# Patient Record
Sex: FEMALE | Race: WHITE | NOT HISPANIC OR LATINO | Employment: OTHER | URBAN - METROPOLITAN AREA
[De-identification: names, ages, dates, MRNs, and addresses within clinical notes are randomized per-mention and may not be internally consistent; named-entity substitution may affect disease eponyms.]

---

## 2017-01-06 ENCOUNTER — TELEPHONE (OUTPATIENT)
Dept: RHEUMATOLOGY | Facility: CLINIC | Age: 52
End: 2017-01-06

## 2017-01-06 NOTE — TELEPHONE ENCOUNTER
Social Work:  D/I: Pt contacted me to discuss her insurance issues. She lost MA over a year ago. She didn't know it ended until she started getting medical bills for the 20% co-pay. She is calling wondering what she can do. She can't afford a medicare supplement policy. I recall discussing [facility] [employer] with her in the past and recommend that program again. discussed that's it's not insurance but a discount program. She doesn't use any DME at this time as it's not covered under the program. Provied the phone # to obtain an application. Also encouraged her to apply for [facility] and [facility] [employer] programs for her old bills.  P: She will contact me again as needed.

## 2017-01-11 ENCOUNTER — TELEPHONE (OUTPATIENT)
Dept: RHEUMATOLOGY | Facility: CLINIC | Age: 52
End: 2017-01-11

## 2017-01-11 DIAGNOSIS — M34.9 SYSTEMIC SCLEROSIS (H): ICD-10-CM

## 2017-01-11 DIAGNOSIS — D50.0 IRON DEFICIENCY ANEMIA DUE TO CHRONIC BLOOD LOSS: ICD-10-CM

## 2017-01-11 DIAGNOSIS — Z79.899 ENCOUNTER FOR LONG-TERM CURRENT USE OF MEDICATION: ICD-10-CM

## 2017-01-11 DIAGNOSIS — M35.00 SICCA SYNDROME (H): ICD-10-CM

## 2017-01-11 DIAGNOSIS — I73.01 RAYNAUD'S DISEASE WITH GANGRENE (H): ICD-10-CM

## 2017-01-11 DIAGNOSIS — M77.40 METATARSALGIA, UNSPECIFIED LATERALITY: ICD-10-CM

## 2017-01-11 DIAGNOSIS — M05.79 RHEUMATOID ARTHRITIS INVOLVING MULTIPLE SITES WITH POSITIVE RHEUMATOID FACTOR (H): Primary | ICD-10-CM

## 2017-01-11 NOTE — TELEPHONE ENCOUNTER
Pt is requesting that an order for her PFT and also her labs be faxed to Dr. Foley with [facility] at [phone] due to insurance coverage. Dr. Ng's phone number is [phone].

## 2017-01-16 NOTE — TELEPHONE ENCOUNTER
Pt left VM re: message below. Orders not received. Requesting be sent again, she be called at [phone]

## 2017-01-19 ENCOUNTER — HOSPITAL ENCOUNTER (OUTPATIENT)
Dept: MAMMOGRAPHY | Facility: CLINIC | Age: 52
Discharge: HOME | End: 2017-01-19
Attending: FAMILY MEDICINE | Admitting: FAMILY MEDICINE
Payer: MEDICARE

## 2017-01-19 DIAGNOSIS — Z12.31 VISIT FOR SCREENING MAMMOGRAM: ICD-10-CM

## 2017-01-19 PROCEDURE — G0202 SCR MAMMO BI INCL CAD: HCPCS

## 2017-01-19 NOTE — TELEPHONE ENCOUNTER
Patient called again regarding orders. Informed that they need to be physically signed by the provider and that sometimes providers are only in office once or twice per week. Patient verbalized understanding of this and asked that I send another reminder message to rheumatology pool.

## 2017-01-24 RX ORDER — OXYCODONE HYDROCHLORIDE 5 MG/1
5 TABLET ORAL EVERY 4 HOURS PRN
Qty: 200 TABLET | Refills: 0 | Status: SHIPPED | OUTPATIENT
Start: 2017-01-24 | End: 2017-02-27

## 2017-01-24 RX ORDER — OXYCODONE AND ACETAMINOPHEN 5; 325 MG/1; MG/1
TABLET ORAL
Qty: 90 TABLET | Refills: 0 | Status: SHIPPED | OUTPATIENT
Start: 2017-01-24 | End: 2017-02-27

## 2017-01-24 NOTE — TELEPHONE ENCOUNTER
Pt called back. Please fax to [phone] [facility]. Pt wants to be called when done. Informed will be done as soon as possible. Please advise.

## 2017-01-24 NOTE — TELEPHONE ENCOUNTER
Last OFV: 9/1/16  Pending OFV: 2/16/17  Dx: Scleroderma  Last filled: Percocet 12/31/16   Oxycodone 1/3/17  Qty: Percocet 90   Oxycodone 200  Pharmacy: [facility] in [place]    No other narcotics filled

## 2017-01-24 NOTE — TELEPHONE ENCOUNTER
Pt is calling again to request that these orders be faxed as soon as possible.       Pt is also requesting refills of the below medications. Pt needs to pick these up this week due to leaving for [place] for over a week starting next Monday. Please contact pt at [phone] when Rx's are ready for .    oxyCODONE-acetaminophen (PERCOCET) 5-325 MG per tablet 90 tablet 0         Sig: Take 1 tablet three times daily as needed For breakthrough pain.     oxyCODONE (ROXICODONE) 5 MG IR tablet 200 tablet 0         Sig: Take 1 tablet (5 mg) by mouth every 4 hours as needed for moderate to severe pain or pain Maximum 8 tabs per day.

## 2017-01-24 NOTE — TELEPHONE ENCOUNTER
Pt called and informed that all orders were faxed to the [phone] number.  Patient stated understanding and all questions were answered.

## 2017-02-01 ENCOUNTER — TRANSFERRED RECORDS (OUTPATIENT)
Dept: HEALTH INFORMATION MANAGEMENT | Facility: CLINIC | Age: 52
End: 2017-02-01

## 2017-02-02 LAB
ALBUMIN SERPL-MCNC: 4.7 G/DL (ref 3.5–5.2)
ALT SERPL-CCNC: 9 U/L (ref 8–45)
AST SERPL-CCNC: 16 U/L (ref 2–40)
CREAT SERPL-MCNC: 0.78 MG/DL (ref 0.57–1.11)
CRP SERPL-MCNC: 2.28 MG/L
GFR SERPL CREATININE-BSD FRML MDRD: >60 ML/MIN/1.73M2
IRON BINDING CAP: 342 (ref 245–400)
IRON: 51 UG/DL (ref 25–156)
UIBC: 291 UG/DL

## 2017-02-03 ENCOUNTER — TRANSFERRED RECORDS (OUTPATIENT)
Dept: HEALTH INFORMATION MANAGEMENT | Facility: CLINIC | Age: 52
End: 2017-02-03

## 2017-02-03 LAB
ABSOLUTE BASOPHILS (EXTERNAL): 0
BACTERIA URINE: NORMAL
BASOPHILS NFR BLD AUTO: 0.2 %
BILIRUBIN UR: NORMAL MG/DL
BLOOD UR: NORMAL U/L
CLARITY, URINE: CLEAR
COLOR UR: YELLOW
EOSINOPHIL # BLD AUTO: 0.1 10*3/UL
EOSINOPHIL NFR BLD AUTO: 1 %
EPITHELIAL CELLS UR: NORMAL
ERYTHROCYTE [DISTWIDTH] IN BLOOD BY AUTOMATED COUNT: 13.1 % (ref 11.5–15.5)
ERYTHROCYTE [SEDIMENTATION RATE] IN BLOOD: 29 MM/HR
GLUCOSE URINE: NORMAL MG/DL
HCT VFR BLD AUTO: 37.1 % (ref 33–51)
HEMOGLOBIN: 11.8 G/DL (ref 12–16)
KETONES UR QL: NORMAL MG/DL
LEUKOCYTE ESTERASE URINE: NORMAL
LYMPHOCYTES # BLD AUTO: 1.8 10*3/UL (ref 0.9–2.9)
LYMPHOCYTES NFR BLD AUTO: 34 % (ref 20–?)
MCH RBC QN AUTO: 31.2 PG (ref 26–34)
MCHC RBC AUTO-ENTMCNC: 31.8 G/DL (ref 32–36)
MCV RBC AUTO: 98 FL (ref 80–100)
MONOCYTES # BLD AUTO: 0.5 10*3/UL
MONOCYTES NFR BLD AUTO: 9.1 %
NEUTROPHILS # BLD AUTO: 2.9 10*3/UL (ref 1.7–7)
NEUTROPHILS NFR BLD AUTO: 55.7 % (ref 42–?)
NITRITE UR QL STRIP: NORMAL
PH UR STRIP: 5 PH (ref 5–7)
PLATELET COUNT - QUEST: 223 10^9/L (ref 140–440)
PMV BLD: 10.2 FL (ref 6.5–11)
PROTEIN UR: NORMAL MG/DL
RBC # BLD AUTO: 3.78 10^12/L (ref 4–5.2)
RBC URINE: NORMAL
SPECIFIC GRAVITY URINE (EXTERNAL): <=1.005
UROBILINOGEN (EXTERNAL): NORMAL
WBC # BLD AUTO: 5.2 10^9/L (ref 4.5–11)
WBC URINE: NORMAL

## 2017-02-16 ENCOUNTER — OFFICE VISIT (OUTPATIENT)
Dept: RHEUMATOLOGY | Facility: CLINIC | Age: 52
End: 2017-02-16
Attending: INTERNAL MEDICINE
Payer: MEDICARE

## 2017-02-16 VITALS
SYSTOLIC BLOOD PRESSURE: 114 MMHG | HEART RATE: 61 BPM | OXYGEN SATURATION: 91 % | DIASTOLIC BLOOD PRESSURE: 65 MMHG | HEIGHT: 64 IN | WEIGHT: 122 LBS | BODY MASS INDEX: 20.83 KG/M2

## 2017-02-16 DIAGNOSIS — B37.81 ESOPHAGEAL CANDIDIASIS (H): ICD-10-CM

## 2017-02-16 DIAGNOSIS — K31.819 GAVE (GASTRIC ANTRAL VASCULAR ECTASIA): ICD-10-CM

## 2017-02-16 DIAGNOSIS — M34.9 SYSTEMIC SCLEROSIS (H): ICD-10-CM

## 2017-02-16 DIAGNOSIS — Z79.899 ENCOUNTER FOR LONG-TERM CURRENT USE OF MEDICATION: ICD-10-CM

## 2017-02-16 DIAGNOSIS — M35.00 SICCA SYNDROME (H): ICD-10-CM

## 2017-02-16 DIAGNOSIS — D50.8 OTHER IRON DEFICIENCY ANEMIA: Primary | ICD-10-CM

## 2017-02-16 PROCEDURE — 99212 OFFICE O/P EST SF 10 MIN: CPT | Mod: ZF

## 2017-02-16 RX ORDER — ASPIRIN AND DIPYRIDAMOLE 25; 200 MG/1; MG/1
1 CAPSULE, EXTENDED RELEASE ORAL DAILY
COMMUNITY
Start: 2016-06-02

## 2017-02-16 NOTE — MR AVS SNAPSHOT
"              After Visit Summary   2/16/2017    Willard Grayson    MRN: [id number]           Patient Information     Date Of Birth          1965        Visit Information        Provider Department      2/16/2017 4:00 PM Brennen Child MD [facility] Rheumatology        Today's Diagnoses     Other iron deficiency anemia    -  1    Systemic sclerosis (H)        Encounter for long-term current use of medication        Sicca syndrome (H)        GAVE (gastric antral vascular ectasia)        Esophageal candidiasis (H)           Follow-ups after your visit        Your next 10 appointments already scheduled     May 18, 2017  2:30 PM CDT   (Arrive by 2:15 PM)   Return Visit with Brennen Child MD   [facility] Rheumatology ([facility])    [address] [address] [phone]              Who to contact     If you have questions or need follow up information about today's clinic visit or your schedule please contact [facility] RHEUMATOLOGY directly at [phone].  Normal or non-critical lab and imaging results will be communicated to you by [employer], letter or phone within 4 business days after the clinic has received the results. If you do not hear from us within 7 days, please contact the clinic through [employer] or phone. If you have a critical or abnormal lab result, we will notify you by phone as soon as possible.  Submit refill requests through [employer] or call your pharmacy and they will forward the refill request to us. Please allow 3 business days for your refill to be completed.          Additional Information About Your Visit        [employer] Information     [employer] lets you send messages to your doctor, view your test results, renew your prescriptions, schedule appointments and more. To sign up, go to [email or web address] . Click on \"Log in\" on the left side of the screen, which will take you to the Welcome page. Then click on \"Sign up Now\" on the " "right side of the page.     You will be asked to enter the access code listed below, as well as some personal information. Please follow the directions to create your username and password.     Your access code is: [email or web address]  Expires: 5/3/2017  6:30 AM     Your access code will  in 90 days. If you need help or a new code, please call your [facility] or [phone].        Care EveryWhere ID     This is your Care EveryWhere ID. This could be used by other organizations to access your [facility] medical records  [id number]        Your Vitals Were     Pulse Height Pulse Oximetry BMI (Body Mass Index)          61 1.626 m (5' 4\") 91% 20.94 kg/m2         Blood Pressure from Last 3 Encounters:   No data found for BP    Weight from Last 3 Encounters:   No data found for Wt              Today, you had the following     No orders found for display       Primary Care Provider Office Phone # Fax #    Lindy Neeta Plasencia [phone]       [place] [address] [facility] [address]        Thank you!     Thank you for choosing [facility] RHEUMATOLOGY  for your care. Our goal is always to provide you with excellent care. Hearing back from our patients is one way we can continue to improve our services. Please take a few minutes to complete the written survey that you may receive in the mail after your visit with us. Thank you!             Your Updated Medication List - Protect others around you: Learn how to safely use, store and throw away your medicines at www.disposemymeds.org.          This list is accurate as of: 17 11:59 PM.  Always use your most recent med list.                   Brand Name Dispense Instructions for use    aspirin-dipyridamole  MG per 12 hr capsule    AGGRENOX     Take 1 capsule by mouth       Blood Pressure Monitoring Kit     1 kit    Check BP every 7 days.       cevimeline 30 MG capsule    EVOXAC    270 capsule    Take 1 capsule (30 mg) by mouth 3 " times daily as needed       cyanocobalamin 1000 MCG/ML injection    VITAMIN B12     Inject 1 mL into the muscle every 30 days       esomeprazole 40 MG CR capsule    nexIUM    180 capsule    Take 1 capsule (40 mg) by mouth 2 times daily Take 30-60 minutes before eating.       fluconazole 100 MG tablet    DIFLUCAN    15 tablet    Take 2 tablets (200 mg) for 1 day followed by 1 tablet (100 mg) daily for 13 days.       folic acid 1 MG tablet    FOLVITE    450 tablet    Take 5 tablets (5 mg) by mouth daily       LORazepam 0.5 MG tablet    ATIVAN    60 tablet    Take 1 - 2 tablets by mouth three times daily as needed.       mycophenolate 500 MG tablet    CELLCEPT - GENERIC EQUIVALENT    360 tablet    Take 2 tablets (1,000 mg) by mouth 2 times daily *LABS DUE EVERY 8-12 WKS       ondansetron 4 MG tablet    ZOFRAN    20 tablet    Take 1 tablet (4 mg) by mouth every 8 hours as needed for nausea       oxyCODONE 5 MG IR tablet    ROXICODONE    200 tablet    Take 1 tablet (5 mg) by mouth every 4 hours as needed for moderate to severe pain or pain Maximum 8 tabs per day.       oxyCODONE-acetaminophen 5-325 MG per tablet    PERCOCET    90 tablet    Take 1 tablet three times daily as needed For breakthrough pain.       pravastatin 10 MG tablet    PRAVACHOL    30 tablet    Take 1 tablet (10 mg) by mouth daily       pregabalin 75 MG capsule    LYRICA    180 capsule    Take 1 capsule (75 mg) by mouth 2 times daily       valACYclovir 500 MG tablet    VALTREX    180 tablet    Take 1 tablet (500 mg) by mouth 2 times daily       VITAMIN D (CHOLECALCIFEROL) PO      Take by mouth daily

## 2017-02-16 NOTE — LETTER
2/16/2017      RE: Willard Grayson  [address]    [facility] [address]       Rheumatology Clinic Follow-up  [facility], Rheumatology        History of rheumatologic diagnosis:    Problem List:  1. 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   2. Associated marked neuropathic skin pain markedly improved with Lyrica with prior medication therapy with gabapentin.   3. Diffuse musculoskeletal pain requiring methotrexate plus CellCept, and narcotic to control, but also improved.   4. Initial clinical overlap with anti-CCP seropositive rheumatoid arthritis with continued anti-CCP seropositivity as of 09/2010.   5. Raynaud's phenomena with new active digital ulcers with easily cracked skin.   6. GI involvement consistent with bacterial small bowel overgrowth with marked improvement after a course of neomycin followed by Dr. Glez; positive hydrogen breath test in 3/2012 - treated wtih rifaximin through Dr. Jacob  7. No convincing evidence of lung involvement on serial pulmonary function tests or clinically.   8. History of heart palpitations prior to the onset of SSc with stable symptoms since.   9. History of medication failures with Remicade, methotrexate, methotrexate plus CellCept and with Orencia and with improvement since initiation of IV IG but with complicating headaches and overall sense of malaise with IV IG infusions.   10. History of silicone breast implants.   11. Incidental finding of calcified splenic artery aneurysm.  12. EGD in 3/2012 with hiatal hernia and gastral antral vascular ectasia        13. Recurrent right  third finger contracture with  extensor surface ulceration, healed        INTERVAL HISTORY, Since our last visit and her November  hospitalization, she has had no more digital ulcers and decreased pain in her fingers.  She did quit smoking at the time of that hospitalization and she has not resumed.  She still has pain in her fingers, but not nearly so severe.  Her biggest problem with her fingers remains the marked contractures.  She has difficulty using the paraffin bath because the paraffin is too hot, so we discussed simply unplugging it for a few minutes and letting it cool down a little and then before it gels using it to warm her hands and allow the stretches.       Since we have last seen her, she has seen her primary care.  I had requested that the lorazepam prescriptions be moved back over to primary care since I had simply assumed them when she had no primary care physician for her existing diagnosis of anxiety.  That has now been accomplished.      She also continues on oxycodone and we again today discussed attempting to taper that down.  She has decreased pain and hopefully can accomplish that, although she still does have multiple sources of musculoskeletal pain.  She has been off of methotrexate since the fall as she was having some gastrointestinal issues with it, but we did discuss that if tapering the narcotics becomes too problematic because of musculoskeletal pain with morning stiffness, resuming low-dose methotrexate might be possible and desirable.        She was started on a statin during the hospitalization and I also asked that that be taken over the primary care.  In fact, she brings her lipid results with her today and they are very good with her total cholesterol only 202, just barely above target, LDL only 133, just barely above target, HDL at 45 which is target, and triglycerides under target, so I am not sure that particularly now that she has the cigarette smoking removed as a risk factor and she has minimal family risk that she necessarily will need statins long-term.  On the other hand, they may have a  vascular stabilizing effect, so I am certainly not opposed to her remaining on it and she does not have side effect with it.       Her biggest issues recently have been gastrointestinal.  She has felt bloated, nauseated, and at times is having significant constipation.  She does not get diarrhea suggesting bacterial small bowel overgrowth.       She is having a lot of issues with her insurance and whether she can pay for her medical bills.  She therefore would like to see our .      She does get some dysphagia, but that is not changing and is not severe.       As noted, she has a number of sources of musculoskeletal pain including pain in her knees and hips and greater trochanteric area.  She did have what sounds like a Baker's cyst a few weeks ago, but that resolved quickly and she did not have any known antecedent injury that would explain that.     FEBRUARY 16, 2017, INTERVAL HISTORY:  Since I last saw the patient, she went down to Gastroenterology at the [facility] and saw Dr. Tatum there.  She was found to have recurrent versus ongoing esophageal candidiasis on upper endoscopy but had a normal colonoscopy.  A rectal balloon test, however, was abnormal, consistent with sphincter dysfunction potentially contributing to her chronic constipation.       She was also told she needed iron infusions.  This is somewhat curious in that her iron studies done at [facility] on 01/27 were only minimally abnormal and her hemoglobin and hematocrit were only minimally abnormal as well.  Apparently, her ferritin was very low, however.       We do not have any of those records to look at.  She says that she asked that they be sent here, but she was told they would be sent only to her primary care doctor.  I have looked in her [facility] notes on Care Everywhere, but I cannot see that they have been scanned in at this point.       She has some financial issues at this point.  Her labs and other tests are best paid for if  they are done at [facility].  Fortunately, her primary care doctor at [facility] was adept enough with the system that she was able to get the labs done at [facility] and have them come directly to my box.  I have reviewed all of them with her today and, as noted, she  has only had some very modest anemia.  She has no other evidence of any toxicity from the CellCept or really any other abnormal labs.       She has been doing well with her Raynaud's.  She has had no episodes of severe distal digital ischemia.  She is very careful about stopping attacks as soon as they get started with rewarming.  She has hand warmers with her at all times and a hand muff that she uses.       Unfortunately, she has restarted smoking again.  She is much more careful with it, she says, however, and is not smoking outdoors when in the cold the way she was previously when she developed a severe ulcer that would not heal.       Although she hated the Botox treatment that Dr. Beauchamp gave her, she has not had any further episodes since that time.       She does believe her lungs are stable.  She is not doing a lot of significant aerobic exercise, but she has not noticed any drop-off in her exercise tolerance with walking, etc.  She has not had PFTs done and I urged her to have them done at [facility] since it is so expensive for her to do them here.       She feels okay otherwise in general.  She continues to have some musculoskeletal pain but does not think it is really a lot worse.           ROS  Gen: Denies weight loss or fevers or night sweats  HEENT:  No ocular inflammation..  Denies swollen glands  CV:  Denies chest pains  Resp:  denies chronic cough or dyspnea.  No pleurisy  Ext:  No swelling in extremities.    Skin:  No new rashes  Remainder of 10-point ROS as per Interval history or negative,     Past Medical History  1.  Diffuse systemic sclerosis  2.  CCP+ inflammatory arthritis  3.  Small bowel overgrowth syndrome - positive hydrogen test   4.  " GERD  5.  Plantar fasciitis - seen by Dr. Jorgensen  6.  Chronic neuropathic pain - on lyrica and chronic percocet    Allergies   Allergies   Allergen Reactions     Penicillin [Esters] Other (See Comments)     Unable to move     Bactroban [Mupirocin Calcium]      Inability to move     Levaquin Other (See Comments)     Muscle weakness       Rifampin Other (See Comments)     Makes pain medication less effective     Medications  Current Outpatient Prescriptions   Medication     LORazepam (ATIVAN) 0.5 MG tablet     valACYclovir (VALTREX) 500 MG tablet     oxyCODONE (ROXICODONE) 5 MG immediate release tablet     oxyCODONE-acetaminophen (PERCOCET) 5-325 MG per tablet     pregabalin (LYRICA) 75 MG capsule     rifaximin (XIFAXAN) 550 MG TABS     mycophenolate (CELLCEPT-BRAND NAME) 500 MG tablet     omeprazole (PRILOSEC) 40 MG capsule     ondansetron (ZOFRAN) 4 MG tablet     Needle, Disp, 30G X 1/2\" MISC     diazepam (VALIUM) 5 MG tablet     fish oil-omega-3 fatty acids (FISH OIL) 1000 MG capsule     METHOTREXate 25 MG/ML injection     leucovorin (WELLCOVORIN) 5 MG tablet     Syringe/Needle, Disp, 27G X 1/2\" 1 ML MISC     Physical Exam  /65  Pulse 61  Ht 1.626 m (5' 4\")  Wt 55.3 kg (122 lb)  SpO2 91%  BMI 20.94 kg/m2  General: pleasant, but dysphoric  HEENT: EOMI, PERRL, no conjunctivitis or scleritis. Chronic facial skin thickenig without facial rash. Slight pursing of her lips. Slight restriction in opening of the jaw. Oropharynx exam reveals 4-5mm shallow ulceration on right lower lip. Moderate saliva pooling. No cervical adenopathy  CV: regular rhythm; no murmurs.    Resp: End-expiratory wheezing heard diffusely throughout  GI:  Soft, minimal  discomfort in the lower abdomen to deeper palpation; no masses or hepatomegaly  Ext:  No peripheral edema.   Skin:  Chronic sclerodactyly, most prominent in R hand (contracture at 50% finger flexion) vs. Left (missing final 20' extension).  Chronic skin thickening with " evidence for improvement along forearms, upper arms, chest, abdomen, lower legs and feet.  Stable Calcinosis-like deposit noted on her right anterior thigh just above the knee joint and on right fith finger. . Salt/pepper appearance to areas of skin (christiano arms and upper chest).  No other rashes.   Fully healed  DU  over  the  3rd  PIP joint: no otherDU.   Mild skin thinning/erythema along other PIPs. 2+ over fingers ; 1+ maximum over hands, and feet and shins, and face, pregressing to atrophic status.    MSK:  Full joint exam without evidence for synovitis. Chronic joint contractures of fingers (christiano right); minimally also along wrists and elbows.       RESULTS:  Component      Latest Ref Rng 1/17/2013           2:15 PM   WBC      4.0 - 11.0 10e9/L 4.6   RBC Count      3.8 - 5.2 10e12/L 3.71 (L)   Hemoglobin      11.7 - 15.7 g/dL 11.8   Hematocrit      35.0 - 47.0 % 37.4   MCV      78 - 100 fl 101 (H)   MCH      26.5 - 33.0 pg 31.8   MCHC      31.5 - 36.5 g/dL 31.6   RDW      10.0 - 15.0 % 13.9   Platelet Count      150 - 450 10e9/L 200     Component      Latest Ref Rng 1/17/2013   ALT      0 - 50 U/L 23   AST      0 - 45 U/L 30   Albumin      3.9 - 5.1 g/dL 4.2   CRP Inflammation      0.0 - 8.0 mg/L <5.0   Sed Rate      0 - 20 mm/h 18         ASSESSMENT: Per the Problem LIst    PLAN AND RECOMMENDATIONS:     I think her overall disease process is relatively stable and has been for a while.      I am discouraged that she has again started smoking, but encouraged that she has not developed digital ulcers since she did restart.      It sounds like she had a pretty good GI workup.  She has been treated now with high-dose fluconazole for recurrent esophageal candidiasis and she had a little bit of GI distress with that but nothing serious and her liver function tests were normal on the recent labs.      I am not quite sure what to make of the somewhat divergent reports of iron studies, so will wait to see what was done at  [facility].  If she cannot get that sent here, she will get a copy for herself and then simply bring in a copy for us.       Hopefully, she can continue to get her labs patched directly through to us the way they were done this last time by her primary care physician and we can also get pulmonary function tests done that way.  Based on her description of her exercise tolerance and her lack of crackles on the basilar lung exam, I am not terribly concerned, but we still should have those done at least annually.        She otherwise looks reasonably well.  I will see her back in 3 months, sooner p.r.n.       This visit was 40 minutes in duration, greater than 50% in counseling.       Brennen Child MD

## 2017-02-16 NOTE — NURSING NOTE
"No chief complaint on file.      Initial /65  Pulse 61  Ht 1.626 m (5' 4\")  Wt 55.3 kg (122 lb)  SpO2 91%  BMI 20.94 kg/m2 Estimated body mass index is 20.94 kg/(m^2) as calculated from the following:    Height as of this encounter: 1.626 m (5' 4\").    Weight as of this encounter: 55.3 kg (122 lb).  Medication Reconciliation: complete   Rosa Gonzalez CMA    "

## 2017-02-24 DIAGNOSIS — M05.79 RHEUMATOID ARTHRITIS INVOLVING MULTIPLE SITES WITH POSITIVE RHEUMATOID FACTOR (H): ICD-10-CM

## 2017-02-24 DIAGNOSIS — I73.01 RAYNAUD'S DISEASE WITH GANGRENE (H): ICD-10-CM

## 2017-02-24 DIAGNOSIS — M77.40 METATARSALGIA, UNSPECIFIED LATERALITY: ICD-10-CM

## 2017-02-24 DIAGNOSIS — M34.9 SYSTEMIC SCLEROSIS (H): ICD-10-CM

## 2017-02-24 NOTE — TELEPHONE ENCOUNTER
Pt is calling to request refills of the below medications. Pt would like to pick these up at the [facility]. Pt would like to pick these up on 2/28/17. Please contact her at [phone].      oxyCODONE (ROXICODONE) 5 MG IR tablet 200 tablet 0      Sig: Take 1 tablet (5 mg) by mouth every 4 hours as needed for moderate to severe pain or pain Maximum 8 tabs per day.     xyCODONE-acetaminophen (PERCOCET) 5-325 MG per tablet 90 tablet 0      Sig: Take 1 tablet three times daily as needed For breakthrough pain.

## 2017-02-27 DIAGNOSIS — M34.9 SYSTEMIC SCLEROSIS (H): ICD-10-CM

## 2017-02-27 NOTE — TELEPHONE ENCOUNTER
pregabalin (LYRICA) 75 MG capsule      Last Written Prescription Date:  7/27/2016  Last Fill Quantity: 180,   # refills: 1  Last Office Visit : 2/16/2017  Future Office visit:  5/18/2017    Routing refill request to provider for review/approval because:  Drug controlled substance

## 2017-02-27 NOTE — TELEPHONE ENCOUNTER
Roxicodone 5 mg      Last Written Prescription Date:  1-24-17  Last Fill Quantity: 200,   # refills: 0      Percocet 5-325 mg      Last Written Prescription Date:  1-24-17  Last Fill Quantity: 90,   # refills: 0  Last Office Visit : 2-16-17  Future Office visit:  5-18-17    Routing refill request to provider for review/approval because:  Drug not on the [facility], [facility] or [facility] refill protocol or controlled substance.    Kathleen M Doege RN

## 2017-02-28 RX ORDER — PREGABALIN 75 MG/1
75 CAPSULE ORAL 2 TIMES DAILY
Qty: 180 CAPSULE | Refills: 1
Start: 2017-02-28 | End: 2018-01-16

## 2017-02-28 RX ORDER — OXYCODONE AND ACETAMINOPHEN 5; 325 MG/1; MG/1
TABLET ORAL
Qty: 90 TABLET | Refills: 0 | Status: SHIPPED | OUTPATIENT
Start: 2017-02-28 | End: 2017-03-28

## 2017-02-28 RX ORDER — OXYCODONE HYDROCHLORIDE 5 MG/1
5 TABLET ORAL EVERY 4 HOURS PRN
Qty: 200 TABLET | Refills: 0 | Status: SHIPPED | OUTPATIENT
Start: 2017-02-28 | End: 2017-03-28

## 2017-03-10 ENCOUNTER — TRANSFERRED RECORDS (OUTPATIENT)
Dept: HEALTH INFORMATION MANAGEMENT | Facility: CLINIC | Age: 52
End: 2017-03-10

## 2017-03-22 DIAGNOSIS — M34.9 SYSTEMIC SCLEROSIS (H): ICD-10-CM

## 2017-03-22 DIAGNOSIS — M05.79 RHEUMATOID ARTHRITIS INVOLVING MULTIPLE SITES WITH POSITIVE RHEUMATOID FACTOR (H): ICD-10-CM

## 2017-03-22 DIAGNOSIS — I73.01 RAYNAUD'S DISEASE WITH GANGRENE (H): ICD-10-CM

## 2017-03-22 DIAGNOSIS — M77.40 METATARSALGIA, UNSPECIFIED LATERALITY: ICD-10-CM

## 2017-03-23 DIAGNOSIS — M77.40 METATARSALGIA, UNSPECIFIED LATERALITY: ICD-10-CM

## 2017-03-23 DIAGNOSIS — M34.9 SYSTEMIC SCLEROSIS (H): ICD-10-CM

## 2017-03-23 DIAGNOSIS — M35.00 SICCA SYNDROME (H): ICD-10-CM

## 2017-03-23 RX ORDER — CEVIMELINE HYDROCHLORIDE 30 MG/1
30 CAPSULE ORAL 3 TIMES DAILY PRN
Qty: 270 CAPSULE | Refills: 3 | Status: SHIPPED | OUTPATIENT
Start: 2017-03-23 | End: 2018-06-11

## 2017-03-23 NOTE — TELEPHONE ENCOUNTER
cevimeline (EVOXAC) 30 MG      Last Written Prescription Date:  11/4/15  Last Fill Quantity: 270,   # refills: 3  Last Office Visit : 2/16/17  Future Office visit:  5/18/17

## 2017-03-23 NOTE — TELEPHONE ENCOUNTER
mycophenolate (CELLCEPT - GENERIC EQUIVALENT) 500 MG tablet      Last Written Prescription Date:  12-11-16  Last Fill Quantity: 360,   # refills: 0  Last Office Visit: 2-16-17  Future Office visit:  5-18-17    CBC RESULTS:   Recent Labs   Lab Test  01/25/17   1445   WBC  5.2   RBC  3.78*   HGB  11.8*   HCT  37.1   MCV  98   MCH  31.2   MCHC  31.8*   RDW  13.1   PLT  223       Creatinine   Date Value Ref Range Status   01/25/2017 0.78 0.57 - 1.11 mg/dL Final   ]    Liver Function Studies -   Recent Labs   Lab Test  01/25/17   1445   11/24/15   1848   PROTTOTAL   --    --   7.8   ALBUMIN  4.7   < >  4.1   BILITOTAL   --    --   0.4   ALKPHOS   --    --   49   AST  16   < >  12   ALT  9   < >  15    < > = values in this interval not displayed.       Kathleen M Doege RN

## 2017-03-24 NOTE — TELEPHONE ENCOUNTER
Prescription(s):  Pending Prescriptions:                       Disp   Refills    mycophenolate (CELLCEPT - GENERIC EQUIVAL*360 ta*0            Sig: Take 2 tablets (1,000 mg) by mouth 2 times daily           Labs every 8-12 weeks    oxyCODONE (ROXICODONE) 5 MG IR tablet     200 ta*0            Sig: Take 1 tablet (5 mg) by mouth every 4 hours as           needed for moderate to severe pain or pain           Maximum 8 tabs per day.    oxyCODONE-acetaminophen (PERCOCET) 5-325 *90 tab*0            Sig: Take 1 tablet three times daily as needed For           breakthrough pain.        Last Date Narcotics Ordered: 2/24/17 for start date of 2/28/17  Last Date Filled at Pharmacy: roxicodone: 3/6/17 (34 day supply) - percocet: 2/28/17 (30 day supply)     checked: 03/24/17 - no concerns, no other providers for these medications.    Last Office Visit: 2/16/2017  Next Office Visit: Visit date not found    Aline Mireles CMA ([facility])  [facility]

## 2017-03-26 NOTE — TELEPHONE ENCOUNTER
mycophenolate (CELLCEPT      Last Written Prescription Date:  12/11/16  Last Fill Quantity: 360,   # refills: 0  Last Office Visit: 2/24/17  Future Office visit:  5/18/17    CBC RESULTS:   Recent Labs   Lab Test  01/25/17   1445   WBC  5.2   RBC  3.78*   HGB  11.8*   HCT  37.1   MCV  98   MCH  31.2   MCHC  31.8*   RDW  13.1   PLT  223       Creatinine   Date Value Ref Range Status   01/25/2017 0.78 0.57 - 1.11 mg/dL Final   ]    Liver Function Studies -   Recent Labs   Lab Test  01/25/17   1445   11/24/15   1848   PROTTOTAL   --    --   7.8   ALBUMIN  4.7   < >  4.1   BILITOTAL   --    --   0.4   ALKPHOS   --    --   49   AST  16   < >  12   ALT  9   < >  15    < > = values in this interval not displayed.   Routing refill request to provider for review/approval because:LABS DUE       oxyCODONE 5 MG   Last Prescription Date:  2/28/17  Last Fill Quantity: 200,   # refills: 0  Routing refill request to provider for review/approval because:  Drug not on [place] [facility] refill protocol or controlled substance     PERCOCET 5-325 MG  Last Written Prescription Date:   2/28/17  Last Fill Quantity: 90,   # refills: 0  Routing refill request to provider for review/approval because:  Drug not on the [facility] refill protocol or controlled substance

## 2017-03-27 NOTE — TELEPHONE ENCOUNTER
Scleroderma pt of Dr Talbert.    Last seen 2/24/17, with future appt 5/18/17. Med monitoring labs are ordered to be done every 8-12 weeks.    Rxs set up and routed to Dr Gonzalez, consulting rheumatologist.    WBC   Date Value Ref Range Status   01/25/2017 5.2 4.5 - 11.0 10^9/L Final     RBC Count   Date Value Ref Range Status   01/25/2017 3.78 (A) 4.00 - 5.20 10^12/L Final     Hemoglobin   Date Value Ref Range Status   01/25/2017 11.8 (A) 12.0 - 16.0 g/dL Final     Hematocrit   Date Value Ref Range Status   01/25/2017 37.1 33.0 - 51.0 % Final     MCV   Date Value Ref Range Status   01/25/2017 98 80 - 100 fl Final     MCH   Date Value Ref Range Status   01/25/2017 31.2 26.0 - 34.0 pg Final     Platelet Count   Date Value Ref Range Status   01/25/2017 223 140 - 440 10^9/L Final   10/26/2016 189 150 - 450 10e9/L Final     % Lymphocytes   Date Value Ref Range Status   01/25/2017 34.0 20.0 % Final     AST   Date Value Ref Range Status   01/25/2017 16 2 - 40 U/L Final     ALT   Date Value Ref Range Status   01/25/2017 9 8 - 45 U/L Final     Albumin   Date Value Ref Range Status   01/25/2017 4.7 3.5 - 5.2 g/dL Final     Alkaline Phosphatase   Date Value Ref Range Status   11/24/2015 49 40 - 150 U/L Final     Creatinine   Date Value Ref Range Status   01/25/2017 0.78 0.57 - 1.11 mg/dL Final     GFR Estimate   Date Value Ref Range Status   01/25/2017 >60 ml/min/1.73m2 Final     GFR Estimate If Black   Date Value Ref Range Status   01/25/2017 >60 ml/min/1.73m2 Final     Creatinine Urine   Date Value Ref Range Status   05/13/2013 7 mg/dL Final     Sed Rate   Date Value Ref Range Status   01/25/2017 29 <31 mm/hr Final     CRP Cardiac Risk   Date Value Ref Range Status   08/24/2010 1.0 mg/L Final     Comment:     Reference Values:   Low Risk:           <1.0 mg/L   Average Risk:       1.0-3.0 mg/L   High Risk:          >3.0 mg/L   Acute Inflammation: >8.0 mg/L     CRP Inflammation   Date Value Ref Range Status   01/25/2017 2.28  <0.50 Final

## 2017-03-28 RX ORDER — OXYCODONE HYDROCHLORIDE 5 MG/1
TABLET ORAL
Qty: 200 TABLET | Refills: 0 | OUTPATIENT
Start: 2017-03-28

## 2017-03-28 RX ORDER — OXYCODONE HYDROCHLORIDE 5 MG/1
5 TABLET ORAL EVERY 4 HOURS PRN
Qty: 200 TABLET | Refills: 0 | Status: SHIPPED | OUTPATIENT
Start: 2017-03-28 | End: 2017-03-31

## 2017-03-28 RX ORDER — OXYCODONE AND ACETAMINOPHEN 5; 325 MG/1; MG/1
TABLET ORAL
Qty: 90 TABLET | Refills: 0 | Status: SHIPPED | OUTPATIENT
Start: 2017-03-28 | End: 2017-03-31

## 2017-03-28 RX ORDER — OXYCODONE AND ACETAMINOPHEN 5; 325 MG/1; MG/1
TABLET ORAL
Qty: 90 TABLET | Refills: 0 | OUTPATIENT
Start: 2017-03-28

## 2017-03-28 RX ORDER — MYCOPHENOLATE MOFETIL 500 MG/1
1000 TABLET ORAL 2 TIMES DAILY
Qty: 120 TABLET | Refills: 3 | OUTPATIENT
Start: 2017-03-28

## 2017-03-28 RX ORDER — MYCOPHENOLATE MOFETIL 500 MG/1
1000 TABLET ORAL 2 TIMES DAILY
Qty: 360 TABLET | Refills: 0 | Status: SHIPPED | OUTPATIENT
Start: 2017-03-28 | End: 2017-06-14

## 2017-03-29 ENCOUNTER — TELEPHONE (OUTPATIENT)
Dept: RHEUMATOLOGY | Facility: CLINIC | Age: 52
End: 2017-03-29

## 2017-03-29 DIAGNOSIS — M06.9 RHEUMATOID ARTHRITIS (H): Primary | ICD-10-CM

## 2017-03-29 DIAGNOSIS — D50.9 ANEMIA, IRON DEFICIENCY: ICD-10-CM

## 2017-03-29 DIAGNOSIS — Z79.899 HIGH RISK MEDICATIONS (NOT ANTICOAGULANTS) LONG-TERM USE: ICD-10-CM

## 2017-03-29 DIAGNOSIS — M34.9 SYSTEMIC SCLEROSIS (H): ICD-10-CM

## 2017-03-29 DIAGNOSIS — K31.819 GAVE (GASTRIC ANTRAL VASCULAR ECTASIA): ICD-10-CM

## 2017-03-29 NOTE — TELEPHONE ENCOUNTER
Dr. Minerva Elizabeth from [facility] is calling to discuss prescribing vaginal estrogen for this pt. Please contact Dr. Elizabeth at [phone] (detailed VM OK).

## 2017-03-29 NOTE — TELEPHONE ENCOUNTER
Pt stopped into clinic to allow staff to copy her [place] records. Copies made and originals given back to pt.    Pt is asking that lab orders be send to Dr Ng's office again as it is cheaper to get her labs done at that office. Fax number is [phone],    Lab orders printed, ready to Dr Child's signature, which will then be faxed.

## 2017-03-30 NOTE — TELEPHONE ENCOUNTER
Left message on Dr Elizabeth's voice mail informing her that Dr Child is out of the clinic this week, returning next week. Left my phone number so that she could return call to provide more information if she wished, otherwise a message will be forwarded to Dr Child.

## 2017-03-31 DIAGNOSIS — M34.9 SYSTEMIC SCLEROSIS (H): ICD-10-CM

## 2017-03-31 DIAGNOSIS — M77.40 METATARSALGIA, UNSPECIFIED LATERALITY: ICD-10-CM

## 2017-03-31 DIAGNOSIS — I73.01 RAYNAUD'S DISEASE WITH GANGRENE (H): ICD-10-CM

## 2017-03-31 DIAGNOSIS — M05.79 RHEUMATOID ARTHRITIS INVOLVING MULTIPLE SITES WITH POSITIVE RHEUMATOID FACTOR (H): ICD-10-CM

## 2017-03-31 RX ORDER — OXYCODONE AND ACETAMINOPHEN 5; 325 MG/1; MG/1
TABLET ORAL
Qty: 90 TABLET | Refills: 0 | Status: SHIPPED | OUTPATIENT
Start: 2017-03-31 | End: 2017-04-24

## 2017-03-31 RX ORDER — OXYCODONE HYDROCHLORIDE 5 MG/1
5 TABLET ORAL EVERY 4 HOURS PRN
Qty: 200 TABLET | Refills: 0 | Status: SHIPPED | OUTPATIENT
Start: 2017-03-31 | End: 2017-04-24

## 2017-03-31 NOTE — TELEPHONE ENCOUNTER
Returned call to pt. She reports that after she picked up her narcotic prescriptions here, she brought them to her [facility] pharmacy. States the pharmacist who works with her there took them. The plan was that she was going to pick them up today. When she called to check on the status she spoke with a different pharmacist and was told they did not have the scripts. Furthermore was told that it appeared they had lost them. The pharmacist she gave them to was not at work, has been ill off and on this past week. Pt was told to contact this clinic for new ones.    Call placed to [facility] pharmacy and spoke with the pharmacist. Was told that he had been told that the other pharmacist verified he had received the scripts from this pt but can't remember what he had done with them. Pharmacy staff has searched their pharmacy but hasn't seen any sign of them. Pharmacist apologized.    Pt will be out of these meds tonight and needs to get them filled.    Rxs set up and will be routed to consult provider.

## 2017-03-31 NOTE — TELEPHONE ENCOUNTER
Pt left a voice message stating the pharmacy lost her narcotic Rx's and needs to pick these up today. Please contact pt at [phone].

## 2017-03-31 NOTE — TELEPHONE ENCOUNTER
Pt called called from [facility] re: pain meds. [facility] informed her ibuprofen Rx could not be fill b/c too early, requesting other med for pain. Please advise ASAP at [phone]. Sent to refill pool pool.

## 2017-04-03 NOTE — TELEPHONE ENCOUNTER
You can let them know that I think this is okay. If she has more specific questions ns I'm happy to talk to her.

## 2017-04-03 NOTE — TELEPHONE ENCOUNTER
Call placed to Dr Elizabeth. Pt has so much post menopausal vaginal atrophy that they are not able to do a colposcopy due to pt not tolerating it. Dr Elizabeth just wanted to give her vaginal estrogen so that they can complete the colposcopy. Relayed Dr Child's comment regarding it was to prescribe the vaginal estrogen. Dr Elizabeth appreciated the call back. She did not have any other questions.    TIARRA FrankN RN  Rheumatology RN Coordinator   [facility]

## 2017-04-06 ENCOUNTER — TELEPHONE (OUTPATIENT)
Dept: RHEUMATOLOGY | Facility: CLINIC | Age: 52
End: 2017-04-06

## 2017-04-06 DIAGNOSIS — M06.9 RA (RHEUMATOID ARTHRITIS) (H): Primary | ICD-10-CM

## 2017-04-06 DIAGNOSIS — M34.9 SYSTEMIC SCLEROSIS (H): ICD-10-CM

## 2017-04-06 NOTE — TELEPHONE ENCOUNTER
Pt saying pulmonary clinic cannot schedule PFT d/t orders being old. Also asking whether Dr. Child wants 30 or 60 minutes test. Please advise. Sent to [facility].

## 2017-04-07 NOTE — TELEPHONE ENCOUNTER
PFT orders updated. Pt notified this has been done.    TIARRA FrankN RN  Rheumatology RN Coordinator  [facility]

## 2017-04-10 DIAGNOSIS — M34.9 SYSTEMIC SCLEROSIS (H): Primary | ICD-10-CM

## 2017-04-14 LAB
ABSOLUTE BASOPHILS (EXTERNAL): 0
ALBUMIN SERPL-MCNC: 4.7 G/DL (ref 3.5–5.2)
ALT SERPL-CCNC: 7 U/L (ref 8–45)
AST SERPL-CCNC: 13 U/L (ref 2–40)
BACTERIA, UR: ABNORMAL
BASOPHILS NFR BLD AUTO: 0.2 %
BILIRUB UR QL: ABNORMAL
CLARITY, URINE: CLEAR
COLOR UR: YELLOW
CREAT SERPL-MCNC: 0.82 MG/DL (ref 0.57–1.11)
CRP SERPL-MCNC: 0.07 MG/L
EOSINOPHIL # BLD AUTO: 0.1 10*3/UL
EOSINOPHIL NFR BLD AUTO: 1.9 %
ERYTHROCYTE [DISTWIDTH] IN BLOOD BY AUTOMATED COUNT: 13.5 % (ref 11.5–15.5)
ERYTHROCYTE [SEDIMENTATION RATE] IN BLOOD: 10 MM/HR
GFR SERPL CREATININE-BSD FRML MDRD: >60 ML/MIN/1.73M2
GLUCOSE URINE: ABNORMAL MG/DL
HCT VFR BLD AUTO: 37 % (ref 33–51)
HEMOGLOBIN: 11.8 G/DL (ref 12–16)
HGB UR QL: ABNORMAL
IRON BINDING CAPACITY: 327 UG/DL (ref 245–400)
IRON SATURATION %: 28 % (ref 20–55)
IRON: 93 UG/DL (ref 25–156)
KETONES UR QL: ABNORMAL MG/DL
LEUKOCYTE ESTERASE URINE: ABNORMAL
LYMPHOCYTES # BLD AUTO: 1.6 10*3/UL (ref 0.9–2.9)
LYMPHOCYTES NFR BLD AUTO: 33.8 % (ref 20–44)
MCH RBC QN AUTO: 31.6 PG (ref 26–34)
MCHC RBC AUTO-ENTMCNC: 31.9 G/DL (ref 32–36)
MCV RBC AUTO: 99 FL (ref 80–100)
MONOCYTES # BLD AUTO: 0.5 10*3/UL
MONOCYTES NFR BLD AUTO: 9.5 %
NEUTROPHILS # BLD AUTO: 2.7 10*3/UL (ref 1.7–7)
NEUTROPHILS NFR BLD AUTO: 54.6 % (ref 42–72)
NITRITE UR QL STRIP: ABNORMAL
PH BLDA: 5 [PH] (ref 6–8.5)
PLATELET COUNT - QUEST: 219 10^9/L (ref 140–440)
PMV BLD: 10.4 FL (ref 6.5–11)
PROT UR QL: ABNORMAL MG/DL
RBC # BLD AUTO: 3.74 10^12/L (ref 4–5.2)
RBC, UR MICRO: ABNORMAL (ref ?–2)
SP GR UR STRIP: 1 (ref 1–1.03)
SQUAMOUS EPI: ABNORMAL
UIBC: 234 UG/DL
UROBILINOGEN UR QL STRIP: ABNORMAL EU/DL (ref 0.2–1)
WBC # BLD AUTO: 4.9 10^9/L (ref 4.5–11)
WBC, UR MICRO: ABNORMAL (ref ?–2)

## 2017-04-24 DIAGNOSIS — M34.9 SYSTEMIC SCLEROSIS (H): ICD-10-CM

## 2017-04-24 DIAGNOSIS — M05.79 RHEUMATOID ARTHRITIS INVOLVING MULTIPLE SITES WITH POSITIVE RHEUMATOID FACTOR (H): ICD-10-CM

## 2017-04-24 DIAGNOSIS — I73.01 RAYNAUD'S DISEASE WITH GANGRENE (H): ICD-10-CM

## 2017-04-24 DIAGNOSIS — M77.40 METATARSALGIA, UNSPECIFIED LATERALITY: ICD-10-CM

## 2017-04-24 NOTE — TELEPHONE ENCOUNTER
Last seen: 2/16/2017    Pending appt: 5/18/2017  Medication: percocet 5-325 mg   Last filled: 3/31/2017  Qty: 90 tabs 0 refills      Last seen: 02/16/2017  Pending appt: 05/18/2017  Medication: oxycodone 5 mg  IR  Last filled: 3/31/2017  Qty: 200 tabs 0 refills    Shanta Shetty LPN

## 2017-04-24 NOTE — TELEPHONE ENCOUNTER
Pt requesting Percocet, [place] [facility] [place] [facility] pool.

## 2017-04-25 DIAGNOSIS — I73.01 RAYNAUD'S DISEASE WITH GANGRENE (H): ICD-10-CM

## 2017-04-25 DIAGNOSIS — M77.40 METATARSALGIA, UNSPECIFIED LATERALITY: ICD-10-CM

## 2017-04-25 DIAGNOSIS — M34.9 SYSTEMIC SCLEROSIS (H): ICD-10-CM

## 2017-04-25 DIAGNOSIS — M05.79 RHEUMATOID ARTHRITIS INVOLVING MULTIPLE SITES WITH POSITIVE RHEUMATOID FACTOR (H): ICD-10-CM

## 2017-04-25 LAB
DLCOUNC-%PRED-PRE: 84 %
DLCOUNC-PRE: 19.02 ML/MIN/MMHG
DLCOUNC-PRED: 22.51 ML/MIN/MMHG
ERV-%PRED-PRE: 148 %
ERV-PRE: 1.63 L
ERV-PRED: 1.1 L
EXPTIME-PRE: 7.96 SEC
FEF2575-%PRED-PRE: 72 %
FEF2575-PRE: 1.76 L/SEC
FEF2575-PRED: 2.44 L/SEC
FEFMAX-%PRED-PRE: 103 %
FEFMAX-PRE: 6.9 L/SEC
FEFMAX-PRED: 6.66 L/SEC
FEV1-%PRED-PRE: 97 %
FEV1-PRE: 2.44 L
FEV1FEV6-PRE: 74 %
FEV1FEV6-PRED: 82 %
FEV1FVC-PRE: 74 %
FEV1FVC-PRED: 81 %
FEV1SVC-PRE: 76 %
FEV1SVC-PRED: 73 %
FIFMAX-PRE: 6.59 L/SEC
FRCPLETH-%PRED-PRE: 125 %
FRCPLETH-PRE: 3.42 L
FRCPLETH-PRED: 2.72 L
FVC-%PRED-PRE: 105 %
FVC-PRE: 3.31 L
FVC-PRED: 3.14 L
IC-%PRED-PRE: 66 %
IC-PRE: 1.57 L
IC-PRED: 2.36 L
RVPLETH-%PRED-PRE: 97 %
RVPLETH-PRE: 1.78 L
RVPLETH-PRED: 1.83 L
TLCPLETH-%PRED-PRE: 99 %
TLCPLETH-PRE: 4.99 L
TLCPLETH-PRED: 5.02 L
VA-%PRED-PRE: 90 %
VA-PRE: 4.65 L
VC-%PRED-PRE: 92 %
VC-PRE: 3.2 L
VC-PRED: 3.46 L

## 2017-04-25 RX ORDER — OXYCODONE HYDROCHLORIDE 5 MG/1
5 TABLET ORAL EVERY 4 HOURS PRN
Qty: 200 TABLET | Refills: 0 | Status: SHIPPED | OUTPATIENT
Start: 2017-04-25 | End: 2017-04-25

## 2017-04-25 RX ORDER — OXYCODONE AND ACETAMINOPHEN 5; 325 MG/1; MG/1
TABLET ORAL
Qty: 90 TABLET | Refills: 0 | Status: SHIPPED | OUTPATIENT
Start: 2017-04-25 | End: 2017-05-18

## 2017-04-25 RX ORDER — OXYCODONE HYDROCHLORIDE 5 MG/1
5 TABLET ORAL EVERY 4 HOURS PRN
Qty: 200 TABLET | Refills: 0 | Status: SHIPPED | OUTPATIENT
Start: 2017-04-25 | End: 2017-05-18

## 2017-04-25 RX ORDER — OXYCODONE AND ACETAMINOPHEN 5; 325 MG/1; MG/1
TABLET ORAL
Qty: 90 TABLET | Refills: 0 | Status: SHIPPED | OUTPATIENT
Start: 2017-04-25 | End: 2017-04-25

## 2017-04-25 NOTE — TELEPHONE ENCOUNTER
Writer called patient informing her that rx is available for  and will be located on the 1st floor with . Patient informed that she will need proof of ID. Patient stated understanding.  Shanta Shetty LPN

## 2017-05-18 ENCOUNTER — OFFICE VISIT (OUTPATIENT)
Dept: RHEUMATOLOGY | Facility: CLINIC | Age: 52
End: 2017-05-18
Attending: INTERNAL MEDICINE
Payer: MEDICARE

## 2017-05-18 VITALS
SYSTOLIC BLOOD PRESSURE: 99 MMHG | HEIGHT: 65 IN | BODY MASS INDEX: 20.06 KG/M2 | DIASTOLIC BLOOD PRESSURE: 60 MMHG | HEART RATE: 69 BPM | TEMPERATURE: 97.6 F | WEIGHT: 120.4 LBS

## 2017-05-18 DIAGNOSIS — M05.79 RHEUMATOID ARTHRITIS INVOLVING MULTIPLE SITES WITH POSITIVE RHEUMATOID FACTOR (H): ICD-10-CM

## 2017-05-18 DIAGNOSIS — M77.41 METATARSALGIA OF BOTH FEET: Primary | ICD-10-CM

## 2017-05-18 DIAGNOSIS — M77.42 METATARSALGIA OF BOTH FEET: Primary | ICD-10-CM

## 2017-05-18 DIAGNOSIS — M77.40 METATARSALGIA, UNSPECIFIED LATERALITY: ICD-10-CM

## 2017-05-18 DIAGNOSIS — M34.9 SYSTEMIC SCLEROSIS (H): ICD-10-CM

## 2017-05-18 DIAGNOSIS — I73.01 RAYNAUD'S DISEASE WITH GANGRENE (H): ICD-10-CM

## 2017-05-18 PROCEDURE — 99212 OFFICE O/P EST SF 10 MIN: CPT | Mod: ZF

## 2017-05-18 RX ORDER — OXYCODONE HYDROCHLORIDE 5 MG/1
5 TABLET ORAL EVERY 4 HOURS PRN
Qty: 204 TABLET | Refills: 0 | Status: SHIPPED | OUTPATIENT
Start: 2017-05-18 | End: 2017-06-21

## 2017-05-18 RX ORDER — OXYCODONE AND ACETAMINOPHEN 5; 325 MG/1; MG/1
TABLET ORAL
Qty: 90 TABLET | Refills: 0 | Status: SHIPPED | OUTPATIENT
Start: 2017-05-18 | End: 2017-06-21

## 2017-05-18 RX ORDER — OXYCODONE HYDROCHLORIDE 5 MG/1
5 TABLET ORAL EVERY 4 HOURS PRN
Qty: 200 TABLET | Refills: 0 | Status: SHIPPED | OUTPATIENT
Start: 2017-05-18 | End: 2017-05-18

## 2017-05-18 ASSESSMENT — PAIN SCALES - GENERAL: PAINLEVEL: MODERATE PAIN (5)

## 2017-05-18 NOTE — MR AVS SNAPSHOT
"              After Visit Summary   5/18/2017    Willard Grayson    MRN: [id number]           Patient Information     Date Of Birth          1965        Visit Information        Provider Department      5/18/2017 2:30 PM Brennen Child MD [facility]        Today's Diagnoses     Metatarsalgia of both feet    -  1    Systemic sclerosis (H)        Rheumatoid arthritis involving multiple sites with positive rheumatoid factor (H)        Raynaud's disease with gangrene (H)        Systemic sclerosis        Metatarsalgia, unspecified laterality           Follow-ups after your visit        Who to contact     If you have questions or need follow up information about today's clinic visit or your schedule please contact [facility] directly at [phone].  Normal or non-critical lab and imaging results will be communicated to you by kompanyhart, letter or phone within 4 business days after the clinic has received the results. If you do not hear from us within 7 days, please contact the clinic through kompanyhart or phone. If you have a critical or abnormal lab result, we will notify you by phone as soon as possible.  Submit refill requests through S B E or call your pharmacy and they will forward the refill request to us. Please allow 3 business days for your refill to be completed.          Additional Information About Your Visit        kompanyhart Information     S B E lets you send messages to your doctor, view your test results, renew your prescriptions, schedule appointments and more. To sign up, go to [email or web address] . Click on \"Log in\" on the left side of the screen, which will take you to the Welcome page. Then click on \"Sign up Now\" on the right side of the page.     You will be asked to enter the access code listed below, as well as some personal information. Please follow the directions to create your username and password.     Your access code is: [email or web address]  Expires: 8/2/2017  6:30 " "AM     Your access code will  in 90 days. If you need help or a new code, please call your [facility] or [phone].        Care EveryWhere ID     This is your Care EveryWhere ID. This could be used by other organizations to access your [place] medical records  [id number]        Your Vitals Were     Pulse Temperature Height BMI (Body Mass Index)          69 97.6  F (36.4  C) (Oral) 1.638 m (5' 4.5\") 20.35 kg/m2         Blood Pressure from Last 3 Encounters:   17 99/60   17 114/65   16 111/76    Weight from Last 3 Encounters:   17 54.6 kg (120 lb 6.4 oz)   17 55.3 kg (122 lb)   16 58.2 kg (128 lb 6.4 oz)              Today, you had the following     No orders found for display         Today's Medication Changes          These changes are accurate as of: 17 11:59 PM.  If you have any questions, ask your nurse or doctor.               Start taking these medicines.        Dose/Directions    oxyCODONE 5 MG IR tablet   Commonly known as:  ROXICODONE   Used for:  Systemic sclerosis (H), Rheumatoid arthritis involving multiple sites with positive rheumatoid factor (H), Raynaud's disease with gangrene (H), Systemic sclerosis (H), Metatarsalgia, unspecified laterality, Metatarsalgia of both feet        Dose:  5 mg   Take 1 tablet (5 mg) by mouth every 4 hours as needed for moderate to severe pain or pain Maximum 6 tabs per day.   Quantity:  204 tablet   Refills:  0            Where to get your medicines      Some of these will need a paper prescription and others can be bought over the counter.  Ask your nurse if you have questions.     Bring a paper prescription for each of these medications     oxyCODONE 5 MG IR tablet    oxyCODONE-acetaminophen 5-325 MG per tablet                Primary Care Provider Office Phone # Fax #    Lindy Plasencia [phone]       [facility] [address] [address]        Thank you!     Thank you " for choosing [facility]  for your care. Our goal is always to provide you with excellent care. Hearing back from our patients is one way we can continue to improve our services. Please take a few minutes to complete the written survey that you may receive in the mail after your visit with us. Thank you!             Your Updated Medication List - Protect others around you: Learn how to safely use, store and throw away your medicines at www.disposemymeds.org.          This list is accurate as of: 5/18/17 11:59 PM.  Always use your most recent med list.                   Brand Name Dispense Instructions for use    aspirin-dipyridamole  MG per 12 hr capsule    AGGRENOX     Take 1 capsule by mouth       Blood Pressure Monitoring Kit     1 kit    Check BP every 7 days.       cevimeline 30 MG capsule    EVOXAC    270 capsule    Take 1 capsule (30 mg) by mouth 3 times daily as needed       cyanocobalamin 1000 MCG/ML injection    VITAMIN B12     Inject 1 mL into the muscle every 30 days       esomeprazole 40 MG CR capsule    nexIUM    180 capsule    Take 1 capsule (40 mg) by mouth 2 times daily Take 30-60 minutes before eating.       fluconazole 100 MG tablet    DIFLUCAN    15 tablet    Take 2 tablets (200 mg) for 1 day followed by 1 tablet (100 mg) daily for 13 days.       folic acid 1 MG tablet    FOLVITE    450 tablet    Take 5 tablets (5 mg) by mouth daily       LORazepam 0.5 MG tablet    ATIVAN    60 tablet    Take 1 - 2 tablets by mouth three times daily as needed.       mycophenolate 500 MG tablet    CELLCEPT - GENERIC EQUIVALENT    360 tablet    Take 2 tablets (1,000 mg) by mouth 2 times daily *LABS DUE EVERY 8-12 WKS       ondansetron 4 MG tablet    ZOFRAN    20 tablet    Take 1 tablet (4 mg) by mouth every 8 hours as needed for nausea       oxyCODONE 5 MG IR tablet    ROXICODONE    204 tablet    Take 1 tablet (5 mg) by mouth every 4 hours as needed for moderate to severe pain or pain Maximum 6 tabs  per day.       oxyCODONE-acetaminophen 5-325 MG per tablet    PERCOCET    90 tablet    Take 1 tablet three times daily as needed For breakthrough pain.       pravastatin 10 MG tablet    PRAVACHOL    30 tablet    Take 1 tablet (10 mg) by mouth daily       pregabalin 75 MG capsule    LYRICA    180 capsule    Take 1 capsule (75 mg) by mouth 2 times daily       valACYclovir 500 MG tablet    VALTREX    180 tablet    Take 1 tablet (500 mg) by mouth 2 times daily       VITAMIN D (CHOLECALCIFEROL) PO      Take by mouth daily

## 2017-05-18 NOTE — LETTER
5/18/2017       RE: Willard Grayson  [address]    [facility] [address]     Dear Colleague,    Thank you for referring your patient, Willard Grayson, to the [facility] RHEUMATOLOGY at [facility] Please see a copy of my visit note below.    Rheumatology Clinic Follow-up  [facility], Rheumatology        History of rheumatologic diagnosis:    Problem List:  1. 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   2. Associated marked neuropathic skin pain markedly improved with Lyrica with prior medication therapy with gabapentin.   3. Diffuse musculoskeletal pain requiring methotrexate plus CellCept, and narcotic to control, but also improved.   4. Initial clinical overlap with anti-CCP seropositive rheumatoid arthritis with continued anti-CCP seropositivity as of 09/2010.   5. Raynaud's phenomena with new active digital ulcers with easily cracked skin.   6. GI involvement consistent with bacterial small bowel overgrowth with marked improvement after a course of neomycin followed by Dr. Glez; positive hydrogen breath test in 3/2012 - treated wtih rifaximin through Dr. Jacob  7. No convincing evidence of lung involvement on serial pulmonary function tests or clinically.   8. History of heart palpitations prior to the onset of SSc with stable symptoms since.   9. History of medication failures with Remicade, methotrexate, methotrexate plus CellCept and with Orencia and with improvement since initiation of IV IG but with complicating headaches and overall sense of malaise with IV IG infusions.   10. History of silicone breast implants.   11. Incidental finding of calcified splenic artery aneurysm.  12. EGD in 3/2012 with hiatal  hernia and gastral antral vascular ectasia        13. Recurrent right  third finger contracture with  extensor surface ulceration, healed        INTERVAL HISTORY, Since our last visit and her November hospitalization, she has had no more digital ulcers and decreased pain in her fingers.  She did quit smoking at the time of that hospitalization and she has not resumed.  She still has pain in her fingers, but not nearly so severe.  Her biggest problem with her fingers remains the marked contractures.  She has difficulty using the paraffin bath because the paraffin is too hot, so we discussed simply unplugging it for a few minutes and letting it cool down a little and then before it gels using it to warm her hands and allow the stretches.       Since we have last seen her, she has seen her primary care.  I had requested that the lorazepam prescriptions be moved back over to primary care since I had simply assumed them when she had no primary care physician for her existing diagnosis of anxiety.  That has now been accomplished.      She also continues on oxycodone and we again today discussed attempting to taper that down.  She has decreased pain and hopefully can accomplish that, although she still does have multiple sources of musculoskeletal pain.  She has been off of methotrexate since the fall as she was having some gastrointestinal issues with it, but we did discuss that if tapering the narcotics becomes too problematic because of musculoskeletal pain with morning stiffness, resuming low-dose methotrexate might be possible and desirable.        She was started on a statin during the hospitalization and I also asked that that be taken over the primary care.  In fact, she brings her lipid results with her today and they are very good with her total cholesterol only 202, just barely above target, LDL only 133, just barely above target, HDL at 45 which is target, and triglycerides under target, so I am not sure that  particularly now that she has the cigarette smoking removed as a risk factor and she has minimal family risk that she necessarily will need statins long-term.  On the other hand, they may have a vascular stabilizing effect, so I am certainly not opposed to her remaining on it and she does not have side effect with it.       Her biggest issues recently have been gastrointestinal.  She has felt bloated, nauseated, and at times is having significant constipation.  She does not get diarrhea suggesting bacterial small bowel overgrowth.       She is having a lot of issues with her insurance and whether she can pay for her medical bills.  She therefore would like to see our .      She does get some dysphagia, but that is not changing and is not severe.       As noted, she has a number of sources of musculoskeletal pain including pain in her knees and hips and greater trochanteric area.  She did have what sounds like a Baker's cyst a few weeks ago, but that resolved quickly and she did not have any known antecedent injury that would explain that.     FEBRUARY 16, 2017, INTERVAL HISTORY:  Since I last saw the patient, she went down to Gastroenterology at the [facility] and saw Dr. Tatum there.  She was found to have recurrent versus ongoing esophageal candidiasis on upper endoscopy but had a normal colonoscopy.  A rectal balloon test, however, was abnormal, consistent with sphincter dysfunction potentially contributing to her chronic constipation.       She was also told she needed iron infusions.  This is somewhat curious in that her iron studies done at [facility] on 01/27 were only minimally abnormal and her hemoglobin and hematocrit were only minimally abnormal as well.  Apparently, her ferritin was very low, however.       We do not have any of those records to look at.  She says that she asked that they be sent here, but she was told they would be sent only to her primary care doctor.  I have looked in her  [facility] notes on Care Everywhere, but I cannot see that they have been scanned in at this point.       She has some financial issues at this point.  Her labs and other tests are best paid for if they are done at [facility].  Fortunately, her primary care doctor at [facility] was adept enough with the system that she was able to get the labs done at [facility] and have them come directly to my box.  I have reviewed all of them with her today and, as noted, she  has only had some very modest anemia.  She has no other evidence of any toxicity from the CellCept or really any other abnormal labs.       She has been doing well with her Raynaud's.  She has had no episodes of severe distal digital ischemia.  She is very careful about stopping attacks as soon as they get started with rewarming.  She has hand warmers with her at all times and a hand muff that she uses.       Unfortunately, she has restarted smoking again.  She is much more careful with it, she says, however, and is not smoking outdoors when in the cold the way she was previously when she developed a severe ulcer that would not heal.       Although she hated the Botox treatment that Dr. Beauchamp gave her, she has not had any further episodes since that time.       She does believe her lungs are stable.  She is not doing a lot of significant aerobic exercise, but she has not noticed any drop-off in her exercise tolerance with walking, etc.  She has not had PFTs done and I urged her to have them done at [facility] since it is so expensive for her to do them here.       She feels okay otherwise in general.  She continues to have some musculoskeletal pain but does not think it is really a lot worse.     INTERVAL HISTORY (05/18/2017):  Since we have last seen the patient, she developed a pretty bad sore throat.  This really sounds viral but she got a Z-GRIFFIN for it.  The soreness in her throat was posterior oropharyngeal extending only a little ways down her throat, but she was  concerned that it could represent recurrent esophageal candidiasis since that was diagnosed on an endoscopy at [place].  She was then told by one of her physicians that she should have had a repeat EGD, despite the fact that the original report said repeat EGD needed to be done only if she had persistent or ongoing symptoms.      The trouble with that is that her only symptoms initially were not pain but were dysphagia.  She has had some longstanding low-grade dysphagia.  She does not believe that has progressed in any way.  It certainly is not worse since she has been on the Z-GRIFFIN which she has now completed.      We have therefore discussed with her that certainly after a Z-GRIFFIN with 2 weeks of therapy with it that were this esophageal candidiasis it should not be better and it might well be worse.  I think given the overall situation that is very unlikely.      She had a lot of constipation and we gave her a bowel regimen for that and with that she has done quite well.      One of the things that is bothering her more is that by the end of the day, both the bottom of her feet and her fingers really burner.  This is not as bad first thing in the day.      What is better is that she has had only 1 digital ulcer, very low-grade in the fifth PIP currently, with no distal pulp ulcers since Dr. Beauchamp gave her the Botox injection.      She also has no evidence of any progressive cardiopulmonary disease and indeed her last pulmonary function tests were completely stable.  She does not do a lot of her aerobic exercise, but she can climb stairs without difficulties.      She has had a history of blood in the urine.  Because of her smoking history, she saw an urologist who did a cystoscopy that was negative.  She was then told she should see a renal doctor.      However, since January here she has had 2 years that has ever completely free of any evidence of blood.  Blood has not observed microscopic stable as far as I can  tell and was positive only on the dip stick.  This did still occur in 2016.  Prior to that, she had been on methotrexate, which is known to potentially cause this.      The other thing is she is noticing and is quite concerned about is worsening oral aperture and more and more problems with her teeth including retraction.  She continues to smoke.  She has significant keratoconjunctivitis sicca, although it is definitely improved with twice daily Evoxac.       She feels okay otherwise in general.  She continues to have some musculoskeletal pain but does not think it is really a lot worse.         ROS  Gen: Denies weight loss or fevers or night sweats  HEENT:  No ocular inflammation..  Denies swollen glands  CV:  Denies chest pains  Resp:  denies chronic cough or dyspnea.  No pleurisy  Ext:  No swelling in extremities.    Skin:  No new rashes  Remainder of 10-point ROS as per Interval history or negative,     Past Medical History  1.  Diffuse systemic sclerosis  2.  CCP+ inflammatory arthritis  3.  Small bowel overgrowth syndrome - positive hydrogen test   4.  GERD  5.  Plantar fasciitis - seen by Dr. Jorgensen  6.  Chronic neuropathic pain - on lyrica and chronic percocet    Allergies   Allergies   Allergen Reactions     Penicillin [Esters] Other (See Comments)     Unable to move     Bactroban [Mupirocin Calcium]      Inability to move     Levaquin Other (See Comments)     Muscle weakness       Rifampin Other (See Comments)     Makes pain medication less effective     Medications  Current Outpatient Prescriptions   Medication     LORazepam (ATIVAN) 0.5 MG tablet     valACYclovir (VALTREX) 500 MG tablet     oxyCODONE (ROXICODONE) 5 MG immediate release tablet     oxyCODONE-acetaminophen (PERCOCET) 5-325 MG per tablet     pregabalin (LYRICA) 75 MG capsule     rifaximin (XIFAXAN) 550 MG TABS     mycophenolate (CELLCEPT-BRAND NAME) 500 MG tablet     omeprazole (PRILOSEC) 40 MG capsule     ondansetron (ZOFRAN) 4 MG tablet  "    Needle, Disp, 30G X 1/2\" MISC     diazepam (VALIUM) 5 MG tablet     fish oil-omega-3 fatty acids (FISH OIL) 1000 MG capsule     METHOTREXate 25 MG/ML injection     leucovorin (WELLCOVORIN) 5 MG tablet     Syringe/Needle, Disp, 27G X 1/2\" 1 ML MISC     Physical Exam  BP 99/60  Pulse 69  Temp 97.6  F (36.4  C) (Oral)  Ht 1.638 m (5' 4.5\")  Wt 54.6 kg (120 lb 6.4 oz)  BMI 20.35 kg/m2  General: pleasant, but dysphoric  HEENT: EOMI, PERRL, no conjunctivitis or scleritis. Chronic facial skin thickenig without facial rash. Slight pursing of her lips. Slight restriction in opening of the jaw. Oropharynx exam reveals 4-5mm shallow ulceration on right lower lip. Moderate saliva pooling. No cervical adenopathy  CV: regular rhythm; no murmurs.    Resp: End-expiratory wheezing heard diffusely throughout  GI:  Soft, minimal  discomfort in the lower abdomen to deeper palpation; no masses or hepatomegaly  Ext:  No peripheral edema.   Skin:  Chronic sclerodactyly, most prominent in R hand (contracture at 50% finger flexion) vs. Left (missing final 20' extension).  Chronic skin thickening with evidence for improvement along forearms, upper arms, chest, abdomen, lower legs and feet.  Stable Calcinosis-like deposit noted on her right anterior thigh just above the knee joint and on right fith finger. . Salt/pepper appearance to areas of skin (christiano arms and upper chest).  No other rashes.   Fully healed  DU  over  the  3rd  PIP joint: no otherDU.   Mild skin thinning/erythema along other PIPs. 2+ over fingers ; 1+ maximum over hands, and feet and shins, and face, pregressing to atrophic status.    MSK:  Full joint exam without evidence for synovitis. Chronic joint contractures of fingers (christiano right); minimally also along wrists and elbows.       RESULTS:  Component      Latest Ref Rng 1/17/2013           2:15 PM   WBC      4.0 - 11.0 10e9/L 4.6   RBC Count      3.8 - 5.2 10e12/L 3.71 (L)   Hemoglobin      11.7 - 15.7 g/dL 11.8 "   Hematocrit      35.0 - 47.0 % 37.4   MCV      78 - 100 fl 101 (H)   MCH      26.5 - 33.0 pg 31.8   MCHC      31.5 - 36.5 g/dL 31.6   RDW      10.0 - 15.0 % 13.9   Platelet Count      150 - 450 10e9/L 200     Component      Latest Ref Rng 1/17/2013   ALT      0 - 50 U/L 23   AST      0 - 45 U/L 30   Albumin      3.9 - 5.1 g/dL 4.2   CRP Inflammation      0.0 - 8.0 mg/L <5.0   Sed Rate      0 - 20 mm/h 18         ASSESSMENT: Per the Problem LIst    PLAN AND RECOMMENDATIONS:     I think her overall disease process is relatively stable and has been for a while.      She has several significant problems and several of them may be related to smoking.  I have therefore counseled her extensively again on smoking cessation.      Perhaps the most important of these is her problems with her teeth.  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      The other things that may be related to smoking are her history of digital ulcers, though that has fortunately been improved since Botox.  The blood in the urine is possibly due to her history of methotrexate use.  She never had cyclophosphamide.  Certainly in a smoker cystoscopy was appropriate to rule out bladder cancer and that was not seen, but given the overall situation I am going to strongly encourage her to stop smoking.      Given the burning in her feet I examined her feet closely.  She actually has tenderness right now along the entire plantar fascia.  I suggested to her, therefore, that she may try getting an arch support and seeing if some of this improves.  She is already on Lyrica so that the extent that this is neurologic that should continue to be helping her.      I have refilled her oxycodone for the next month starting on the thirty-first.  I will plan on seeing her back in approximately 3-4 months,  sooner p.r.n.       She otherwise looks reasonably well.  I will see her back in 3-4 months, sooner p.r.n.       This visit was 40 minutes in duration, greater than 50% in counseling.     Again, thank you for allowing me to participate in the care of your patient.      Sincerely,    Brennen Child MD

## 2017-05-18 NOTE — NURSING NOTE
"Chief Complaint   Patient presents with     RECHECK     Scleroderma       Initial BP 99/60  Pulse 69  Temp 97.6  F (36.4  C) (Oral)  Ht 1.638 m (5' 4.5\")  Wt 54.6 kg (120 lb 6.4 oz)  BMI 20.35 kg/m2 Estimated body mass index is 20.35 kg/(m^2) as calculated from the following:    Height as of this encounter: 1.638 m (5' 4.5\").    Weight as of this encounter: 54.6 kg (120 lb 6.4 oz).  Medication Reconciliation: complete  "

## 2017-05-18 NOTE — PROGRESS NOTES
Rheumatology Clinic Follow-up  [facility], Rheumatology        History of rheumatologic diagnosis:    Problem List:  1. 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   2. Associated marked neuropathic skin pain markedly improved with Lyrica with prior medication therapy with gabapentin.   3. Diffuse musculoskeletal pain requiring methotrexate plus CellCept, and narcotic to control, but also improved.   4. Initial clinical overlap with anti-CCP seropositive rheumatoid arthritis with continued anti-CCP seropositivity as of 09/2010.   5. Raynaud's phenomena with new active digital ulcers with easily cracked skin.   6. GI involvement consistent with bacterial small bowel overgrowth with marked improvement after a course of neomycin followed by Dr. Glez; positive hydrogen breath test in 3/2012 - treated wtih rifaximin through Dr. Jacob  7. No convincing evidence of lung involvement on serial pulmonary function tests or clinically.   8. History of heart palpitations prior to the onset of SSc with stable symptoms since.   9. History of medication failures with Remicade, methotrexate, methotrexate plus CellCept and with Orencia and with improvement since initiation of IV IG but with complicating headaches and overall sense of malaise with IV IG infusions.   10. History of silicone breast implants.   11. Incidental finding of calcified splenic artery aneurysm.  12. EGD in 3/2012 with hiatal hernia and gastral antral vascular ectasia        13. Recurrent right  third finger contracture with  extensor surface ulceration, healed        INTERVAL HISTORY, Since our last visit and her November hospitalization, she has had no more digital ulcers and decreased pain in her fingers.  She did  quit smoking at the time of that hospitalization and she has not resumed.  She still has pain in her fingers, but not nearly so severe.  Her biggest problem with her fingers remains the marked contractures.  She has difficulty using the paraffin bath because the paraffin is too hot, so we discussed simply unplugging it for a few minutes and letting it cool down a little and then before it gels using it to warm her hands and allow the stretches.       Since we have last seen her, she has seen her primary care.  I had requested that the lorazepam prescriptions be moved back over to primary care since I had simply assumed them when she had no primary care physician for her existing diagnosis of anxiety.  That has now been accomplished.      She also continues on oxycodone and we again today discussed attempting to taper that down.  She has decreased pain and hopefully can accomplish that, although she still does have multiple sources of musculoskeletal pain.  She has been off of methotrexate since the fall as she was having some gastrointestinal issues with it, but we did discuss that if tapering the narcotics becomes too problematic because of musculoskeletal pain with morning stiffness, resuming low-dose methotrexate might be possible and desirable.        She was started on a statin during the hospitalization and I also asked that that be taken over the primary care.  In fact, she brings her lipid results with her today and they are very good with her total cholesterol only 202, just barely above target, LDL only 133, just barely above target, HDL at 45 which is target, and triglycerides under target, so I am not sure that particularly now that she has the cigarette smoking removed as a risk factor and she has minimal family risk that she necessarily will need statins long-term.  On the other hand, they may have a vascular stabilizing effect, so I am certainly not opposed to her remaining on it and she does not have  side effect with it.       Her biggest issues recently have been gastrointestinal.  She has felt bloated, nauseated, and at times is having significant constipation.  She does not get diarrhea suggesting bacterial small bowel overgrowth.       She is having a lot of issues with her insurance and whether she can pay for her medical bills.  She therefore would like to see our .      She does get some dysphagia, but that is not changing and is not severe.       As noted, she has a number of sources of musculoskeletal pain including pain in her knees and hips and greater trochanteric area.  She did have what sounds like a Baker's cyst a few weeks ago, but that resolved quickly and she did not have any known antecedent injury that would explain that.     FEBRUARY 16, 2017, INTERVAL HISTORY:  Since I last saw the patient, she went down to Gastroenterology at the [facility] and saw Dr. Tatum there.  She was found to have recurrent versus ongoing esophageal candidiasis on upper endoscopy but had a normal colonoscopy.  A rectal balloon test, however, was abnormal, consistent with sphincter dysfunction potentially contributing to her chronic constipation.       She was also told she needed iron infusions.  This is somewhat curious in that her iron studies done at [facility] on 01/27 were only minimally abnormal and her hemoglobin and hematocrit were only minimally abnormal as well.  Apparently, her ferritin was very low, however.       We do not have any of those records to look at.  She says that she asked that they be sent here, but she was told they would be sent only to her primary care doctor.  I have looked in her [facility] notes on Care Everywhere, but I cannot see that they have been scanned in at this point.       She has some financial issues at this point.  Her labs and other tests are best paid for if they are done at [facility].  Fortunately, her primary care doctor at [facility] was adept enough with the system  that she was able to get the labs done at [facility] and have them come directly to my box.  I have reviewed all of them with her today and, as noted, she  has only had some very modest anemia.  She has no other evidence of any toxicity from the CellCept or really any other abnormal labs.       She has been doing well with her Raynaud's.  She has had no episodes of severe distal digital ischemia.  She is very careful about stopping attacks as soon as they get started with rewarming.  She has hand warmers with her at all times and a hand muff that she uses.       Unfortunately, she has restarted smoking again.  She is much more careful with it, she says, however, and is not smoking outdoors when in the cold the way she was previously when she developed a severe ulcer that would not heal.       Although she hated the Botox treatment that Dr. Beauchamp gave her, she has not had any further episodes since that time.       She does believe her lungs are stable.  She is not doing a lot of significant aerobic exercise, but she has not noticed any drop-off in her exercise tolerance with walking, etc.  She has not had PFTs done and I urged her to have them done at [facility] since it is so expensive for her to do them here.       She feels okay otherwise in general.  She continues to have some musculoskeletal pain but does not think it is really a lot worse.     INTERVAL HISTORY (05/18/2017):  Since we have last seen the patient, she developed a pretty bad sore throat.  This really sounds viral but she got a Z-GRIFFIN for it.  The soreness in her throat was posterior oropharyngeal extending only a little ways down her throat, but she was concerned that it could represent recurrent esophageal candidiasis since that was diagnosed on an endoscopy at [facility].  She was then told by one of her physicians that she should have had a repeat EGD, despite the fact that the original report said repeat EGD needed to be done only if she had persistent or  ongoing symptoms.      The trouble with that is that her only symptoms initially were not pain but were dysphagia.  She has had some longstanding low-grade dysphagia.  She does not believe that has progressed in any way.  It certainly is not worse since she has been on the Z-GRIFFIN which she has now completed.      We have therefore discussed with her that certainly after a Z-GRIFFIN with 2 weeks of therapy with it that were this esophageal candidiasis it should not be better and it might well be worse.  I think given the overall situation that is very unlikely.      She had a lot of constipation and we gave her a bowel regimen for that and with that she has done quite well.      One of the things that is bothering her more is that by the end of the day, both the bottom of her feet and her fingers really burner.  This is not as bad first thing in the day.      What is better is that she has had only 1 digital ulcer, very low-grade in the fifth PIP currently, with no distal pulp ulcers since Dr. Beauchamp gave her the Botox injection.      She also has no evidence of any progressive cardiopulmonary disease and indeed her last pulmonary function tests were completely stable.  She does not do a lot of her aerobic exercise, but she can climb stairs without difficulties.      She has had a history of blood in the urine.  Because of her smoking history, she saw an urologist who did a cystoscopy that was negative.  She was then told she should see a renal doctor.      However, since January here she has had 2 years that has ever completely free of any evidence of blood.  Blood has not observed microscopic stable as far as I can tell and was positive only on the dip stick.  This did still occur in 2016.  Prior to that, she had been on methotrexate, which is known to potentially cause this.      The other thing is she is noticing and is quite concerned about is worsening oral aperture and more and more problems with her teeth  "including retraction.  She continues to smoke.  She has significant keratoconjunctivitis sicca, although it is definitely improved with twice daily Evoxac.       She feels okay otherwise in general.  She continues to have some musculoskeletal pain but does not think it is really a lot worse.         ROS  Gen: Denies weight loss or fevers or night sweats  HEENT:  No ocular inflammation..  Denies swollen glands  CV:  Denies chest pains  Resp:  denies chronic cough or dyspnea.  No pleurisy  Ext:  No swelling in extremities.    Skin:  No new rashes  Remainder of 10-point ROS as per Interval history or negative,     Past Medical History  1.  Diffuse systemic sclerosis  2.  CCP+ inflammatory arthritis  3.  Small bowel overgrowth syndrome - positive hydrogen test   4.  GERD  5.  Plantar fasciitis - seen by Dr. Jorgensen  6.  Chronic neuropathic pain - on lyrica and chronic percocet    Allergies   Allergies   Allergen Reactions     Penicillin [Esters] Other (See Comments)     Unable to move     Bactroban [Mupirocin Calcium]      Inability to move     Levaquin Other (See Comments)     Muscle weakness       Rifampin Other (See Comments)     Makes pain medication less effective     Medications  Current Outpatient Prescriptions   Medication     LORazepam (ATIVAN) 0.5 MG tablet     valACYclovir (VALTREX) 500 MG tablet     oxyCODONE (ROXICODONE) 5 MG immediate release tablet     oxyCODONE-acetaminophen (PERCOCET) 5-325 MG per tablet     pregabalin (LYRICA) 75 MG capsule     rifaximin (XIFAXAN) 550 MG TABS     mycophenolate (CELLCEPT-BRAND NAME) 500 MG tablet     omeprazole (PRILOSEC) 40 MG capsule     ondansetron (ZOFRAN) 4 MG tablet     Needle, Disp, 30G X 1/2\" MISC     diazepam (VALIUM) 5 MG tablet     fish oil-omega-3 fatty acids (FISH OIL) 1000 MG capsule     METHOTREXate 25 MG/ML injection     leucovorin (WELLCOVORIN) 5 MG tablet     Syringe/Needle, Disp, 27G X 1/2\" 1 ML MISC     Physical Exam  BP 99/60  Pulse 69  Temp 97.6 " " F (36.4  C) (Oral)  Ht 1.638 m (5' 4.5\")  Wt 54.6 kg (120 lb 6.4 oz)  BMI 20.35 kg/m2  General: pleasant, but dysphoric  HEENT: EOMI, PERRL, no conjunctivitis or scleritis. Chronic facial skin thickenig without facial rash. Slight pursing of her lips. Slight restriction in opening of the jaw. Oropharynx exam reveals 4-5mm shallow ulceration on right lower lip. Moderate saliva pooling. No cervical adenopathy  CV: regular rhythm; no murmurs.    Resp: End-expiratory wheezing heard diffusely throughout  GI:  Soft, minimal  discomfort in the lower abdomen to deeper palpation; no masses or hepatomegaly  Ext:  No peripheral edema.   Skin:  Chronic sclerodactyly, most prominent in R hand (contracture at 50% finger flexion) vs. Left (missing final 20' extension).  Chronic skin thickening with evidence for improvement along forearms, upper arms, chest, abdomen, lower legs and feet.  Stable Calcinosis-like deposit noted on her right anterior thigh just above the knee joint and on right fith finger. . Salt/pepper appearance to areas of skin (christiano arms and upper chest).  No other rashes.   Fully healed  DU  over  the  3rd  PIP joint: no otherDU.   Mild skin thinning/erythema along other PIPs. 2+ over fingers ; 1+ maximum over hands, and feet and shins, and face, pregressing to atrophic status.    MSK:  Full joint exam without evidence for synovitis. Chronic joint contractures of fingers (christiano right); minimally also along wrists and elbows.       RESULTS:  Component      Latest Ref Rng 1/17/2013           2:15 PM   WBC      4.0 - 11.0 10e9/L 4.6   RBC Count      3.8 - 5.2 10e12/L 3.71 (L)   Hemoglobin      11.7 - 15.7 g/dL 11.8   Hematocrit      35.0 - 47.0 % 37.4   MCV      78 - 100 fl 101 (H)   MCH      26.5 - 33.0 pg 31.8   MCHC      31.5 - 36.5 g/dL 31.6   RDW      10.0 - 15.0 % 13.9   Platelet Count      150 - 450 10e9/L 200     Component      Latest Ref Rn 1/17/2013   ALT      0 - 50 U/L 23   AST      0 - 45 U/L 30 "   Albumin      3.9 - 5.1 g/dL 4.2   CRP Inflammation      0.0 - 8.0 mg/L <5.0   Sed Rate      0 - 20 mm/h 18         ASSESSMENT: Per the Problem LIst    PLAN AND RECOMMENDATIONS:     I think her overall disease process is relatively stable and has been for a while.      She has several significant problems and several of them may be related to smoking.  I have therefore counseled her extensively again on smoking cessation.      Perhaps the most important of these is her problems with her teeth.  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      The other things that may be related to smoking are her history of digital ulcers, though that has fortunately been improved since Botox.  The blood in the urine is possibly due to her history of methotrexate use.  She never had cyclophosphamide.  Certainly in a smoker cystoscopy was appropriate to rule out bladder cancer and that was not seen, but given the overall situation I am going to strongly encourage her to stop smoking.      Given the burning in her feet I examined her feet closely.  She actually has tenderness right now along the entire plantar fascia.  I suggested to her, therefore, that she may try getting an arch support and seeing if some of this improves.  She is already on Lyrica so that the extent that this is neurologic that should continue to be helping her.      I have refilled her oxycodone for the next month starting on the thirty-first.  I will plan on seeing her back in approximately 3-4 months, sooner p.r.n.       She otherwise looks reasonably well.  I will see her back in 3-4 months, sooner p.r.n.       This visit was 40 minutes in duration, greater than 50% in counseling.

## 2017-05-21 DIAGNOSIS — M34.9 SYSTEMIC SCLEROSIS (H): ICD-10-CM

## 2017-05-23 RX ORDER — MYCOPHENOLATE MOFETIL 500 MG/1
TABLET ORAL
Qty: 360 TABLET | Refills: 0 | OUTPATIENT
Start: 2017-05-23

## 2017-06-13 DIAGNOSIS — Z79.899 HIGH RISK MEDICATIONS (NOT ANTICOAGULANTS) LONG-TERM USE: ICD-10-CM

## 2017-06-13 DIAGNOSIS — D50.9 ANEMIA, IRON DEFICIENCY: ICD-10-CM

## 2017-06-13 DIAGNOSIS — M34.9 SYSTEMIC SCLEROSIS (H): ICD-10-CM

## 2017-06-13 DIAGNOSIS — K31.819 GAVE (GASTRIC ANTRAL VASCULAR ECTASIA): ICD-10-CM

## 2017-06-13 DIAGNOSIS — M06.9 RHEUMATOID ARTHRITIS (H): ICD-10-CM

## 2017-06-13 LAB
ALBUMIN SERPL-MCNC: 4.2 G/DL (ref 3.4–5)
ALBUMIN UR-MCNC: NEGATIVE MG/DL
ALT SERPL W P-5'-P-CCNC: 15 U/L (ref 0–50)
APPEARANCE UR: CLEAR
AST SERPL W P-5'-P-CCNC: 14 U/L (ref 0–45)
BASOPHILS # BLD AUTO: 0 10E9/L (ref 0–0.2)
BASOPHILS NFR BLD AUTO: 0.2 %
BILIRUB UR QL STRIP: NEGATIVE
COLOR UR AUTO: ABNORMAL
CREAT SERPL-MCNC: 0.71 MG/DL (ref 0.52–1.04)
CRP SERPL-MCNC: <2.9 MG/L (ref 0–8)
DIFFERENTIAL METHOD BLD: ABNORMAL
EOSINOPHIL # BLD AUTO: 0.1 10E9/L (ref 0–0.7)
EOSINOPHIL NFR BLD AUTO: 1 %
ERYTHROCYTE [DISTWIDTH] IN BLOOD BY AUTOMATED COUNT: 12.7 % (ref 10–15)
ERYTHROCYTE [SEDIMENTATION RATE] IN BLOOD BY WESTERGREN METHOD: 13 MM/H (ref 0–30)
GFR SERPL CREATININE-BSD FRML MDRD: 85 ML/MIN/1.7M2
GLUCOSE UR STRIP-MCNC: NEGATIVE MG/DL
HCT VFR BLD AUTO: 37.1 % (ref 35–47)
HGB BLD-MCNC: 11.5 G/DL (ref 11.7–15.7)
HGB UR QL STRIP: ABNORMAL
IMM GRANULOCYTES # BLD: 0 10E9/L (ref 0–0.4)
IMM GRANULOCYTES NFR BLD: 0.2 %
IRON SATN MFR SERPL: 18 % (ref 15–46)
IRON SERPL-MCNC: 72 UG/DL (ref 35–180)
KETONES UR STRIP-MCNC: NEGATIVE MG/DL
LEUKOCYTE ESTERASE UR QL STRIP: NEGATIVE
LYMPHOCYTES # BLD AUTO: 1.8 10E9/L (ref 0.8–5.3)
LYMPHOCYTES NFR BLD AUTO: 37.5 %
MCH RBC QN AUTO: 31.2 PG (ref 26.5–33)
MCHC RBC AUTO-ENTMCNC: 31 G/DL (ref 31.5–36.5)
MCV RBC AUTO: 101 FL (ref 78–100)
MONOCYTES # BLD AUTO: 0.5 10E9/L (ref 0–1.3)
MONOCYTES NFR BLD AUTO: 9.8 %
MUCOUS THREADS #/AREA URNS LPF: PRESENT /LPF
NEUTROPHILS # BLD AUTO: 2.5 10E9/L (ref 1.6–8.3)
NEUTROPHILS NFR BLD AUTO: 51.3 %
NITRATE UR QL: NEGATIVE
NRBC # BLD AUTO: 0 10*3/UL
NRBC BLD AUTO-RTO: 0 /100
PH UR STRIP: 6 PH (ref 5–7)
PLATELET # BLD AUTO: 218 10E9/L (ref 150–450)
RBC # BLD AUTO: 3.69 10E12/L (ref 3.8–5.2)
RBC #/AREA URNS AUTO: 4 /HPF (ref 0–2)
SP GR UR STRIP: 1 (ref 1–1.03)
SQUAMOUS #/AREA URNS AUTO: 1 /HPF (ref 0–1)
TIBC SERPL-MCNC: 394 UG/DL (ref 240–430)
URN SPEC COLLECT METH UR: ABNORMAL
UROBILINOGEN UR STRIP-MCNC: 0 MG/DL (ref 0–2)
WBC # BLD AUTO: 4.8 10E9/L (ref 4–11)
WBC #/AREA URNS AUTO: <1 /HPF (ref 0–2)

## 2017-06-14 DIAGNOSIS — M34.9 SYSTEMIC SCLEROSIS (H): ICD-10-CM

## 2017-06-16 RX ORDER — MYCOPHENOLATE MOFETIL 500 MG/1
1000 TABLET ORAL 2 TIMES DAILY
Qty: 360 TABLET | Refills: 0 | Status: SHIPPED | OUTPATIENT
Start: 2017-06-16 | End: 2017-09-21

## 2017-06-21 ENCOUNTER — TELEPHONE (OUTPATIENT)
Dept: RHEUMATOLOGY | Facility: CLINIC | Age: 52
End: 2017-06-21

## 2017-06-21 DIAGNOSIS — M77.41 METATARSALGIA OF BOTH FEET: ICD-10-CM

## 2017-06-21 DIAGNOSIS — I73.01 RAYNAUD'S DISEASE WITH GANGRENE (H): ICD-10-CM

## 2017-06-21 DIAGNOSIS — M05.79 RHEUMATOID ARTHRITIS INVOLVING MULTIPLE SITES WITH POSITIVE RHEUMATOID FACTOR (H): ICD-10-CM

## 2017-06-21 DIAGNOSIS — M34.9 SYSTEMIC SCLEROSIS (H): ICD-10-CM

## 2017-06-21 DIAGNOSIS — M77.40 METATARSALGIA, UNSPECIFIED LATERALITY: ICD-10-CM

## 2017-06-21 DIAGNOSIS — M77.42 METATARSALGIA OF BOTH FEET: ICD-10-CM

## 2017-06-21 RX ORDER — OXYCODONE HYDROCHLORIDE 5 MG/1
5 TABLET ORAL EVERY 4 HOURS PRN
Qty: 204 TABLET | Refills: 0 | Status: SHIPPED | OUTPATIENT
Start: 2017-07-05 | End: 2017-08-11

## 2017-06-21 RX ORDER — OXYCODONE AND ACETAMINOPHEN 5; 325 MG/1; MG/1
TABLET ORAL
Qty: 90 TABLET | Refills: 0 | Status: SHIPPED | OUTPATIENT
Start: 2017-06-21 | End: 2017-07-24

## 2017-06-21 NOTE — TELEPHONE ENCOUNTER
Willard called to request a refill of her pain meds. Her percocet is due for a refill next week and the oxycodone not until 7/12/17 according to pt. She is not sure how the scripts got  on the fill dates. Pt would like to pick both up next week, with the oxycodone post dated.     Last seen: 5/18/17  Pending appt: 8/3/17  Medication:  Percocet last filled  5/28/17 qty 90 verified with [facility]  Oxycodone last filled 6/9/17 qty 204 verified with [facility]    Prescription set up, printed and will be given  to Dr Child tomorrow signature.    TIARRA FrankN RN  Rheumatology RN Coordinator   Health

## 2017-06-22 NOTE — TELEPHONE ENCOUNTER
Writer walked Rxs to [facility] and patient notified via voice mail.   Shanta Shetty LPN

## 2017-07-21 DIAGNOSIS — M05.79 RHEUMATOID ARTHRITIS INVOLVING MULTIPLE SITES WITH POSITIVE RHEUMATOID FACTOR (H): ICD-10-CM

## 2017-07-21 DIAGNOSIS — M34.9 SYSTEMIC SCLEROSIS (H): ICD-10-CM

## 2017-07-21 DIAGNOSIS — M77.42 METATARSALGIA OF BOTH FEET: ICD-10-CM

## 2017-07-21 DIAGNOSIS — M77.41 METATARSALGIA OF BOTH FEET: ICD-10-CM

## 2017-07-21 DIAGNOSIS — M77.40 METATARSALGIA, UNSPECIFIED LATERALITY: ICD-10-CM

## 2017-07-21 DIAGNOSIS — I73.01 RAYNAUD'S DISEASE WITH GANGRENE (H): ICD-10-CM

## 2017-07-21 NOTE — TELEPHONE ENCOUNTER
Pt requesting Percocet, oxycodone refill. Please send to [facility]. Says refill for Percocet due 7/27/17, oxycodone 8/15/17 so she is not sure how Dr. Child would like to proceed with it. Says she can pick meds up at 2 separate times. Please advise. Sent to [facility].

## 2017-07-24 NOTE — TELEPHONE ENCOUNTER
Last seen: 5/18/17  Pending appt: 8/3/17  Medication: percocet    Last filled: 6/27/17  Qty: 90 tabs 0 refills      checked and no other provider's prescribing medication.  Shanta Shetty LPN

## 2017-07-25 RX ORDER — OXYCODONE AND ACETAMINOPHEN 5; 325 MG/1; MG/1
TABLET ORAL
Qty: 90 TABLET | Refills: 0 | Status: SHIPPED | OUTPATIENT
Start: 2017-07-25 | End: 2017-08-09

## 2017-07-26 NOTE — TELEPHONE ENCOUNTER
Writer spoke with patient and informed her that her Rx will be available for  on the first floor. Patient stated understanding.   Shanta Shetty LPN

## 2017-08-03 ENCOUNTER — TELEPHONE (OUTPATIENT)
Dept: RHEUMATOLOGY | Facility: CLINIC | Age: 52
End: 2017-08-03

## 2017-08-03 NOTE — TELEPHONE ENCOUNTER
"Patient called. States she is unable to make her appointment with Dr. Child today, as she's currently in the ER with her daughter (needs to have surgery due to a rough delivery). Requesting work in to see Dr. Child before first available in 2 months (\"I really need to see him\") and refill of oxycodone.    Melvina Miles RN  "

## 2017-08-09 DIAGNOSIS — I73.01 RAYNAUD'S DISEASE WITH GANGRENE (H): ICD-10-CM

## 2017-08-09 DIAGNOSIS — M05.79 RHEUMATOID ARTHRITIS INVOLVING MULTIPLE SITES WITH POSITIVE RHEUMATOID FACTOR (H): ICD-10-CM

## 2017-08-09 DIAGNOSIS — M77.42 METATARSALGIA OF BOTH FEET: ICD-10-CM

## 2017-08-09 DIAGNOSIS — M77.41 METATARSALGIA OF BOTH FEET: ICD-10-CM

## 2017-08-09 DIAGNOSIS — M34.9 SYSTEMIC SCLEROSIS (H): ICD-10-CM

## 2017-08-09 DIAGNOSIS — M77.40 METATARSALGIA, UNSPECIFIED LATERALITY: ICD-10-CM

## 2017-08-09 NOTE — TELEPHONE ENCOUNTER
"Pt LVM asking for status on oxycodone refill. Said she requested refill previous wk \"or whenever.\" No documentation in Epic. Thinks med is due 8/13, 8/14, or 8/15. Pt says will not be able to wait until 8/17/17 appt to . Please advise at [phone]. Sent to [facility].    "

## 2017-08-10 RX ORDER — OXYCODONE AND ACETAMINOPHEN 5; 325 MG/1; MG/1
TABLET ORAL
Qty: 90 TABLET | Refills: 0 | Status: SHIPPED | OUTPATIENT
Start: 2017-08-10 | End: 2017-08-17

## 2017-08-10 NOTE — TELEPHONE ENCOUNTER
Last seen: 5/18/17  Pending appt: 8/17/17  Medication: oxycodone   Last filled: 7/12/17  Qty: 90 tab 0 refill      checked and no outside providers noted prescribing medication.   Shanta Shetty LPN

## 2017-08-11 DIAGNOSIS — M34.9 SYSTEMIC SCLEROSIS (H): ICD-10-CM

## 2017-08-11 DIAGNOSIS — M77.42 METATARSALGIA OF BOTH FEET: ICD-10-CM

## 2017-08-11 DIAGNOSIS — I73.01 RAYNAUD'S DISEASE WITH GANGRENE (H): ICD-10-CM

## 2017-08-11 DIAGNOSIS — M77.40 METATARSALGIA, UNSPECIFIED LATERALITY: ICD-10-CM

## 2017-08-11 DIAGNOSIS — M05.79 RHEUMATOID ARTHRITIS INVOLVING MULTIPLE SITES WITH POSITIVE RHEUMATOID FACTOR (H): ICD-10-CM

## 2017-08-11 DIAGNOSIS — M77.41 METATARSALGIA OF BOTH FEET: ICD-10-CM

## 2017-08-11 RX ORDER — OXYCODONE HYDROCHLORIDE 5 MG/1
5 TABLET ORAL EVERY 4 HOURS PRN
Qty: 204 TABLET | Refills: 0 | Status: CANCELLED | OUTPATIENT
Start: 2017-08-11

## 2017-08-11 NOTE — TELEPHONE ENCOUNTER
Last seen: 5/18/17  Pending appt: 8/17/17  Medication: oxycodone    Last filled: 7/12/17  Qty: 90/0     checked and no outside provider's noted prescribing medication.   Shanta Shetty LPN

## 2017-08-11 NOTE — TELEPHONE ENCOUNTER
Male answered phone and stated that writer has the wrong number. Called a second time and was hung up on. Rx is ready for .   Shanta Shetty LPN

## 2017-08-14 NOTE — TELEPHONE ENCOUNTER
Writer spoke with patient updating her of medications status. Patient stated understanding and would like to  medication as soon as possible.   Shanta Shetty LPN

## 2017-08-15 ENCOUNTER — TELEPHONE (OUTPATIENT)
Dept: NEPHROLOGY | Facility: CLINIC | Age: 52
End: 2017-08-15

## 2017-08-15 RX ORDER — OXYCODONE HYDROCHLORIDE 5 MG/1
5 TABLET ORAL EVERY 4 HOURS PRN
Qty: 204 TABLET | Refills: 0 | Status: SHIPPED | OUTPATIENT
Start: 2017-08-15 | End: 2017-09-12

## 2017-08-15 NOTE — TELEPHONE ENCOUNTER
Patient left voice message regarding prescriptions? Frustrated that this has taken 2 weeks and is requesting a call back today as she is wanting to pick this medication up today. Message sent to [facility].  Krissy Park LPN  Nephrology  [facility]  [phone]

## 2017-08-15 NOTE — TELEPHONE ENCOUNTER
Writer informed patient that Rx has been approved and will be with the [facility] staff on the first floor.   Shanta Shetty LPN

## 2017-08-17 ENCOUNTER — OFFICE VISIT (OUTPATIENT)
Dept: RHEUMATOLOGY | Facility: CLINIC | Age: 52
End: 2017-08-17
Attending: INTERNAL MEDICINE
Payer: MEDICARE

## 2017-08-17 VITALS
TEMPERATURE: 98.1 F | OXYGEN SATURATION: 99 % | DIASTOLIC BLOOD PRESSURE: 69 MMHG | SYSTOLIC BLOOD PRESSURE: 120 MMHG | HEART RATE: 50 BPM | BODY MASS INDEX: 19.56 KG/M2 | HEIGHT: 65 IN | WEIGHT: 117.4 LBS

## 2017-08-17 DIAGNOSIS — K31.819 GAVE (GASTRIC ANTRAL VASCULAR ECTASIA): ICD-10-CM

## 2017-08-17 DIAGNOSIS — D50.9 ANEMIA, IRON DEFICIENCY: ICD-10-CM

## 2017-08-17 DIAGNOSIS — Z79.899 ENCOUNTER FOR LONG-TERM CURRENT USE OF MEDICATION: ICD-10-CM

## 2017-08-17 DIAGNOSIS — M35.00 SICCA SYNDROME (H): ICD-10-CM

## 2017-08-17 DIAGNOSIS — M05.742 RHEUMATOID ARTHRITIS INVOLVING BOTH HANDS WITH POSITIVE RHEUMATOID FACTOR (H): Primary | ICD-10-CM

## 2017-08-17 DIAGNOSIS — I73.01 RAYNAUD'S DISEASE WITH GANGRENE (H): ICD-10-CM

## 2017-08-17 DIAGNOSIS — M06.9 RHEUMATOID ARTHRITIS (H): ICD-10-CM

## 2017-08-17 DIAGNOSIS — M77.42 METATARSALGIA OF BOTH FEET: ICD-10-CM

## 2017-08-17 DIAGNOSIS — M77.40 METATARSALGIA, UNSPECIFIED LATERALITY: ICD-10-CM

## 2017-08-17 DIAGNOSIS — M34.9 SYSTEMIC SCLEROSIS (H): ICD-10-CM

## 2017-08-17 DIAGNOSIS — M86.9 OSTEOMYELITIS OF FINGER OF RIGHT HAND (H): ICD-10-CM

## 2017-08-17 DIAGNOSIS — M77.41 METATARSALGIA OF BOTH FEET: ICD-10-CM

## 2017-08-17 DIAGNOSIS — F41.9 ANXIETY: ICD-10-CM

## 2017-08-17 DIAGNOSIS — M05.79 RHEUMATOID ARTHRITIS INVOLVING MULTIPLE SITES WITH POSITIVE RHEUMATOID FACTOR (H): ICD-10-CM

## 2017-08-17 DIAGNOSIS — Z79.899 HIGH RISK MEDICATIONS (NOT ANTICOAGULANTS) LONG-TERM USE: ICD-10-CM

## 2017-08-17 DIAGNOSIS — D50.0 IRON DEFICIENCY ANEMIA DUE TO CHRONIC BLOOD LOSS: ICD-10-CM

## 2017-08-17 DIAGNOSIS — M05.741 RHEUMATOID ARTHRITIS INVOLVING BOTH HANDS WITH POSITIVE RHEUMATOID FACTOR (H): Primary | ICD-10-CM

## 2017-08-17 LAB
ALBUMIN SERPL-MCNC: 4.2 G/DL (ref 3.4–5)
ALT SERPL W P-5'-P-CCNC: 17 U/L (ref 0–50)
AST SERPL W P-5'-P-CCNC: 12 U/L (ref 0–45)
BASOPHILS # BLD AUTO: 0 10E9/L (ref 0–0.2)
BASOPHILS NFR BLD AUTO: 0.2 %
CREAT SERPL-MCNC: 0.76 MG/DL (ref 0.52–1.04)
CRP SERPL-MCNC: <2.9 MG/L (ref 0–8)
DIFFERENTIAL METHOD BLD: ABNORMAL
EOSINOPHIL # BLD AUTO: 0.1 10E9/L (ref 0–0.7)
EOSINOPHIL NFR BLD AUTO: 1.2 %
ERYTHROCYTE [DISTWIDTH] IN BLOOD BY AUTOMATED COUNT: 12.8 % (ref 10–15)
ERYTHROCYTE [SEDIMENTATION RATE] IN BLOOD BY WESTERGREN METHOD: 11 MM/H (ref 0–30)
GFR SERPL CREATININE-BSD FRML MDRD: 79 ML/MIN/1.7M2
HCT VFR BLD AUTO: 37.7 % (ref 35–47)
HGB BLD-MCNC: 11.9 G/DL (ref 11.7–15.7)
IMM GRANULOCYTES # BLD: 0 10E9/L (ref 0–0.4)
IMM GRANULOCYTES NFR BLD: 0.2 %
IRON SATN MFR SERPL: 34 % (ref 15–46)
IRON SERPL-MCNC: 126 UG/DL (ref 35–180)
LYMPHOCYTES # BLD AUTO: 1.9 10E9/L (ref 0.8–5.3)
LYMPHOCYTES NFR BLD AUTO: 38.5 %
MCH RBC QN AUTO: 31.7 PG (ref 26.5–33)
MCHC RBC AUTO-ENTMCNC: 31.6 G/DL (ref 31.5–36.5)
MCV RBC AUTO: 101 FL (ref 78–100)
MONOCYTES # BLD AUTO: 0.4 10E9/L (ref 0–1.3)
MONOCYTES NFR BLD AUTO: 7.8 %
NEUTROPHILS # BLD AUTO: 2.6 10E9/L (ref 1.6–8.3)
NEUTROPHILS NFR BLD AUTO: 52.1 %
NRBC # BLD AUTO: 0 10*3/UL
NRBC BLD AUTO-RTO: 0 /100
PLATELET # BLD AUTO: 189 10E9/L (ref 150–450)
RBC # BLD AUTO: 3.75 10E12/L (ref 3.8–5.2)
TIBC SERPL-MCNC: 370 UG/DL (ref 240–430)
WBC # BLD AUTO: 5 10E9/L (ref 4–11)

## 2017-08-17 PROCEDURE — 85652 RBC SED RATE AUTOMATED: CPT

## 2017-08-17 PROCEDURE — 99212 OFFICE O/P EST SF 10 MIN: CPT | Mod: ZF

## 2017-08-17 PROCEDURE — 84460 ALANINE AMINO (ALT) (SGPT): CPT

## 2017-08-17 PROCEDURE — 81001 URINALYSIS AUTO W/SCOPE: CPT

## 2017-08-17 PROCEDURE — 84450 TRANSFERASE (AST) (SGOT): CPT

## 2017-08-17 PROCEDURE — 82565 ASSAY OF CREATININE: CPT

## 2017-08-17 PROCEDURE — 82040 ASSAY OF SERUM ALBUMIN: CPT

## 2017-08-17 PROCEDURE — 86140 C-REACTIVE PROTEIN: CPT

## 2017-08-17 PROCEDURE — 83540 ASSAY OF IRON: CPT

## 2017-08-17 PROCEDURE — 83550 IRON BINDING TEST: CPT

## 2017-08-17 PROCEDURE — 85025 COMPLETE CBC W/AUTO DIFF WBC: CPT

## 2017-08-17 RX ORDER — OXYCODONE AND ACETAMINOPHEN 5; 325 MG/1; MG/1
TABLET ORAL
Qty: 90 TABLET | Refills: 0 | Status: SHIPPED | OUTPATIENT
Start: 2017-08-17 | End: 2017-09-12

## 2017-08-17 RX ORDER — FOLIC ACID 1 MG/1
1 TABLET ORAL DAILY
Qty: 90 TABLET | Refills: 3 | Status: SHIPPED | OUTPATIENT
Start: 2017-08-17 | End: 2023-02-24

## 2017-08-17 ASSESSMENT — PAIN SCALES - GENERAL: PAINLEVEL: MODERATE PAIN (5)

## 2017-08-17 NOTE — NURSING NOTE
Chief Complaint   Patient presents with     RECHECK     Scleroderma   Pt roomed, vitals, meds, and allergies reviewed with pt. Pt ready for provider.  Jabari Miller, CMA

## 2017-08-17 NOTE — PROGRESS NOTES
Rheumatology Clinic Follow-up  [facility], Rheumatology        History of rheumatologic diagnosis:    Problem List:  1. 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   2. Associated marked neuropathic skin pain markedly improved with Lyrica with prior medication therapy with gabapentin.   3. Diffuse musculoskeletal pain requiring methotrexate plus CellCept, and narcotic to control, but also improved.   4. Initial clinical overlap with anti-CCP seropositive rheumatoid arthritis with continued anti-CCP seropositivity as of 09/2010.   5. Raynaud's phenomena with new active digital ulcers with easily cracked skin.   6. GI involvement consistent with bacterial small bowel overgrowth with marked improvement after a course of neomycin followed by Dr. Glez; positive hydrogen breath test in 3/2012 - treated wtih rifaximin through Dr. Jacob  7. No convincing evidence of lung involvement on serial pulmonary function tests or clinically.   8. History of heart palpitations prior to the onset of SSc with stable symptoms since.   9. History of medication failures with Remicade, methotrexate, methotrexate plus CellCept and with Orencia and with improvement since initiation of IV IG but with complicating headaches and overall sense of malaise with IV IG infusions.   10. History of silicone breast implants.   11. Incidental finding of calcified splenic artery aneurysm.  12. EGD in 3/2012 with hiatal hernia and gastral antral vascular ectasia        13. Recurrent right  third finger contracture with  extensor surface ulceration, healed        INTERVAL HISTORY, Since our last visit and her November hospitalization, she has had no more digital ulcers and decreased pain in her fingers.  She did  quit smoking at the time of that hospitalization and she has not resumed.  She still has pain in her fingers, but not nearly so severe.  Her biggest problem with her fingers remains the marked contractures.  She has difficulty using the paraffin bath because the paraffin is too hot, so we discussed simply unplugging it for a few minutes and letting it cool down a little and then before it gels using it to warm her hands and allow the stretches.       Since we have last seen her, she has seen her primary care.  I had requested that the lorazepam prescriptions be moved back over to primary care since I had simply assumed them when she had no primary care physician for her existing diagnosis of anxiety.  That has now been accomplished.      She also continues on oxycodone and we again today discussed attempting to taper that down.  She has decreased pain and hopefully can accomplish that, although she still does have multiple sources of musculoskeletal pain.  She has been off of methotrexate since the fall as she was having some gastrointestinal issues with it, but we did discuss that if tapering the narcotics becomes too problematic because of musculoskeletal pain with morning stiffness, resuming low-dose methotrexate might be possible and desirable.        She was started on a statin during the hospitalization and I also asked that that be taken over the primary care.  In fact, she brings her lipid results with her today and they are very good with her total cholesterol only 202, just barely above target, LDL only 133, just barely above target, HDL at 45 which is target, and triglycerides under target, so I am not sure that particularly now that she has the cigarette smoking removed as a risk factor and she has minimal family risk that she necessarily will need statins long-term.  On the other hand, they may have a vascular stabilizing effect, so I am certainly not opposed to her remaining on it and she does not have  side effect with it.       Her biggest issues recently have been gastrointestinal.  She has felt bloated, nauseated, and at times is having significant constipation.  She does not get diarrhea suggesting bacterial small bowel overgrowth.       She is having a lot of issues with her insurance and whether she can pay for her medical bills.  She therefore would like to see our .      She does get some dysphagia, but that is not changing and is not severe.       As noted, she has a number of sources of musculoskeletal pain including pain in her knees and hips and greater trochanteric area.  She did have what sounds like a Baker's cyst a few weeks ago, but that resolved quickly and she did not have any known antecedent injury that would explain that.     FEBRUARY 16, 2017, INTERVAL HISTORY:  Since I last saw the patient, she went down to Gastroenterology at the [facility] and saw Dr. Tatum there.  She was found to have recurrent versus ongoing esophageal candidiasis on upper endoscopy but had a normal colonoscopy.  A rectal balloon test, however, was abnormal, consistent with sphincter dysfunction potentially contributing to her chronic constipation.       She was also told she needed iron infusions.  This is somewhat curious in that her iron studies done at [facility] on 01/27 were only minimally abnormal and her hemoglobin and hematocrit were only minimally abnormal as well.  Apparently, her ferritin was very low, however.       We do not have any of those records to look at.  She says that she asked that they be sent here, but she was told they would be sent only to her primary care doctor.  I have looked in her [facility] notes on Care Everywhere, but I cannot see that they have been scanned in at this point.       She has some financial issues at this point.  Her labs and other tests are best paid for if they are done at [facility].  Fortunately, her primary care doctor at [facility] was adept enough with the system  that she was able to get the labs done at [facility] and have them come directly to my box.  I have reviewed all of them with her today and, as noted, she  has only had some very modest anemia.  She has no other evidence of any toxicity from the CellCept or really any other abnormal labs.       She has been doing well with her Raynaud's.  She has had no episodes of severe distal digital ischemia.  She is very careful about stopping attacks as soon as they get started with rewarming.  She has hand warmers with her at all times and a hand muff that she uses.       Unfortunately, she has restarted smoking again.  She is much more careful with it, she says, however, and is not smoking outdoors when in the cold the way she was previously when she developed a severe ulcer that would not heal.       Although she hated the Botox treatment that Dr. Beauchamp gave her, she has not had any further episodes since that time.       She does believe her lungs are stable.  She is not doing a lot of significant aerobic exercise, but she has not noticed any drop-off in her exercise tolerance with walking, etc.  She has not had PFTs done and I urged her to have them done at [facility] since it is so expensive for her to do them here.       She feels okay otherwise in general.  She continues to have some musculoskeletal pain but does not think it is really a lot worse.     INTERVAL HISTORY (05/18/2017):  Since we have last seen the patient, she developed a pretty bad sore throat.  This really sounds viral but she got a Z-GRIFFIN for it.  The soreness in her throat was posterior oropharyngeal extending only a little ways down her throat, but she was concerned that it could represent recurrent esophageal candidiasis since that was diagnosed on an endoscopy at [facility].  She was then told by one of her physicians that she should have had a repeat EGD, despite the fact that the original report said repeat EGD needed to be done only if she had persistent or  ongoing symptoms.      The trouble with that is that her only symptoms initially were not pain but were dysphagia.  She has had some longstanding low-grade dysphagia.  She does not believe that has progressed in any way.  It certainly is not worse since she has been on the Z-GRIFFIN which she has now completed.      We have therefore discussed with her that certainly after a Z-GRIFFIN with 2 weeks of therapy with it that were this esophageal candidiasis it should not be better and it might well be worse.  I think given the overall situation that is very unlikely.      She had a lot of constipation and we gave her a bowel regimen for that and with that she has done quite well.      One of the things that is bothering her more is that by the end of the day, both the bottom of her feet and her fingers really burner.  This is not as bad first thing in the day.      What is better is that she has had only 1 digital ulcer, very low-grade in the fifth PIP currently, with no distal pulp ulcers since Dr. Beauchamp gave her the Botox injection.      She also has no evidence of any progressive cardiopulmonary disease and indeed her last pulmonary function tests were completely stable.  She does not do a lot of her aerobic exercise, but she can climb stairs without difficulties.      She has had a history of blood in the urine.  Because of her smoking history, she saw an urologist who did a cystoscopy that was negative.  She was then told she should see a renal doctor.      However, since January here she has had 2 years that has ever completely free of any evidence of blood.  Blood has not observed microscopic stable as far as I can tell and was positive only on the dip stick.  This did still occur in 2016.  Prior to that, she had been on methotrexate, which is known to potentially cause this.      The other thing is she is noticing and is quite concerned about is worsening oral aperture and more and more problems with her teeth  including retraction.  She continues to smoke.  She has significant keratoconjunctivitis sicca, although it is definitely improved with twice daily Evoxac.       She feels okay otherwise in general.  She continues to have some musculoskeletal pain but does not think it is really a lot worse.     AUGUST 17, 2017, INTERVAL HISTORY:  Since we have last seen the patient, she has been having a lot of stressors.  Specifically, her daughter had a baby in late July, then had a complication that kept her in the hospital for some time.  Her sister has also been in the hospital with what she reports has a brain hemorrhage.  She is 42 years old and this all apparently developed after an infected back injection for pain.  She has now been told that she has positive autoimmune antibodies and that she might have systemic lupus, apparently.       With all of this stress, she has again resumed smoking after having had cessation.  She has some intermittent ulcers over her right third and right fifth PIP joints.  She is, if anything, more contractured there.  She is not doing stretching because of the pain.  They are not open currently, however.  If she hits them, of course, they hurt more and open up.       She is also getting a lot of hip pain, particularly over the right hip at night.  Much of the pain she describes as consistent with trochanteric bursitis, but some may be consistent with true hip pain deeper in.  She feels a tendon rub at times over this area as well.       She has been off of methotrexate for a long time, as her skin had improved enough that we felt we could get her off of it and she had some gastrointestinal symptoms that were arguably worsened from it.  She continues to have a lot of GI symptoms currently with intermittent bloating and nausea.  She has persistently had some low-grade dysphagia as well.  None of that is all that severe right now, and pain is the bigger problem.       She has apparently had some  recent mix-up with the narcotic prescriptions as well.  I had signed a Percocet prescription last week, but that was apparently not the one that was due; it was rather the oxycodone, which I did sign just 2 days ago.  She has apparently not picked up the Percocet prescription and is wanting to make sure she gets it refilled now for 2 weeks from now rather 10 days from now when it is due.        She is having some skin irritation over her upper back with a lot of itching and burning pain.  She has excoriated that area.  She wants to make sure that it does not look like a skin cancer.       Other features of her disease remained relatively stable.  She is getting Raynaud's attacks despite the fact that it is not particularly cool right now.  This may be being worsened by her cigarette smoking, and we discussed that.             ROS  Gen: Denies weight loss or fevers or night sweats  HEENT:  No ocular inflammation..  Denies swollen glands  CV:  Denies chest pains  Resp:  denies chronic cough or dyspnea.  No pleurisy  Ext:  No swelling in extremities.    Skin:  No new rashes  Remainder of 10-point ROS as per Interval history or negative,     Past Medical History  1.  Diffuse systemic sclerosis  2.  CCP+ inflammatory arthritis  3.  Small bowel overgrowth syndrome - positive hydrogen test   4.  GERD  5.  Plantar fasciitis - seen by Dr. Jorgensen  6.  Chronic neuropathic pain - on lyrica and chronic percocet    Allergies   Allergies   Allergen Reactions     Penicillin [Esters] Other (See Comments)     Unable to move     Bactroban [Mupirocin Calcium]      Inability to move     Levaquin Other (See Comments)     Muscle weakness       Rifampin Other (See Comments)     Makes pain medication less effective     Medications  Current Outpatient Prescriptions   Medication     LORazepam (ATIVAN) 0.5 MG tablet     valACYclovir (VALTREX) 500 MG tablet     oxyCODONE (ROXICODONE) 5 MG immediate release tablet     oxyCODONE-acetaminophen  "(PERCOCET) 5-325 MG per tablet     pregabalin (LYRICA) 75 MG capsule     rifaximin (XIFAXAN) 550 MG TABS     mycophenolate (CELLCEPT-BRAND NAME) 500 MG tablet     omeprazole (PRILOSEC) 40 MG capsule     ondansetron (ZOFRAN) 4 MG tablet     Needle, Disp, 30G X 1/2\" MISC     diazepam (VALIUM) 5 MG tablet     fish oil-omega-3 fatty acids (FISH OIL) 1000 MG capsule     METHOTREXate 25 MG/ML injection     leucovorin (WELLCOVORIN) 5 MG tablet     Syringe/Needle, Disp, 27G X 1/2\" 1 ML MISC     Physical Exam  /69  Pulse 50  Temp 98.1  F (36.7  C) (Oral)  Ht 1.651 m (5' 5\")  Wt 53.3 kg (117 lb 6.4 oz)  SpO2 99%  BMI 19.54 kg/m2  General: pleasant, but dysphoric  HEENT: EOMI, PERRL, no conjunctivitis or scleritis. Chronic facial skin thickenig without facial rash. Slight pursing of her lips. Slight restriction in opening of the jaw. Oropharynx exam reveals 4-5mm shallow ulceration on right lower lip. Moderate saliva pooling. No cervical adenopathy  CV: regular rhythm; no murmurs.    Resp: End-expiratory wheezing heard diffusely throughout  GI:  Soft, minimal  discomfort in the lower abdomen to deeper palpation; no masses or hepatomegaly  Ext:  No peripheral edema.   Skin:  Chronic sclerodactyly, most prominent in R hand (contracture at 50% finger flexion) vs. Left (missing final 20' extension).  Chronic skin thickening with evidence for improvement along forearms, upper arms, chest, abdomen, lower legs and feet.  Stable Calcinosis-like deposit noted on her right anterior thigh just above the knee joint and on right fith finger. . Salt/pepper appearance to areas of skin (christiano arms and upper chest).  No other rashes.   Closed  DU  over  the right 5th and  3rd  PIP joint: no otherDU.   Mild skin thinning/erythema along other PIPs. 2+ over fingers ; 1+ maximum over hands, and feet and shins, and face, pregressing to atrophic status.    MSK:  Full joint exam without evidence for synovitis, but right hip pain in joint " and over trochate. Chronic joint contractures of fingers (christiano right); minimally also along wrists and elbows.       RESULTS:  Component      Latest Ref Rng 1/17/2013           2:15 PM   WBC      4.0 - 11.0 10e9/L 4.6   RBC Count      3.8 - 5.2 10e12/L 3.71 (L)   Hemoglobin      11.7 - 15.7 g/dL 11.8   Hematocrit      35.0 - 47.0 % 37.4   MCV      78 - 100 fl 101 (H)   MCH      26.5 - 33.0 pg 31.8   MCHC      31.5 - 36.5 g/dL 31.6   RDW      10.0 - 15.0 % 13.9   Platelet Count      150 - 450 10e9/L 200     Component      Latest Ref Rng 1/17/2013   ALT      0 - 50 U/L 23   AST      0 - 45 U/L 30   Albumin      3.9 - 5.1 g/dL 4.2   CRP Inflammation      0.0 - 8.0 mg/L <5.0   Sed Rate      0 - 20 mm/h 18         ASSESSMENT: Per the Problem LIst    PLAN AND RECOMMENDATIONS:     It is not entirely clear to me how much of her increase in joint symptoms is driven by her current stressors, but I do think she probably has some inflammation in the tendons, and she continues to have quite significant contractures, particularly in her hands, that are not improving.        Given this overall picture, I think it is worth trying to restart methotrexate.       Given that she has had some difficulty with GI tolerance, I am going to start her on a very low dose, just 7.5 mg oral weekly.  If even this dosing bothers her gut, then we will try switching her to injectable, which she did use previously and tolerated.       I have also asked her to be stretching these joints.  I have given her instruction on how to do so.       We have worked through the issues with what probably happened with her narcotics.  Longer term, I want to get her narcotic prescriptions synchronized so that they are both occurring at the same time for her oxycodone and her Percocet breakthrough.  We will work on that and try to get the amounts set up so that we can get them synchronized with her next visit and give her 1-month supplies and post-date prescriptions  out for 90 days so that we quit having these problems with the synchronization of them.       I am going to have her do labs today, come back and see me in 5-6 weeks, and reassess with her joint and tendon pain at that time.  I again urged her to quit smoking.         This visit was 40 minutes in duration, greater than 50% in counseling.

## 2017-08-17 NOTE — MR AVS SNAPSHOT
After Visit Summary   8/17/2017    Willard Grayson    MRN: [id number]           Patient Information     Date Of Birth          1965        Visit Information        Provider Department      8/17/2017 5:30 PM Brennen Child MD [facility]        Today's Diagnoses     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 factor (H)    -  1    Osteomyelitis of finger of right hand (H)        Encounter for long-term current use of medication        Sicca syndrome (H)        GAVE (gastric antral vascular ectasia)        Raynaud's disease with gangrene (H)        Systemic sclerosis (H)        Rheumatoid arthritis involving multiple sites with positive rheumatoid factor (H)        Systemic sclerosis        Metatarsalgia, unspecified laterality        Metatarsalgia of both feet           Follow-ups after your visit        Your next 10 appointments already scheduled     Sep 21, 2017  2:30 PM CDT   (Arrive by 2:15 PM)   RETURN SCLERODERMA with Brennen Child MD   [facility] ([facility])    [address] [place] [address] [phone]              Who to contact     If you have questions or need follow up information about today's clinic visit or your schedule please contact [facility] directly at [phone].  Normal or non-critical lab and imaging results will be communicated to you by MyChart, letter or phone within 4 business days after the clinic has received the results. If you do not hear from us within 7 days, please contact the clinic through MyChart or phone. If you have a critical or abnormal lab result, we will notify you by phone as soon as possible.  Submit refill requests through [employer] or call your pharmacy and they will forward the refill request to us. Please allow 3 business days for your refill to be completed.          Additional Information About Your Visit        MyChart Information  "    [employer] lets you send messages to your doctor, view your test results, renew your prescriptions, schedule appointments and more. To sign up, go to [email or web address] . Click on \"Log in\" on the left side of the screen, which will take you to the Welcome page. Then click on \"Sign up Now\" on the right side of the page.     You will be asked to enter the access code listed below, as well as some personal information. Please follow the directions to create your username and password.     Your access code is: [email or web address]  Expires: 2017  6:31 AM     Your access code will  in 90 days. If you need help or a new code, please call your [place] clinic or [phone].        Care EveryWhere ID     This is your Care EveryWhere ID. This could be used by other organizations to access your [place] medical records  [id number]        Your Vitals Were     Pulse Temperature Height Pulse Oximetry BMI (Body Mass Index)       50 98.1  F (36.7  C) (Oral) 1.651 m (5' 5\") 99% 19.54 kg/m2        Blood Pressure from Last 3 Encounters:   17 120/69   17 99/60   17 114/65    Weight from Last 3 Encounters:   17 53.3 kg (117 lb 6.4 oz)   17 54.6 kg (120 lb 6.4 oz)   17 55.3 kg (122 lb)              Today, you had the following     No orders found for display         Today's Medication Changes          These changes are accurate as of: 17  6:05 PM.  If you have any questions, ask your nurse or doctor.               Start taking these medicines.        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   Quantity:  12 tablet   Refills:  3         These medicines have changed or have updated prescriptions.        Dose/Directions    * folic acid 1 MG tablet   Commonly known as:  FOLVITE   This may have changed:  Another medication with the same name was added. Make sure you understand how and when to take each.   Used for:  Rheumatoid arthritis(714.0), Systemic sclerosis (H), High risk medications (not anticoagulants) long-term use   Changed by:  Brennen Child MD        Dose:  5 mg   Take 5 tablets (5 mg) by mouth daily   Quantity:  450 tablet   Refills:  3       * folic acid 1 MG tablet   Commonly known as:  FOLVITE   This may have changed:  You were already taking a medication with the same name, and this prescription was added. Make sure you understand how and when to take each.   Used for:  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 factor (H), Osteomyelitis of finger of right hand (H), Encounter for long-term current use of medication, Sicca syndrome (H), GAVE (gastric antral vascular ectasia), Raynaud's disease with gangrene (H)   Changed by:  Brennen Child MD        Dose:  1 mg   Take 1 tablet (1 mg) by mouth daily   Quantity:  90 tablet   Refills:  3       pregabalin 75 MG capsule   Commonly known as:  LYRICA   This may have changed:  when to take this   Used for:  Systemic sclerosis (H)        Dose:  75 mg   Take 1 capsule (75 mg) by mouth 2 times daily   Quantity:  180 capsule   Refills:  1       valACYclovir 500 MG tablet   Commonly known as:  VALTREX   This may have changed:  when to take this   Used for:  Herpes, Systemic sclerosis (H), Anxiety        Dose:  500 mg   Take 1 tablet (500 mg) by mouth 2 times daily   Quantity:  180 tablet   Refills:  1       * Notice:  This list has 2 medication(s) that are the same as other medications prescribed for you. Read the directions carefully, and ask your doctor or other care provider to review  them with you.         Where to get your medicines      These medications were sent to [facility] - [place], [place] - [address]  [address], [facility] [address]     Phone:  [phone]     folic acid 1 MG tablet    methotrexate 2.5 MG tablet CHEMO         Some of these will need a paper prescription and others can be bought over the counter.  Ask your nurse if you have questions.     Bring a paper prescription for each of these medications     oxyCODONE-acetaminophen 5-325 MG per tablet                Primary Care Provider Office Phone # Fax #    Lindy Santacruz Leatha Plasencia [phone]       [facility] [address] [facility] [address]        Equal Access to Services     SOL CHAU : Shar lopezo Oc, waaxda luqadaha, qaybta kaalmada adewisamyaadrian, quinten romeo . So [facility] [phone].    ATENCIÓN: Si habla español, tiene a paige disposición servicios gratuitos de asistencia lingüística. [facility] [phone].    We comply with applicable federal civil rights laws and [place] laws. We do not discriminate on the basis of race, color, national origin, age, disability sex, sexual orientation or gender identity.            Thank you!     Thank you for choosing [facility]  for your care. Our goal is always to provide you with excellent care. Hearing back from our patients is one way we can continue to improve our services. Please take a few minutes to complete the written survey that you may receive in the mail after your visit with us. Thank you!             Your Updated Medication List - Protect others around you: Learn how to safely use, store and throw away your medicines at www.disposemymeds.org.          This list is accurate as of: 8/17/17  6:05 PM.  Always use your most recent med list.                   Brand Name Dispense Instructions for use Diagnosis    aspirin-dipyridamole  MG per 12 hr capsule     AGGRENOX     Take 1 capsule by mouth        Blood Pressure Monitoring Kit     1 kit    Check BP every 7 days.    Rheumatoid arthritis(714.0), Sicca syndrome (H), Systemic sclerosis (H), Encounter for long-term (current) use of other medications, Abnormal hemoglobin (Hgb) (H), Ulcer of finger, with fat layer exposed (H), Chronic osteomyelitis, site unspecified, Anxiety, Herpes, Rheumatoid arthritis(714.0)       cevimeline 30 MG capsule    EVOXAC    270 capsule    Take 1 capsule (30 mg) by mouth 3 times daily as needed    Metatarsalgia, unspecified laterality, Systemic sclerosis (H), Sicca syndrome (H)       cyanocobalamin 1000 MCG/ML injection    VITAMIN B12     Inject 1 mL into the muscle every 30 days        esomeprazole 40 MG CR capsule    nexIUM    180 capsule    Take 1 capsule (40 mg) by mouth 2 times daily Take 30-60 minutes before eating.    GAVE (gastric antral vascular ectasia)       fluconazole 100 MG tablet    DIFLUCAN    15 tablet    Take 2 tablets (200 mg) for 1 day followed by 1 tablet (100 mg) daily for 13 days.    Candida infection, esophageal (H)       * folic acid 1 MG tablet    FOLVITE    450 tablet    Take 5 tablets (5 mg) by mouth daily    Rheumatoid arthritis(714.0), Systemic sclerosis (H), High risk medications (not anticoagulants) long-term use       * folic acid 1 MG tablet    FOLVITE    90 tablet    Take 1 tablet (1 mg) by mouth daily    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 factor (H), Osteomyelitis of finger of right hand (H), Encounter for long-term current use of medication, Sicca syndrome (H), GAVE (gastric antral vascular ectasia), Raynaud's disease with gangrene (H)       LORazepam 0.5 MG tablet    ATIVAN    60 tablet    Take 1 - 2 tablets by mouth three times daily as needed.    Systemic sclerosis (H), Anxiety, Ulcer of finger, with fat layer exposed (H), Raynaud's disease with gangrene (H), Osteomyelitis of finger of right hand (H), Rheumatoid arthritis  involving multiple sites with positive rheumatoid factor (H), Systemic sclerosis (H)       methotrexate 2.5 MG tablet CHEMO     12 tablet    Take 3 tablets (7.5 mg) by mouth once a week Take 3 tablets (7.5mg) once weekly.    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 factor (H), Osteomyelitis of finger of right hand (H), Encounter for long-term current use of medication, Sicca syndrome (H), GAVE (gastric antral vascular ectasia), Raynaud's disease with gangrene (H)       mycophenolate 500 MG tablet    GENERIC EQUIVALENT    360 tablet    Take 2 tablets (1,000 mg) by mouth 2 times daily *LABS DUE EVERY 8-12 WKS    Systemic sclerosis (H)       ondansetron 4 MG tablet    ZOFRAN    20 tablet    Take 1 tablet (4 mg) by mouth every 8 hours as needed for nausea    Nausea, Rheumatoid arthritis(714.0), Systemic sclerosis (H), Encounter for long-term (current) use of other medications, Iron deficiency anemia, Personal history of other diseases of digestive system, GAVE (gastric antral vascular ectasia), Raynaud's syndrome, Abnormal hemoglobin (Hgb) (H), Ulcer of finger, with fat layer exposed (H), Chronic osteomyelitis, site unspecified, Sicca syndrome (H), Anxiety, Herpes, Rheumatoid arthritis(714.0), High risk medications (not anticoagulants) long-term use, Osteomyelitis of finger of right hand (H), Ulcer of finger (H)       oxyCODONE 5 MG IR tablet    ROXICODONE    204 tablet    Take 1 tablet (5 mg) by mouth every 4 hours as needed for moderate to severe pain or pain Maximum 6 tabs per day.    Systemic sclerosis (H), Rheumatoid arthritis involving multiple sites with positive rheumatoid factor (H), Raynaud's disease with gangrene (H), Systemic sclerosis (H), Metatarsalgia, unspecified laterality, Metatarsalgia of both feet       oxyCODONE-acetaminophen 5-325 MG per tablet    PERCOCET    90 tablet    Take 1 tablet three times daily as needed For breakthrough pain.    Systemic sclerosis (H), Rheumatoid  arthritis involving multiple sites with positive rheumatoid factor (H), Raynaud's disease with gangrene (H), Systemic sclerosis (H), Metatarsalgia, unspecified laterality, Metatarsalgia of both feet       pravastatin 10 MG tablet    PRAVACHOL    30 tablet    Take 1 tablet (10 mg) by mouth daily    Rheumatoid arthritis involving multiple sites with positive rheumatoid factor (H)       pregabalin 75 MG capsule    LYRICA    180 capsule    Take 1 capsule (75 mg) by mouth 2 times daily    Systemic sclerosis (H)       valACYclovir 500 MG tablet    VALTREX    180 tablet    Take 1 tablet (500 mg) by mouth 2 times daily    Herpes, Systemic sclerosis (H), Anxiety       VITAMIN D (CHOLECALCIFEROL) PO      Take 2,000 Units by mouth daily        * Notice:  This list has 2 medication(s) that are the same as other medications prescribed for you. Read the directions carefully, and ask your doctor or other care provider to review them with you.

## 2017-08-17 NOTE — LETTER
8/17/2017      RE: Willard Grayson  [address]    [facility] [id number]       Rheumatology Clinic Follow-up  [facility], Rheumatology        History of rheumatologic diagnosis:    Problem List:  1. 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   2. Associated marked neuropathic skin pain markedly improved with Lyrica with prior medication therapy with gabapentin.   3. Diffuse musculoskeletal pain requiring methotrexate plus CellCept, and narcotic to control, but also improved.   4. Initial clinical overlap with anti-CCP seropositive rheumatoid arthritis with continued anti-CCP seropositivity as of 09/2010.   5. Raynaud's phenomena with new active digital ulcers with easily cracked skin.   6. GI involvement consistent with bacterial small bowel overgrowth with marked improvement after a course of neomycin followed by Dr. Glez; positive hydrogen breath test in 3/2012 - treated wtih rifaximin through Dr. Jacob  7. No convincing evidence of lung involvement on serial pulmonary function tests or clinically.   8. History of heart palpitations prior to the onset of SSc with stable symptoms since.   9. History of medication failures with Remicade, methotrexate, methotrexate plus CellCept and with Orencia and with improvement since initiation of IV IG but with complicating headaches and overall sense of malaise with IV IG infusions.   10. History of silicone breast implants.   11. Incidental finding of calcified splenic artery aneurysm.  12. EGD in 3/2012 with hiatal hernia and gastral antral vascular ectasia        13. Recurrent right  third finger contracture with  extensor surface ulceration, healed        INTERVAL HISTORY, Since our last visit and her November  hospitalization, she has had no more digital ulcers and decreased pain in her fingers.  She did quit smoking at the time of that hospitalization and she has not resumed.  She still has pain in her fingers, but not nearly so severe.  Her biggest problem with her fingers remains the marked contractures.  She has difficulty using the paraffin bath because the paraffin is too hot, so we discussed simply unplugging it for a few minutes and letting it cool down a little and then before it gels using it to warm her hands and allow the stretches.       Since we have last seen her, she has seen her primary care.  I had requested that the lorazepam prescriptions be moved back over to primary care since I had simply assumed them when she had no primary care physician for her existing diagnosis of anxiety.  That has now been accomplished.      She also continues on oxycodone and we again today discussed attempting to taper that down.  She has decreased pain and hopefully can accomplish that, although she still does have multiple sources of musculoskeletal pain.  She has been off of methotrexate since the fall as she was having some gastrointestinal issues with it, but we did discuss that if tapering the narcotics becomes too problematic because of musculoskeletal pain with morning stiffness, resuming low-dose methotrexate might be possible and desirable.        She was started on a statin during the hospitalization and I also asked that that be taken over the primary care.  In fact, she brings her lipid results with her today and they are very good with her total cholesterol only 202, just barely above target, LDL only 133, just barely above target, HDL at 45 which is target, and triglycerides under target, so I am not sure that particularly now that she has the cigarette smoking removed as a risk factor and she has minimal family risk that she necessarily will need statins long-term.  On the other hand, they may have a  vascular stabilizing effect, so I am certainly not opposed to her remaining on it and she does not have side effect with it.       Her biggest issues recently have been gastrointestinal.  She has felt bloated, nauseated, and at times is having significant constipation.  She does not get diarrhea suggesting bacterial small bowel overgrowth.       She is having a lot of issues with her insurance and whether she can pay for her medical bills.  She therefore would like to see our .      She does get some dysphagia, but that is not changing and is not severe.       As noted, she has a number of sources of musculoskeletal pain including pain in her knees and hips and greater trochanteric area.  She did have what sounds like a Baker's cyst a few weeks ago, but that resolved quickly and she did not have any known antecedent injury that would explain that.     FEBRUARY 16, 2017, INTERVAL HISTORY:  Since I last saw the patient, she went down to Gastroenterology at the [facility] and saw Dr. Tatum there.  She was found to have recurrent versus ongoing esophageal candidiasis on upper endoscopy but had a normal colonoscopy.  A rectal balloon test, however, was abnormal, consistent with sphincter dysfunction potentially contributing to her chronic constipation.       She was also told she needed iron infusions.  This is somewhat curious in that her iron studies done at [facility] on 01/27 were only minimally abnormal and her hemoglobin and hematocrit were only minimally abnormal as well.  Apparently, her ferritin was very low, however.       We do not have any of those records to look at.  She says that she asked that they be sent here, but she was told they would be sent only to her primary care doctor.  I have looked in her [facility] notes on Care Everywhere, but I cannot see that they have been scanned in at this point.       She has some financial issues at this point.  Her labs and other tests are best paid for if  they are done at [facility].  Fortunately, her primary care doctor at [facility] was adept enough with the system that she was able to get the labs done at [facility] and have them come directly to my box.  I have reviewed all of them with her today and, as noted, she  has only had some very modest anemia.  She has no other evidence of any toxicity from the CellCept or really any other abnormal labs.       She has been doing well with her Raynaud's.  She has had no episodes of severe distal digital ischemia.  She is very careful about stopping attacks as soon as they get started with rewarming.  She has hand warmers with her at all times and a hand muff that she uses.       Unfortunately, she has restarted smoking again.  She is much more careful with it, she says, however, and is not smoking outdoors when in the cold the way she was previously when she developed a severe ulcer that would not heal.       Although she hated the Botox treatment that Dr. Beauchamp gave her, she has not had any further episodes since that time.       She does believe her lungs are stable.  She is not doing a lot of significant aerobic exercise, but she has not noticed any drop-off in her exercise tolerance with walking, etc.  She has not had PFTs done and I urged her to have them done at [facility] since it is so expensive for her to do them here.       She feels okay otherwise in general.  She continues to have some musculoskeletal pain but does not think it is really a lot worse.     INTERVAL HISTORY (05/18/2017):  Since we have last seen the patient, she developed a pretty bad sore throat.  This really sounds viral but she got a Z-GRIFFIN for it.  The soreness in her throat was posterior oropharyngeal extending only a little ways down her throat, but she was concerned that it could represent recurrent esophageal candidiasis since that was diagnosed on an endoscopy at [place].  She was then told by one of her physicians that she should have had a repeat EGD,  despite the fact that the original report said repeat EGD needed to be done only if she had persistent or ongoing symptoms.      The trouble with that is that her only symptoms initially were not pain but were dysphagia.  She has had some longstanding low-grade dysphagia.  She does not believe that has progressed in any way.  It certainly is not worse since she has been on the Z-GRIFFIN which she has now completed.      We have therefore discussed with her that certainly after a Z-GRIFFIN with 2 weeks of therapy with it that were this esophageal candidiasis it should not be better and it might well be worse.  I think given the overall situation that is very unlikely.      She had a lot of constipation and we gave her a bowel regimen for that and with that she has done quite well.      One of the things that is bothering her more is that by the end of the day, both the bottom of her feet and her fingers really burner.  This is not as bad first thing in the day.      What is better is that she has had only 1 digital ulcer, very low-grade in the fifth PIP currently, with no distal pulp ulcers since Dr. Beauchamp gave her the Botox injection.      She also has no evidence of any progressive cardiopulmonary disease and indeed her last pulmonary function tests were completely stable.  She does not do a lot of her aerobic exercise, but she can climb stairs without difficulties.      She has had a history of blood in the urine.  Because of her smoking history, she saw an urologist who did a cystoscopy that was negative.  She was then told she should see a renal doctor.      However, since January here she has had 2 years that has ever completely free of any evidence of blood.  Blood has not observed microscopic stable as far as I can tell and was positive only on the dip stick.  This did still occur in 2016.  Prior to that, she had been on methotrexate, which is known to potentially cause this.      The other thing is she is  noticing and is quite concerned about is worsening oral aperture and more and more problems with her teeth including retraction.  She continues to smoke.  She has significant keratoconjunctivitis sicca, although it is definitely improved with twice daily Evoxac.       She feels okay otherwise in general.  She continues to have some musculoskeletal pain but does not think it is really a lot worse.     AUGUST 17, 2017, INTERVAL HISTORY:  Since we have last seen the patient, she has been having a lot of stressors.  Specifically, her daughter had a baby in late July, then had a complication that kept her in the hospital for some time.  Her sister has also been in the hospital with what she reports has a brain hemorrhage.  She is 42 years old and this all apparently developed after an infected back injection for pain.  She has now been told that she has positive autoimmune antibodies and that she might have systemic lupus, apparently.       With all of this stress, she has again resumed smoking after having had cessation.  She has some intermittent ulcers over her right third and right fifth PIP joints.  She is, if anything, more contractured there.  She is not doing stretching because of the pain.  They are not open currently, however.  If she hits them, of course, they hurt more and open up.       She is also getting a lot of hip pain, particularly over the right hip at night.  Much of the pain she describes as consistent with trochanteric bursitis, but some may be consistent with true hip pain deeper in.  She feels a tendon rub at times over this area as well.       She has been off of methotrexate for a long time, as her skin had improved enough that we felt we could get her off of it and she had some gastrointestinal symptoms that were arguably worsened from it.  She continues to have a lot of GI symptoms currently with intermittent bloating and nausea.  She has persistently had some low-grade dysphagia as well.   None of that is all that severe right now, and pain is the bigger problem.       She has apparently had some recent mix-up with the narcotic prescriptions as well.  I had signed a Percocet prescription last week, but that was apparently not the one that was due; it was rather the oxycodone, which I did sign just 2 days ago.  She has apparently not picked up the Percocet prescription and is wanting to make sure she gets it refilled now for 2 weeks from now rather 10 days from now when it is due.        She is having some skin irritation over her upper back with a lot of itching and burning pain.  She has excoriated that area.  She wants to make sure that it does not look like a skin cancer.       Other features of her disease remained relatively stable.  She is getting Raynaud's attacks despite the fact that it is not particularly cool right now.  This may be being worsened by her cigarette smoking, and we discussed that.             ROS  Gen: Denies weight loss or fevers or night sweats  HEENT:  No ocular inflammation..  Denies swollen glands  CV:  Denies chest pains  Resp:  denies chronic cough or dyspnea.  No pleurisy  Ext:  No swelling in extremities.    Skin:  No new rashes  Remainder of 10-point ROS as per Interval history or negative,     Past Medical History  1.  Diffuse systemic sclerosis  2.  CCP+ inflammatory arthritis  3.  Small bowel overgrowth syndrome - positive hydrogen test   4.  GERD  5.  Plantar fasciitis - seen by Dr. Jorgensen  6.  Chronic neuropathic pain - on lyrica and chronic percocet    Allergies   Allergies   Allergen Reactions     Penicillin [Esters] Other (See Comments)     Unable to move     Bactroban [Mupirocin Calcium]      Inability to move     Levaquin Other (See Comments)     Muscle weakness       Rifampin Other (See Comments)     Makes pain medication less effective     Medications  Current Outpatient Prescriptions   Medication     LORazepam (ATIVAN) 0.5 MG tablet     valACYclovir  "(VALTREX) 500 MG tablet     oxyCODONE (ROXICODONE) 5 MG immediate release tablet     oxyCODONE-acetaminophen (PERCOCET) 5-325 MG per tablet     pregabalin (LYRICA) 75 MG capsule     rifaximin (XIFAXAN) 550 MG TABS     mycophenolate (CELLCEPT-BRAND NAME) 500 MG tablet     omeprazole (PRILOSEC) 40 MG capsule     ondansetron (ZOFRAN) 4 MG tablet     Needle, Disp, 30G X 1/2\" MISC     diazepam (VALIUM) 5 MG tablet     fish oil-omega-3 fatty acids (FISH OIL) 1000 MG capsule     METHOTREXate 25 MG/ML injection     leucovorin (WELLCOVORIN) 5 MG tablet     Syringe/Needle, Disp, 27G X 1/2\" 1 ML MISC     Physical Exam  /69  Pulse 50  Temp 98.1  F (36.7  C) (Oral)  Ht 1.651 m (5' 5\")  Wt 53.3 kg (117 lb 6.4 oz)  SpO2 99%  BMI 19.54 kg/m2  General: pleasant, but dysphoric  HEENT: EOMI, PERRL, no conjunctivitis or scleritis. Chronic facial skin thickenig without facial rash. Slight pursing of her lips. Slight restriction in opening of the jaw. Oropharynx exam reveals 4-5mm shallow ulceration on right lower lip. Moderate saliva pooling. No cervical adenopathy  CV: regular rhythm; no murmurs.    Resp: End-expiratory wheezing heard diffusely throughout  GI:  Soft, minimal  discomfort in the lower abdomen to deeper palpation; no masses or hepatomegaly  Ext:  No peripheral edema.   Skin:  Chronic sclerodactyly, most prominent in R hand (contracture at 50% finger flexion) vs. Left (missing final 20' extension).  Chronic skin thickening with evidence for improvement along forearms, upper arms, chest, abdomen, lower legs and feet.  Stable Calcinosis-like deposit noted on her right anterior thigh just above the knee joint and on right fith finger. . Salt/pepper appearance to areas of skin (christiano arms and upper chest).  No other rashes.   Closed  DU  over  the right 5th and  3rd  PIP joint: no otherDU.   Mild skin thinning/erythema along other PIPs. 2+ over fingers ; 1+ maximum over hands, and feet and shins, and face, " pregressing to atrophic status.    MSK:  Full joint exam without evidence for synovitis, but right hip pain in joint and over trochate. Chronic joint contractures of fingers (christiano right); minimally also along wrists and elbows.       RESULTS:  Component      Latest Ref Rng 1/17/2013           2:15 PM   WBC      4.0 - 11.0 10e9/L 4.6   RBC Count      3.8 - 5.2 10e12/L 3.71 (L)   Hemoglobin      11.7 - 15.7 g/dL 11.8   Hematocrit      35.0 - 47.0 % 37.4   MCV      78 - 100 fl 101 (H)   MCH      26.5 - 33.0 pg 31.8   MCHC      31.5 - 36.5 g/dL 31.6   RDW      10.0 - 15.0 % 13.9   Platelet Count      150 - 450 10e9/L 200     Component      Latest Ref Rng 1/17/2013   ALT      0 - 50 U/L 23   AST      0 - 45 U/L 30   Albumin      3.9 - 5.1 g/dL 4.2   CRP Inflammation      0.0 - 8.0 mg/L <5.0   Sed Rate      0 - 20 mm/h 18         ASSESSMENT: Per the Problem LIst    PLAN AND RECOMMENDATIONS:     It is not entirely clear to me how much of her increase in joint symptoms is driven by her current stressors, but I do think she probably has some inflammation in the tendons, and she continues to have quite significant contractures, particularly in her hands, that are not improving.        Given this overall picture, I think it is worth trying to restart methotrexate.       Given that she has had some difficulty with GI tolerance, I am going to start her on a very low dose, just 7.5 mg oral weekly.  If even this dosing bothers her gut, then we will try switching her to injectable, which she did use previously and tolerated.       I have also asked her to be stretching these joints.  I have given her instruction on how to do so.       We have worked through the issues with what probably happened with her narcotics.  Longer term, I want to get her narcotic prescriptions synchronized so that they are both occurring at the same time for her oxycodone and her Percocet breakthrough.  We will work on that and try to get the amounts set up  so that we can get them synchronized with her next visit and give her 1-month supplies and post-date prescriptions out for 90 days so that we quit having these problems with the synchronization of them.       I am going to have her do labs today, come back and see me in 5-6 weeks, and reassess with her joint and tendon pain at that time.  I again urged her to quit smoking.         This visit was 40 minutes in duration, greater than 50% in counseling.         Brennen Child MD

## 2017-08-21 RX ORDER — LEUCOVORIN CALCIUM 5 MG/1
TABLET ORAL
Qty: 0 TABLET | Refills: 1 | OUTPATIENT
Start: 2017-08-21

## 2017-08-24 ENCOUNTER — TELEPHONE (OUTPATIENT)
Dept: RHEUMATOLOGY | Facility: CLINIC | Age: 52
End: 2017-08-24

## 2017-08-24 DIAGNOSIS — M05.89 OTHER RHEUMATOID ARTHRITIS WITH RHEUMATOID FACTOR OF MULTIPLE SITES (H): Primary | ICD-10-CM

## 2017-08-24 NOTE — TELEPHONE ENCOUNTER
Pt asking if she can get MTX injections instead of PO d/t stomach issues. Also if she can get Rx for leucovorin calcium. Please advise at [phone]. Sent to [facility].

## 2017-08-25 PROBLEM — M05.89 OTHER RHEUMATOID ARTHRITIS WITH RHEUMATOID FACTOR OF MULTIPLE SITES (H): Status: ACTIVE | Noted: 2017-08-25

## 2017-08-25 RX ORDER — LEUCOVORIN CALCIUM 25 MG/1
25 TABLET ORAL DAILY
Qty: 4 TABLET | Refills: 3 | Status: SHIPPED | OUTPATIENT
Start: 2017-08-25 | End: 2018-02-27

## 2017-08-25 RX ORDER — METHOTREXATE 25 MG/ML
10 INJECTION, SOLUTION INTRA-ARTERIAL; INTRAMUSCULAR; INTRAVENOUS
Qty: 4 ML | Refills: 3 | Status: SHIPPED | OUTPATIENT
Start: 2017-08-25 | End: 2018-02-27

## 2017-08-25 NOTE — TELEPHONE ENCOUNTER
Patient notified that Methotrexate and leucovorin has been approved and electronically sent to [facility]. Patient stated understanding.   Shanta Shetty LPN

## 2017-08-25 NOTE — TELEPHONE ENCOUNTER
Methotrexate SQ and Leucovorin tabs scripts set up and routed to Dr Child.    TIARRA FrankN RN  Rheumatology RN Coordinator   [facility]

## 2017-09-12 DIAGNOSIS — I73.01 RAYNAUD'S DISEASE WITH GANGRENE (H): ICD-10-CM

## 2017-09-12 DIAGNOSIS — M34.9 SYSTEMIC SCLEROSIS (H): ICD-10-CM

## 2017-09-12 DIAGNOSIS — M77.41 METATARSALGIA OF BOTH FEET: ICD-10-CM

## 2017-09-12 DIAGNOSIS — M05.79 RHEUMATOID ARTHRITIS INVOLVING MULTIPLE SITES WITH POSITIVE RHEUMATOID FACTOR (H): ICD-10-CM

## 2017-09-12 DIAGNOSIS — M77.40 METATARSALGIA, UNSPECIFIED LATERALITY: ICD-10-CM

## 2017-09-12 DIAGNOSIS — M77.42 METATARSALGIA OF BOTH FEET: ICD-10-CM

## 2017-09-12 NOTE — TELEPHONE ENCOUNTER
Pt LVM asking to speak w/ Andrew. Told to specifically ask to speak w/ her when calling in for narcotics. Please advise at [phone]. Sent to [facility].

## 2017-09-12 NOTE — TELEPHONE ENCOUNTER
Pt is calling to request refills of her percocet and oxycodone. Pt is asking for refills for 3 months.  Last

## 2017-09-14 RX ORDER — OXYCODONE AND ACETAMINOPHEN 5; 325 MG/1; MG/1
1 TABLET ORAL EVERY 4 HOURS PRN
Qty: 90 TABLET | Refills: 0 | Status: SHIPPED | OUTPATIENT
Start: 2017-10-20 | End: 2017-11-21

## 2017-09-14 RX ORDER — OXYCODONE HYDROCHLORIDE 5 MG/1
5 TABLET ORAL EVERY 4 HOURS PRN
Qty: 204 TABLET | Refills: 0 | Status: SHIPPED | OUTPATIENT
Start: 2017-09-14 | End: 2017-11-21

## 2017-09-14 RX ORDER — OXYCODONE HYDROCHLORIDE 5 MG/1
5 TABLET ORAL EVERY 4 HOURS PRN
Qty: 204 TABLET | Refills: 0 | Status: SHIPPED | OUTPATIENT
Start: 2017-10-10 | End: 2017-11-21

## 2017-09-14 RX ORDER — OXYCODONE AND ACETAMINOPHEN 5; 325 MG/1; MG/1
1 TABLET ORAL EVERY 4 HOURS PRN
Qty: 90 TABLET | Refills: 0 | Status: SHIPPED | OUTPATIENT
Start: 2017-11-17 | End: 2017-11-21

## 2017-09-14 RX ORDER — OXYCODONE HYDROCHLORIDE 5 MG/1
5 TABLET ORAL EVERY 4 HOURS PRN
Qty: 204 TABLET | Refills: 0 | Status: SHIPPED | OUTPATIENT
Start: 2017-11-07 | End: 2017-11-21

## 2017-09-14 RX ORDER — OXYCODONE AND ACETAMINOPHEN 5; 325 MG/1; MG/1
1 TABLET ORAL EVERY 4 HOURS PRN
Qty: 90 TABLET | Refills: 0 | Status: SHIPPED | OUTPATIENT
Start: 2017-09-22 | End: 2017-11-21

## 2017-09-15 DIAGNOSIS — M77.40 METATARSALGIA: ICD-10-CM

## 2017-09-15 DIAGNOSIS — M05.89 OTHER RHEUMATOID ARTHRITIS WITH RHEUMATOID FACTOR OF MULTIPLE SITES (H): ICD-10-CM

## 2017-09-15 DIAGNOSIS — M34.9 SYSTEMIC SCLEROSIS (H): Primary | ICD-10-CM

## 2017-09-15 RX ORDER — OXYCODONE AND ACETAMINOPHEN 5; 325 MG/1; MG/1
1 TABLET ORAL EVERY 4 HOURS PRN
Qty: 12 TABLET | Refills: 0 | Status: SHIPPED | OUTPATIENT
Start: 2017-09-15 | End: 2017-11-21

## 2017-09-15 NOTE — TELEPHONE ENCOUNTER
Pt's pharmacy had changed the  for the percocet last month. Pt is reporting they do not work and make her feel ill. She had spoke with the pharmacy to verify they do have a supply that she used in the past. They recommended she get a short script to replace the tabs she has left and the pt will need to bring the tabs into the clinic to be discarded. Contacted pharmacy and verified the above information. They have received complaints from others regarding the same issue. Was told they have the previous  pills again and would be able to replace them but need a script. Had discussed with Dr Child previously and was told that as long as the pt brought in the tabs, he would be willing. Dr Child is out of clinic today and spoke with Dr Layton, who is agreeable to sign this script.    Routed to Dr Layton to sign.    Andrew Chiu, BSN RN  Rheumatology RN Coordinator  [facility]

## 2017-09-15 NOTE — TELEPHONE ENCOUNTER
Patient informed that 4 percocet prescriptions and 3 oxycodone prescriptions have been signed and will be walked to [facility]. Patient stated that she is already at the clinic waiting.   Shanta Shetty LPN

## 2017-09-21 ENCOUNTER — OFFICE VISIT (OUTPATIENT)
Dept: RHEUMATOLOGY | Facility: CLINIC | Age: 52
End: 2017-09-21
Attending: INTERNAL MEDICINE
Payer: MEDICARE

## 2017-09-21 VITALS — DIASTOLIC BLOOD PRESSURE: 64 MMHG | OXYGEN SATURATION: 99 % | SYSTOLIC BLOOD PRESSURE: 117 MMHG | HEART RATE: 89 BPM

## 2017-09-21 DIAGNOSIS — Z79.899 ENCOUNTER FOR LONG-TERM CURRENT USE OF MEDICATION: ICD-10-CM

## 2017-09-21 DIAGNOSIS — K21.9 GASTROESOPHAGEAL REFLUX DISEASE, ESOPHAGITIS PRESENCE NOT SPECIFIED: ICD-10-CM

## 2017-09-21 DIAGNOSIS — M34.9 SYSTEMIC SCLEROSIS (H): ICD-10-CM

## 2017-09-21 DIAGNOSIS — E53.8 VITAMIN B12 DEFICIENCY (NON ANEMIC): ICD-10-CM

## 2017-09-21 DIAGNOSIS — I73.01 RAYNAUD'S DISEASE WITH GANGRENE (H): ICD-10-CM

## 2017-09-21 DIAGNOSIS — R07.82 INTERCOSTAL PAIN: ICD-10-CM

## 2017-09-21 DIAGNOSIS — M05.79 RHEUMATOID ARTHRITIS INVOLVING MULTIPLE SITES WITH POSITIVE RHEUMATOID FACTOR (H): ICD-10-CM

## 2017-09-21 DIAGNOSIS — Z79.899 ENCOUNTER FOR LONG-TERM CURRENT USE OF MEDICATION: Primary | ICD-10-CM

## 2017-09-21 LAB
ALBUMIN UR-MCNC: NEGATIVE MG/DL
APPEARANCE UR: CLEAR
BILIRUB UR QL STRIP: NEGATIVE
COLOR UR AUTO: ABNORMAL
GLUCOSE UR STRIP-MCNC: NEGATIVE MG/DL
HGB UR QL STRIP: ABNORMAL
KETONES UR STRIP-MCNC: NEGATIVE MG/DL
LEUKOCYTE ESTERASE UR QL STRIP: NEGATIVE
MUCOUS THREADS #/AREA URNS LPF: PRESENT /LPF
NITRATE UR QL: NEGATIVE
PH UR STRIP: 6 PH (ref 5–7)
RBC #/AREA URNS AUTO: 2 /HPF (ref 0–2)
SOURCE: ABNORMAL
SP GR UR STRIP: 1.01 (ref 1–1.03)
SQUAMOUS #/AREA URNS AUTO: 1 /HPF (ref 0–1)
UROBILINOGEN UR STRIP-MCNC: 0 MG/DL (ref 0–2)
WBC #/AREA URNS AUTO: 1 /HPF (ref 0–2)

## 2017-09-21 PROCEDURE — 99212 OFFICE O/P EST SF 10 MIN: CPT | Mod: ZF

## 2017-09-21 ASSESSMENT — PAIN SCALES - GENERAL: PAINLEVEL: MODERATE PAIN (4)

## 2017-09-21 NOTE — MR AVS SNAPSHOT
"              After Visit Summary   2017    Willard Grayson    MRN: [id number]           Patient Information     Date Of Birth          1965        Visit Information        Provider Department      2017 2:30 PM Brennen Child MD [facility]        Today's Diagnoses     Encounter for long-term current use of medication    -  1    Vitamin B12 deficiency (non anemic)        Systemic sclerosis (H)        Intercostal pain        Gastroesophageal reflux disease, esophagitis presence not specified           Follow-ups after your visit        Who to contact     If you have questions or need follow up information about today's clinic visit or your schedule please contact [facility] directly at [phone].  Normal or non-critical lab and imaging results will be communicated to you by [employer]hart, letter or phone within 4 business days after the clinic has received the results. If you do not hear from us within 7 days, please contact the clinic through [employer]hart or phone. If you have a critical or abnormal lab result, we will notify you by phone as soon as possible.  Submit refill requests through [employer] or call your pharmacy and they will forward the refill request to us. Please allow 3 business days for your refill to be completed.          Additional Information About Your Visit        MyChart Information     [employer] lets you send messages to your doctor, view your test results, renew your prescriptions, schedule appointments and more. To sign up, go to [email or web address] . Click on \"Log in\" on the left side of the screen, which will take you to the Welcome page. Then click on \"Sign up Now\" on the right side of the page.     You will be asked to enter the access code listed below, as well as some personal information. Please follow the directions to create your username and password.     Your access code is: [email or web address]  Expires: 2017  6:31 AM     Your access code will  " in 90 days. If you need help or a new code, please call your [facility] or [phone].        Care EveryWhere ID     This is your Care EveryWhere ID. This could be used by other organizations to access your [place] medical records  [id number]        Your Vitals Were     Pulse Pulse Oximetry                89 99%           Blood Pressure from Last 3 Encounters:   09/21/17 117/64   08/17/17 120/69   05/18/17 99/60    Weight from Last 3 Encounters:   08/17/17 53.3 kg (117 lb 6.4 oz)   05/18/17 54.6 kg (120 lb 6.4 oz)   02/16/17 55.3 kg (122 lb)                 Today's Medication Changes          These changes are accurate as of: 9/21/17 11:59 PM.  If you have any questions, ask your nurse or doctor.               These medicines have changed or have updated prescriptions.        Dose/Directions    pregabalin 75 MG capsule   Commonly known as:  LYRICA   This may have changed:  when to take this   Used for:  Systemic sclerosis (H)        Dose:  75 mg   Take 1 capsule (75 mg) by mouth 2 times daily   Quantity:  180 capsule   Refills:  1       valACYclovir 500 MG tablet   Commonly known as:  VALTREX   This may have changed:  when to take this   Used for:  Herpes, Systemic sclerosis (H), Anxiety        Dose:  500 mg   Take 1 tablet (500 mg) by mouth 2 times daily   Quantity:  180 tablet   Refills:  1            Where to get your medicines      These medications were sent to [facility] [address] [place] [address] [address]   [address] [facility] [address]     Phone:  [phone]     mycophenolate 500 MG tablet                Primary Care Provider Office Phone # Fax #    Lindy Plasencia [phone]       [facility] [address] [facility] [address]        Equal Access to Services     [facility] ISAC : Shar Renae, warachel resendiz, qaybta kaalmaadrian lee, quinten caruso. [facility] [phone].    ATENCIÓN: Everton woods  español, tiene a paige disposición servicios gratuitos de asistencia lingüística. Collin siddiqui [phone].    We comply with applicable federal civil rights laws and [place] laws. We do not discriminate on the basis of race, color, national origin, age, disability, sex, sexual orientation, or gender identity.            Thank you!     Thank you for choosing [facility]  for your care. Our goal is always to provide you with excellent care. Hearing back from our patients is one way we can continue to improve our services. Please take a few minutes to complete the written survey that you may receive in the mail after your visit with us. Thank you!             Your Updated Medication List - Protect others around you: Learn how to safely use, store and throw away your medicines at www.disposemymeds.org.          This list is accurate as of: 9/21/17 11:59 PM.  Always use your most recent med list.                   Brand Name Dispense Instructions for use Diagnosis    aspirin-dipyridamole  MG per 12 hr capsule    AGGRENOX     Take 1 capsule by mouth        Blood Pressure Monitoring Kit     1 kit    Check BP every 7 days.    Rheumatoid arthritis(714.0), Sicca syndrome (H), Systemic sclerosis (H), Encounter for long-term (current) use of other medications, Abnormal hemoglobin (Hgb) (H), Ulcer of finger, with fat layer exposed (H), Chronic osteomyelitis, site unspecified, Anxiety, Herpes, Rheumatoid arthritis(714.0)       cevimeline 30 MG capsule    EVOXAC    270 capsule    Take 1 capsule (30 mg) by mouth 3 times daily as needed    Metatarsalgia, unspecified laterality, Systemic sclerosis (H), Sicca syndrome (H)       cyanocobalamin 1000 MCG/ML injection    VITAMIN B12     Inject 1 mL into the muscle every 30 days        esomeprazole 40 MG CR capsule    nexIUM    180 capsule    Take 1 capsule (40 mg) by mouth 2 times daily Take 30-60 minutes before eating.    GAVE (gastric antral vascular ectasia)        fluconazole 100 MG tablet    DIFLUCAN    15 tablet    Take 2 tablets (200 mg) for 1 day followed by 1 tablet (100 mg) daily for 13 days.    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    Systemic sclerosis (H), Anxiety, Ulcer of finger, with fat layer exposed (H), Raynaud's disease with gangrene (H), Osteomyelitis of finger of right hand (H), Rheumatoid arthritis involving multiple sites with positive rheumatoid factor (H), Systemic sclerosis (H)       * methotrexate 2.5 MG tablet CHEMO     12 tablet    Take 3 tablets (7.5 mg) by mouth once a week Take 3 tablets (7.5mg) once weekly.    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    Other rheumatoid arthritis with  rheumatoid factor of multiple sites       mycophenolate 500 MG tablet    GENERIC EQUIVALENT    360 tablet    Take 2 tablets (1,000 mg) by mouth 2 times daily *LABS DUE EVERY 8-12 WKS    Systemic sclerosis (H)       ondansetron 4 MG tablet    ZOFRAN    20 tablet    Take 1 tablet (4 mg) by mouth every 8 hours as needed for nausea    Nausea, Rheumatoid arthritis(714.0), Systemic sclerosis (H), Encounter for long-term (current) use of other medications, Iron deficiency anemia, Personal history of other diseases of digestive system, GAVE (gastric antral vascular ectasia), Raynaud's syndrome, Abnormal hemoglobin (Hgb) (H), Ulcer of finger, with fat layer exposed (H), Chronic osteomyelitis, site unspecified, Sicca syndrome (H), Anxiety, Herpes, Rheumatoid arthritis(714.0), High risk medications (not anticoagulants) long-term use, Osteomyelitis of finger of right hand (H), Ulcer of finger (H)       * oxyCODONE 5 MG IR tablet    ROXICODONE    204 tablet    Take 1 tablet (5 mg) by mouth every 4 hours as needed for moderate to severe pain or pain maximum 6 tablet(s) per day    Systemic sclerosis (H), Rheumatoid arthritis involving multiple sites with positive rheumatoid factor (H), Raynaud's disease with gangrene (H), Systemic sclerosis (H), Metatarsalgia, unspecified laterality, Metatarsalgia of both feet       * oxyCODONE 5 MG IR tablet    ROXICODONE    204 tablet    Take 1 tablet (5 mg) by mouth every 4 hours as needed for moderate to severe pain or pain Maximum 6 tabs per day.    Systemic sclerosis (H), Rheumatoid arthritis involving multiple sites with positive rheumatoid factor (H), Raynaud's disease with gangrene (H), Systemic sclerosis (H), Metatarsalgia, unspecified laterality, Metatarsalgia of both feet       * oxyCODONE 5 MG IR tablet   Start taking on:  11/7/2017    ROXICODONE    204 tablet    Take 1 tablet (5 mg) by mouth every 4 hours as needed for moderate to severe pain or pain maximum 6 tablet(s) per day     Systemic sclerosis (H), Rheumatoid arthritis involving multiple sites with positive rheumatoid factor (H), Raynaud's disease with gangrene (H), Systemic sclerosis (H), Metatarsalgia, unspecified laterality, Metatarsalgia of both feet       * oxyCODONE-acetaminophen 5-325 MG per tablet    PERCOCET    12 tablet    Take 1 tablet by mouth every 4 hours as needed for moderate to severe pain or pain maximum 3 tablet(s) per day    Systemic sclerosis (H), Other rheumatoid arthritis with rheumatoid factor of multiple sites       * oxyCODONE-acetaminophen 5-325 MG per tablet    PERCOCET    90 tablet    Take 1 tablet by mouth every 4 hours as needed for moderate to severe pain or pain maximum 3 tablet(s) per day    Systemic sclerosis (H), Rheumatoid arthritis involving multiple sites with positive rheumatoid factor (H), Raynaud's disease with gangrene (H), Systemic sclerosis (H), Metatarsalgia, unspecified laterality, Metatarsalgia of both feet       * oxyCODONE-acetaminophen 5-325 MG per tablet   Start taking on:  10/20/2017    PERCOCET    90 tablet    Take 1 tablet by mouth every 4 hours as needed for moderate to severe pain or pain maximum 3 tablet(s) per day    Systemic sclerosis (H), Rheumatoid arthritis involving multiple sites with positive rheumatoid factor (H), Raynaud's disease with gangrene (H), Systemic sclerosis (H), Metatarsalgia, unspecified laterality, Metatarsalgia of both feet       * oxyCODONE-acetaminophen 5-325 MG per tablet   Start taking on:  11/17/2017    PERCOCET    90 tablet    Take 1 tablet by mouth every 4 hours as needed for moderate to severe pain Max 3 tabs per day    Systemic sclerosis (H), Rheumatoid arthritis involving multiple sites with positive rheumatoid factor (H), Raynaud's disease with gangrene (H), Systemic sclerosis (H), Metatarsalgia, unspecified laterality, Metatarsalgia of both feet       pravastatin 10 MG tablet    PRAVACHOL    30 tablet    Take 1 tablet (10 mg) by mouth daily     Rheumatoid arthritis involving multiple sites with positive rheumatoid factor (H)       pregabalin 75 MG capsule    LYRICA    180 capsule    Take 1 capsule (75 mg) by mouth 2 times daily    Systemic sclerosis (H)       valACYclovir 500 MG tablet    VALTREX    180 tablet    Take 1 tablet (500 mg) by mouth 2 times daily    Herpes, Systemic sclerosis (H), Anxiety       VITAMIN D (CHOLECALCIFEROL) PO      Take 2,000 Units by mouth daily        * Notice:  This list has 11 medication(s) that are the same as other medications prescribed for you. Read the directions carefully, and ask your doctor or other care provider to review them with you.

## 2017-09-21 NOTE — LETTER
9/21/2017      RE: Willard Grayson  [address]    [facility] [address]       Rheumatology Clinic Follow-up  [facility], Rheumatology        History of rheumatologic diagnosis:    Problem List:  1. 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   2. Associated marked neuropathic skin pain markedly improved with Lyrica with prior medication therapy with gabapentin.   3. Diffuse musculoskeletal pain requiring methotrexate plus CellCept, and narcotic to control, but also improved.   4. Initial clinical overlap with anti-CCP seropositive rheumatoid arthritis with continued anti-CCP seropositivity as of 09/2010.   5. Raynaud's phenomena with new active digital ulcers with easily cracked skin.   6. GI involvement consistent with bacterial small bowel overgrowth with marked improvement after a course of neomycin followed by Dr. Glez; positive hydrogen breath test in 3/2012 - treated wtih rifaximin through Dr. Jacob  7. No convincing evidence of lung involvement on serial pulmonary function tests or clinically.   8. History of heart palpitations prior to the onset of SSc with stable symptoms since.   9. History of medication failures with Remicade, methotrexate, methotrexate plus CellCept and with Orencia and with improvement since initiation of IV IG but with complicating headaches and overall sense of malaise with IV IG infusions.   10. History of silicone breast implants.   11. Incidental finding of calcified splenic artery aneurysm.  12. EGD in 3/2012 with hiatal hernia and gastral antral vascular ectasia        13. Recurrent right  third finger contracture with  extensor surface ulceration, healed        INTERVAL HISTORY, Since our last visit and her November  hospitalization, she has had no more digital ulcers and decreased pain in her fingers.  She did quit smoking at the time of that hospitalization and she has not resumed.  She still has pain in her fingers, but not nearly so severe.  Her biggest problem with her fingers remains the marked contractures.  She has difficulty using the paraffin bath because the paraffin is too hot, so we discussed simply unplugging it for a few minutes and letting it cool down a little and then before it gels using it to warm her hands and allow the stretches.       Since we have last seen her, she has seen her primary care.  I had requested that the lorazepam prescriptions be moved back over to primary care since I had simply assumed them when she had no primary care physician for her existing diagnosis of anxiety.  That has now been accomplished.      She also continues on oxycodone and we again today discussed attempting to taper that down.  She has decreased pain and hopefully can accomplish that, although she still does have multiple sources of musculoskeletal pain.  She has been off of methotrexate since the fall as she was having some gastrointestinal issues with it, but we did discuss that if tapering the narcotics becomes too problematic because of musculoskeletal pain with morning stiffness, resuming low-dose methotrexate might be possible and desirable.        She was started on a statin during the hospitalization and I also asked that that be taken over the primary care.  In fact, she brings her lipid results with her today and they are very good with her total cholesterol only 202, just barely above target, LDL only 133, just barely above target, HDL at 45 which is target, and triglycerides under target, so I am not sure that particularly now that she has the cigarette smoking removed as a risk factor and she has minimal family risk that she necessarily will need statins long-term.  On the other hand, they may have a  vascular stabilizing effect, so I am certainly not opposed to her remaining on it and she does not have side effect with it.       Her biggest issues recently have been gastrointestinal.  She has felt bloated, nauseated, and at times is having significant constipation.  She does not get diarrhea suggesting bacterial small bowel overgrowth.       She is having a lot of issues with her insurance and whether she can pay for her medical bills.  She therefore would like to see our .      She does get some dysphagia, but that is not changing and is not severe.       As noted, she has a number of sources of musculoskeletal pain including pain in her knees and hips and greater trochanteric area.  She did have what sounds like a Baker's cyst a few weeks ago, but that resolved quickly and she did not have any known antecedent injury that would explain that.     FEBRUARY 16, 2017, INTERVAL HISTORY:  Since I last saw the patient, she went down to Gastroenterology at the [facility] and saw Dr. Tatum there.  She was found to have recurrent versus ongoing esophageal candidiasis on upper endoscopy but had a normal colonoscopy.  A rectal balloon test, however, was abnormal, consistent with sphincter dysfunction potentially contributing to her chronic constipation.       She was also told she needed iron infusions.  This is somewhat curious in that her iron studies done at [facility] on 01/27 were only minimally abnormal and her hemoglobin and hematocrit were only minimally abnormal as well.  Apparently, her ferritin was very low, however.       We do not have any of those records to look at.  She says that she asked that they be sent here, but she was told they would be sent only to her primary care doctor.  I have looked in her [facility] notes on Care Everywhere, but I cannot see that they have been scanned in at this point.       She has some financial issues at this point.  Her labs and other tests are best paid for if  they are done at [facility].  Fortunately, her primary care doctor at [facility] was adept enough with the system that she was able to get the labs done at [facility] and have them come directly to my box.  I have reviewed all of them with her today and, as noted, she  has only had some very modest anemia.  She has no other evidence of any toxicity from the CellCept or really any other abnormal labs.       She has been doing well with her Raynaud's.  She has had no episodes of severe distal digital ischemia.  She is very careful about stopping attacks as soon as they get started with rewarming.  She has hand warmers with her at all times and a hand muff that she uses.       Unfortunately, she has restarted smoking again.  She is much more careful with it, she says, however, and is not smoking outdoors when in the cold the way she was previously when she developed a severe ulcer that would not heal.       Although she hated the Botox treatment that Dr. Beauchamp gave her, she has not had any further episodes since that time.       She does believe her lungs are stable.  She is not doing a lot of significant aerobic exercise, but she has not noticed any drop-off in her exercise tolerance with walking, etc.  She has not had PFTs done and I urged her to have them done at [facility] since it is so expensive for her to do them here.       She feels okay otherwise in general.  She continues to have some musculoskeletal pain but does not think it is really a lot worse.     INTERVAL HISTORY (05/18/2017):  Since we have last seen the patient, she developed a pretty bad sore throat.  This really sounds viral but she got a Z-GRIFFIN for it.  The soreness in her throat was posterior oropharyngeal extending only a little ways down her throat, but she was concerned that it could represent recurrent esophageal candidiasis since that was diagnosed on an endoscopy at [facility].  She was then told by one of her physicians that she should have had a repeat EGD,  despite the fact that the original report said repeat EGD needed to be done only if she had persistent or ongoing symptoms.      The trouble with that is that her only symptoms initially were not pain but were dysphagia.  She has had some longstanding low-grade dysphagia.  She does not believe that has progressed in any way.  It certainly is not worse since she has been on the Z-GRIFFIN which she has now completed.      We have therefore discussed with her that certainly after a Z-GRIFFIN with 2 weeks of therapy with it that were this esophageal candidiasis it should not be better and it might well be worse.  I think given the overall situation that is very unlikely.      She had a lot of constipation and we gave her a bowel regimen for that and with that she has done quite well.      One of the things that is bothering her more is that by the end of the day, both the bottom of her feet and her fingers really burner.  This is not as bad first thing in the day.      What is better is that she has had only 1 digital ulcer, very low-grade in the fifth PIP currently, with no distal pulp ulcers since Dr. Beauchamp gave her the Botox injection.      She also has no evidence of any progressive cardiopulmonary disease and indeed her last pulmonary function tests were completely stable.  She does not do a lot of her aerobic exercise, but she can climb stairs without difficulties.      She has had a history of blood in the urine.  Because of her smoking history, she saw an urologist who did a cystoscopy that was negative.  She was then told she should see a renal doctor.      However, since January here she has had 2 years that has ever completely free of any evidence of blood.  Blood has not observed microscopic stable as far as I can tell and was positive only on the dip stick.  This did still occur in 2016.  Prior to that, she had been on methotrexate, which is known to potentially cause this.      The other thing is she is  noticing and is quite concerned about is worsening oral aperture and more and more problems with her teeth including retraction.  She continues to smoke.  She has significant keratoconjunctivitis sicca, although it is definitely improved with twice daily Evoxac.       She feels okay otherwise in general.  She continues to have some musculoskeletal pain but does not think it is really a lot worse.     AUGUST 17, 2017, INTERVAL HISTORY:  Since we have last seen the patient, she has been having a lot of stressors.  Specifically, her daughter had a baby in late July, then had a complication that kept her in the hospital for some time.  Her sister has also been in the hospital with what she reports has a brain hemorrhage.  She is 42 years old and this all apparently developed after an infected back injection for pain.  She has now been told that she has positive autoimmune antibodies and that she might have systemic lupus, apparently.       With all of this stress, she has again resumed smoking after having had cessation.  She has some intermittent ulcers over her right third and right fifth PIP joints.  She is, if anything, more contractured there.  She is not doing stretching because of the pain.  They are not open currently, however.  If she hits them, of course, they hurt more and open up.       She is also getting a lot of hip pain, particularly over the right hip at night.  Much of the pain she describes as consistent with trochanteric bursitis, but some may be consistent with true hip pain deeper in.  She feels a tendon rub at times over this area as well.       She has been off of methotrexate for a long time, as her skin had improved enough that we felt we could get her off of it and she had some gastrointestinal symptoms that were arguably worsened from it.  She continues to have a lot of GI symptoms currently with intermittent bloating and nausea.  She has persistently had some low-grade dysphagia as well.   None of that is all that severe right now, and pain is the bigger problem.       She has apparently had some recent mix-up with the narcotic prescriptions as well.  I had signed a Percocet prescription last week, but that was apparently not the one that was due; it was rather the oxycodone, which I did sign just 2 days ago.  She has apparently not picked up the Percocet prescription and is wanting to make sure she gets it refilled now for 2 weeks from now rather 10 days from now when it is due.        She is having some skin irritation over her upper back with a lot of itching and burning pain.  She has excoriated that area.  She wants to make sure that it does not look like a skin cancer.       Other features of her disease remained relatively stable.  She is getting Raynaud's attacks despite the fact that it is not particularly cool right now.  This may be being worsened by her cigarette smoking, and we discussed that.     SEPTEMBER 21, 2017, INTERVAL HISTORY:  The patient started methotrexate back in mid August, but she felt so much worse on it with gastrointestinal symptoms that after she recently stopped it, she did not resume it.  She is still having some musculoskeletal pain, particularly in the right side of her ribs.  This is one of the reasons we started it.  She is not sure whether it helped at all, but she was only on it for a very short time.       She is also having some musculoskeletal pain in her neck and, of course, within her hands which have the marked contractures.       She still has some GERD going on, but that is better on the Nexium b.i.d.  She has to sleep sitting up, however, and she has been doing that for some time to avoid severe GERD.        She called us to tell us that she had a really bad experience with the most recent Percocet that she got.  She believes that this was because of a change in .  She returned the pills to the pharmacy and they gave her some from the  " she had used previously, and she felt those were better so she has asked that on an ongoing basis that those are the ones that she gets.       She is concerned about some areas in her fingers that feel like she might be developing new ulcerations, but she has had nothing open up there.  She does get some Raynaud's and is concerned about that worsening with the onset of colder weather.                 ROS  Gen: Denies weight loss or fevers or night sweats  HEENT:  No ocular inflammation..  Denies swollen glands  CV:  Denies chest pains  Resp:  denies chronic cough or dyspnea.  No pleurisy  Ext:  No swelling in extremities.    Skin:  No new rashes  Remainder of 10-point ROS as per Interval history or negative,     Past Medical History  1.  Diffuse systemic sclerosis  2.  CCP+ inflammatory arthritis  3.  Small bowel overgrowth syndrome - positive hydrogen test   4.  GERD  5.  Plantar fasciitis - seen by Dr. Jorgensen  6.  Chronic neuropathic pain - on lyrica and chronic percocet    Allergies   Allergies   Allergen Reactions     Penicillin [Esters] Other (See Comments)     Unable to move     Bactroban [Mupirocin Calcium]      Inability to move     Levaquin Other (See Comments)     Muscle weakness       Rifampin Other (See Comments)     Makes pain medication less effective     Medications  Current Outpatient Prescriptions   Medication     LORazepam (ATIVAN) 0.5 MG tablet     valACYclovir (VALTREX) 500 MG tablet     oxyCODONE (ROXICODONE) 5 MG immediate release tablet     oxyCODONE-acetaminophen (PERCOCET) 5-325 MG per tablet     pregabalin (LYRICA) 75 MG capsule     rifaximin (XIFAXAN) 550 MG TABS     mycophenolate (CELLCEPT-BRAND NAME) 500 MG tablet     omeprazole (PRILOSEC) 40 MG capsule     ondansetron (ZOFRAN) 4 MG tablet     Needle, Disp, 30G X 1/2\" MISC     diazepam (VALIUM) 5 MG tablet     fish oil-omega-3 fatty acids (FISH OIL) 1000 MG capsule     METHOTREXate 25 MG/ML injection     leucovorin " "(WELLCOVORIN) 5 MG tablet     Syringe/Needle, Disp, 27G X 1/2\" 1 ML MISC     Physical Exam  /64  Pulse 89  SpO2 99%  General: pleasant, but dysphoric  HEENT: EOMI, PERRL, no conjunctivitis or scleritis. Chronic facial skin thickenig without facial rash. Slight pursing of her lips. Slight restriction in opening of the jaw. Oropharynx exam reveals 4-5mm shallow ulceration on right lower lip. Moderate saliva pooling. No cervical adenopathy  CV: regular rhythm; no murmurs.    Resp: End-expiratory wheezing heard diffusely throughout  GI:  Soft, minimal  discomfort in the lower abdomen to deeper palpation; no masses or hepatomegaly  Ext:  No peripheral edema.   Skin:  Chronic sclerodactyly, most prominent in R hand (contracture at 50% finger flexion) vs. Left (missing final 20' extension).  Chronic skin thickening with evidence for improvement along forearms, upper arms, chest, abdomen, lower legs and feet.  Stable Calcinosis-like deposit noted on her right anterior thigh just above the knee joint and on right fith finger. . Salt/pepper appearance to areas of skin (christiano arms and upper chest).  No other rashes.   Closed  DU  over  the right 5th and  3rd  PIP joint: no otherDU.   Mild skin thinning/erythema along other PIPs. 2+ over fingers ; 1+ maximum over hands, and feet and shins, and face, pregressing to atrophic status.    MSK: She continues very tender between the ribs, worse on the right then left but pretty diffusely.  Full joint exam without evidence for synovitis, but right hip pain in joint and over trochate. Chronic joint contractures of fingers (christiano right); minimally also along wrists and elbows.       RESULTS:  Component      Latest Ref Rn 1/17/2013           2:15 PM   WBC      4.0 - 11.0 10e9/L 4.6   RBC Count      3.8 - 5.2 10e12/L 3.71 (L)   Hemoglobin      11.7 - 15.7 g/dL 11.8   Hematocrit      35.0 - 47.0 % 37.4   MCV      78 - 100 fl 101 (H)   MCH      26.5 - 33.0 pg 31.8   MCHC      31.5 - " 36.5 g/dL 31.6   RDW      10.0 - 15.0 % 13.9   Platelet Count      150 - 450 10e9/L 200     Component      Latest Ref Rng 1/17/2013   ALT      0 - 50 U/L 23   AST      0 - 45 U/L 30   Albumin      3.9 - 5.1 g/dL 4.2   CRP Inflammation      0.0 - 8.0 mg/L <5.0   Sed Rate      0 - 20 mm/h 18         ASSESSMENT: Per the Problem LIst    PLAN AND RECOMMENDATIONS:     Her current biggest complaint is of this rib pain, and based on her exam it is clearly musculoskeletal rather than something deeper and it really seems to track pretty well to the intercostal spaces.       I have not seen this frequently before, but some scleroderma patients can get inflammation in this kind of connective tissue, and she really does not expand her lungs.  She is not doing anything aerobic and really nothing in the stretching department, either.      On first principles, I have asked her to begin doing yoga and aerobic exercise to start to try to expand her lungs more.      We have also discussed again trying methotrexate with more folic acid to see if she can tolerate it and potentially block the development of inflammation as she tries to stretch and break down this cartilage.       She will go ahead and do that, as she already has the prescription, and let us know if it is not working or she cannot tolerate it.  She will also go about the exercises.       She will simply have to be alert for the development of digital ulcers or ischemia.  She was again counseled on avoiding smoking which would worsen this problem.       I will plan on seeing her back in 2-3 months, sooner p.r.n.             This visit was 40 minutes in duration, greater than 50% in counseling.       Brennen Child MD

## 2017-09-21 NOTE — NURSING NOTE
"Chief Complaint   Patient presents with     RECHECK     Follow up for scleroderma       Initial /64  Pulse 89  SpO2 99% Estimated body mass index is 19.54 kg/(m^2) as calculated from the following:    Height as of 8/17/17: 1.651 m (5' 5\").    Weight as of 8/17/17: 53.3 kg (117 lb 6.4 oz).  Medication Reconciliation: complete   Rosa Gonzalez CMA    "

## 2017-09-25 RX ORDER — MYCOPHENOLATE MOFETIL 500 MG/1
1000 TABLET ORAL 2 TIMES DAILY
Qty: 360 TABLET | Refills: 0 | Status: SHIPPED | OUTPATIENT
Start: 2017-09-25 | End: 2017-12-02

## 2017-09-25 NOTE — TELEPHONE ENCOUNTER
CELLCEPT - GENERIC       Last Written Prescription Date:  6/16/17  Last Fill Quantity: 360,   # refills: 0  Last Office Visit: 9/21/17  Future Office visit:  NONE    CBC RESULTS:   Recent Labs   Lab Test  08/17/17 1822   WBC  5.0   RBC  3.75*   HGB  11.9   HCT  37.7   MCV  101*   MCH  31.7   MCHC  31.6   RDW  12.8   PLT  189       Creatinine   Date Value Ref Range Status   08/17/2017 0.76 0.52 - 1.04 mg/dL Final   ]    Liver Function Studies -   Recent Labs   Lab Test  08/17/17   1822   11/24/15   1848   PROTTOTAL   --    --   7.8   ALBUMIN  4.2   < >  4.1   BILITOTAL   --    --   0.4   ALKPHOS   --    --   49   AST  12   < >  12   ALT  17   < >  15    < > = values in this interval not displayed.

## 2017-10-04 PROBLEM — R07.82 INTERCOSTAL PAIN: Status: ACTIVE | Noted: 2017-10-04

## 2017-10-04 NOTE — PROGRESS NOTES
Rheumatology Clinic Follow-up  [facility], Rheumatology        History of rheumatologic diagnosis:    Problem List:  1. 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   2. Associated marked neuropathic skin pain markedly improved with Lyrica with prior medication therapy with gabapentin.   3. Diffuse musculoskeletal pain requiring methotrexate plus CellCept, and narcotic to control, but also improved.   4. Initial clinical overlap with anti-CCP seropositive rheumatoid arthritis with continued anti-CCP seropositivity as of 09/2010.   5. Raynaud's phenomena with new active digital ulcers with easily cracked skin.   6. GI involvement consistent with bacterial small bowel overgrowth with marked improvement after a course of neomycin followed by Dr. Glez; positive hydrogen breath test in 3/2012 - treated wtih rifaximin through Dr. Jacob  7. No convincing evidence of lung involvement on serial pulmonary function tests or clinically.   8. History of heart palpitations prior to the onset of SSc with stable symptoms since.   9. History of medication failures with Remicade, methotrexate, methotrexate plus CellCept and with Orencia and with improvement since initiation of IV IG but with complicating headaches and overall sense of malaise with IV IG infusions.   10. History of silicone breast implants.   11. Incidental finding of calcified splenic artery aneurysm.  12. EGD in 3/2012 with hiatal hernia and gastral antral vascular ectasia        13. Recurrent right  third finger contracture with  extensor surface ulceration, healed        INTERVAL HISTORY, Since our last visit and her November hospitalization, she has had no more digital ulcers and decreased pain in her fingers.  She did  quit smoking at the time of that hospitalization and she has not resumed.  She still has pain in her fingers, but not nearly so severe.  Her biggest problem with her fingers remains the marked contractures.  She has difficulty using the paraffin bath because the paraffin is too hot, so we discussed simply unplugging it for a few minutes and letting it cool down a little and then before it gels using it to warm her hands and allow the stretches.       Since we have last seen her, she has seen her primary care.  I had requested that the lorazepam prescriptions be moved back over to primary care since I had simply assumed them when she had no primary care physician for her existing diagnosis of anxiety.  That has now been accomplished.      She also continues on oxycodone and we again today discussed attempting to taper that down.  She has decreased pain and hopefully can accomplish that, although she still does have multiple sources of musculoskeletal pain.  She has been off of methotrexate since the fall as she was having some gastrointestinal issues with it, but we did discuss that if tapering the narcotics becomes too problematic because of musculoskeletal pain with morning stiffness, resuming low-dose methotrexate might be possible and desirable.        She was started on a statin during the hospitalization and I also asked that that be taken over the primary care.  In fact, she brings her lipid results with her today and they are very good with her total cholesterol only 202, just barely above target, LDL only 133, just barely above target, HDL at 45 which is target, and triglycerides under target, so I am not sure that particularly now that she has the cigarette smoking removed as a risk factor and she has minimal family risk that she necessarily will need statins long-term.  On the other hand, they may have a vascular stabilizing effect, so I am certainly not opposed to her remaining on it and she does not have  side effect with it.       Her biggest issues recently have been gastrointestinal.  She has felt bloated, nauseated, and at times is having significant constipation.  She does not get diarrhea suggesting bacterial small bowel overgrowth.       She is having a lot of issues with her insurance and whether she can pay for her medical bills.  She therefore would like to see our .      She does get some dysphagia, but that is not changing and is not severe.       As noted, she has a number of sources of musculoskeletal pain including pain in her knees and hips and greater trochanteric area.  She did have what sounds like a Baker's cyst a few weeks ago, but that resolved quickly and she did not have any known antecedent injury that would explain that.     FEBRUARY 16, 2017, INTERVAL HISTORY:  Since I last saw the patient, she went down to Gastroenterology at the [facility] and saw Dr. Tatum there.  She was found to have recurrent versus ongoing esophageal candidiasis on upper endoscopy but had a normal colonoscopy.  A rectal balloon test, however, was abnormal, consistent with sphincter dysfunction potentially contributing to her chronic constipation.       She was also told she needed iron infusions.  This is somewhat curious in that her iron studies done at [facility] on 01/27 were only minimally abnormal and her hemoglobin and hematocrit were only minimally abnormal as well.  Apparently, her ferritin was very low, however.       We do not have any of those records to look at.  She says that she asked that they be sent here, but she was told they would be sent only to her primary care doctor.  I have looked in her [facility] notes on Care Everywhere, but I cannot see that they have been scanned in at this point.       She has some financial issues at this point.  Her labs and other tests are best paid for if they are done at [facility].  Fortunately, her primary care doctor at [facility] was adept enough with the system  that she was able to get the labs done at [facility] and have them come directly to my box.  I have reviewed all of them with her today and, as noted, she  has only had some very modest anemia.  She has no other evidence of any toxicity from the CellCept or really any other abnormal labs.       She has been doing well with her Raynaud's.  She has had no episodes of severe distal digital ischemia.  She is very careful about stopping attacks as soon as they get started with rewarming.  She has hand warmers with her at all times and a hand muff that she uses.       Unfortunately, she has restarted smoking again.  She is much more careful with it, she says, however, and is not smoking outdoors when in the cold the way she was previously when she developed a severe ulcer that would not heal.       Although she hated the Botox treatment that Dr. Beauchamp gave her, she has not had any further episodes since that time.       She does believe her lungs are stable.  She is not doing a lot of significant aerobic exercise, but she has not noticed any drop-off in her exercise tolerance with walking, etc.  She has not had PFTs done and I urged her to have them done at [facility] since it is so expensive for her to do them here.       She feels okay otherwise in general.  She continues to have some musculoskeletal pain but does not think it is really a lot worse.     INTERVAL HISTORY (05/18/2017):  Since we have last seen the patient, she developed a pretty bad sore throat.  This really sounds viral but she got a Z-GRIFFIN for it.  The soreness in her throat was posterior oropharyngeal extending only a little ways down her throat, but she was concerned that it could represent recurrent esophageal candidiasis since that was diagnosed on an endoscopy at [place].  She was then told by one of her physicians that she should have had a repeat EGD, despite the fact that the original report said repeat EGD needed to be done only if she had persistent or  ongoing symptoms.      The trouble with that is that her only symptoms initially were not pain but were dysphagia.  She has had some longstanding low-grade dysphagia.  She does not believe that has progressed in any way.  It certainly is not worse since she has been on the Z-GRIFFIN which she has now completed.      We have therefore discussed with her that certainly after a Z-GRIFFIN with 2 weeks of therapy with it that were this esophageal candidiasis it should not be better and it might well be worse.  I think given the overall situation that is very unlikely.      She had a lot of constipation and we gave her a bowel regimen for that and with that she has done quite well.      One of the things that is bothering her more is that by the end of the day, both the bottom of her feet and her fingers really burner.  This is not as bad first thing in the day.      What is better is that she has had only 1 digital ulcer, very low-grade in the fifth PIP currently, with no distal pulp ulcers since Dr. Beauchamp gave her the Botox injection.      She also has no evidence of any progressive cardiopulmonary disease and indeed her last pulmonary function tests were completely stable.  She does not do a lot of her aerobic exercise, but she can climb stairs without difficulties.      She has had a history of blood in the urine.  Because of her smoking history, she saw an urologist who did a cystoscopy that was negative.  She was then told she should see a renal doctor.      However, since January here she has had 2 years that has ever completely free of any evidence of blood.  Blood has not observed microscopic stable as far as I can tell and was positive only on the dip stick.  This did still occur in 2016.  Prior to that, she had been on methotrexate, which is known to potentially cause this.      The other thing is she is noticing and is quite concerned about is worsening oral aperture and more and more problems with her teeth  including retraction.  She continues to smoke.  She has significant keratoconjunctivitis sicca, although it is definitely improved with twice daily Evoxac.       She feels okay otherwise in general.  She continues to have some musculoskeletal pain but does not think it is really a lot worse.     AUGUST 17, 2017, INTERVAL HISTORY:  Since we have last seen the patient, she has been having a lot of stressors.  Specifically, her daughter had a baby in late July, then had a complication that kept her in the hospital for some time.  Her sister has also been in the hospital with what she reports has a brain hemorrhage.  She is 42 years old and this all apparently developed after an infected back injection for pain.  She has now been told that she has positive autoimmune antibodies and that she might have systemic lupus, apparently.       With all of this stress, she has again resumed smoking after having had cessation.  She has some intermittent ulcers over her right third and right fifth PIP joints.  She is, if anything, more contractured there.  She is not doing stretching because of the pain.  They are not open currently, however.  If she hits them, of course, they hurt more and open up.       She is also getting a lot of hip pain, particularly over the right hip at night.  Much of the pain she describes as consistent with trochanteric bursitis, but some may be consistent with true hip pain deeper in.  She feels a tendon rub at times over this area as well.       She has been off of methotrexate for a long time, as her skin had improved enough that we felt we could get her off of it and she had some gastrointestinal symptoms that were arguably worsened from it.  She continues to have a lot of GI symptoms currently with intermittent bloating and nausea.  She has persistently had some low-grade dysphagia as well.  None of that is all that severe right now, and pain is the bigger problem.       She has apparently had some  recent mix-up with the narcotic prescriptions as well.  I had signed a Percocet prescription last week, but that was apparently not the one that was due; it was rather the oxycodone, which I did sign just 2 days ago.  She has apparently not picked up the Percocet prescription and is wanting to make sure she gets it refilled now for 2 weeks from now rather 10 days from now when it is due.        She is having some skin irritation over her upper back with a lot of itching and burning pain.  She has excoriated that area.  She wants to make sure that it does not look like a skin cancer.       Other features of her disease remained relatively stable.  She is getting Raynaud's attacks despite the fact that it is not particularly cool right now.  This may be being worsened by her cigarette smoking, and we discussed that.     SEPTEMBER 21, 2017, INTERVAL HISTORY:  The patient started methotrexate back in mid August, but she felt so much worse on it with gastrointestinal symptoms that after she recently stopped it, she did not resume it.  She is still having some musculoskeletal pain, particularly in the right side of her ribs.  This is one of the reasons we started it.  She is not sure whether it helped at all, but she was only on it for a very short time.       She is also having some musculoskeletal pain in her neck and, of course, within her hands which have the marked contractures.       She still has some GERD going on, but that is better on the Nexium b.i.d.  She has to sleep sitting up, however, and she has been doing that for some time to avoid severe GERD.        She called us to tell us that she had a really bad experience with the most recent Percocet that she got.  She believes that this was because of a change in .  She returned the pills to the pharmacy and they gave her some from the  she had used previously, and she felt those were better so she has asked that on an ongoing basis that  "those are the ones that she gets.       She is concerned about some areas in her fingers that feel like she might be developing new ulcerations, but she has had nothing open up there.  She does get some Raynaud's and is concerned about that worsening with the onset of colder weather.                 ROS  Gen: Denies weight loss or fevers or night sweats  HEENT:  No ocular inflammation..  Denies swollen glands  CV:  Denies chest pains  Resp:  denies chronic cough or dyspnea.  No pleurisy  Ext:  No swelling in extremities.    Skin:  No new rashes  Remainder of 10-point ROS as per Interval history or negative,     Past Medical History  1.  Diffuse systemic sclerosis  2.  CCP+ inflammatory arthritis  3.  Small bowel overgrowth syndrome - positive hydrogen test   4.  GERD  5.  Plantar fasciitis - seen by Dr. Jorgensen  6.  Chronic neuropathic pain - on lyrica and chronic percocet    Allergies   Allergies   Allergen Reactions     Penicillin [Esters] Other (See Comments)     Unable to move     Bactroban [Mupirocin Calcium]      Inability to move     Levaquin Other (See Comments)     Muscle weakness       Rifampin Other (See Comments)     Makes pain medication less effective     Medications  Current Outpatient Prescriptions   Medication     LORazepam (ATIVAN) 0.5 MG tablet     valACYclovir (VALTREX) 500 MG tablet     oxyCODONE (ROXICODONE) 5 MG immediate release tablet     oxyCODONE-acetaminophen (PERCOCET) 5-325 MG per tablet     pregabalin (LYRICA) 75 MG capsule     rifaximin (XIFAXAN) 550 MG TABS     mycophenolate (CELLCEPT-BRAND NAME) 500 MG tablet     omeprazole (PRILOSEC) 40 MG capsule     ondansetron (ZOFRAN) 4 MG tablet     Needle, Disp, 30G X 1/2\" MISC     diazepam (VALIUM) 5 MG tablet     fish oil-omega-3 fatty acids (FISH OIL) 1000 MG capsule     METHOTREXate 25 MG/ML injection     leucovorin (WELLCOVORIN) 5 MG tablet     Syringe/Needle, Disp, 27G X 1/2\" 1 ML MISC     Physical Exam  /64  Pulse 89  SpO2 " 99%  General: pleasant, but dysphoric  HEENT: EOMI, PERRL, no conjunctivitis or scleritis. Chronic facial skin thickenig without facial rash. Slight pursing of her lips. Slight restriction in opening of the jaw. Oropharynx exam reveals 4-5mm shallow ulceration on right lower lip. Moderate saliva pooling. No cervical adenopathy  CV: regular rhythm; no murmurs.    Resp: End-expiratory wheezing heard diffusely throughout  GI:  Soft, minimal  discomfort in the lower abdomen to deeper palpation; no masses or hepatomegaly  Ext:  No peripheral edema.   Skin:  Chronic sclerodactyly, most prominent in R hand (contracture at 50% finger flexion) vs. Left (missing final 20' extension).  Chronic skin thickening with evidence for improvement along forearms, upper arms, chest, abdomen, lower legs and feet.  Stable Calcinosis-like deposit noted on her right anterior thigh just above the knee joint and on right fith finger. . Salt/pepper appearance to areas of skin (christiano arms and upper chest).  No other rashes.   Closed  DU  over  the right 5th and  3rd  PIP joint: no otherDU.   Mild skin thinning/erythema along other PIPs. 2+ over fingers ; 1+ maximum over hands, and feet and shins, and face, pregressing to atrophic status.    MSK: She continues very tender between the ribs, worse on the right then left but pretty diffusely.  Full joint exam without evidence for synovitis, but right hip pain in joint and over trochate. Chronic joint contractures of fingers (christiano right); minimally also along wrists and elbows.       RESULTS:  Component      Latest Ref Rng 1/17/2013           2:15 PM   WBC      4.0 - 11.0 10e9/L 4.6   RBC Count      3.8 - 5.2 10e12/L 3.71 (L)   Hemoglobin      11.7 - 15.7 g/dL 11.8   Hematocrit      35.0 - 47.0 % 37.4   MCV      78 - 100 fl 101 (H)   MCH      26.5 - 33.0 pg 31.8   MCHC      31.5 - 36.5 g/dL 31.6   RDW      10.0 - 15.0 % 13.9   Platelet Count      150 - 450 10e9/L 200     Component      Latest Ref Rng  1/17/2013   ALT      0 - 50 U/L 23   AST      0 - 45 U/L 30   Albumin      3.9 - 5.1 g/dL 4.2   CRP Inflammation      0.0 - 8.0 mg/L <5.0   Sed Rate      0 - 20 mm/h 18         ASSESSMENT: Per the Problem LIst    PLAN AND RECOMMENDATIONS:     Her current biggest complaint is of this rib pain, and based on her exam it is clearly musculoskeletal rather than something deeper and it really seems to track pretty well to the intercostal spaces.       I have not seen this frequently before, but some scleroderma patients can get inflammation in this kind of connective tissue, and she really does not expand her lungs.  She is not doing anything aerobic and really nothing in the stretching department, either.      On first principles, I have asked her to begin doing yoga and aerobic exercise to start to try to expand her lungs more.      We have also discussed again trying methotrexate with more folic acid to see if she can tolerate it and potentially block the development of inflammation as she tries to stretch and break down this cartilage.       She will go ahead and do that, as she already has the prescription, and let us know if it is not working or she cannot tolerate it.  She will also go about the exercises.       She will simply have to be alert for the development of digital ulcers or ischemia.  She was again counseled on avoiding smoking which would worsen this problem.       I will plan on seeing her back in 2-3 months, sooner p.r.n.             This visit was 40 minutes in duration, greater than 50% in counseling.

## 2017-10-23 ENCOUNTER — TELEPHONE (OUTPATIENT)
Dept: RHEUMATOLOGY | Facility: CLINIC | Age: 52
End: 2017-10-23

## 2017-10-23 NOTE — TELEPHONE ENCOUNTER
Left message on answering machine informing pt that we will do the PA for the Evoxac but if she has any other concerns please call this clinic and ask to speak with RNCC.    TIARRA FrankN RN  Rheumatology RN Coordinator  [facility]

## 2017-10-23 NOTE — TELEPHONE ENCOUNTER
"Patient calling saying she tried to fill her \"sjogrens medication\" but was informed by her pharmacy that is is not covered and she needs a prior authorization.  Asked her to clarify which medication she is referring to, she was unsure, she thought it started with a \"cev\".  Spelled out cevimeline that is on her medication list, and she thinks this is correct and not sure. 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 Child and we need to ensure we are doing a PA on the correct medication. She began to cry, states she does not feel good at all, and discontinued the phone call.    Please give her a call to discuss, she called from [phone] phone number  "

## 2017-10-24 ENCOUNTER — TELEPHONE (OUTPATIENT)
Dept: INFECTIOUS DISEASES | Facility: CLINIC | Age: 52
End: 2017-10-24

## 2017-10-24 ENCOUNTER — TELEPHONE (OUTPATIENT)
Dept: RHEUMATOLOGY | Facility: CLINIC | Age: 52
End: 2017-10-24

## 2017-10-24 NOTE — TELEPHONE ENCOUNTER
Contacted [facility] at [phone]. Spoke with Elizabeth to set up the PA request. Then was transferred to a pharmacist who then approved the PA. According to previous approvals pt has tried pilocarpine in the past per [facility] pharmacist. [facility] pharmacy notified. Pharmacist will call back if they have problems running the claim. They do not need a new script.     ID: [id number]    Message left on pt's voice mail that the PA has been approved.     TIARRA FrankN RN  Rheumatology RN Coordinator  [facility]

## 2017-10-24 NOTE — TELEPHONE ENCOUNTER
----- Message from Jen Espitia sent at 10/20/2017 10:04 AM CDT -----  Regarding: Dr. Child, Request sooner appt.   Contact: [phone]  Hi Team,     Patient needs to be seen within 2 months from last visit on 9/21 however next available is not until February 2017. Patient is hoping to get in sooner if possible. Please follow-up.     Kind Regards    Jen     Please DO NOT send this message and/or reply back to sender. Call Center Representatives DO NOT respond to messages.

## 2017-10-26 NOTE — TELEPHONE ENCOUNTER
Left message on her voice mail asking that pt return call. Will offer her an appointment on 11/21/17 at 3:30 pm when she returns call.    TIARRA FrankN RN  Rheumatology RN Coordinator  [facility]

## 2017-11-06 NOTE — TELEPHONE ENCOUNTER
Pt returned call and accepted appointment with Dr Child on 11/21/17 at 3:30 pm. Pt has been having some issues with her stomach that she would like to talk with Dr Child about. She has seen GI in [place], and it was suggested that pt schedule a follow up so if Dr Child advises pt to see them pt will have an appointment. Pt is agreeable to this plan.    Staff message sent to the pool asking for assistance in scheduling this appointment.    TIARRA FrankN RN  Rheumatology RN Coordinator  [facility]

## 2017-11-06 NOTE — TELEPHONE ENCOUNTER
Left message on pt's voice mail asking that she return call in order to schedule her follow up appointment. Asked her to return call by tomorrow.    TIARRA FrankN RN  Rheumatology RN Coordinator  [facility]

## 2017-11-13 NOTE — PROGRESS NOTES
Rheumatology Clinic Follow-up  [facility], Rheumatology        History of rheumatologic diagnosis:    Problem List:  1. 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   2. Associated marked neuropathic skin pain markedly improved with Lyrica with prior medication therapy with gabapentin.   3. Diffuse musculoskeletal pain requiring methotrexate plus CellCept, and narcotic to control, but also improved.   4. Initial clinical overlap with anti-CCP seropositive rheumatoid arthritis with continued anti-CCP seropositivity as of 09/2010.   5. Raynaud's phenomena with new active digital ulcers with easily cracked skin.   6. GI involvement consistent with bacterial small bowel overgrowth with marked improvement after a course of neomycin followed by Dr. Glez; positive hydrogen breath test in 3/2012 - treated wtih rifaximin through Dr. Jacob  7. No convincing evidence of lung involvement on serial pulmonary function tests or clinically.   8. History of heart palpitations prior to the onset of SSc with stable symptoms since.   9. History of medication failures with Remicade, methotrexate, methotrexate plus CellCept and with Orencia and with improvement since initiation of IV IG but with complicating headaches and overall sense of malaise with IV IG infusions.   10. History of silicone breast implants.   11. Incidental finding of calcified splenic artery aneurysm.  12. EGD in 3/2012 with hiatal hernia and gastral antral vascular ectasia        13. Recurrent right  third finger contracture with  extensor surface ulceration, healed        INTERVAL HISTORY, Since our last visit and her November hospitalization, she has had no more digital ulcers and decreased pain in her fingers.  She did  quit smoking at the time of that hospitalization and she has not resumed.  She still has pain in her fingers, but not nearly so severe.  Her biggest problem with her fingers remains the marked contractures.  She has difficulty using the paraffin bath because the paraffin is too hot, so we discussed simply unplugging it for a few minutes and letting it cool down a little and then before it gels using it to warm her hands and allow the stretches.       Since we have last seen her, she has seen her primary care.  I had requested that the lorazepam prescriptions be moved back over to primary care since I had simply assumed them when she had no primary care physician for her existing diagnosis of anxiety.  That has now been accomplished.      She also continues on oxycodone and we again today discussed attempting to taper that down.  She has decreased pain and hopefully can accomplish that, although she still does have multiple sources of musculoskeletal pain.  She has been off of methotrexate since the fall as she was having some gastrointestinal issues with it, but we did discuss that if tapering the narcotics becomes too problematic because of musculoskeletal pain with morning stiffness, resuming low-dose methotrexate might be possible and desirable.        She was started on a statin during the hospitalization and I also asked that that be taken over the primary care.  In fact, she brings her lipid results with her today and they are very good with her total cholesterol only 202, just barely above target, LDL only 133, just barely above target, HDL at 45 which is target, and triglycerides under target, so I am not sure that particularly now that she has the cigarette smoking removed as a risk factor and she has minimal family risk that she necessarily will need statins long-term.  On the other hand, they may have a vascular stabilizing effect, so I am certainly not opposed to her remaining on it and she does not have  side effect with it.       Her biggest issues recently have been gastrointestinal.  She has felt bloated, nauseated, and at times is having significant constipation.  She does not get diarrhea suggesting bacterial small bowel overgrowth.       She is having a lot of issues with her insurance and whether she can pay for her medical bills.  She therefore would like to see our .      She does get some dysphagia, but that is not changing and is not severe.       As noted, she has a number of sources of musculoskeletal pain including pain in her knees and hips and greater trochanteric area.  She did have what sounds like a Baker's cyst a few weeks ago, but that resolved quickly and she did not have any known antecedent injury that would explain that.     FEBRUARY 16, 2017, INTERVAL HISTORY:  Since I last saw the patient, she went down to Gastroenterology at the [facility] and saw Dr. Tatum there.  She was found to have recurrent versus ongoing esophageal candidiasis on upper endoscopy but had a normal colonoscopy.  A rectal balloon test, however, was abnormal, consistent with sphincter dysfunction potentially contributing to her chronic constipation.       She was also told she needed iron infusions.  This is somewhat curious in that her iron studies done at [facility] on 01/27 were only minimally abnormal and her hemoglobin and hematocrit were only minimally abnormal as well.  Apparently, her ferritin was very low, however.       We do not have any of those records to look at.  She says that she asked that they be sent here, but she was told they would be sent only to her primary care doctor.  I have looked in her [facility] notes on Care Everywhere, but I cannot see that they have been scanned in at this point.       She has some financial issues at this point.  Her labs and other tests are best paid for if they are done at [facility].  Fortunately, her primary care doctor at [facility] was adept enough with the system  that she was able to get the labs done at [facility] and have them come directly to my box.  I have reviewed all of them with her today and, as noted, she  has only had some very modest anemia.  She has no other evidence of any toxicity from the CellCept or really any other abnormal labs.       She has been doing well with her Raynaud's.  She has had no episodes of severe distal digital ischemia.  She is very careful about stopping attacks as soon as they get started with rewarming.  She has hand warmers with her at all times and a hand muff that she uses.       Unfortunately, she has restarted smoking again.  She is much more careful with it, she says, however, and is not smoking outdoors when in the cold the way she was previously when she developed a severe ulcer that would not heal.       Although she hated the Botox treatment that Dr. Beauchamp gave her, she has not had any further episodes since that time.       She does believe her lungs are stable.  She is not doing a lot of significant aerobic exercise, but she has not noticed any drop-off in her exercise tolerance with walking, etc.  She has not had PFTs done and I urged her to have them done at [facility] since it is so expensive for her to do them here.       She feels okay otherwise in general.  She continues to have some musculoskeletal pain but does not think it is really a lot worse.           ROS  Gen: Denies weight loss or fevers or night sweats  HEENT:  No ocular inflammation..  Denies swollen glands  CV:  Denies chest pains  Resp:  denies chronic cough or dyspnea.  No pleurisy  Ext:  No swelling in extremities.    Skin:  No new rashes  Remainder of 10-point ROS as per Interval history or negative,     Past Medical History  1.  Diffuse systemic sclerosis  2.  CCP+ inflammatory arthritis  3.  Small bowel overgrowth syndrome - positive hydrogen test   4.  GERD  5.  Plantar fasciitis - seen by Dr. Jorgensen  6.  Chronic neuropathic pain - on lyrica and chronic  "percocet    Allergies   Allergies   Allergen Reactions     Penicillin [Esters] Other (See Comments)     Unable to move     Bactroban [Mupirocin Calcium]      Inability to move     Levaquin Other (See Comments)     Muscle weakness       Rifampin Other (See Comments)     Makes pain medication less effective     Medications  Current Outpatient Prescriptions   Medication     LORazepam (ATIVAN) 0.5 MG tablet     valACYclovir (VALTREX) 500 MG tablet     oxyCODONE (ROXICODONE) 5 MG immediate release tablet     oxyCODONE-acetaminophen (PERCOCET) 5-325 MG per tablet     pregabalin (LYRICA) 75 MG capsule     rifaximin (XIFAXAN) 550 MG TABS     mycophenolate (CELLCEPT-BRAND NAME) 500 MG tablet     omeprazole (PRILOSEC) 40 MG capsule     ondansetron (ZOFRAN) 4 MG tablet     Needle, Disp, 30G X 1/2\" MISC     diazepam (VALIUM) 5 MG tablet     fish oil-omega-3 fatty acids (FISH OIL) 1000 MG capsule     METHOTREXate 25 MG/ML injection     leucovorin (WELLCOVORIN) 5 MG tablet     Syringe/Needle, Disp, 27G X 1/2\" 1 ML MISC     Physical Exam  /65  Pulse 61  Ht 1.626 m (5' 4\")  Wt 55.3 kg (122 lb)  SpO2 91%  BMI 20.94 kg/m2  General: pleasant, but dysphoric  HEENT: EOMI, PERRL, no conjunctivitis or scleritis. Chronic facial skin thickenig without facial rash. Slight pursing of her lips. Slight restriction in opening of the jaw. Oropharynx exam reveals 4-5mm shallow ulceration on right lower lip. Moderate saliva pooling. No cervical adenopathy  CV: regular rhythm; no murmurs.    Resp: End-expiratory wheezing heard diffusely throughout  GI:  Soft, minimal  discomfort in the lower abdomen to deeper palpation; no masses or hepatomegaly  Ext:  No peripheral edema.   Skin:  Chronic sclerodactyly, most prominent in R hand (contracture at 50% finger flexion) vs. Left (missing final 20' extension).  Chronic skin thickening with evidence for improvement along forearms, upper arms, chest, abdomen, lower legs and feet.  Stable " Calcinosis-like deposit noted on her right anterior thigh just above the knee joint and on right fith finger. . Salt/pepper appearance to areas of skin (christiano arms and upper chest).  No other rashes.   Fully healed  DU  over  the  3rd  PIP joint: no otherDU.   Mild skin thinning/erythema along other PIPs. 2+ over fingers ; 1+ maximum over hands, and feet and shins, and face, pregressing to atrophic status.    MSK:  Full joint exam without evidence for synovitis. Chronic joint contractures of fingers (christiano right); minimally also along wrists and elbows.       RESULTS:  Component      Latest Ref Rng 1/17/2013           2:15 PM   WBC      4.0 - 11.0 10e9/L 4.6   RBC Count      3.8 - 5.2 10e12/L 3.71 (L)   Hemoglobin      11.7 - 15.7 g/dL 11.8   Hematocrit      35.0 - 47.0 % 37.4   MCV      78 - 100 fl 101 (H)   MCH      26.5 - 33.0 pg 31.8   MCHC      31.5 - 36.5 g/dL 31.6   RDW      10.0 - 15.0 % 13.9   Platelet Count      150 - 450 10e9/L 200     Component      Latest Ref Rng 1/17/2013   ALT      0 - 50 U/L 23   AST      0 - 45 U/L 30   Albumin      3.9 - 5.1 g/dL 4.2   CRP Inflammation      0.0 - 8.0 mg/L <5.0   Sed Rate      0 - 20 mm/h 18         ASSESSMENT: Per the Problem LIst    PLAN AND RECOMMENDATIONS:     I think her overall disease process is relatively stable and has been for a while.      I am discouraged that she has again started smoking, but encouraged that she has not developed digital ulcers since she did restart.      It sounds like she had a pretty good GI workup.  She has been treated now with high-dose fluconazole for recurrent esophageal candidiasis and she had a little bit of GI distress with that but nothing serious and her liver function tests were normal on the recent labs.      I am not quite sure what to make of the somewhat divergent reports of iron studies, so will wait to see what was done at [facility].  If she cannot get that sent here, she will get a copy for herself and then simply bring in  a copy for us.       Hopefully, she can continue to get her labs patched directly through to us the way they were done this last time by her primary care physician and we can also get pulmonary function tests done that way.  Based on her description of her exercise tolerance and her lack of crackles on the basilar lung exam, I am not terribly concerned, but we still should have those done at least annually.        She otherwise looks reasonably well.  I will see her back in 3 months, sooner p.r.n.       This visit was 40 minutes in duration, greater than 50% in counseling.              DISPLAY PLAN FREE TEXT

## 2017-11-21 ENCOUNTER — OFFICE VISIT (OUTPATIENT)
Dept: RHEUMATOLOGY | Facility: CLINIC | Age: 52
End: 2017-11-21
Attending: INTERNAL MEDICINE
Payer: MEDICARE

## 2017-11-21 VITALS
SYSTOLIC BLOOD PRESSURE: 106 MMHG | HEART RATE: 74 BPM | WEIGHT: 121 LBS | HEIGHT: 64 IN | OXYGEN SATURATION: 98 % | DIASTOLIC BLOOD PRESSURE: 64 MMHG | BODY MASS INDEX: 20.66 KG/M2

## 2017-11-21 DIAGNOSIS — M05.79 RHEUMATOID ARTHRITIS INVOLVING MULTIPLE SITES WITH POSITIVE RHEUMATOID FACTOR (H): ICD-10-CM

## 2017-11-21 DIAGNOSIS — Z79.899 ENCOUNTER FOR LONG-TERM CURRENT USE OF MEDICATION: ICD-10-CM

## 2017-11-21 DIAGNOSIS — M77.40 METATARSALGIA, UNSPECIFIED LATERALITY: ICD-10-CM

## 2017-11-21 DIAGNOSIS — E53.8 VITAMIN B12 DEFICIENCY (NON ANEMIC): ICD-10-CM

## 2017-11-21 DIAGNOSIS — Z79.899 HIGH RISK MEDICATIONS (NOT ANTICOAGULANTS) LONG-TERM USE: ICD-10-CM

## 2017-11-21 DIAGNOSIS — M77.41 METATARSALGIA OF BOTH FEET: ICD-10-CM

## 2017-11-21 DIAGNOSIS — I73.01 RAYNAUD'S DISEASE WITH GANGRENE (H): ICD-10-CM

## 2017-11-21 DIAGNOSIS — M34.9 SYSTEMIC SCLEROSIS (H): ICD-10-CM

## 2017-11-21 DIAGNOSIS — M85.80 OSTEOPENIA, UNSPECIFIED LOCATION: Primary | ICD-10-CM

## 2017-11-21 DIAGNOSIS — M77.42 METATARSALGIA OF BOTH FEET: ICD-10-CM

## 2017-11-21 DIAGNOSIS — D50.9 ANEMIA, IRON DEFICIENCY: ICD-10-CM

## 2017-11-21 DIAGNOSIS — M06.9 RHEUMATOID ARTHRITIS (H): ICD-10-CM

## 2017-11-21 DIAGNOSIS — K31.819 GAVE (GASTRIC ANTRAL VASCULAR ECTASIA): ICD-10-CM

## 2017-11-21 DIAGNOSIS — M85.80 OSTEOPENIA, UNSPECIFIED LOCATION: ICD-10-CM

## 2017-11-21 LAB
ALBUMIN UR-MCNC: ABNORMAL MG/DL
ALT SERPL W P-5'-P-CCNC: 18 U/L (ref 0–50)
APPEARANCE UR: ABNORMAL
AST SERPL W P-5'-P-CCNC: 13 U/L (ref 0–45)
BASOPHILS # BLD AUTO: 0 10E9/L (ref 0–0.2)
BASOPHILS NFR BLD AUTO: 0.4 %
BILIRUB UR QL STRIP: ABNORMAL
COLOR UR AUTO: ABNORMAL
CREAT SERPL-MCNC: 0.76 MG/DL (ref 0.52–1.04)
CRP SERPL-MCNC: <2.9 MG/L (ref 0–8)
DIFFERENTIAL METHOD BLD: ABNORMAL
EOSINOPHIL # BLD AUTO: 0.1 10E9/L (ref 0–0.7)
EOSINOPHIL NFR BLD AUTO: 2.7 %
ERYTHROCYTE [DISTWIDTH] IN BLOOD BY AUTOMATED COUNT: 12.8 % (ref 10–15)
ERYTHROCYTE [SEDIMENTATION RATE] IN BLOOD BY WESTERGREN METHOD: 13 MM/H (ref 0–30)
GFR SERPL CREATININE-BSD FRML MDRD: 79 ML/MIN/1.7M2
GLUCOSE UR STRIP-MCNC: ABNORMAL MG/DL
HCT VFR BLD AUTO: 37.5 % (ref 35–47)
HGB BLD-MCNC: 11.6 G/DL (ref 11.7–15.7)
HGB UR QL STRIP: ABNORMAL
IMM GRANULOCYTES # BLD: 0 10E9/L (ref 0–0.4)
IMM GRANULOCYTES NFR BLD: 0.2 %
IRON SATN MFR SERPL: 10 % (ref 15–46)
IRON SERPL-MCNC: 41 UG/DL (ref 35–180)
KETONES UR STRIP-MCNC: ABNORMAL MG/DL
LEUKOCYTE ESTERASE UR QL STRIP: ABNORMAL
LYMPHOCYTES # BLD AUTO: 1.8 10E9/L (ref 0.8–5.3)
LYMPHOCYTES NFR BLD AUTO: 36.7 %
MCH RBC QN AUTO: 31 PG (ref 26.5–33)
MCHC RBC AUTO-ENTMCNC: 30.9 G/DL (ref 31.5–36.5)
MCV RBC AUTO: 100 FL (ref 78–100)
MONOCYTES # BLD AUTO: 0.4 10E9/L (ref 0–1.3)
MONOCYTES NFR BLD AUTO: 8.2 %
NEUTROPHILS # BLD AUTO: 2.5 10E9/L (ref 1.6–8.3)
NEUTROPHILS NFR BLD AUTO: 51.8 %
NITRATE UR QL: ABNORMAL
NRBC # BLD AUTO: 0 10*3/UL
NRBC BLD AUTO-RTO: 0 /100
PH UR STRIP: ABNORMAL PH (ref 5–7)
PLATELET # BLD AUTO: 196 10E9/L (ref 150–450)
RBC # BLD AUTO: 3.74 10E12/L (ref 3.8–5.2)
RBC #/AREA URNS AUTO: ABNORMAL /HPF (ref 0–2)
SOURCE: ABNORMAL
SP GR UR STRIP: ABNORMAL (ref 1–1.03)
TIBC SERPL-MCNC: 407 UG/DL (ref 240–430)
UROBILINOGEN UR STRIP-MCNC: ABNORMAL MG/DL (ref 0–2)
VIT B12 SERPL-MCNC: 792 PG/ML (ref 193–986)
WBC # BLD AUTO: 4.9 10E9/L (ref 4–11)
WBC #/AREA URNS AUTO: ABNORMAL /HPF

## 2017-11-21 PROCEDURE — 82607 VITAMIN B-12: CPT | Performed by: INTERNAL MEDICINE

## 2017-11-21 PROCEDURE — 84450 TRANSFERASE (AST) (SGOT): CPT | Performed by: INTERNAL MEDICINE

## 2017-11-21 PROCEDURE — 83550 IRON BINDING TEST: CPT | Performed by: INTERNAL MEDICINE

## 2017-11-21 PROCEDURE — 86140 C-REACTIVE PROTEIN: CPT | Performed by: INTERNAL MEDICINE

## 2017-11-21 PROCEDURE — 85025 COMPLETE CBC W/AUTO DIFF WBC: CPT | Performed by: INTERNAL MEDICINE

## 2017-11-21 PROCEDURE — 84460 ALANINE AMINO (ALT) (SGPT): CPT | Performed by: INTERNAL MEDICINE

## 2017-11-21 PROCEDURE — 85652 RBC SED RATE AUTOMATED: CPT | Performed by: INTERNAL MEDICINE

## 2017-11-21 PROCEDURE — 82306 VITAMIN D 25 HYDROXY: CPT | Performed by: INTERNAL MEDICINE

## 2017-11-21 PROCEDURE — 99212 OFFICE O/P EST SF 10 MIN: CPT | Mod: ZF

## 2017-11-21 PROCEDURE — 83540 ASSAY OF IRON: CPT | Performed by: INTERNAL MEDICINE

## 2017-11-21 PROCEDURE — 82565 ASSAY OF CREATININE: CPT | Performed by: INTERNAL MEDICINE

## 2017-11-21 PROCEDURE — 36415 COLL VENOUS BLD VENIPUNCTURE: CPT | Performed by: INTERNAL MEDICINE

## 2017-11-21 PROCEDURE — 82380 ASSAY OF CAROTENE: CPT | Performed by: INTERNAL MEDICINE

## 2017-11-21 RX ORDER — OXYCODONE AND ACETAMINOPHEN 5; 325 MG/1; MG/1
1 TABLET ORAL EVERY 4 HOURS PRN
Qty: 90 TABLET | Refills: 0 | Status: SHIPPED | OUTPATIENT
Start: 2018-02-15 | End: 2018-02-27

## 2017-11-21 RX ORDER — OXYCODONE AND ACETAMINOPHEN 5; 325 MG/1; MG/1
1 TABLET ORAL EVERY 4 HOURS PRN
Qty: 90 TABLET | Refills: 0 | Status: SHIPPED | OUTPATIENT
Start: 2018-01-17 | End: 2017-11-21

## 2017-11-21 RX ORDER — OXYCODONE AND ACETAMINOPHEN 5; 325 MG/1; MG/1
1 TABLET ORAL EVERY 4 HOURS PRN
Qty: 90 TABLET | Refills: 0 | Status: SHIPPED | OUTPATIENT
Start: 2017-12-20 | End: 2018-02-27

## 2017-11-21 RX ORDER — OXYCODONE AND ACETAMINOPHEN 5; 325 MG/1; MG/1
1 TABLET ORAL EVERY 4 HOURS PRN
Qty: 90 TABLET | Refills: 0 | Status: SHIPPED | OUTPATIENT
Start: 2018-01-17 | End: 2018-02-27

## 2017-11-21 RX ORDER — OXYCODONE HYDROCHLORIDE 5 MG/1
5 TABLET ORAL EVERY 4 HOURS PRN
Qty: 204 TABLET | Refills: 0 | Status: SHIPPED | OUTPATIENT
Start: 2018-01-17 | End: 2017-11-21

## 2017-11-21 RX ORDER — OXYCODONE HYDROCHLORIDE 5 MG/1
5 TABLET ORAL EVERY 4 HOURS PRN
Qty: 204 TABLET | Refills: 0 | Status: SHIPPED | OUTPATIENT
Start: 2017-11-21 | End: 2017-11-21

## 2017-11-21 RX ORDER — OXYCODONE AND ACETAMINOPHEN 5; 325 MG/1; MG/1
1 TABLET ORAL EVERY 4 HOURS PRN
Qty: 90 TABLET | Refills: 0 | Status: SHIPPED | OUTPATIENT
Start: 2017-11-21 | End: 2017-11-21

## 2017-11-21 RX ORDER — OXYCODONE HYDROCHLORIDE 5 MG/1
5 TABLET ORAL EVERY 4 HOURS PRN
Qty: 204 TABLET | Refills: 0 | Status: SHIPPED | OUTPATIENT
Start: 2018-01-17 | End: 2018-02-27

## 2017-11-21 RX ORDER — OXYCODONE HYDROCHLORIDE 5 MG/1
5 TABLET ORAL EVERY 4 HOURS PRN
Qty: 204 TABLET | Refills: 0 | Status: SHIPPED | OUTPATIENT
Start: 2017-12-20 | End: 2017-11-21

## 2017-11-21 RX ORDER — OXYCODONE AND ACETAMINOPHEN 5; 325 MG/1; MG/1
1 TABLET ORAL EVERY 4 HOURS PRN
Qty: 90 TABLET | Refills: 0 | Status: SHIPPED | OUTPATIENT
Start: 2017-11-21 | End: 2018-02-27

## 2017-11-21 RX ORDER — OXYCODONE HYDROCHLORIDE 5 MG/1
5 TABLET ORAL EVERY 4 HOURS PRN
Qty: 204 TABLET | Refills: 0 | Status: SHIPPED | OUTPATIENT
Start: 2018-02-15 | End: 2018-02-27

## 2017-11-21 RX ORDER — OXYCODONE HYDROCHLORIDE 5 MG/1
5 TABLET ORAL EVERY 4 HOURS PRN
Qty: 204 TABLET | Refills: 0 | Status: SHIPPED | OUTPATIENT
Start: 2017-12-20 | End: 2018-02-27

## 2017-11-21 RX ORDER — OXYCODONE AND ACETAMINOPHEN 5; 325 MG/1; MG/1
1 TABLET ORAL EVERY 4 HOURS PRN
Qty: 90 TABLET | Refills: 0 | Status: SHIPPED | OUTPATIENT
Start: 2017-12-20 | End: 2017-11-21

## 2017-11-21 RX ORDER — OXYCODONE HYDROCHLORIDE 5 MG/1
5 TABLET ORAL EVERY 4 HOURS PRN
Qty: 204 TABLET | Refills: 0 | Status: SHIPPED | OUTPATIENT
Start: 2017-11-21 | End: 2018-02-27

## 2017-11-21 ASSESSMENT — PAIN SCALES - GENERAL: PAINLEVEL: MODERATE PAIN (5)

## 2017-11-21 NOTE — NURSING NOTE
"Chief Complaint   Patient presents with     RECHECK     Follow up for scleroderma        Initial /64  Pulse 74  Ht 1.626 m (5' 4\")  Wt 54.9 kg (121 lb)  SpO2 98%  BMI 20.77 kg/m2 Estimated body mass index is 20.77 kg/(m^2) as calculated from the following:    Height as of this encounter: 1.626 m (5' 4\").    Weight as of this encounter: 54.9 kg (121 lb).  Medication Reconciliation: complete   Rosa Gonzalez CMA    "

## 2017-11-21 NOTE — LETTER
11/21/2017      RE: Willard Grayson  [address]    [facility] [id number]       Rheumatology Clinic Follow-up  [facility], Rheumatology        History of rheumatologic diagnosis:    Problem List:  1. 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   2. Associated marked neuropathic skin pain markedly improved with Lyrica with prior medication therapy with gabapentin.   3. Diffuse musculoskeletal pain requiring methotrexate plus CellCept, and narcotic to control, but also improved.   4. Initial clinical overlap with anti-CCP seropositive rheumatoid arthritis with continued anti-CCP seropositivity as of 09/2010.   5. Raynaud's phenomena with new active digital ulcers with easily cracked skin.   6. GI involvement consistent with bacterial small bowel overgrowth with marked improvement after a course of neomycin followed by Dr. Glez; positive hydrogen breath test in 3/2012 - treated wtih rifaximin through Dr. Jacob  7. No convincing evidence of lung involvement on serial pulmonary function tests or clinically.   8. History of heart palpitations prior to the onset of SSc with stable symptoms since.   9. History of medication failures with Remicade, methotrexate, methotrexate plus CellCept and with Orencia and with improvement since initiation of IV IG but with complicating headaches and overall sense of malaise with IV IG infusions.   10. History of silicone breast implants.   11. Incidental finding of calcified splenic artery aneurysm.  12. EGD in 3/2012 with hiatal hernia and gastral antral vascular ectasia        13. Recurrent right  third finger contracture with  extensor surface ulceration, healed        INTERVAL HISTORY, Since our last visit and her November  hospitalization, she has had no more digital ulcers and decreased pain in her fingers.  She did quit smoking at the time of that hospitalization and she has not resumed.  She still has pain in her fingers, but not nearly so severe.  Her biggest problem with her fingers remains the marked contractures.  She has difficulty using the paraffin bath because the paraffin is too hot, so we discussed simply unplugging it for a few minutes and letting it cool down a little and then before it gels using it to warm her hands and allow the stretches.       Since we have last seen her, she has seen her primary care.  I had requested that the lorazepam prescriptions be moved back over to primary care since I had simply assumed them when she had no primary care physician for her existing diagnosis of anxiety.  That has now been accomplished.      She also continues on oxycodone and we again today discussed attempting to taper that down.  She has decreased pain and hopefully can accomplish that, although she still does have multiple sources of musculoskeletal pain.  She has been off of methotrexate since the fall as she was having some gastrointestinal issues with it, but we did discuss that if tapering the narcotics becomes too problematic because of musculoskeletal pain with morning stiffness, resuming low-dose methotrexate might be possible and desirable.        She was started on a statin during the hospitalization and I also asked that that be taken over the primary care.  In fact, she brings her lipid results with her today and they are very good with her total cholesterol only 202, just barely above target, LDL only 133, just barely above target, HDL at 45 which is target, and triglycerides under target, so I am not sure that particularly now that she has the cigarette smoking removed as a risk factor and she has minimal family risk that she necessarily will need statins long-term.  On the other hand, they may have a  vascular stabilizing effect, so I am certainly not opposed to her remaining on it and she does not have side effect with it.       Her biggest issues recently have been gastrointestinal.  She has felt bloated, nauseated, and at times is having significant constipation.  She does not get diarrhea suggesting bacterial small bowel overgrowth.       She is having a lot of issues with her insurance and whether she can pay for her medical bills.  She therefore would like to see our .      She does get some dysphagia, but that is not changing and is not severe.       As noted, she has a number of sources of musculoskeletal pain including pain in her knees and hips and greater trochanteric area.  She did have what sounds like a Baker's cyst a few weeks ago, but that resolved quickly and she did not have any known antecedent injury that would explain that.     FEBRUARY 16, 2017, INTERVAL HISTORY:  Since I last saw the patient, she went down to Gastroenterology at the [facility] and saw Dr. Tatum there.  She was found to have recurrent versus ongoing esophageal candidiasis on upper endoscopy but had a normal colonoscopy.  A rectal balloon test, however, was abnormal, consistent with sphincter dysfunction potentially contributing to her chronic constipation.       She was also told she needed iron infusions.  This is somewhat curious in that her iron studies done at [facility] on 01/27 were only minimally abnormal and her hemoglobin and hematocrit were only minimally abnormal as well.  Apparently, her ferritin was very low, however.       We do not have any of those records to look at.  She says that she asked that they be sent here, but she was told they would be sent only to her primary care doctor.  I have looked in her [facility] notes on Care Everywhere, but I cannot see that they have been scanned in at this point.       She has some financial issues at this point.  Her labs and other tests are best paid for if  they are done at [facility].  Fortunately, her primary care doctor at [facility] was adept enough with the system that she was able to get the labs done at [facility] and have them come directly to my box.  I have reviewed all of them with her today and, as noted, she  has only had some very modest anemia.  She has no other evidence of any toxicity from the CellCept or really any other abnormal labs.       She has been doing well with her Raynaud's.  She has had no episodes of severe distal digital ischemia.  She is very careful about stopping attacks as soon as they get started with rewarming.  She has hand warmers with her at all times and a hand muff that she uses.       Unfortunately, she has restarted smoking again.  She is much more careful with it, she says, however, and is not smoking outdoors when in the cold the way she was previously when she developed a severe ulcer that would not heal.       Although she hated the Botox treatment that Dr. Beauchamp gave her, she has not had any further episodes since that time.       She does believe her lungs are stable.  She is not doing a lot of significant aerobic exercise, but she has not noticed any drop-off in her exercise tolerance with walking, etc.  She has not had PFTs done and I urged her to have them done at [facility] since it is so expensive for her to do them here.       She feels okay otherwise in general.  She continues to have some musculoskeletal pain but does not think it is really a lot worse.     INTERVAL HISTORY (05/18/2017):  Since we have last seen the patient, she developed a pretty bad sore throat.  This really sounds viral but she got a Z-GRIFFIN for it.  The soreness in her throat was posterior oropharyngeal extending only a little ways down her throat, but she was concerned that it could represent recurrent esophageal candidiasis since that was diagnosed on an endoscopy at [place].  She was then told by one of her physicians that she should have had a repeat EGD,  despite the fact that the original report said repeat EGD needed to be done only if she had persistent or ongoing symptoms.      The trouble with that is that her only symptoms initially were not pain but were dysphagia.  She has had some longstanding low-grade dysphagia.  She does not believe that has progressed in any way.  It certainly is not worse since she has been on the Z-GRIFFIN which she has now completed.      We have therefore discussed with her that certainly after a Z-GRIFFIN with 2 weeks of therapy with it that were this esophageal candidiasis it should not be better and it might well be worse.  I think given the overall situation that is very unlikely.      She had a lot of constipation and we gave her a bowel regimen for that and with that she has done quite well.      One of the things that is bothering her more is that by the end of the day, both the bottom of her feet and her fingers really burner.  This is not as bad first thing in the day.      What is better is that she has had only 1 digital ulcer, very low-grade in the fifth PIP currently, with no distal pulp ulcers since Dr. Beauchamp gave her the Botox injection.      She also has no evidence of any progressive cardiopulmonary disease and indeed her last pulmonary function tests were completely stable.  She does not do a lot of her aerobic exercise, but she can climb stairs without difficulties.      She has had a history of blood in the urine.  Because of her smoking history, she saw an urologist who did a cystoscopy that was negative.  She was then told she should see a renal doctor.      However, since January here she has had 2 years that has ever completely free of any evidence of blood.  Blood has not observed microscopic stable as far as I can tell and was positive only on the dip stick.  This did still occur in 2016.  Prior to that, she had been on methotrexate, which is known to potentially cause this.      The other thing is she is  noticing and is quite concerned about is worsening oral aperture and more and more problems with her teeth including retraction.  She continues to smoke.  She has significant keratoconjunctivitis sicca, although it is definitely improved with twice daily Evoxac.       She feels okay otherwise in general.  She continues to have some musculoskeletal pain but does not think it is really a lot worse.     AUGUST 17, 2017, INTERVAL HISTORY:  Since we have last seen the patient, she has been having a lot of stressors.  Specifically, her daughter had a baby in late July, then had a complication that kept her in the hospital for some time.  Her sister has also been in the hospital with what she reports has a brain hemorrhage.  She is 42 years old and this all apparently developed after an infected back injection for pain.  She has now been told that she has positive autoimmune antibodies and that she might have systemic lupus, apparently.       With all of this stress, she has again resumed smoking after having had cessation.  She has some intermittent ulcers over her right third and right fifth PIP joints.  She is, if anything, more contractured there.  She is not doing stretching because of the pain.  They are not open currently, however.  If she hits them, of course, they hurt more and open up.       She is also getting a lot of hip pain, particularly over the right hip at night.  Much of the pain she describes as consistent with trochanteric bursitis, but some may be consistent with true hip pain deeper in.  She feels a tendon rub at times over this area as well.       She has been off of methotrexate for a long time, as her skin had improved enough that we felt we could get her off of it and she had some gastrointestinal symptoms that were arguably worsened from it.  She continues to have a lot of GI symptoms currently with intermittent bloating and nausea.  She has persistently had some low-grade dysphagia as well.   None of that is all that severe right now, and pain is the bigger problem.       She has apparently had some recent mix-up with the narcotic prescriptions as well.  I had signed a Percocet prescription last week, but that was apparently not the one that was due; it was rather the oxycodone, which I did sign just 2 days ago.  She has apparently not picked up the Percocet prescription and is wanting to make sure she gets it refilled now for 2 weeks from now rather 10 days from now when it is due.        She is having some skin irritation over her upper back with a lot of itching and burning pain.  She has excoriated that area.  She wants to make sure that it does not look like a skin cancer.       Other features of her disease remained relatively stable.  She is getting Raynaud's attacks despite the fact that it is not particularly cool right now.  This may be being worsened by her cigarette smoking, and we discussed that.     SEPTEMBER 21, 2017, INTERVAL HISTORY:  The patient started methotrexate back in mid August, but she felt so much worse on it with gastrointestinal symptoms that after she recently stopped it, she did not resume it.  She is still having some musculoskeletal pain, particularly in the right side of her ribs.  This is one of the reasons we started it.  She is not sure whether it helped at all, but she was only on it for a very short time.       She is also having some musculoskeletal pain in her neck and, of course, within her hands which have the marked contractures.       She still has some GERD going on, but that is better on the Nexium b.i.d.  She has to sleep sitting up, however, and she has been doing that for some time to avoid severe GERD.        She called us to tell us that she had a really bad experience with the most recent Percocet that she got.  She believes that this was because of a change in .  She returned the pills to the pharmacy and they gave her some from the   she had used previously, and she felt those were better so she has asked that on an ongoing basis that those are the ones that she gets.       She is concerned about some areas in her fingers that feel like she might be developing new ulcerations, but she has had nothing open up there.  She does get some Raynaud's and is concerned about that worsening with the onset of colder weather.     INTERVAL HISTORY:  Since we have last seen her, she finally started having her back pain get better.  She thinks some of that was because she was getting strain there when she was walking her dog from the dog pulling on the leash.  She did have to give her dog away because she was having trouble taking care of it.      Her primary care doctor had actually done some plain films and was concerned about a possible lesion but a followup CT scan showed only shadowing.  A DEXA scan was done and she said that showed low bone density.  Although we have Care Everywhere, I cannot see the actual results of the DEXA scans so I do not know precisely what it said.  It was recommended to her she take calcium and vitamin D, but she is already taking 2000 international units a day of vitamin D and eating a diet that is almost entirely dairy.      She says that in part because she is having so much trouble eating.  She says the food feels like her enemy.  She says protein drinks make her sick and in general she feels better if she does not eat but she knows she needs to eat something so she will try to eat food.  She has already had an endoscopy at [facility] and additional workup there and that was unrevealing.  She does, however, remain on around-the-clock, oxycodone and we started to discuss that.      Because her gut is so problematic she has not had any methotrexate since August or September and she felt like that made her sicker.      On the positive side, she is able to control her Raynaud's phenomena pretty well with rewarming.  She  is on Aggrenox now and she thinks that has definitely helped her ulcers.  She is not however doing regular stretching of any kind let alone her hands.      She is due for labs.  The last ones were done 3 months ago.  She did on her own in addition to stopping the methotrexate reduce her MMF to 3 tablets a day.  That is probably okay based on her most recent pulmonary function tests done last April which were very good.  She is not having any increased shortness of breath, decreased exercise tolerance, or dry cough.  On the other hand, she is doing virtually no exercise.  She does continue to smoke.                     ROS  Gen: Denies weight loss or fevers or night sweats  HEENT:  No ocular inflammation..  Denies swollen glands  CV:  Denies chest pains  Resp:  denies chronic cough or dyspnea.  No pleurisy  Ext:  No swelling in extremities.    Skin:  No new rashes  Remainder of 10-point ROS as per Interval history or negative,     Past Medical History  1.  Diffuse systemic sclerosis  2.  CCP+ inflammatory arthritis  3.  Small bowel overgrowth syndrome - positive hydrogen test   4.  GERD  5.  Plantar fasciitis - seen by Dr. Jorgensen  6.  Chronic neuropathic pain - on lyrica and chronic percocet    Allergies   Allergies   Allergen Reactions     Penicillin [Esters] Other (See Comments)     Unable to move     Bactroban [Mupirocin Calcium]      Inability to move     Levaquin Other (See Comments)     Muscle weakness       Rifampin Other (See Comments)     Makes pain medication less effective     Medications  Current Outpatient Prescriptions   Medication     LORazepam (ATIVAN) 0.5 MG tablet     valACYclovir (VALTREX) 500 MG tablet     oxyCODONE (ROXICODONE) 5 MG immediate release tablet     oxyCODONE-acetaminophen (PERCOCET) 5-325 MG per tablet     pregabalin (LYRICA) 75 MG capsule     rifaximin (XIFAXAN) 550 MG TABS     mycophenolate (CELLCEPT-BRAND NAME) 500 MG tablet     omeprazole (PRILOSEC) 40 MG capsule      "ondansetron (ZOFRAN) 4 MG tablet     Needle, Disp, 30G X 1/2\" MISC     diazepam (VALIUM) 5 MG tablet     fish oil-omega-3 fatty acids (FISH OIL) 1000 MG capsule     METHOTREXate 25 MG/ML injection     leucovorin (WELLCOVORIN) 5 MG tablet     Syringe/Needle, Disp, 27G X 1/2\" 1 ML MISC     Physical Exam  /64  Pulse 74  Ht 1.626 m (5' 4\")  Wt 54.9 kg (121 lb)  SpO2 98%  BMI 20.77 kg/m2  General: pleasant, but dysphoric  HEENT: EOMI, PERRL, no conjunctivitis or scleritis. Chronic facial skin thickenig without facial rash. Slight pursing of her lips. Slight restriction in opening of the jaw. Oropharynx exam reveals 4-5mm shallow ulceration on right lower lip. Moderate saliva pooling. No cervical adenopathy  CV: regular rhythm; no murmurs.    Resp: End-expiratory wheezing heard diffusely throughout  GI:  Soft, minimal  discomfort in the lower abdomen to deeper palpation; no masses or hepatomegaly  Ext:  No peripheral edema.   Skin:  Chronic sclerodactyly, most prominent in R hand (contracture at 50% finger flexion) vs. Left (missing final 20' extension).  Chronic skin thickening with evidence for improvement along forearms, upper arms, chest, abdomen, lower legs and feet.  Stable Calcinosis-like deposit noted on her right anterior thigh just above the knee joint and on right fith finger. . Salt/pepper appearance to areas of skin (christiano arms and upper chest).  No other rashes.   Closed  DU  over  the right 5th and  3rd  PIP joint: no otherDU.   Mild skin thinning/erythema along other PIPs. 2+ over fingers ; 1+ maximum over hands, and feet and shins, and face, pregressing to atrophic status.    MSK: She continues very tender between the ribs, worse on the right then left but pretty diffusely.  Full joint exam without evidence for synovitis, but right hip pain in joint and over trochate. Chronic joint contractures of fingers (christiano right); minimally also along wrists and elbows.       RESULTS:  Component      Latest " Ref Rng 1/17/2013           2:15 PM   WBC      4.0 - 11.0 10e9/L 4.6   RBC Count      3.8 - 5.2 10e12/L 3.71 (L)   Hemoglobin      11.7 - 15.7 g/dL 11.8   Hematocrit      35.0 - 47.0 % 37.4   MCV      78 - 100 fl 101 (H)   MCH      26.5 - 33.0 pg 31.8   MCHC      31.5 - 36.5 g/dL 31.6   RDW      10.0 - 15.0 % 13.9   Platelet Count      150 - 450 10e9/L 200     Component      Latest Ref Rng 1/17/2013   ALT      0 - 50 U/L 23   AST      0 - 45 U/L 30   Albumin      3.9 - 5.1 g/dL 4.2   CRP Inflammation      0.0 - 8.0 mg/L <5.0   Sed Rate      0 - 20 mm/h 18         ASSESSMENT: Per the Problem LIst    PLAN AND RECOMMENDATIONS:     She identifies postprandial gut pain and nausea as her biggest problem today.  On the other hand, she is able to have a normal bowel movement once a day and does not have features suggestive of bacterial small bowel overgrowth and a recent endoscopy was normal.      I have therefore had a long conversation with her again today about narcotic use.  She tells me she has attempted to taper further, but has not been able to do that because of pain.  However, I discussed with her extensively the problems of underlying scleroderma gut motility being exacerbated by around the clock narcotic use.  I think this is likely the cause of these problems.  I am ordering vitamin D levels and she was told to supplement that and also carotene levels which would be expected to be low if she is having malabsorption issues, but her weight is stable and her most recent laboratory tests were almost entirely normal other than 4 red blood cell indices, so I really do not think she is malnourished.      She has big concerns about whether her insurance would cover pain clinic visits, but she may be covered at the [facility] and I have already suggested to her she be seen at the [facility] pain clinic because of their broad approaches to pain and I have sent several patients there who had good results.  She will look  into this.      In the meantime, I have continued her current pain prescription for the next 3 months, which should last her until mid-March when I see her back.  All of these were postdated prescriptions written out to be filled approximately 1 month from now, 2 months from now, and 3 months from now.      I think it is okay for her to stay on the lower dose MMF for the time being, given how good her pulmonary function tests were.  We will probably want to reorder those next visit, however.      She will contact us if she is having any other increased difficulties in the interim.        I did encourage her strongly again to redouble her efforts to stop smoking and also to be doing stretching exercises on a daily basis and something aerobic at least 2 days a week.         This visit was 40 minutes in duration, greater than 50% in counseling.       Brennen Child MD

## 2017-11-21 NOTE — MR AVS SNAPSHOT
"              After Visit Summary   11/21/2017    Willard Grayson    MRN: [id number]           Patient Information     Date Of Birth          1965        Visit Information        Provider Department      11/21/2017 3:30 PM Brennen Child MD [facility]        Today's Diagnoses     Osteopenia, unspecified location    -  1    Systemic sclerosis (H)        Rheumatoid arthritis involving multiple sites with positive rheumatoid factor (H)        Raynaud's disease with gangrene (H)        Systemic sclerosis        Metatarsalgia, unspecified laterality        Metatarsalgia of both feet           Follow-ups after your visit        Your next 10 appointments already scheduled     Feb 27, 2018  3:30 PM CST   (Arrive by 3:15 PM)   RETURN SCLERODERMA with Brennen Child MD   [facility] ([facility])    [address] [place] [id number] [phone]              Who to contact     If you have questions or need follow up information about today's clinic visit or your schedule please contact [facility] directly at [phone].  Normal or non-critical lab and imaging results will be communicated to you by [facility], letter or phone within 4 business days after the clinic has received the results. If you do not hear from us within 7 days, please contact the clinic through [employer] or phone. If you have a critical or abnormal lab result, we will notify you by phone as soon as possible.  Submit refill requests through [employer] or call your pharmacy and they will forward the refill request to us. Please allow 3 business days for your refill to be completed.          Additional Information About Your Visit        [employer] Information     [employer] lets you send messages to your doctor, view your test results, renew your prescriptions, schedule appointments and more. To sign up, go to [email or web address] . Click on \"Log in\" on the left side of the " "screen, which will take you to the Welcome page. Then click on \"Sign up Now\" on the right side of the page.     You will be asked to enter the access code listed below, as well as some personal information. Please follow the directions to create your username and password.     Your access code is: [email or web address]  Expires: 2018  6:30 AM     Your access code will  in 90 days. If you need help or a new code, please call your [facility] or [phone].        Care EveryWhere ID     This is your Care EveryWhere ID. This could be used by other organizations to access your [facility] medical records  [id number]        Your Vitals Were     Pulse Height Pulse Oximetry BMI (Body Mass Index)          74 1.626 m (5' 4\") 98% 20.77 kg/m2         Blood Pressure from Last 3 Encounters:   17 106/64   17 117/64   17 120/69    Weight from Last 3 Encounters:   17 54.9 kg (121 lb)   17 53.3 kg (117 lb 6.4 oz)   17 54.6 kg (120 lb 6.4 oz)                 Today's Medication Changes          These changes are accurate as of: 17 11:59 PM.  If you have any questions, ask your nurse or doctor.               Start taking these medicines.        Dose/Directions    * oxyCODONE IR 5 MG tablet   Commonly known as:  ROXICODONE   Used for:  Systemic sclerosis (H), Rheumatoid arthritis involving multiple sites with positive rheumatoid factor (H), Raynaud's disease with gangrene (H), Systemic sclerosis (H), Metatarsalgia, unspecified laterality, Metatarsalgia of both feet, Osteopenia, unspecified location   Started by:  Brennen Child MD        Dose:  5 mg   Take 1 tablet (5 mg) by mouth every 4 hours as needed for moderate to severe pain or pain maximum 6 tablet(s) per day   Quantity:  204 tablet   Refills:  0       * oxyCODONE IR 5 MG tablet   Commonly known as:  ROXICODONE   Used for:  Systemic sclerosis (H), Rheumatoid arthritis involving multiple sites with positive rheumatoid factor (H), " Raynaud's disease with gangrene (H), Systemic sclerosis (H), Metatarsalgia, unspecified laterality, Metatarsalgia of both feet, Osteopenia, unspecified location   Started by:  Brennen Child MD        Dose:  5 mg   Start taking on:  12/20/2017   Take 1 tablet (5 mg) by mouth every 4 hours as needed for moderate to severe pain or pain maximum 6 tablet(s) per day   Quantity:  204 tablet   Refills:  0       * oxyCODONE IR 5 MG tablet   Commonly known as:  ROXICODONE   Used for:  Systemic sclerosis (H), Rheumatoid arthritis involving multiple sites with positive rheumatoid factor (H), Raynaud's disease with gangrene (H), Systemic sclerosis (H), Metatarsalgia, unspecified laterality, Metatarsalgia of both feet, Osteopenia, unspecified location   Started by:  Brennen Child MD        Dose:  5 mg   Start taking on:  1/17/2018   Take 1 tablet (5 mg) by mouth every 4 hours as needed for moderate to severe pain or pain Maximum 6 tabs per day.   Quantity:  204 tablet   Refills:  0       * oxyCODONE IR 5 MG tablet   Commonly known as:  ROXICODONE   Used for:  Systemic sclerosis (H), Rheumatoid arthritis involving multiple sites with positive rheumatoid factor (H), Raynaud's disease with gangrene (H), Systemic sclerosis (H), Metatarsalgia, unspecified laterality, Metatarsalgia of both feet, Osteopenia, unspecified location   Started by:  Brennen Child MD        Dose:  5 mg   Start taking on:  2/15/2018   Take 1 tablet (5 mg) by mouth every 4 hours as needed for moderate to severe pain or pain Maximum 6 tabs per day.   Quantity:  204 tablet   Refills:  0       * oxyCODONE-acetaminophen 5-325 MG per tablet   Commonly known as:  PERCOCET   Used for:  Systemic sclerosis (H), Rheumatoid arthritis involving multiple sites with positive rheumatoid factor (H), Raynaud's disease with gangrene (H), Systemic sclerosis (H), Metatarsalgia, unspecified laterality, Metatarsalgia of both feet, Osteopenia, unspecified location    Started by:  Brennen Child MD        Dose:  1 tablet   Take 1 tablet by mouth every 4 hours as needed for moderate to severe pain or pain maximum 3 tablet(s) per day   Quantity:  90 tablet   Refills:  0       * oxyCODONE-acetaminophen 5-325 MG per tablet   Commonly known as:  PERCOCET   Used for:  Systemic sclerosis (H), Rheumatoid arthritis involving multiple sites with positive rheumatoid factor (H), Raynaud's disease with gangrene (H), Systemic sclerosis (H), Metatarsalgia, unspecified laterality, Metatarsalgia of both feet, Osteopenia, unspecified location   Started by:  Brennen Child MD        Dose:  1 tablet   Start taking on:  12/20/2017   Take 1 tablet by mouth every 4 hours as needed for moderate to severe pain or pain maximum 3 tablet(s) per day   Quantity:  90 tablet   Refills:  0       * oxyCODONE-acetaminophen 5-325 MG per tablet   Commonly known as:  PERCOCET   Used for:  Systemic sclerosis (H), Rheumatoid arthritis involving multiple sites with positive rheumatoid factor (H), Raynaud's disease with gangrene (H), Systemic sclerosis (H), Metatarsalgia, unspecified laterality, Metatarsalgia of both feet, Osteopenia, unspecified location   Started by:  Brennen Child MD        Dose:  1 tablet   Start taking on:  1/17/2018   Take 1 tablet by mouth every 4 hours as needed for moderate to severe pain Max 3 tabs per day   Quantity:  90 tablet   Refills:  0       * oxyCODONE-acetaminophen 5-325 MG per tablet   Commonly known as:  PERCOCET   Used for:  Systemic sclerosis (H), Rheumatoid arthritis involving multiple sites with positive rheumatoid factor (H), Raynaud's disease with gangrene (H), Systemic sclerosis (H), Metatarsalgia, unspecified laterality, Metatarsalgia of both feet, Osteopenia, unspecified location   Started by:  Brennen Child MD        Dose:  1 tablet   Start taking on:  2/15/2018   Take 1 tablet by mouth every 4 hours as needed for moderate to severe pain Max 3 tabs per day    Quantity:  90 tablet   Refills:  0       * Notice:  This list has 8 medication(s) that are the same as other medications prescribed for you. Read the directions carefully, and ask your doctor or other care provider to review them with you.      These medicines have changed or have updated prescriptions.        Dose/Directions    pregabalin 75 MG capsule   Commonly known as:  LYRICA   This may have changed:  when to take this   Used for:  Systemic sclerosis (H)        Dose:  75 mg   Take 1 capsule (75 mg) by mouth 2 times daily   Quantity:  180 capsule   Refills:  1       valACYclovir 500 MG tablet   Commonly known as:  VALTREX   This may have changed:  when to take this   Used for:  Herpes, Systemic sclerosis (H), Anxiety        Dose:  500 mg   Take 1 tablet (500 mg) by mouth 2 times daily   Quantity:  180 tablet   Refills:  1            Where to get your medicines      Some of these will need a paper prescription and others can be bought over the counter.  Ask your nurse if you have questions.     Bring a paper prescription for each of these medications     oxyCODONE IR 5 MG tablet    oxyCODONE IR 5 MG tablet    oxyCODONE IR 5 MG tablet    oxyCODONE IR 5 MG tablet    oxyCODONE-acetaminophen 5-325 MG per tablet    oxyCODONE-acetaminophen 5-325 MG per tablet    oxyCODONE-acetaminophen 5-325 MG per tablet    oxyCODONE-acetaminophen 5-325 MG per tablet                Primary Care Provider Office Phone # Fax #    Lindy Santacruz Leatha Plasencia [phone]       [facility] [address] [address]        Equal Access to Services     PAT CHAU AH: Hadii ru Renae, warachel resendiz, qaybta kaalmada rosa, quinten caruso. So [facility] [phone].    ATENCIÓN: Si habla español, tiene a paige disposición servicios gratuitos de asistencia lingüística. Collin al [phone].    We comply with applicable federal civil rights laws and [place] laws. We do not  discriminate on the basis of race, color, national origin, age, disability, sex, sexual orientation, or gender identity.            Thank you!     Thank you for choosing [facility]  for your care. Our goal is always to provide you with excellent care. Hearing back from our patients is one way we can continue to improve our services. Please take a few minutes to complete the written survey that you may receive in the mail after your visit with us. Thank you!             Your Updated Medication List - Protect others around you: Learn how to safely use, store and throw away your medicines at www.disposemymeds.org.          This list is accurate as of: 11/21/17 11:59 PM.  Always use your most recent med list.                   Brand Name Dispense Instructions for use Diagnosis    aspirin-dipyridamole  MG per 12 hr capsule    AGGRENOX     Take 1 capsule by mouth        Blood Pressure Monitoring Kit     1 kit    Check BP every 7 days.    Rheumatoid arthritis(714.0), Sicca syndrome (H), Systemic sclerosis (H), Encounter for long-term (current) use of other medications, Abnormal hemoglobin (Hgb) (H), Ulcer of finger, with fat layer exposed (H), Chronic osteomyelitis, site unspecified, Anxiety, Herpes, Rheumatoid arthritis(714.0)       cevimeline 30 MG capsule    EVOXAC    270 capsule    Take 1 capsule (30 mg) by mouth 3 times daily as needed    Metatarsalgia, unspecified laterality, Systemic sclerosis (H), Sicca syndrome (H)       cyanocobalamin 1000 MCG/ML injection    VITAMIN B12     Inject 1 mL into the muscle every 30 days        esomeprazole 40 MG CR capsule    nexIUM    180 capsule    Take 1 capsule (40 mg) by mouth 2 times daily Take 30-60 minutes before eating.    GAVE (gastric antral vascular ectasia)       fluconazole 100 MG tablet    DIFLUCAN    15 tablet    Take 2 tablets (200 mg) for 1 day followed by 1 tablet (100 mg) daily for 13 days.    Candida infection, esophageal (H)       * folic acid 1  MG tablet    FOLVITE    450 tablet    Take 5 tablets (5 mg) by mouth daily    Rheumatoid arthritis(714.0), Systemic sclerosis (H), High risk medications (not anticoagulants) long-term use       * folic acid 1 MG tablet    FOLVITE    90 tablet    Take 1 tablet (1 mg) by mouth daily    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 factor (H), Osteomyelitis of finger of right hand (H), Encounter for long-term current use of medication, Sicca syndrome (H), GAVE (gastric antral vascular ectasia), Raynaud's disease with gangrene (H)       leucovorin 25 MG tablet    WELLCOVORIN    4 tablet    Take 1 tablet (25 mg) by mouth daily Day after taking methotrexate    Other rheumatoid arthritis with rheumatoid factor of multiple sites       LORazepam 0.5 MG tablet    ATIVAN    60 tablet    Take 1 - 2 tablets by mouth three times daily as needed.    Systemic sclerosis (H), Anxiety, Ulcer of finger, with fat layer exposed (H), Raynaud's disease with gangrene (H), Osteomyelitis of finger of right hand (H), Rheumatoid arthritis involving multiple sites with positive rheumatoid factor (H), Systemic sclerosis (H)       * methotrexate 2.5 MG tablet CHEMO     12 tablet    Take 3 tablets (7.5 mg) by mouth once a week Take 3 tablets (7.5mg) once weekly.    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    Other rheumatoid arthritis with rheumatoid factor of multiple sites       ondansetron 4 MG tablet    ZOFRAN    20 tablet    Take 1 tablet (4 mg) by mouth every 8 hours as needed for nausea    Nausea, Rheumatoid arthritis(714.0),  Systemic sclerosis (H), Encounter for long-term (current) use of other medications, Iron deficiency anemia, Personal history of other diseases of digestive system, GAVE (gastric antral vascular ectasia), Raynaud's syndrome, Abnormal hemoglobin (Hgb) (H), Ulcer of finger, with fat layer exposed (H), Chronic osteomyelitis, site unspecified, Sicca syndrome (H), Anxiety, Herpes, Rheumatoid arthritis(714.0), High risk medications (not anticoagulants) long-term use, Osteomyelitis of finger of right hand (H), Ulcer of finger (H)       * oxyCODONE IR 5 MG tablet    ROXICODONE    204 tablet    Take 1 tablet (5 mg) by mouth every 4 hours as needed for moderate to severe pain or pain maximum 6 tablet(s) per day    Systemic sclerosis (H), Rheumatoid arthritis involving multiple sites with positive rheumatoid factor (H), Raynaud's disease with gangrene (H), Systemic sclerosis (H), Metatarsalgia, unspecified laterality, Metatarsalgia of both feet, Osteopenia, unspecified location       * oxyCODONE IR 5 MG tablet   Start taking on:  12/20/2017    ROXICODONE    204 tablet    Take 1 tablet (5 mg) by mouth every 4 hours as needed for moderate to severe pain or pain maximum 6 tablet(s) per day    Systemic sclerosis (H), Rheumatoid arthritis involving multiple sites with positive rheumatoid factor (H), Raynaud's disease with gangrene (H), Systemic sclerosis (H), Metatarsalgia, unspecified laterality, Metatarsalgia of both feet, Osteopenia, unspecified location       * oxyCODONE IR 5 MG tablet   Start taking on:  1/17/2018    ROXICODONE    204 tablet    Take 1 tablet (5 mg) by mouth every 4 hours as needed for moderate to severe pain or pain Maximum 6 tabs per day.    Systemic sclerosis (H), Rheumatoid arthritis involving multiple sites with positive rheumatoid factor (H), Raynaud's disease with gangrene (H), Systemic sclerosis (H), Metatarsalgia, unspecified laterality, Metatarsalgia of both feet, Osteopenia, unspecified location        * oxyCODONE IR 5 MG tablet   Start taking on:  2/15/2018    ROXICODONE    204 tablet    Take 1 tablet (5 mg) by mouth every 4 hours as needed for moderate to severe pain or pain Maximum 6 tabs per day.    Systemic sclerosis (H), Rheumatoid arthritis involving multiple sites with positive rheumatoid factor (H), Raynaud's disease with gangrene (H), Systemic sclerosis (H), Metatarsalgia, unspecified laterality, Metatarsalgia of both feet, Osteopenia, unspecified location       * oxyCODONE-acetaminophen 5-325 MG per tablet    PERCOCET    90 tablet    Take 1 tablet by mouth every 4 hours as needed for moderate to severe pain or pain maximum 3 tablet(s) per day    Systemic sclerosis (H), Rheumatoid arthritis involving multiple sites with positive rheumatoid factor (H), Raynaud's disease with gangrene (H), Systemic sclerosis (H), Metatarsalgia, unspecified laterality, Metatarsalgia of both feet, Osteopenia, unspecified location       * oxyCODONE-acetaminophen 5-325 MG per tablet   Start taking on:  12/20/2017    PERCOCET    90 tablet    Take 1 tablet by mouth every 4 hours as needed for moderate to severe pain or pain maximum 3 tablet(s) per day    Systemic sclerosis (H), Rheumatoid arthritis involving multiple sites with positive rheumatoid factor (H), Raynaud's disease with gangrene (H), Systemic sclerosis (H), Metatarsalgia, unspecified laterality, Metatarsalgia of both feet, Osteopenia, unspecified location       * oxyCODONE-acetaminophen 5-325 MG per tablet   Start taking on:  1/17/2018    PERCOCET    90 tablet    Take 1 tablet by mouth every 4 hours as needed for moderate to severe pain Max 3 tabs per day    Systemic sclerosis (H), Rheumatoid arthritis involving multiple sites with positive rheumatoid factor (H), Raynaud's disease with gangrene (H), Systemic sclerosis (H), Metatarsalgia, unspecified laterality, Metatarsalgia of both feet, Osteopenia, unspecified location       * oxyCODONE-acetaminophen 5-325 MG per  tablet   Start taking on:  2/15/2018    PERCOCET    90 tablet    Take 1 tablet by mouth every 4 hours as needed for moderate to severe pain Max 3 tabs per day    Systemic sclerosis (H), Rheumatoid arthritis involving multiple sites with positive rheumatoid factor (H), Raynaud's disease with gangrene (H), Systemic sclerosis (H), Metatarsalgia, unspecified laterality, Metatarsalgia of both feet, Osteopenia, unspecified location       pravastatin 10 MG tablet    PRAVACHOL    30 tablet    Take 1 tablet (10 mg) by mouth daily    Rheumatoid arthritis involving multiple sites with positive rheumatoid factor (H)       pregabalin 75 MG capsule    LYRICA    180 capsule    Take 1 capsule (75 mg) by mouth 2 times daily    Systemic sclerosis (H)       valACYclovir 500 MG tablet    VALTREX    180 tablet    Take 1 tablet (500 mg) by mouth 2 times daily    Herpes, Systemic sclerosis (H), Anxiety       VITAMIN D (CHOLECALCIFEROL) PO      Take 2,000 Units by mouth daily        * Notice:  This list has 12 medication(s) that are the same as other medications prescribed for you. Read the directions carefully, and ask your doctor or other care provider to review them with you.

## 2017-11-21 NOTE — PROGRESS NOTES
Rheumatology Clinic Follow-up  [facility], Rheumatology        History of rheumatologic diagnosis:    Problem List:  1. 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   2. Associated marked neuropathic skin pain markedly improved with Lyrica with prior medication therapy with gabapentin.   3. Diffuse musculoskeletal pain requiring methotrexate plus CellCept, and narcotic to control, but also improved.   4. Initial clinical overlap with anti-CCP seropositive rheumatoid arthritis with continued anti-CCP seropositivity as of 09/2010.   5. Raynaud's phenomena with new active digital ulcers with easily cracked skin.   6. GI involvement consistent with bacterial small bowel overgrowth with marked improvement after a course of neomycin followed by Dr. Glez; positive hydrogen breath test in 3/2012 - treated wtih rifaximin through Dr. Jacob  7. No convincing evidence of lung involvement on serial pulmonary function tests or clinically.   8. History of heart palpitations prior to the onset of SSc with stable symptoms since.   9. History of medication failures with Remicade, methotrexate, methotrexate plus CellCept and with Orencia and with improvement since initiation of IV IG but with complicating headaches and overall sense of malaise with IV IG infusions.   10. History of silicone breast implants.   11. Incidental finding of calcified splenic artery aneurysm.  12. EGD in 3/2012 with hiatal hernia and gastral antral vascular ectasia        13. Recurrent right  third finger contracture with  extensor surface ulceration, healed        INTERVAL HISTORY, Since our last visit and her November hospitalization, she has had no more digital ulcers and decreased pain in her fingers.  She did  quit smoking at the time of that hospitalization and she has not resumed.  She still has pain in her fingers, but not nearly so severe.  Her biggest problem with her fingers remains the marked contractures.  She has difficulty using the paraffin bath because the paraffin is too hot, so we discussed simply unplugging it for a few minutes and letting it cool down a little and then before it gels using it to warm her hands and allow the stretches.       Since we have last seen her, she has seen her primary care.  I had requested that the lorazepam prescriptions be moved back over to primary care since I had simply assumed them when she had no primary care physician for her existing diagnosis of anxiety.  That has now been accomplished.      She also continues on oxycodone and we again today discussed attempting to taper that down.  She has decreased pain and hopefully can accomplish that, although she still does have multiple sources of musculoskeletal pain.  She has been off of methotrexate since the fall as she was having some gastrointestinal issues with it, but we did discuss that if tapering the narcotics becomes too problematic because of musculoskeletal pain with morning stiffness, resuming low-dose methotrexate might be possible and desirable.        She was started on a statin during the hospitalization and I also asked that that be taken over the primary care.  In fact, she brings her lipid results with her today and they are very good with her total cholesterol only 202, just barely above target, LDL only 133, just barely above target, HDL at 45 which is target, and triglycerides under target, so I am not sure that particularly now that she has the cigarette smoking removed as a risk factor and she has minimal family risk that she necessarily will need statins long-term.  On the other hand, they may have a vascular stabilizing effect, so I am certainly not opposed to her remaining on it and she does not have  side effect with it.       Her biggest issues recently have been gastrointestinal.  She has felt bloated, nauseated, and at times is having significant constipation.  She does not get diarrhea suggesting bacterial small bowel overgrowth.       She is having a lot of issues with her insurance and whether she can pay for her medical bills.  She therefore would like to see our .      She does get some dysphagia, but that is not changing and is not severe.       As noted, she has a number of sources of musculoskeletal pain including pain in her knees and hips and greater trochanteric area.  She did have what sounds like a Baker's cyst a few weeks ago, but that resolved quickly and she did not have any known antecedent injury that would explain that.     FEBRUARY 16, 2017, INTERVAL HISTORY:  Since I last saw the patient, she went down to Gastroenterology at the [facility] and saw Dr. Tatum there.  She was found to have recurrent versus ongoing esophageal candidiasis on upper endoscopy but had a normal colonoscopy.  A rectal balloon test, however, was abnormal, consistent with sphincter dysfunction potentially contributing to her chronic constipation.       She was also told she needed iron infusions.  This is somewhat curious in that her iron studies done at [facility] on 01/27 were only minimally abnormal and her hemoglobin and hematocrit were only minimally abnormal as well.  Apparently, her ferritin was very low, however.       We do not have any of those records to look at.  She says that she asked that they be sent here, but she was told they would be sent only to her primary care doctor.  I have looked in her [facility] notes on Care Everywhere, but I cannot see that they have been scanned in at this point.       She has some financial issues at this point.  Her labs and other tests are best paid for if they are done at [facility].  Fortunately, her primary care doctor at [facility] was adept enough with the system  that she was able to get the labs done at [facility] and have them come directly to my box.  I have reviewed all of them with her today and, as noted, she  has only had some very modest anemia.  She has no other evidence of any toxicity from the CellCept or really any other abnormal labs.       She has been doing well with her Raynaud's.  She has had no episodes of severe distal digital ischemia.  She is very careful about stopping attacks as soon as they get started with rewarming.  She has hand warmers with her at all times and a hand muff that she uses.       Unfortunately, she has restarted smoking again.  She is much more careful with it, she says, however, and is not smoking outdoors when in the cold the way she was previously when she developed a severe ulcer that would not heal.       Although she hated the Botox treatment that Dr. Beauchamp gave her, she has not had any further episodes since that time.       She does believe her lungs are stable.  She is not doing a lot of significant aerobic exercise, but she has not noticed any drop-off in her exercise tolerance with walking, etc.  She has not had PFTs done and I urged her to have them done at [facility] since it is so expensive for her to do them here.       She feels okay otherwise in general.  She continues to have some musculoskeletal pain but does not think it is really a lot worse.     INTERVAL HISTORY (05/18/2017):  Since we have last seen the patient, she developed a pretty bad sore throat.  This really sounds viral but she got a Z-GRIFFIN for it.  The soreness in her throat was posterior oropharyngeal extending only a little ways down her throat, but she was concerned that it could represent recurrent esophageal candidiasis since that was diagnosed on an endoscopy at [place].  She was then told by one of her physicians that she should have had a repeat EGD, despite the fact that the original report said repeat EGD needed to be done only if she had persistent or  ongoing symptoms.      The trouble with that is that her only symptoms initially were not pain but were dysphagia.  She has had some longstanding low-grade dysphagia.  She does not believe that has progressed in any way.  It certainly is not worse since she has been on the Z-GRIFFIN which she has now completed.      We have therefore discussed with her that certainly after a Z-GRIFFIN with 2 weeks of therapy with it that were this esophageal candidiasis it should not be better and it might well be worse.  I think given the overall situation that is very unlikely.      She had a lot of constipation and we gave her a bowel regimen for that and with that she has done quite well.      One of the things that is bothering her more is that by the end of the day, both the bottom of her feet and her fingers really burner.  This is not as bad first thing in the day.      What is better is that she has had only 1 digital ulcer, very low-grade in the fifth PIP currently, with no distal pulp ulcers since Dr. Beauchamp gave her the Botox injection.      She also has no evidence of any progressive cardiopulmonary disease and indeed her last pulmonary function tests were completely stable.  She does not do a lot of her aerobic exercise, but she can climb stairs without difficulties.      She has had a history of blood in the urine.  Because of her smoking history, she saw an urologist who did a cystoscopy that was negative.  She was then told she should see a renal doctor.      However, since January here she has had 2 years that has ever completely free of any evidence of blood.  Blood has not observed microscopic stable as far as I can tell and was positive only on the dip stick.  This did still occur in 2016.  Prior to that, she had been on methotrexate, which is known to potentially cause this.      The other thing is she is noticing and is quite concerned about is worsening oral aperture and more and more problems with her teeth  including retraction.  She continues to smoke.  She has significant keratoconjunctivitis sicca, although it is definitely improved with twice daily Evoxac.       She feels okay otherwise in general.  She continues to have some musculoskeletal pain but does not think it is really a lot worse.     AUGUST 17, 2017, INTERVAL HISTORY:  Since we have last seen the patient, she has been having a lot of stressors.  Specifically, her daughter had a baby in late July, then had a complication that kept her in the hospital for some time.  Her sister has also been in the hospital with what she reports has a brain hemorrhage.  She is 42 years old and this all apparently developed after an infected back injection for pain.  She has now been told that she has positive autoimmune antibodies and that she might have systemic lupus, apparently.       With all of this stress, she has again resumed smoking after having had cessation.  She has some intermittent ulcers over her right third and right fifth PIP joints.  She is, if anything, more contractured there.  She is not doing stretching because of the pain.  They are not open currently, however.  If she hits them, of course, they hurt more and open up.       She is also getting a lot of hip pain, particularly over the right hip at night.  Much of the pain she describes as consistent with trochanteric bursitis, but some may be consistent with true hip pain deeper in.  She feels a tendon rub at times over this area as well.       She has been off of methotrexate for a long time, as her skin had improved enough that we felt we could get her off of it and she had some gastrointestinal symptoms that were arguably worsened from it.  She continues to have a lot of GI symptoms currently with intermittent bloating and nausea.  She has persistently had some low-grade dysphagia as well.  None of that is all that severe right now, and pain is the bigger problem.       She has apparently had some  recent mix-up with the narcotic prescriptions as well.  I had signed a Percocet prescription last week, but that was apparently not the one that was due; it was rather the oxycodone, which I did sign just 2 days ago.  She has apparently not picked up the Percocet prescription and is wanting to make sure she gets it refilled now for 2 weeks from now rather 10 days from now when it is due.        She is having some skin irritation over her upper back with a lot of itching and burning pain.  She has excoriated that area.  She wants to make sure that it does not look like a skin cancer.       Other features of her disease remained relatively stable.  She is getting Raynaud's attacks despite the fact that it is not particularly cool right now.  This may be being worsened by her cigarette smoking, and we discussed that.     SEPTEMBER 21, 2017, INTERVAL HISTORY:  The patient started methotrexate back in mid August, but she felt so much worse on it with gastrointestinal symptoms that after she recently stopped it, she did not resume it.  She is still having some musculoskeletal pain, particularly in the right side of her ribs.  This is one of the reasons we started it.  She is not sure whether it helped at all, but she was only on it for a very short time.       She is also having some musculoskeletal pain in her neck and, of course, within her hands which have the marked contractures.       She still has some GERD going on, but that is better on the Nexium b.i.d.  She has to sleep sitting up, however, and she has been doing that for some time to avoid severe GERD.        She called us to tell us that she had a really bad experience with the most recent Percocet that she got.  She believes that this was because of a change in .  She returned the pills to the pharmacy and they gave her some from the  she had used previously, and she felt those were better so she has asked that on an ongoing basis that  those are the ones that she gets.       She is concerned about some areas in her fingers that feel like she might be developing new ulcerations, but she has had nothing open up there.  She does get some Raynaud's and is concerned about that worsening with the onset of colder weather.     INTERVAL HISTORY:  Since we have last seen her, she finally started having her back pain get better.  She thinks some of that was because she was getting strain there when she was walking her dog from the dog pulling on the leash.  She did have to give her dog away because she was having trouble taking care of it.      Her primary care doctor had actually done some plain films and was concerned about a possible lesion but a followup CT scan showed only shadowing.  A DEXA scan was done and she said that showed low bone density.  Although we have Care Everywhere, I cannot see the actual results of the DEXA scans so I do not know precisely what it said.  It was recommended to her she take calcium and vitamin D, but she is already taking 2000 international units a day of vitamin D and eating a diet that is almost entirely dairy.      She says that in part because she is having so much trouble eating.  She says the food feels like her enemy.  She says protein drinks make her sick and in general she feels better if she does not eat but she knows she needs to eat something so she will try to eat food.  She has already had an endoscopy at [place] and additional workup there and that was unrevealing.  She does, however, remain on around-the-clock, oxycodone and we started to discuss that.      Because her gut is so problematic she has not had any methotrexate since August or September and she felt like that made her sicker.      On the positive side, she is able to control her Raynaud's phenomena pretty well with rewarming.  She is on Aggrenox now and she thinks that has definitely helped her ulcers.  She is not however doing regular stretching  "of any kind let alone her hands.      She is due for labs.  The last ones were done 3 months ago.  She did on her own in addition to stopping the methotrexate reduce her MMF to 3 tablets a day.  That is probably okay based on her most recent pulmonary function tests done last April which were very good.  She is not having any increased shortness of breath, decreased exercise tolerance, or dry cough.  On the other hand, she is doing virtually no exercise.  She does continue to smoke.                     ROS  Gen: Denies weight loss or fevers or night sweats  HEENT:  No ocular inflammation..  Denies swollen glands  CV:  Denies chest pains  Resp:  denies chronic cough or dyspnea.  No pleurisy  Ext:  No swelling in extremities.    Skin:  No new rashes  Remainder of 10-point ROS as per Interval history or negative,     Past Medical History  1.  Diffuse systemic sclerosis  2.  CCP+ inflammatory arthritis  3.  Small bowel overgrowth syndrome - positive hydrogen test   4.  GERD  5.  Plantar fasciitis - seen by Dr. Jorgensen  6.  Chronic neuropathic pain - on lyrica and chronic percocet    Allergies   Allergies   Allergen Reactions     Penicillin [Esters] Other (See Comments)     Unable to move     Bactroban [Mupirocin Calcium]      Inability to move     Levaquin Other (See Comments)     Muscle weakness       Rifampin Other (See Comments)     Makes pain medication less effective     Medications  Current Outpatient Prescriptions   Medication     LORazepam (ATIVAN) 0.5 MG tablet     valACYclovir (VALTREX) 500 MG tablet     oxyCODONE (ROXICODONE) 5 MG immediate release tablet     oxyCODONE-acetaminophen (PERCOCET) 5-325 MG per tablet     pregabalin (LYRICA) 75 MG capsule     rifaximin (XIFAXAN) 550 MG TABS     mycophenolate (CELLCEPT-BRAND NAME) 500 MG tablet     omeprazole (PRILOSEC) 40 MG capsule     ondansetron (ZOFRAN) 4 MG tablet     Needle, Disp, 30G X 1/2\" MISC     diazepam (VALIUM) 5 MG tablet     fish oil-omega-3 fatty " "acids (FISH OIL) 1000 MG capsule     METHOTREXate 25 MG/ML injection     leucovorin (WELLCOVORIN) 5 MG tablet     Syringe/Needle, Disp, 27G X 1/2\" 1 ML MISC     Physical Exam  /64  Pulse 74  Ht 1.626 m (5' 4\")  Wt 54.9 kg (121 lb)  SpO2 98%  BMI 20.77 kg/m2  General: pleasant, but dysphoric  HEENT: EOMI, PERRL, no conjunctivitis or scleritis. Chronic facial skin thickenig without facial rash. Slight pursing of her lips. Slight restriction in opening of the jaw. Oropharynx exam reveals 4-5mm shallow ulceration on right lower lip. Moderate saliva pooling. No cervical adenopathy  CV: regular rhythm; no murmurs.    Resp: End-expiratory wheezing heard diffusely throughout  GI:  Soft, minimal  discomfort in the lower abdomen to deeper palpation; no masses or hepatomegaly  Ext:  No peripheral edema.   Skin:  Chronic sclerodactyly, most prominent in R hand (contracture at 50% finger flexion) vs. Left (missing final 20' extension).  Chronic skin thickening with evidence for improvement along forearms, upper arms, chest, abdomen, lower legs and feet.  Stable Calcinosis-like deposit noted on her right anterior thigh just above the knee joint and on right fith finger. . Salt/pepper appearance to areas of skin (christiano arms and upper chest).  No other rashes.   Closed  DU  over  the right 5th and  3rd  PIP joint: no otherDU.   Mild skin thinning/erythema along other PIPs. 2+ over fingers ; 1+ maximum over hands, and feet and shins, and face, pregressing to atrophic status.    MSK: She continues very tender between the ribs, worse on the right then left but pretty diffusely.  Full joint exam without evidence for synovitis, but right hip pain in joint and over trochate. Chronic joint contractures of fingers (christiano right); minimally also along wrists and elbows.       RESULTS:  Component      Latest Ref Rng 1/17/2013           2:15 PM   WBC      4.0 - 11.0 10e9/L 4.6   RBC Count      3.8 - 5.2 10e12/L 3.71 (L)   Hemoglobin     "  11.7 - 15.7 g/dL 11.8   Hematocrit      35.0 - 47.0 % 37.4   MCV      78 - 100 fl 101 (H)   MCH      26.5 - 33.0 pg 31.8   MCHC      31.5 - 36.5 g/dL 31.6   RDW      10.0 - 15.0 % 13.9   Platelet Count      150 - 450 10e9/L 200     Component      Latest Ref Rng 1/17/2013   ALT      0 - 50 U/L 23   AST      0 - 45 U/L 30   Albumin      3.9 - 5.1 g/dL 4.2   CRP Inflammation      0.0 - 8.0 mg/L <5.0   Sed Rate      0 - 20 mm/h 18         ASSESSMENT: Per the Problem LIst    PLAN AND RECOMMENDATIONS:     She identifies postprandial gut pain and nausea as her biggest problem today.  On the other hand, she is able to have a normal bowel movement once a day and does not have features suggestive of bacterial small bowel overgrowth and a recent endoscopy was normal.      I have therefore had a long conversation with her again today about narcotic use.  She tells me she has attempted to taper further, but has not been able to do that because of pain.  However, I discussed with her extensively the problems of underlying scleroderma gut motility being exacerbated by around the clock narcotic use.  I think this is likely the cause of these problems.  I am ordering vitamin D levels and she was told to supplement that and also carotene levels which would be expected to be low if she is having malabsorption issues, but her weight is stable and her most recent laboratory tests were almost entirely normal other than 4 red blood cell indices, so I really do not think she is malnourished.      She has big concerns about whether her insurance would cover pain clinic visits, but she may be covered at the [facility] and I have already suggested to her she be seen at the [facility] pain clinic because of their broad approaches to pain and I have sent several patients there who had good results.  She will look into this.      In the meantime, I have continued her current pain prescription for the next 3 months, which should last her until  mid-March when I see her back.  All of these were postdated prescriptions written out to be filled approximately 1 month from now, 2 months from now, and 3 months from now.      I think it is okay for her to stay on the lower dose MMF for the time being, given how good her pulmonary function tests were.  We will probably want to reorder those next visit, however.      She will contact us if she is having any other increased difficulties in the interim.        I did encourage her strongly again to redouble her efforts to stop smoking and also to be doing stretching exercises on a daily basis and something aerobic at least 2 days a week.         This visit was 40 minutes in duration, greater than 50% in counseling.

## 2017-11-22 LAB — DEPRECATED CALCIDIOL+CALCIFEROL SERPL-MC: 64 UG/L (ref 20–75)

## 2017-11-25 LAB — CAROTENE SERPL-MCNC: 54 UG/DL (ref 60–200)

## 2017-12-02 DIAGNOSIS — M34.9 SYSTEMIC SCLEROSIS (H): ICD-10-CM

## 2017-12-06 ENCOUNTER — TELEPHONE (OUTPATIENT)
Dept: ENDOCRINOLOGY | Facility: CLINIC | Age: 52
End: 2017-12-06

## 2017-12-06 NOTE — TELEPHONE ENCOUNTER
[facility]    Last Written Prescription Date:  9/25/17  Last Fill Quantity: 360,   # refills: 0  Last Office Visit: 11/21/17  Future Office visit:  2/27/18    CBC RESULTS:   Recent Labs   Lab Test  11/21/17   1648   WBC  4.9   RBC  3.74*   HGB  11.6*   HCT  37.5   MCV  100   MCH  31.0   MCHC  30.9*   RDW  12.8   PLT  196       Creatinine   Date Value Ref Range Status   11/21/2017 0.76 0.52 - 1.04 mg/dL Final   ]    Liver Function Studies -   Recent Labs   Lab Test  11/21/17   1648  08/17/17   1822   11/24/15   1848   PROTTOTAL   --    --    --   7.8   ALBUMIN   --   4.2   < >  4.1   BILITOTAL   --    --    --   0.4   ALKPHOS   --    --    --   49   AST  13  12   < >  12   ALT  18  17   < >  15    < > = values in this interval not displayed.       Routing refill request to provider for review/approval because:  Drug not on the [facility] or [facility] refill protocol or controlled substance

## 2017-12-06 NOTE — TELEPHONE ENCOUNTER
Pt saying DEXA results can be retrieved by calling [facility] at [phone]. Says Dr. Child had trouble seeing them at recent appt. Please advise. Sent to [employer].

## 2017-12-07 RX ORDER — MYCOPHENOLATE MOFETIL 500 MG/1
TABLET ORAL
Qty: 360 TABLET | Refills: 0 | Status: SHIPPED | OUTPATIENT
Start: 2017-12-07 | End: 2018-02-27

## 2017-12-07 NOTE — TELEPHONE ENCOUNTER
Contacted [facility] medical records and requested that a copy of the DEXA scan rpt be faxed to this clinic. Was told this would be done by Monday.    TIARRA FrankN RN  Rheumatology RN Coordinator  [facility] Breen

## 2017-12-15 DIAGNOSIS — R11.0 NAUSEA: Primary | ICD-10-CM

## 2017-12-19 RX ORDER — ONDANSETRON 4 MG/1
TABLET, ORALLY DISINTEGRATING ORAL
Qty: 20 TABLET | Refills: 0 | Status: SHIPPED | OUTPATIENT
Start: 2017-12-19 | End: 2018-10-23

## 2017-12-19 NOTE — TELEPHONE ENCOUNTER
Last Written Prescription Date:  8/20/15  Last Fill Quantity: 20,   # refills: 3  Last Office Visit : 11/21/17  Future Office visit:  2/27/18    Routing refill request to provider for review/approval because:  Drug not on refill protocol

## 2018-01-16 DIAGNOSIS — M34.9 SYSTEMIC SCLEROSIS (H): ICD-10-CM

## 2018-01-16 RX ORDER — PREGABALIN 75 MG/1
75 CAPSULE ORAL 2 TIMES DAILY
Qty: 180 CAPSULE | Refills: 1 | Status: SHIPPED | OUTPATIENT
Start: 2018-01-16 | End: 2022-10-25

## 2018-01-16 NOTE — TELEPHONE ENCOUNTER
pregabalin (LYRICA)      Last Written Prescription Date:  2/28/17  Last Fill Quantity: 180,   # refills: 1  Last Office Visit : 11/21/17  Future Office visit:   2/27/18      Routing refill request to provider for review/approval because:  Controlled. Previously rf has pt taking 1 tab qd. Entered as ordered.

## 2018-01-17 DIAGNOSIS — M06.9 RHEUMATOID ARTHRITIS (H): ICD-10-CM

## 2018-01-17 DIAGNOSIS — K31.819 GAVE (GASTRIC ANTRAL VASCULAR ECTASIA): ICD-10-CM

## 2018-01-17 DIAGNOSIS — Z79.899 HIGH RISK MEDICATIONS (NOT ANTICOAGULANTS) LONG-TERM USE: ICD-10-CM

## 2018-01-17 DIAGNOSIS — M34.9 SYSTEMIC SCLEROSIS (H): ICD-10-CM

## 2018-01-17 DIAGNOSIS — D50.9 ANEMIA, IRON DEFICIENCY: ICD-10-CM

## 2018-01-17 LAB
ALBUMIN SERPL-MCNC: 4 G/DL (ref 3.4–5)
ALBUMIN UR-MCNC: NEGATIVE MG/DL
ALT SERPL W P-5'-P-CCNC: 12 U/L (ref 0–50)
APPEARANCE UR: ABNORMAL
AST SERPL W P-5'-P-CCNC: 15 U/L (ref 0–45)
BACTERIA #/AREA URNS HPF: ABNORMAL /HPF
BASOPHILS # BLD AUTO: 0 10E9/L (ref 0–0.2)
BASOPHILS NFR BLD AUTO: 0.3 %
BILIRUB UR QL STRIP: NEGATIVE
COLOR UR AUTO: ABNORMAL
CREAT SERPL-MCNC: 0.8 MG/DL (ref 0.52–1.04)
CRP SERPL-MCNC: <2.9 MG/L (ref 0–8)
DIFFERENTIAL METHOD BLD: NORMAL
EOSINOPHIL # BLD AUTO: 0.3 10E9/L (ref 0–0.7)
EOSINOPHIL NFR BLD AUTO: 4 %
ERYTHROCYTE [DISTWIDTH] IN BLOOD BY AUTOMATED COUNT: 12.8 % (ref 10–15)
ERYTHROCYTE [SEDIMENTATION RATE] IN BLOOD BY WESTERGREN METHOD: 13 MM/H (ref 0–30)
GFR SERPL CREATININE-BSD FRML MDRD: 74 ML/MIN/1.7M2
GLUCOSE UR STRIP-MCNC: NEGATIVE MG/DL
HCT VFR BLD AUTO: 38.3 % (ref 35–47)
HGB BLD-MCNC: 12.1 G/DL (ref 11.7–15.7)
HGB UR QL STRIP: ABNORMAL
IMM GRANULOCYTES # BLD: 0 10E9/L (ref 0–0.4)
IMM GRANULOCYTES NFR BLD: 0.4 %
IRON SATN MFR SERPL: 23 % (ref 15–46)
IRON SERPL-MCNC: 86 UG/DL (ref 35–180)
KETONES UR STRIP-MCNC: NEGATIVE MG/DL
LEUKOCYTE ESTERASE UR QL STRIP: NEGATIVE
LYMPHOCYTES # BLD AUTO: 1.7 10E9/L (ref 0.8–5.3)
LYMPHOCYTES NFR BLD AUTO: 24.2 %
MCH RBC QN AUTO: 31.5 PG (ref 26.5–33)
MCHC RBC AUTO-ENTMCNC: 31.6 G/DL (ref 31.5–36.5)
MCV RBC AUTO: 100 FL (ref 78–100)
MONOCYTES # BLD AUTO: 0.4 10E9/L (ref 0–1.3)
MONOCYTES NFR BLD AUTO: 6.5 %
MUCOUS THREADS #/AREA URNS LPF: PRESENT /LPF
NEUTROPHILS # BLD AUTO: 4.4 10E9/L (ref 1.6–8.3)
NEUTROPHILS NFR BLD AUTO: 64.6 %
NITRATE UR QL: NEGATIVE
NRBC # BLD AUTO: 0 10*3/UL
NRBC BLD AUTO-RTO: 0 /100
PH UR STRIP: 6 PH (ref 5–7)
PLATELET # BLD AUTO: 205 10E9/L (ref 150–450)
RBC # BLD AUTO: 3.84 10E12/L (ref 3.8–5.2)
RBC #/AREA URNS AUTO: 1 /HPF (ref 0–2)
SOURCE: ABNORMAL
SP GR UR STRIP: 1.01 (ref 1–1.03)
SQUAMOUS #/AREA URNS AUTO: 4 /HPF (ref 0–1)
TIBC SERPL-MCNC: 370 UG/DL (ref 240–430)
UROBILINOGEN UR STRIP-MCNC: 0 MG/DL (ref 0–2)
WBC # BLD AUTO: 6.8 10E9/L (ref 4–11)
WBC #/AREA URNS AUTO: <1 /HPF (ref 0–2)

## 2018-01-17 NOTE — TELEPHONE ENCOUNTER
Prescription called to  Pt's [facility] pharmacy. Pt notified this was done.    TIARRA FrankN RN  Rheumatology RN Coordinator  [facility]

## 2018-02-27 ENCOUNTER — OFFICE VISIT (OUTPATIENT)
Dept: RHEUMATOLOGY | Facility: CLINIC | Age: 53
End: 2018-02-27
Attending: INTERNAL MEDICINE
Payer: MEDICARE

## 2018-02-27 VITALS
WEIGHT: 126.2 LBS | BODY MASS INDEX: 21.02 KG/M2 | HEART RATE: 75 BPM | SYSTOLIC BLOOD PRESSURE: 122 MMHG | DIASTOLIC BLOOD PRESSURE: 63 MMHG | TEMPERATURE: 98.2 F | HEIGHT: 65 IN | OXYGEN SATURATION: 99 %

## 2018-02-27 DIAGNOSIS — M77.42 METATARSALGIA OF BOTH FEET: ICD-10-CM

## 2018-02-27 DIAGNOSIS — M85.80 OSTEOPENIA, UNSPECIFIED LOCATION: ICD-10-CM

## 2018-02-27 DIAGNOSIS — M77.41 METATARSALGIA OF BOTH FEET: ICD-10-CM

## 2018-02-27 DIAGNOSIS — M34.9 SYSTEMIC SCLEROSIS (H): ICD-10-CM

## 2018-02-27 DIAGNOSIS — M77.40 METATARSALGIA, UNSPECIFIED LATERALITY: ICD-10-CM

## 2018-02-27 DIAGNOSIS — M05.79 RHEUMATOID ARTHRITIS INVOLVING MULTIPLE SITES WITH POSITIVE RHEUMATOID FACTOR (H): ICD-10-CM

## 2018-02-27 DIAGNOSIS — I73.01 RAYNAUD'S DISEASE WITH GANGRENE (H): ICD-10-CM

## 2018-02-27 PROCEDURE — G0463 HOSPITAL OUTPT CLINIC VISIT: HCPCS | Mod: ZF

## 2018-02-27 RX ORDER — OXYCODONE AND ACETAMINOPHEN 5; 325 MG/1; MG/1
1 TABLET ORAL EVERY 4 HOURS PRN
Qty: 90 TABLET | Refills: 0 | Status: SHIPPED | OUTPATIENT
Start: 2018-04-24 | End: 2018-05-30

## 2018-02-27 RX ORDER — OXYCODONE HYDROCHLORIDE 5 MG/1
5 TABLET ORAL EVERY 4 HOURS PRN
Qty: 204 TABLET | Refills: 0 | Status: SHIPPED | OUTPATIENT
Start: 2018-03-27 | End: 2018-05-30

## 2018-02-27 RX ORDER — OXYCODONE AND ACETAMINOPHEN 5; 325 MG/1; MG/1
1 TABLET ORAL EVERY 4 HOURS PRN
Qty: 90 TABLET | Refills: 0 | Status: SHIPPED | OUTPATIENT
Start: 2018-03-27 | End: 2018-05-30

## 2018-02-27 RX ORDER — CEPHALEXIN 500 MG/1
500 CAPSULE ORAL 4 TIMES DAILY
Qty: 84 CAPSULE | Refills: 2 | Status: SHIPPED | OUTPATIENT
Start: 2018-02-27 | End: 2018-03-20

## 2018-02-27 RX ORDER — AZITHROMYCIN 250 MG/1
250 TABLET, FILM COATED ORAL DAILY
Qty: 21 TABLET | Refills: 2 | Status: SHIPPED | OUTPATIENT
Start: 2018-02-27 | End: 2018-03-20

## 2018-02-27 RX ORDER — MULTIVIT-MIN/IRON/FOLIC ACID/K 18-600-40
2000 CAPSULE ORAL DAILY
COMMUNITY
Start: 2018-02-27

## 2018-02-27 RX ORDER — MYCOPHENOLATE MOFETIL 500 MG/1
1500 TABLET ORAL DAILY
Qty: 360 TABLET | Refills: 0 | COMMUNITY
Start: 2018-02-27 | End: 2018-05-07

## 2018-02-27 RX ORDER — OXYCODONE HYDROCHLORIDE 5 MG/1
5 TABLET ORAL EVERY 4 HOURS PRN
Qty: 204 TABLET | Refills: 0 | Status: SHIPPED | OUTPATIENT
Start: 2018-04-24 | End: 2018-05-30

## 2018-02-27 RX ORDER — OXYCODONE AND ACETAMINOPHEN 5; 325 MG/1; MG/1
1 TABLET ORAL EVERY 4 HOURS PRN
Qty: 90 TABLET | Refills: 0 | Status: SHIPPED | OUTPATIENT
Start: 2018-02-27 | End: 2018-05-30

## 2018-02-27 RX ORDER — OXYCODONE HYDROCHLORIDE 5 MG/1
5 TABLET ORAL EVERY 4 HOURS PRN
Qty: 204 TABLET | Refills: 0 | Status: SHIPPED | OUTPATIENT
Start: 2018-02-27 | End: 2018-03-29

## 2018-02-27 ASSESSMENT — PAIN SCALES - GENERAL: PAINLEVEL: MODERATE PAIN (5)

## 2018-02-27 NOTE — MR AVS SNAPSHOT
After Visit Summary   2/27/2018    Willard Grayson    MRN: [id number]           Patient Information     Date Of Birth          1965        Visit Information        Provider Department      2/27/2018 3:30 PM Brennen Child MD [facility]        Today's Diagnoses     Systemic sclerosis (H)        Rheumatoid arthritis involving multiple sites with positive rheumatoid factor (H)        Raynaud's disease with gangrene (H)        Metatarsalgia, unspecified laterality        Metatarsalgia of both feet        Osteopenia, unspecified location           Follow-ups after your visit        Follow-up notes from your care team     Return in about 6 weeks (around 4/10/2018), or at 5 pm, for Routine Visit.      Your next 10 appointments already scheduled     Apr 10, 2018  3:00 PM CDT   FULL PULMONARY FUNCTION with [facility] PFL C   [facility] Function Testing ([facility])    [address] [place] [id number] [phone]            Apr 10, 2018  4:00 PM CDT   Six Minute Walk with UC PFL 6 MINUTE WALK 1   [facility] ([facility])    [address] [place] [id number] [phone]            Apr 10, 2018  5:00 PM CDT   (Arrive by 4:45 PM)   RETURN SCLERODERMA with Brennen Child MD   [facility] ([facility])    [address] [place] [id number] [phone]              Future tests that were ordered for you today     Open Future Orders        Priority Expected Expires Ordered    General PFT Lab (Please always keep checked) Routine  2/27/2019 2/27/2018    Pulmonary Function Test Routine  2/27/2019 2/27/2018    6 minute walk test Routine  2/27/2019 2/27/2018            Who to contact     If you have questions or need follow up information about today's clinic visit or your schedule please contact [facility] directly at [phone].  Normal or non-critical lab and imaging results will be communicated to you by [employer], letter or phone within 4 business days after the clinic has received the results. If you do not hear from us within 7 days, please contact the clinic through [employer] or phone. If you have a critical or abnormal lab result, we will notify you by phone as soon as possible.  Submit refill requests through [employer] or call your pharmacy and they will forward the refill request to us. Please allow 3 business days for your refill to be completed.          Additional Information About Your Visit        [employer] Information     [employer] lets you send messages to your doctor, view your test results, renew your prescriptions, schedule appointments and more. To sign up, go to [email or web address] . Click on \"Log in\" on the left side of the screen, which will take you to the Welcome page. Then click on \"Sign up Now\" on the right side of the page.     You will be asked to enter the access code listed below, as well as some personal information. Please follow the directions to create your username and password.     Your access code is: [email or web address]  Expires: 2018  6:30 AM     Your access code will  in 90 days. If you need help or a new code, please call your [facility] clinic or [phone].        Care EveryWhere ID     This is your Care EveryWhere ID. This could be used by other organizations to access your [facility] medical records  [id number]        Your Vitals Were     Pulse Temperature Height Pulse Oximetry BMI (Body Mass Index)       75 98.2  F (36.8  C) (Oral) 1.638 m (5' 4.5\") 99% 21.33 kg/m2        Blood Pressure from Last 3 Encounters:   18 122/63   17 106/64   17 117/64    Weight from Last 3 Encounters:   18 57.2 kg (126 lb 3.2 oz)   17 54.9 kg (121 lb)   17 53.3 kg (117 lb 6.4 oz)                 Today's Medication Changes          These changes " are accurate as of 2/27/18  4:48 PM.  If you have any questions, ask your nurse or doctor.               Start taking these medicines.        Dose/Directions    azithromycin 250 MG tablet   Commonly known as:  ZITHROMAX   Used for:  Systemic sclerosis (H), Rheumatoid arthritis involving multiple sites with positive rheumatoid factor (H), Raynaud's disease with gangrene (H), Metatarsalgia, unspecified laterality, Metatarsalgia of both feet, Osteopenia, unspecified location   Started by:  Brennen Child MD        Dose:  250 mg   Take 1 tablet (250 mg) by mouth daily for 21 days   Quantity:  21 tablet   Refills:  2       cephALEXin 500 MG capsule   Commonly known as:  KEFLEX   Used for:  Systemic sclerosis (H), Rheumatoid arthritis involving multiple sites with positive rheumatoid factor (H), Raynaud's disease with gangrene (H), Metatarsalgia, unspecified laterality, Metatarsalgia of both feet, Osteopenia, unspecified location   Started by:  Brennen Child MD        Dose:  500 mg   Take 1 capsule (500 mg) by mouth 4 times daily for 21 days   Quantity:  84 capsule   Refills:  2         These medicines have changed or have updated prescriptions.        Dose/Directions    mycophenolate 500 MG tablet   Commonly known as:  GENERIC EQUIVALENT   This may have changed:  See the new instructions.   Used for:  Systemic sclerosis (H)   Changed by:  Brennen Child MD        Dose:  1500 mg   Take 3 tablets (1,500 mg) by mouth daily   Quantity:  360 tablet   Refills:  0       valACYclovir 500 MG tablet   Commonly known as:  VALTREX   This may have changed:  when to take this   Used for:  Herpes, Systemic sclerosis (H), Anxiety        Dose:  500 mg   Take 1 tablet (500 mg) by mouth 2 times daily   Quantity:  180 tablet   Refills:  1       Vitamin D (Cholecalciferol) 1000 UNITS Tabs   This may have changed:    - medication strength  - how much to take   Changed by:  Brennen Child MD        Dose:  4000 Units   Take 4,000  Units by mouth daily   Refills:  0            Where to get your medicines      These medications were sent to [facility] - [place], [place] - [address]  [address], [facility] [address]     Phone:  [phone]     azithromycin 250 MG tablet    cephALEXin 500 MG capsule         Some of these will need a paper prescription and others can be bought over the counter.  Ask your nurse if you have questions.     Bring a paper prescription for each of these medications     oxyCODONE IR 5 MG tablet    oxyCODONE IR 5 MG tablet    oxyCODONE IR 5 MG tablet    oxyCODONE-acetaminophen 5-325 MG per tablet    oxyCODONE-acetaminophen 5-325 MG per tablet    oxyCODONE-acetaminophen 5-325 MG per tablet                Primary Care Provider Office Phone # Fax #    Lindy Neeta Plasencia [phone]       [facility] [address] [facility] [address]        Equal Access to Services     PAT CHAU AH: Hadii ru domingo hadasho Soomaali, waaxda luqadaha, qaybta kaalmada adeegyada, quinten romeo . So [facility] [phone].    ATENCIÓN: Si ivyla español, tiene a paige disposición servicios gratuitos de asistencia lingüística. Llame al [phone].    We comply with applicable federal civil rights laws and [place] laws. We do not discriminate on the basis of race, color, national origin, age, disability, sex, sexual orientation, or gender identity.            Thank you!     Thank you for choosing [facility] RHEUMATOLOG[facility]  for your care. Our goal is always to provide you with excellent care. Hearing back from our patients is one way we can continue to improve our services. Please take a few minutes to complete the written survey that you may receive in the mail after your visit with us. Thank you!             Your Updated Medication List - Protect others around you: Learn how to safely use, store and throw away your medicines at www.disposemymeds.org.          This list is  accurate as of 2/27/18  4:48 PM.  Always use your most recent med list.                   Brand Name Dispense Instructions for use Diagnosis    aspirin-dipyridamole  MG per 12 hr capsule    AGGRENOX     Take 1 capsule by mouth        azithromycin 250 MG tablet    ZITHROMAX    21 tablet    Take 1 tablet (250 mg) by mouth daily for 21 days    Systemic sclerosis (H), Rheumatoid arthritis involving multiple sites with positive rheumatoid factor (H), Raynaud's disease with gangrene (H), Metatarsalgia, unspecified laterality, Metatarsalgia of both feet, Osteopenia, unspecified location       Blood Pressure Monitoring Kit     1 kit    Check BP every 7 days.    Rheumatoid arthritis(714.0), Sicca syndrome (H), Systemic sclerosis (H), Encounter for long-term (current) use of other medications, Abnormal hemoglobin (Hgb) (H), Ulcer of finger, with fat layer exposed (H), Chronic osteomyelitis, site unspecified, Anxiety, Herpes, Rheumatoid arthritis(714.0)       cephALEXin 500 MG capsule    KEFLEX    84 capsule    Take 1 capsule (500 mg) by mouth 4 times daily for 21 days    Systemic sclerosis (H), Rheumatoid arthritis involving multiple sites with positive rheumatoid factor (H), Raynaud's disease with gangrene (H), Metatarsalgia, unspecified laterality, Metatarsalgia of both feet, Osteopenia, unspecified location       cevimeline 30 MG capsule    EVOXAC    270 capsule    Take 1 capsule (30 mg) by mouth 3 times daily as needed    Metatarsalgia, unspecified laterality, Systemic sclerosis (H), Sicca syndrome (H)       cyanocobalamin 1000 MCG/ML injection    VITAMIN B12     Inject 1 mL into the muscle every 30 days        esomeprazole 40 MG CR capsule    nexIUM    180 capsule    Take 1 capsule (40 mg) by mouth 2 times daily Take 30-60 minutes before eating.    GAVE (gastric antral vascular ectasia)       folic acid 1 MG tablet    FOLVITE    90 tablet    Take 1 tablet (1 mg) by mouth daily    Rheumatoid arthritis involving both  hands with positive rheumatoid factor (H), Osteomyelitis of finger of right hand (H), Encounter for long-term current use of medication, Sicca syndrome (H), GAVE (gastric antral vascular ectasia), Raynaud's disease with gangrene (H)       LORazepam 0.5 MG tablet    ATIVAN    60 tablet    Take 1 - 2 tablets by mouth three times daily as needed.    Systemic sclerosis (H), Anxiety, Ulcer of finger, with fat layer exposed (H), Raynaud's disease with gangrene (H), Osteomyelitis of finger of right hand (H), Rheumatoid arthritis involving multiple sites with positive rheumatoid factor (H), Systemic sclerosis (H)       mycophenolate 500 MG tablet    GENERIC EQUIVALENT    360 tablet    Take 3 tablets (1,500 mg) by mouth daily    Systemic sclerosis (H)       ondansetron 4 MG ODT tab    ZOFRAN-ODT    20 tablet    LET 1 TABLET DISSOLVE IN MOUTH EVERY 8 HOURS AS NEEDED FOR NAUSEA    Nausea       * oxyCODONE IR 5 MG tablet    ROXICODONE    204 tablet    Take 1 tablet (5 mg) by mouth every 4 hours as needed for moderate to severe pain or pain Maximum 6 tabs per day.    Systemic sclerosis (H), Rheumatoid arthritis involving multiple sites with positive rheumatoid factor (H), Raynaud's disease with gangrene (H), Metatarsalgia, unspecified laterality, Metatarsalgia of both feet, Osteopenia, unspecified location       * oxyCODONE IR 5 MG tablet   Start taking on:  3/27/2018    ROXICODONE    204 tablet    Take 1 tablet (5 mg) by mouth every 4 hours as needed for moderate to severe pain or pain maximum 6 tablet(s) per day    Systemic sclerosis (H), Rheumatoid arthritis involving multiple sites with positive rheumatoid factor (H), Raynaud's disease with gangrene (H), Metatarsalgia, unspecified laterality, Metatarsalgia of both feet, Osteopenia, unspecified location       * oxyCODONE IR 5 MG tablet   Start taking on:  4/24/2018    ROXICODONE    204 tablet    Take 1 tablet (5 mg) by mouth every 4 hours as needed for moderate to severe pain  or pain maximum 6 tablet(s) per day    Systemic sclerosis (H), Rheumatoid arthritis involving multiple sites with positive rheumatoid factor (H), Raynaud's disease with gangrene (H), Metatarsalgia, unspecified laterality, Metatarsalgia of both feet, Osteopenia, unspecified location       * oxyCODONE-acetaminophen 5-325 MG per tablet    PERCOCET    90 tablet    Take 1 tablet by mouth every 4 hours as needed for moderate to severe pain Max 3 tabs per day    Systemic sclerosis (H), Rheumatoid arthritis involving multiple sites with positive rheumatoid factor (H), Raynaud's disease with gangrene (H), Metatarsalgia, unspecified laterality, Metatarsalgia of both feet, Osteopenia, unspecified location       * oxyCODONE-acetaminophen 5-325 MG per tablet   Start taking on:  3/27/2018    PERCOCET    90 tablet    Take 1 tablet by mouth every 4 hours as needed for moderate to severe pain or pain maximum 3 tablet(s) per day    Systemic sclerosis (H), Rheumatoid arthritis involving multiple sites with positive rheumatoid factor (H), Raynaud's disease with gangrene (H), Metatarsalgia, unspecified laterality, Metatarsalgia of both feet, Osteopenia, unspecified location       * oxyCODONE-acetaminophen 5-325 MG per tablet   Start taking on:  4/24/2018    PERCOCET    90 tablet    Take 1 tablet by mouth every 4 hours as needed for moderate to severe pain or pain maximum 3 tablet(s) per day    Systemic sclerosis (H), Rheumatoid arthritis involving multiple sites with positive rheumatoid factor (H), Raynaud's disease with gangrene (H), Metatarsalgia, unspecified laterality, Metatarsalgia of both feet, Osteopenia, unspecified location       pravastatin 10 MG tablet    PRAVACHOL    30 tablet    Take 1 tablet (10 mg) by mouth daily    Rheumatoid arthritis involving multiple sites with positive rheumatoid factor (H)       pregabalin 75 MG capsule    LYRICA    180 capsule    Take 1 capsule (75 mg) by mouth 2 times daily    Systemic sclerosis  (H)       valACYclovir 500 MG tablet    VALTREX    180 tablet    Take 1 tablet (500 mg) by mouth 2 times daily    Herpes, Systemic sclerosis (H), Anxiety       Vitamin D (Cholecalciferol) 1000 UNITS Tabs      Take 4,000 Units by mouth daily        * Notice:  This list has 6 medication(s) that are the same as other medications prescribed for you. Read the directions carefully, and ask your doctor or other care provider to review them with you.

## 2018-02-27 NOTE — NURSING NOTE
"Chief Complaint   Patient presents with     RECHECK     Sclerderma       Initial /63 (BP Location: Right arm, Patient Position: Sitting, Cuff Size: Adult Regular)  Pulse 75  Temp 98.2  F (36.8  C) (Oral)  Ht 1.638 m (5' 4.5\")  Wt 57.2 kg (126 lb 3.2 oz)  SpO2 99%  BMI 21.33 kg/m2 Estimated body mass index is 21.33 kg/(m^2) as calculated from the following:    Height as of this encounter: 1.638 m (5' 4.5\").    Weight as of this encounter: 57.2 kg (126 lb 3.2 oz).  Medication Reconciliation: complete     Harry Muse MA    "

## 2018-02-27 NOTE — PROGRESS NOTES
Rheumatology Clinic Follow-up  [facility], Rheumatology        History of rheumatologic diagnosis:    Problem List:  1. 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   2. Associated marked neuropathic skin pain markedly improved with Lyrica with prior medication therapy with gabapentin.   3. Diffuse musculoskeletal pain requiring methotrexate plus CellCept, and narcotic to control, but also improved.   4. Initial clinical overlap with anti-CCP seropositive rheumatoid arthritis with continued anti-CCP seropositivity as of 09/2010.   5. Raynaud's phenomena with new active digital ulcers with easily cracked skin.   6. GI involvement consistent with bacterial small bowel overgrowth with marked improvement after a course of neomycin followed by Dr. Glez; positive hydrogen breath test in 3/2012 - treated wtih rifaximin through Dr. Jacob  7. No convincing evidence of lung involvement on serial pulmonary function tests or clinically.   8. History of heart palpitations prior to the onset of SSc with stable symptoms since.   9. History of medication failures with Remicade, methotrexate, methotrexate plus CellCept and with Orencia and with improvement since initiation of IV IG but with complicating headaches and overall sense of malaise with IV IG infusions.   10. History of silicone breast implants.   11. Incidental finding of calcified splenic artery aneurysm.  12. EGD in 3/2012 with hiatal hernia and gastral antral vascular ectasia        13. Recurrent right  third finger contracture with  extensor surface ulceration, healed        INTERVAL HISTORY, Since our last visit and her November hospitalization, she has had no more digital ulcers and decreased pain in her fingers.  She did  quit smoking at the time of that hospitalization and she has not resumed.  She still has pain in her fingers, but not nearly so severe.  Her biggest problem with her fingers remains the marked contractures.  She has difficulty using the paraffin bath because the paraffin is too hot, so we discussed simply unplugging it for a few minutes and letting it cool down a little and then before it gels using it to warm her hands and allow the stretches.       Since we have last seen her, she has seen her primary care.  I had requested that the lorazepam prescriptions be moved back over to primary care since I had simply assumed them when she had no primary care physician for her existing diagnosis of anxiety.  That has now been accomplished.      She also continues on oxycodone and we again today discussed attempting to taper that down.  She has decreased pain and hopefully can accomplish that, although she still does have multiple sources of musculoskeletal pain.  She has been off of methotrexate since the fall as she was having some gastrointestinal issues with it, but we did discuss that if tapering the narcotics becomes too problematic because of musculoskeletal pain with morning stiffness, resuming low-dose methotrexate might be possible and desirable.        She was started on a statin during the hospitalization and I also asked that that be taken over the primary care.  In fact, she brings her lipid results with her today and they are very good with her total cholesterol only 202, just barely above target, LDL only 133, just barely above target, HDL at 45 which is target, and triglycerides under target, so I am not sure that particularly now that she has the cigarette smoking removed as a risk factor and she has minimal family risk that she necessarily will need statins long-term.  On the other hand, they may have a vascular stabilizing effect, so I am certainly not opposed to her remaining on it and she does not have  side effect with it.       Her biggest issues recently have been gastrointestinal.  She has felt bloated, nauseated, and at times is having significant constipation.  She does not get diarrhea suggesting bacterial small bowel overgrowth.       She is having a lot of issues with her insurance and whether she can pay for her medical bills.  She therefore would like to see our .      She does get some dysphagia, but that is not changing and is not severe.       As noted, she has a number of sources of musculoskeletal pain including pain in her knees and hips and greater trochanteric area.  She did have what sounds like a Baker's cyst a few weeks ago, but that resolved quickly and she did not have any known antecedent injury that would explain that.     FEBRUARY 16, 2017, INTERVAL HISTORY:  Since I last saw the patient, she went down to Gastroenterology at the [facility] and saw Dr. Tatum there.  She was found to have recurrent versus ongoing esophageal candidiasis on upper endoscopy but had a normal colonoscopy.  A rectal balloon test, however, was abnormal, consistent with sphincter dysfunction potentially contributing to her chronic constipation.       She was also told she needed iron infusions.  This is somewhat curious in that her iron studies done at [facility] on 01/27 were only minimally abnormal and her hemoglobin and hematocrit were only minimally abnormal as well.  Apparently, her ferritin was very low, however.       We do not have any of those records to look at.  She says that she asked that they be sent here, but she was told they would be sent only to her primary care doctor.  I have looked in her [facility] notes on Care Everywhere, but I cannot see that they have been scanned in at this point.       She has some financial issues at this point.  Her labs and other tests are best paid for if they are done at [facility].  Fortunately, her primary care doctor at [facility] was adept enough with the system  that she was able to get the labs done at [facility] and have them come directly to my box.  I have reviewed all of them with her today and, as noted, she  has only had some very modest anemia.  She has no other evidence of any toxicity from the CellCept or really any other abnormal labs.       She has been doing well with her Raynaud's.  She has had no episodes of severe distal digital ischemia.  She is very careful about stopping attacks as soon as they get started with rewarming.  She has hand warmers with her at all times and a hand muff that she uses.       Unfortunately, she has restarted smoking again.  She is much more careful with it, she says, however, and is not smoking outdoors when in the cold the way she was previously when she developed a severe ulcer that would not heal.       Although she hated the Botox treatment that Dr. Beauchamp gave her, she has not had any further episodes since that time.       She does believe her lungs are stable.  She is not doing a lot of significant aerobic exercise, but she has not noticed any drop-off in her exercise tolerance with walking, etc.  She has not had PFTs done and I urged her to have them done at [facility] since it is so expensive for her to do them here.       She feels okay otherwise in general.  She continues to have some musculoskeletal pain but does not think it is really a lot worse.     INTERVAL HISTORY (05/18/2017):  Since we have last seen the patient, she developed a pretty bad sore throat.  This really sounds viral but she got a Z-GRIFFIN for it.  The soreness in her throat was posterior oropharyngeal extending only a little ways down her throat, but she was concerned that it could represent recurrent esophageal candidiasis since that was diagnosed on an endoscopy at [place].  She was then told by one of her physicians that she should have had a repeat EGD, despite the fact that the original report said repeat EGD needed to be done only if she had persistent or  ongoing symptoms.      The trouble with that is that her only symptoms initially were not pain but were dysphagia.  She has had some longstanding low-grade dysphagia.  She does not believe that has progressed in any way.  It certainly is not worse since she has been on the Z-GRIFFIN which she has now completed.      We have therefore discussed with her that certainly after a Z-GRIFFIN with 2 weeks of therapy with it that were this esophageal candidiasis it should not be better and it might well be worse.  I think given the overall situation that is very unlikely.      She had a lot of constipation and we gave her a bowel regimen for that and with that she has done quite well.      One of the things that is bothering her more is that by the end of the day, both the bottom of her feet and her fingers really burner.  This is not as bad first thing in the day.      What is better is that she has had only 1 digital ulcer, very low-grade in the fifth PIP currently, with no distal pulp ulcers since Dr. Beauchamp gave her the Botox injection.      She also has no evidence of any progressive cardiopulmonary disease and indeed her last pulmonary function tests were completely stable.  She does not do a lot of her aerobic exercise, but she can climb stairs without difficulties.      She has had a history of blood in the urine.  Because of her smoking history, she saw an urologist who did a cystoscopy that was negative.  She was then told she should see a renal doctor.      However, since January here she has had 2 years that has ever completely free of any evidence of blood.  Blood has not observed microscopic stable as far as I can tell and was positive only on the dip stick.  This did still occur in 2016.  Prior to that, she had been on methotrexate, which is known to potentially cause this.      The other thing is she is noticing and is quite concerned about is worsening oral aperture and more and more problems with her teeth  including retraction.  She continues to smoke.  She has significant keratoconjunctivitis sicca, although it is definitely improved with twice daily Evoxac.       She feels okay otherwise in general.  She continues to have some musculoskeletal pain but does not think it is really a lot worse.     AUGUST 17, 2017, INTERVAL HISTORY:  Since we have last seen the patient, she has been having a lot of stressors.  Specifically, her daughter had a baby in late July, then had a complication that kept her in the hospital for some time.  Her sister has also been in the hospital with what she reports has a brain hemorrhage.  She is 42 years old and this all apparently developed after an infected back injection for pain.  She has now been told that she has positive autoimmune antibodies and that she might have systemic lupus, apparently.       With all of this stress, she has again resumed smoking after having had cessation.  She has some intermittent ulcers over her right third and right fifth PIP joints.  She is, if anything, more contractured there.  She is not doing stretching because of the pain.  They are not open currently, however.  If she hits them, of course, they hurt more and open up.       She is also getting a lot of hip pain, particularly over the right hip at night.  Much of the pain she describes as consistent with trochanteric bursitis, but some may be consistent with true hip pain deeper in.  She feels a tendon rub at times over this area as well.       She has been off of methotrexate for a long time, as her skin had improved enough that we felt we could get her off of it and she had some gastrointestinal symptoms that were arguably worsened from it.  She continues to have a lot of GI symptoms currently with intermittent bloating and nausea.  She has persistently had some low-grade dysphagia as well.  None of that is all that severe right now, and pain is the bigger problem.       She has apparently had some  recent mix-up with the narcotic prescriptions as well.  I had signed a Percocet prescription last week, but that was apparently not the one that was due; it was rather the oxycodone, which I did sign just 2 days ago.  She has apparently not picked up the Percocet prescription and is wanting to make sure she gets it refilled now for 2 weeks from now rather 10 days from now when it is due.        She is having some skin irritation over her upper back with a lot of itching and burning pain.  She has excoriated that area.  She wants to make sure that it does not look like a skin cancer.       Other features of her disease remained relatively stable.  She is getting Raynaud's attacks despite the fact that it is not particularly cool right now.  This may be being worsened by her cigarette smoking, and we discussed that.     SEPTEMBER 21, 2017, INTERVAL HISTORY:  The patient started methotrexate back in mid August, but she felt so much worse on it with gastrointestinal symptoms that after she recently stopped it, she did not resume it.  She is still having some musculoskeletal pain, particularly in the right side of her ribs.  This is one of the reasons we started it.  She is not sure whether it helped at all, but she was only on it for a very short time.       She is also having some musculoskeletal pain in her neck and, of course, within her hands which have the marked contractures.       She still has some GERD going on, but that is better on the Nexium b.i.d.  She has to sleep sitting up, however, and she has been doing that for some time to avoid severe GERD.        She called us to tell us that she had a really bad experience with the most recent Percocet that she got.  She believes that this was because of a change in .  She returned the pills to the pharmacy and they gave her some from the  she had used previously, and she felt those were better so she has asked that on an ongoing basis that  those are the ones that she gets.       She is concerned about some areas in her fingers that feel like she might be developing new ulcerations, but she has had nothing open up there.  She does get some Raynaud's and is concerned about that worsening with the onset of colder weather.     INTERVAL HISTORY:  Since we have last seen her, she finally started having her back pain get better.  She thinks some of that was because she was getting strain there when she was walking her dog from the dog pulling on the leash.  She did have to give her dog away because she was having trouble taking care of it.      Her primary care doctor had actually done some plain films and was concerned about a possible lesion but a followup CT scan showed only shadowing.  A DEXA scan was done and she said that showed low bone density.  Although we have Care Everywhere, I cannot see the actual results of the DEXA scans so I do not know precisely what it said.  It was recommended to her she take calcium and vitamin D, but she is already taking 2000 international units a day of vitamin D and eating a diet that is almost entirely dairy.      She says that in part because she is having so much trouble eating.  She says the food feels like her enemy.  She says protein drinks make her sick and in general she feels better if she does not eat but she knows she needs to eat something so she will try to eat food.  She has already had an endoscopy at [place] and additional workup there and that was unrevealing.  She does, however, remain on around-the-clock, oxycodone and we started to discuss that.      Because her gut is so problematic she has not had any methotrexate since August or September and she felt like that made her sicker.      On the positive side, she is able to control her Raynaud's phenomena pretty well with rewarming.  She is on Aggrenox now and she thinks that has definitely helped her ulcers.  She is not however doing regular stretching  of any kind let alone her hands.      She is due for labs.  The last ones were done 3 months ago.  She did on her own in addition to stopping the methotrexate reduce her MMF to 3 tablets a day.  That is probably okay based on her most recent pulmonary function tests done last April which were very good.  She is not having any increased shortness of breath, decreased exercise tolerance, or dry cough.  On the other hand, she is doing virtually no exercise.  She does continue to smoke.     02/27/2018 Interval history:     Since we have last seen her, she was doing pretty well for a while this winter but unfortunately just in the last several weeks has developed several digital ulcers.  The first was on her right fifth finger over the PIP joint.  This probably was a calcinosis lesion because she has had those before and she was able to pick something out of it and then it healed nicely.  Based on her description that was likely calcinosis.      However, a more troublesome lesion developed shortly thereafter in the right fourth PIP extensor surface.  She is severely contractured there and previously had osteomyelitis at this location.  She has over the last 4-5 days again developed really painful and significant redness around that site consistent with cellulitis.      When she had osteomyelitis there several years ago we were able to successfully treat her with oral antibiotics under the guidance of Dr. Ramirez.  He had given her cephalexin 500 mg q.i.d. plus azithromycin 250 mg daily after trials of several other antibiotic combinations and she did finally heal with this.  We therefore discussed using that again today.      She fortunately has not had other digital ulcers or distal digital ulcers.  She remains on CellCept, though not on methotrexate.      Her breathing feels fine to her and she denies any dry or productive cough.  She has been ongoing laboratory monitoring.  It has been almost a year however since she  had PFTs.      She does have some headaches at night.  She does not tend to have these so much during the day.  They are not on an everyday basis and she cannot really link them to anything.      She continues to have a lot of gout symptoms.  In general, she feels sick to her stomach easily and has gut pain easily.  She is doing better with respect to constipation on a regimen I have provided her before with a combination of fiber, MiraLax and titration of magnesium to keep things moving.      She denies any other new symptoms or other new features of disease.      She does acknowledge, however, pretty significant depression.  She says this always worsens her in the winter.  She has previously had counseling, but cannot afford it because of a high co-pay.  She is not following with anyone on a regular basis for it other than when she discusses it with me.                         ROS  Gen: Denies weight loss or fevers or night sweats  HEENT:  No ocular inflammation..  Denies swollen glands  CV:  Denies chest pains  Resp:  denies chronic cough or dyspnea.  No pleurisy  Ext:  No swelling in extremities.    Skin:  No new rashes  Remainder of 10-point ROS as per Interval history or negative,     Past Medical History  1.  Diffuse systemic sclerosis  2.  CCP+ inflammatory arthritis  3.  Small bowel overgrowth syndrome - positive hydrogen test   4.  GERD  5.  Plantar fasciitis - seen by Dr. Jorgensen  6.  Chronic neuropathic pain - on lyrica and chronic percocet    Allergies   Allergies   Allergen Reactions     Penicillin [Esters] Other (See Comments)     Unable to move     Bactroban [Mupirocin Calcium]      Inability to move     Levaquin Other (See Comments)     Muscle weakness       Rifampin Other (See Comments)     Makes pain medication less effective     Medications  Current Outpatient Prescriptions   Medication     LORazepam (ATIVAN) 0.5 MG tablet     valACYclovir (VALTREX) 500 MG tablet     oxyCODONE (ROXICODONE) 5 MG  "immediate release tablet     oxyCODONE-acetaminophen (PERCOCET) 5-325 MG per tablet     pregabalin (LYRICA) 75 MG capsule     rifaximin (XIFAXAN) 550 MG TABS     mycophenolate (CELLCEPT-BRAND NAME) 500 MG tablet     omeprazole (PRILOSEC) 40 MG capsule     ondansetron (ZOFRAN) 4 MG tablet     Needle, Disp, 30G X 1/2\" MISC     diazepam (VALIUM) 5 MG tablet     fish oil-omega-3 fatty acids (FISH OIL) 1000 MG capsule     METHOTREXate 25 MG/ML injection     leucovorin (WELLCOVORIN) 5 MG tablet     Syringe/Needle, Disp, 27G X 1/2\" 1 ML MISC     Physical Exam  /63 (BP Location: Right arm, Patient Position: Sitting, Cuff Size: Adult Regular)  Pulse 75  Temp 98.2  F (36.8  C) (Oral)  Ht 1.638 m (5' 4.5\")  Wt 57.2 kg (126 lb 3.2 oz)  SpO2 99%  BMI 21.33 kg/m2  General: pleasant, but dysphoric  HEENT: EOMI, PERRL, no conjunctivitis or scleritis. Chronic facial skin thickenig without facial rash. Slight pursing of her lips. Slight restriction in opening of the jaw. Oropharynx exam reveals 4-5mm shallow ulceration on right lower lip. Moderate saliva pooling. No cervical adenopathy  CV: regular rhythm; no murmurs.    Resp: End-expiratory wheezing heard diffusely throughout  GI:  Soft, minimal  discomfort in the lower abdomen to deeper palpation; no masses or hepatomegaly  Ext:  No peripheral edema.   Skin:  Chronic sclerodactyly, most prominent in R hand (contracture at 50% finger flexion) vs. Left (missing final 20' extension).  Chronic skin thickening with evidence for improvement along forearms, upper arms, chest, abdomen, lower legs and feet.  Stable Calcinosis-like deposit noted on her right anterior thigh just above the knee joint and on right fith finger. . Salt/pepper appearance to areas of skin (christiano arms and upper chest).  No other rashes.   Closed  DU  over  the right 5th and  3rd  PIP joint: no otherDU.   Mild skin thinning/erythema along other PIPs. 2+ over fingers ; 1+ maximum over hands, and feet and " shins, and face, pregressing to atrophic status. Open cellulitic appearing ulcer over r 4th PIP extensor surface.    MSK:   Full joint exam without evidence for synovitis, but right hip pain in joint and over trochate. Chronic joint contractures of fingers (christiano right); minimally also along wrists and elbows.       RESULTS:  Component      Latest Ref Rng 1/17/2013           2:15 PM   WBC      4.0 - 11.0 10e9/L 4.6   RBC Count      3.8 - 5.2 10e12/L 3.71 (L)   Hemoglobin      11.7 - 15.7 g/dL 11.8   Hematocrit      35.0 - 47.0 % 37.4   MCV      78 - 100 fl 101 (H)   MCH      26.5 - 33.0 pg 31.8   MCHC      31.5 - 36.5 g/dL 31.6   RDW      10.0 - 15.0 % 13.9   Platelet Count      150 - 450 10e9/L 200     Component      Latest Ref Rng 1/17/2013   ALT      0 - 50 U/L 23   AST      0 - 45 U/L 30   Albumin      3.9 - 5.1 g/dL 4.2   CRP Inflammation      0.0 - 8.0 mg/L <5.0   Sed Rate      0 - 20 mm/h 18         ASSESSMENT: Per the Problem LIst    PLAN AND RECOMMENDATIONS:     I think the biggest concern today is probable cellulitis over the right fourth digital ulcer in the PIP.      We will accordingly get her started on cephalexin 500 mg q.i.d. and azithromycin 250 mg daily.  If this does not improve things she will let us know.        If the ulcer does not fully healed within the next 3 weeks, I would consider extending the antibiotic prescription because there is so little tissue underneath this ulcer, besides the bone, and I do not want her again to have osteomyelitis.      She should have repeat pulmonary function testing when we next see her because it will be a year in April and I am going to plan on seeing her around that time given the finger lesion.      We did discuss her depression.  I would really like to see her in counseling.  I also think she has a bad overlap between depression, anxiety and her pain, which she has multiple reasons for including her bad finger contractures and in this case now an ulcer.   This is not make her pain control easier, however.      We have gone ahead and refilled her narcotic pain prescriptions today.  She has gotten to the point where she can titrate her bowel regimen well enough that it does let her get so severely constipated, but my impression is still that a lot of her gut pain has to do with the combination of dysmotility and narcotics worsening that issue.      I will see her back in approximately 6 weeks, sooner p.r.n.         This visit was 40 minutes in duration, greater than 50% in counseling.

## 2018-02-27 NOTE — LETTER
2/27/2018      RE: Willard Grayson  [address]    [facility] [address]       Rheumatology Clinic Follow-up  [facility], Rheumatology        History of rheumatologic diagnosis:    Problem List:  1. 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   2. Associated marked neuropathic skin pain markedly improved with Lyrica with prior medication therapy with gabapentin.   3. Diffuse musculoskeletal pain requiring methotrexate plus CellCept, and narcotic to control, but also improved.   4. Initial clinical overlap with anti-CCP seropositive rheumatoid arthritis with continued anti-CCP seropositivity as of 09/2010.   5. Raynaud's phenomena with new active digital ulcers with easily cracked skin.   6. GI involvement consistent with bacterial small bowel overgrowth with marked improvement after a course of neomycin followed by Dr. Glez; positive hydrogen breath test in 3/2012 - treated wtih rifaximin through Dr. Jacob  7. No convincing evidence of lung involvement on serial pulmonary function tests or clinically.   8. History of heart palpitations prior to the onset of SSc with stable symptoms since.   9. History of medication failures with Remicade, methotrexate, methotrexate plus CellCept and with Orencia and with improvement since initiation of IV IG but with complicating headaches and overall sense of malaise with IV IG infusions.   10. History of silicone breast implants.   11. Incidental finding of calcified splenic artery aneurysm.  12. EGD in 3/2012 with hiatal hernia and gastral antral vascular ectasia        13. Recurrent right  third finger contracture with  extensor surface ulceration, healed        INTERVAL HISTORY, Since our last visit and her November  hospitalization, she has had no more digital ulcers and decreased pain in her fingers.  She did quit smoking at the time of that hospitalization and she has not resumed.  She still has pain in her fingers, but not nearly so severe.  Her biggest problem with her fingers remains the marked contractures.  She has difficulty using the paraffin bath because the paraffin is too hot, so we discussed simply unplugging it for a few minutes and letting it cool down a little and then before it gels using it to warm her hands and allow the stretches.       Since we have last seen her, she has seen her primary care.  I had requested that the lorazepam prescriptions be moved back over to primary care since I had simply assumed them when she had no primary care physician for her existing diagnosis of anxiety.  That has now been accomplished.      She also continues on oxycodone and we again today discussed attempting to taper that down.  She has decreased pain and hopefully can accomplish that, although she still does have multiple sources of musculoskeletal pain.  She has been off of methotrexate since the fall as she was having some gastrointestinal issues with it, but we did discuss that if tapering the narcotics becomes too problematic because of musculoskeletal pain with morning stiffness, resuming low-dose methotrexate might be possible and desirable.        She was started on a statin during the hospitalization and I also asked that that be taken over the primary care.  In fact, she brings her lipid results with her today and they are very good with her total cholesterol only 202, just barely above target, LDL only 133, just barely above target, HDL at 45 which is target, and triglycerides under target, so I am not sure that particularly now that she has the cigarette smoking removed as a risk factor and she has minimal family risk that she necessarily will need statins long-term.  On the other hand, they may have a  vascular stabilizing effect, so I am certainly not opposed to her remaining on it and she does not have side effect with it.       Her biggest issues recently have been gastrointestinal.  She has felt bloated, nauseated, and at times is having significant constipation.  She does not get diarrhea suggesting bacterial small bowel overgrowth.       She is having a lot of issues with her insurance and whether she can pay for her medical bills.  She therefore would like to see our .      She does get some dysphagia, but that is not changing and is not severe.       As noted, she has a number of sources of musculoskeletal pain including pain in her knees and hips and greater trochanteric area.  She did have what sounds like a Baker's cyst a few weeks ago, but that resolved quickly and she did not have any known antecedent injury that would explain that.     FEBRUARY 16, 2017, INTERVAL HISTORY:  Since I last saw the patient, she went down to Gastroenterology at the [facility] and saw Dr. Tatum there.  She was found to have recurrent versus ongoing esophageal candidiasis on upper endoscopy but had a normal colonoscopy.  A rectal balloon test, however, was abnormal, consistent with sphincter dysfunction potentially contributing to her chronic constipation.       She was also told she needed iron infusions.  This is somewhat curious in that her iron studies done at [facility] on 01/27 were only minimally abnormal and her hemoglobin and hematocrit were only minimally abnormal as well.  Apparently, her ferritin was very low, however.       We do not have any of those records to look at.  She says that she asked that they be sent here, but she was told they would be sent only to her primary care doctor.  I have looked in her [facility] notes on Care Everywhere, but I cannot see that they have been scanned in at this point.       She has some financial issues at this point.  Her labs and other tests are best paid for if  they are done at [facility].  Fortunately, her primary care doctor at [facility] was adept enough with the system that she was able to get the labs done at [facility] and have them come directly to my box.  I have reviewed all of them with her today and, as noted, she  has only had some very modest anemia.  She has no other evidence of any toxicity from the CellCept or really any other abnormal labs.       She has been doing well with her Raynaud's.  She has had no episodes of severe distal digital ischemia.  She is very careful about stopping attacks as soon as they get started with rewarming.  She has hand warmers with her at all times and a hand muff that she uses.       Unfortunately, she has restarted smoking again.  She is much more careful with it, she says, however, and is not smoking outdoors when in the cold the way she was previously when she developed a severe ulcer that would not heal.       Although she hated the Botox treatment that Dr. Beauchamp gave her, she has not had any further episodes since that time.       She does believe her lungs are stable.  She is not doing a lot of significant aerobic exercise, but she has not noticed any drop-off in her exercise tolerance with walking, etc.  She has not had PFTs done and I urged her to have them done at [facility] since it is so expensive for her to do them here.       She feels okay otherwise in general.  She continues to have some musculoskeletal pain but does not think it is really a lot worse.     INTERVAL HISTORY (05/18/2017):  Since we have last seen the patient, she developed a pretty bad sore throat.  This really sounds viral but she got a Z-GRIFFIN for it.  The soreness in her throat was posterior oropharyngeal extending only a little ways down her throat, but she was concerned that it could represent recurrent esophageal candidiasis since that was diagnosed on an endoscopy at [facility].  She was then told by one of her physicians that she should have had a repeat EGD,  despite the fact that the original report said repeat EGD needed to be done only if she had persistent or ongoing symptoms.      The trouble with that is that her only symptoms initially were not pain but were dysphagia.  She has had some longstanding low-grade dysphagia.  She does not believe that has progressed in any way.  It certainly is not worse since she has been on the Z-GRIFFIN which she has now completed.      We have therefore discussed with her that certainly after a Z-GRIFFIN with 2 weeks of therapy with it that were this esophageal candidiasis it should not be better and it might well be worse.  I think given the overall situation that is very unlikely.      She had a lot of constipation and we gave her a bowel regimen for that and with that she has done quite well.      One of the things that is bothering her more is that by the end of the day, both the bottom of her feet and her fingers really burner.  This is not as bad first thing in the day.      What is better is that she has had only 1 digital ulcer, very low-grade in the fifth PIP currently, with no distal pulp ulcers since Dr. Beauchamp gave her the Botox injection.      She also has no evidence of any progressive cardiopulmonary disease and indeed her last pulmonary function tests were completely stable.  She does not do a lot of her aerobic exercise, but she can climb stairs without difficulties.      She has had a history of blood in the urine.  Because of her smoking history, she saw an urologist who did a cystoscopy that was negative.  She was then told she should see a renal doctor.      However, since January here she has had 2 years that has ever completely free of any evidence of blood.  Blood has not observed microscopic stable as far as I can tell and was positive only on the dip stick.  This did still occur in 2016.  Prior to that, she had been on methotrexate, which is known to potentially cause this.      The other thing is she is  noticing and is quite concerned about is worsening oral aperture and more and more problems with her teeth including retraction.  She continues to smoke.  She has significant keratoconjunctivitis sicca, although it is definitely improved with twice daily Evoxac.       She feels okay otherwise in general.  She continues to have some musculoskeletal pain but does not think it is really a lot worse.     AUGUST 17, 2017, INTERVAL HISTORY:  Since we have last seen the patient, she has been having a lot of stressors.  Specifically, her daughter had a baby in late July, then had a complication that kept her in the hospital for some time.  Her sister has also been in the hospital with what she reports has a brain hemorrhage.  She is 42 years old and this all apparently developed after an infected back injection for pain.  She has now been told that she has positive autoimmune antibodies and that she might have systemic lupus, apparently.       With all of this stress, she has again resumed smoking after having had cessation.  She has some intermittent ulcers over her right third and right fifth PIP joints.  She is, if anything, more contractured there.  She is not doing stretching because of the pain.  They are not open currently, however.  If she hits them, of course, they hurt more and open up.       She is also getting a lot of hip pain, particularly over the right hip at night.  Much of the pain she describes as consistent with trochanteric bursitis, but some may be consistent with true hip pain deeper in.  She feels a tendon rub at times over this area as well.       She has been off of methotrexate for a long time, as her skin had improved enough that we felt we could get her off of it and she had some gastrointestinal symptoms that were arguably worsened from it.  She continues to have a lot of GI symptoms currently with intermittent bloating and nausea.  She has persistently had some low-grade dysphagia as well.   None of that is all that severe right now, and pain is the bigger problem.       She has apparently had some recent mix-up with the narcotic prescriptions as well.  I had signed a Percocet prescription last week, but that was apparently not the one that was due; it was rather the oxycodone, which I did sign just 2 days ago.  She has apparently not picked up the Percocet prescription and is wanting to make sure she gets it refilled now for 2 weeks from now rather 10 days from now when it is due.        She is having some skin irritation over her upper back with a lot of itching and burning pain.  She has excoriated that area.  She wants to make sure that it does not look like a skin cancer.       Other features of her disease remained relatively stable.  She is getting Raynaud's attacks despite the fact that it is not particularly cool right now.  This may be being worsened by her cigarette smoking, and we discussed that.     SEPTEMBER 21, 2017, INTERVAL HISTORY:  The patient started methotrexate back in mid August, but she felt so much worse on it with gastrointestinal symptoms that after she recently stopped it, she did not resume it.  She is still having some musculoskeletal pain, particularly in the right side of her ribs.  This is one of the reasons we started it.  She is not sure whether it helped at all, but she was only on it for a very short time.       She is also having some musculoskeletal pain in her neck and, of course, within her hands which have the marked contractures.       She still has some GERD going on, but that is better on the Nexium b.i.d.  She has to sleep sitting up, however, and she has been doing that for some time to avoid severe GERD.        She called us to tell us that she had a really bad experience with the most recent Percocet that she got.  She believes that this was because of a change in .  She returned the pills to the pharmacy and they gave her some from the   she had used previously, and she felt those were better so she has asked that on an ongoing basis that those are the ones that she gets.       She is concerned about some areas in her fingers that feel like she might be developing new ulcerations, but she has had nothing open up there.  She does get some Raynaud's and is concerned about that worsening with the onset of colder weather.     INTERVAL HISTORY:  Since we have last seen her, she finally started having her back pain get better.  She thinks some of that was because she was getting strain there when she was walking her dog from the dog pulling on the leash.  She did have to give her dog away because she was having trouble taking care of it.      Her primary care doctor had actually done some plain films and was concerned about a possible lesion but a followup CT scan showed only shadowing.  A DEXA scan was done and she said that showed low bone density.  Although we have Care Everywhere, I cannot see the actual results of the DEXA scans so I do not know precisely what it said.  It was recommended to her she take calcium and vitamin D, but she is already taking 2000 international units a day of vitamin D and eating a diet that is almost entirely dairy.      She says that in part because she is having so much trouble eating.  She says the food feels like her enemy.  She says protein drinks make her sick and in general she feels better if she does not eat but she knows she needs to eat something so she will try to eat food.  She has already had an endoscopy at [facility] and additional workup there and that was unrevealing.  She does, however, remain on around-the-clock, oxycodone and we started to discuss that.      Because her gut is so problematic she has not had any methotrexate since August or September and she felt like that made her sicker.      On the positive side, she is able to control her Raynaud's phenomena pretty well with rewarming.  She  is on Aggrenox now and she thinks that has definitely helped her ulcers.  She is not however doing regular stretching of any kind let alone her hands.      She is due for labs.  The last ones were done 3 months ago.  She did on her own in addition to stopping the methotrexate reduce her MMF to 3 tablets a day.  That is probably okay based on her most recent pulmonary function tests done last April which were very good.  She is not having any increased shortness of breath, decreased exercise tolerance, or dry cough.  On the other hand, she is doing virtually no exercise.  She does continue to smoke.     02/27/2018 Interval history:     Since we have last seen her, she was doing pretty well for a while this winter but unfortunately just in the last several weeks has developed several digital ulcers.  The first was on her right fifth finger over the PIP joint.  This probably was a calcinosis lesion because she has had those before and she was able to pick something out of it and then it healed nicely.  Based on her description that was likely calcinosis.      However, a more troublesome lesion developed shortly thereafter in the right fourth PIP extensor surface.  She is severely contractured there and previously had osteomyelitis at this location.  She has over the last 4-5 days again developed really painful and significant redness around that site consistent with cellulitis.      When she had osteomyelitis there several years ago we were able to successfully treat her with oral antibiotics under the guidance of Dr. Ramirez.  He had given her cephalexin 500 mg q.i.d. plus azithromycin 250 mg daily after trials of several other antibiotic combinations and she did finally heal with this.  We therefore discussed using that again today.      She fortunately has not had other digital ulcers or distal digital ulcers.  She remains on CellCept, though not on methotrexate.      Her breathing feels fine to her and she denies  any dry or productive cough.  She has been ongoing laboratory monitoring.  It has been almost a year however since she had PFTs.      She does have some headaches at night.  She does not tend to have these so much during the day.  They are not on an everyday basis and she cannot really link them to anything.      She continues to have a lot of gout symptoms.  In general, she feels sick to her stomach easily and has gut pain easily.  She is doing better with respect to constipation on a regimen I have provided her before with a combination of fiber, MiraLax and titration of magnesium to keep things moving.      She denies any other new symptoms or other new features of disease.      She does acknowledge, however, pretty significant depression.  She says this always worsens her in the winter.  She has previously had counseling, but cannot afford it because of a high co-pay.  She is not following with anyone on a regular basis for it other than when she discusses it with me.                         ROS  Gen: Denies weight loss or fevers or night sweats  HEENT:  No ocular inflammation..  Denies swollen glands  CV:  Denies chest pains  Resp:  denies chronic cough or dyspnea.  No pleurisy  Ext:  No swelling in extremities.    Skin:  No new rashes  Remainder of 10-point ROS as per Interval history or negative,     Past Medical History  1.  Diffuse systemic sclerosis  2.  CCP+ inflammatory arthritis  3.  Small bowel overgrowth syndrome - positive hydrogen test   4.  GERD  5.  Plantar fasciitis - seen by Dr. Jorgensen  6.  Chronic neuropathic pain - on lyrica and chronic percocet    Allergies   Allergies   Allergen Reactions     Penicillin [Esters] Other (See Comments)     Unable to move     Bactroban [Mupirocin Calcium]      Inability to move     Levaquin Other (See Comments)     Muscle weakness       Rifampin Other (See Comments)     Makes pain medication less effective     Medications  Current Outpatient Prescriptions  "  Medication     LORazepam (ATIVAN) 0.5 MG tablet     valACYclovir (VALTREX) 500 MG tablet     oxyCODONE (ROXICODONE) 5 MG immediate release tablet     oxyCODONE-acetaminophen (PERCOCET) 5-325 MG per tablet     pregabalin (LYRICA) 75 MG capsule     rifaximin (XIFAXAN) 550 MG TABS     mycophenolate (CELLCEPT-BRAND NAME) 500 MG tablet     omeprazole (PRILOSEC) 40 MG capsule     ondansetron (ZOFRAN) 4 MG tablet     Needle, Disp, 30G X 1/2\" MISC     diazepam (VALIUM) 5 MG tablet     fish oil-omega-3 fatty acids (FISH OIL) 1000 MG capsule     METHOTREXate 25 MG/ML injection     leucovorin (WELLCOVORIN) 5 MG tablet     Syringe/Needle, Disp, 27G X 1/2\" 1 ML MISC     Physical Exam  /63 (BP Location: Right arm, Patient Position: Sitting, Cuff Size: Adult Regular)  Pulse 75  Temp 98.2  F (36.8  C) (Oral)  Ht 1.638 m (5' 4.5\")  Wt 57.2 kg (126 lb 3.2 oz)  SpO2 99%  BMI 21.33 kg/m2  General: pleasant, but dysphoric  HEENT: EOMI, PERRL, no conjunctivitis or scleritis. Chronic facial skin thickenig without facial rash. Slight pursing of her lips. Slight restriction in opening of the jaw. Oropharynx exam reveals 4-5mm shallow ulceration on right lower lip. Moderate saliva pooling. No cervical adenopathy  CV: regular rhythm; no murmurs.    Resp: End-expiratory wheezing heard diffusely throughout  GI:  Soft, minimal  discomfort in the lower abdomen to deeper palpation; no masses or hepatomegaly  Ext:  No peripheral edema.   Skin:  Chronic sclerodactyly, most prominent in R hand (contracture at 50% finger flexion) vs. Left (missing final 20' extension).  Chronic skin thickening with evidence for improvement along forearms, upper arms, chest, abdomen, lower legs and feet.  Stable Calcinosis-like deposit noted on her right anterior thigh just above the knee joint and on right fith finger. . Salt/pepper appearance to areas of skin (christiano arms and upper chest).  No other rashes.   Closed  DU  over  the right 5th and  3rd  PIP " joint: no otherDU.   Mild skin thinning/erythema along other PIPs. 2+ over fingers ; 1+ maximum over hands, and feet and shins, and face, pregressing to atrophic status. Open cellulitic appearing ulcer over r 4th PIP extensor surface.    MSK:   Full joint exam without evidence for synovitis, but right hip pain in joint and over trochate. Chronic joint contractures of fingers (christiano right); minimally also along wrists and elbows.       RESULTS:  Component      Latest Ref Rng 1/17/2013           2:15 PM   WBC      4.0 - 11.0 10e9/L 4.6   RBC Count      3.8 - 5.2 10e12/L 3.71 (L)   Hemoglobin      11.7 - 15.7 g/dL 11.8   Hematocrit      35.0 - 47.0 % 37.4   MCV      78 - 100 fl 101 (H)   MCH      26.5 - 33.0 pg 31.8   MCHC      31.5 - 36.5 g/dL 31.6   RDW      10.0 - 15.0 % 13.9   Platelet Count      150 - 450 10e9/L 200     Component      Latest Ref Rng 1/17/2013   ALT      0 - 50 U/L 23   AST      0 - 45 U/L 30   Albumin      3.9 - 5.1 g/dL 4.2   CRP Inflammation      0.0 - 8.0 mg/L <5.0   Sed Rate      0 - 20 mm/h 18         ASSESSMENT: Per the Problem LIst    PLAN AND RECOMMENDATIONS:     I think the biggest concern today is probable cellulitis over the right fourth digital ulcer in the PIP.      We will accordingly get her started on cephalexin 500 mg q.i.d. and azithromycin 250 mg daily.  If this does not improve things she will let us know.        If the ulcer does not fully healed within the next 3 weeks, I would consider extending the antibiotic prescription because there is so little tissue underneath this ulcer, besides the bone, and I do not want her again to have osteomyelitis.      She should have repeat pulmonary function testing when we next see her because it will be a year in April and I am going to plan on seeing her around that time given the finger lesion.      We did discuss her depression.  I would really like to see her in counseling.  I also think she has a bad overlap between depression, anxiety  and her pain, which she has multiple reasons for including her bad finger contractures and in this case now an ulcer.  This is not make her pain control easier, however.      We have gone ahead and refilled her narcotic pain prescriptions today.  She has gotten to the point where she can titrate her bowel regimen well enough that it does let her get so severely constipated, but my impression is still that a lot of her gut pain has to do with the combination of dysmotility and narcotics worsening that issue.      I will see her back in approximately 6 weeks, sooner p.r.n.         This visit was 40 minutes in duration, greater than 50% in counseling.               Brennen Child MD       Neuro

## 2018-02-27 NOTE — LETTER
2/27/2018       RE: Willard Grayson  [address]    [facility] [id number]     Dear Colleague,    Thank you for referring your patient, Willard Grayson, to the [facility] RHEUMATOLOGY at [facility]. Please see a copy of my visit note below.         Rheumatology Clinic Follow-up  [facility] [place], Rheumatology        History of rheumatologic diagnosis:    Problem List:  1. 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   2. Associated marked neuropathic skin pain markedly improved with Lyrica with prior medication therapy with gabapentin.   3. Diffuse musculoskeletal pain requiring methotrexate plus CellCept, and narcotic to control, but also improved.   4. Initial clinical overlap with anti-CCP seropositive rheumatoid arthritis with continued anti-CCP seropositivity as of 09/2010.   5. Raynaud's phenomena with new active digital ulcers with easily cracked skin.   6. GI involvement consistent with bacterial small bowel overgrowth with marked improvement after a course of neomycin followed by Dr. Glez; positive hydrogen breath test in 3/2012 - treated wtih rifaximin through Dr. Jacob  7. No convincing evidence of lung involvement on serial pulmonary function tests or clinically.   8. History of heart palpitations prior to the onset of SSc with stable symptoms since.   9. History of medication failures with Remicade, methotrexate, methotrexate plus CellCept and with Orencia and with improvement since initiation of IV IG but with complicating headaches and overall sense of malaise with IV IG infusions.   10. History of silicone breast implants.   11. Incidental finding of calcified splenic artery aneurysm.  12. EGD in 3/2012 with  hiatal hernia and gastral antral vascular ectasia        13. Recurrent right  third finger contracture with  extensor surface ulceration, healed        INTERVAL HISTORY, Since our last visit and her November hospitalization, she has had no more digital ulcers and decreased pain in her fingers.  She did quit smoking at the time of that hospitalization and she has not resumed.  She still has pain in her fingers, but not nearly so severe.  Her biggest problem with her fingers remains the marked contractures.  She has difficulty using the paraffin bath because the paraffin is too hot, so we discussed simply unplugging it for a few minutes and letting it cool down a little and then before it gels using it to warm her hands and allow the stretches.       Since we have last seen her, she has seen her primary care.  I had requested that the lorazepam prescriptions be moved back over to primary care since I had simply assumed them when she had no primary care physician for her existing diagnosis of anxiety.  That has now been accomplished.      She also continues on oxycodone and we again today discussed attempting to taper that down.  She has decreased pain and hopefully can accomplish that, although she still does have multiple sources of musculoskeletal pain.  She has been off of methotrexate since the fall as she was having some gastrointestinal issues with it, but we did discuss that if tapering the narcotics becomes too problematic because of musculoskeletal pain with morning stiffness, resuming low-dose methotrexate might be possible and desirable.        She was started on a statin during the hospitalization and I also asked that that be taken over the primary care.  In fact, she brings her lipid results with her today and they are very good with her total cholesterol only 202, just barely above target, LDL only 133, just barely above target, HDL at 45 which is target, and triglycerides under target, so I am not sure  that particularly now that she has the cigarette smoking removed as a risk factor and she has minimal family risk that she necessarily will need statins long-term.  On the other hand, they may have a vascular stabilizing effect, so I am certainly not opposed to her remaining on it and she does not have side effect with it.       Her biggest issues recently have been gastrointestinal.  She has felt bloated, nauseated, and at times is having significant constipation.  She does not get diarrhea suggesting bacterial small bowel overgrowth.       She is having a lot of issues with her insurance and whether she can pay for her medical bills.  She therefore would like to see our .      She does get some dysphagia, but that is not changing and is not severe.       As noted, she has a number of sources of musculoskeletal pain including pain in her knees and hips and greater trochanteric area.  She did have what sounds like a Baker's cyst a few weeks ago, but that resolved quickly and she did not have any known antecedent injury that would explain that.     FEBRUARY 16, 2017, INTERVAL HISTORY:  Since I last saw the patient, she went down to Gastroenterology at the [facility] and saw Dr. Tatum there.  She was found to have recurrent versus ongoing esophageal candidiasis on upper endoscopy but had a normal colonoscopy.  A rectal balloon test, however, was abnormal, consistent with sphincter dysfunction potentially contributing to her chronic constipation.       She was also told she needed iron infusions.  This is somewhat curious in that her iron studies done at [facility] on 01/27 were only minimally abnormal and her hemoglobin and hematocrit were only minimally abnormal as well.  Apparently, her ferritin was very low, however.       We do not have any of those records to look at.  She says that she asked that they be sent here, but she was told they would be sent only to her primary care doctor.  I have looked in  her [facility] notes on Care Everywhere, but I cannot see that they have been scanned in at this point.       She has some financial issues at this point.  Her labs and other tests are best paid for if they are done at [facility].  Fortunately, her primary care doctor at [facility] was adept enough with the system that she was able to get the labs done at [facility] and have them come directly to my box.  I have reviewed all of them with her today and, as noted, she  has only had some very modest anemia.  She has no other evidence of any toxicity from the CellCept or really any other abnormal labs.       She has been doing well with her Raynaud's.  She has had no episodes of severe distal digital ischemia.  She is very careful about stopping attacks as soon as they get started with rewarming.  She has hand warmers with her at all times and a hand muff that she uses.       Unfortunately, she has restarted smoking again.  She is much more careful with it, she says, however, and is not smoking outdoors when in the cold the way she was previously when she developed a severe ulcer that would not heal.       Although she hated the Botox treatment that Dr. Beauchamp gave her, she has not had any further episodes since that time.       She does believe her lungs are stable.  She is not doing a lot of significant aerobic exercise, but she has not noticed any drop-off in her exercise tolerance with walking, etc.  She has not had PFTs done and I urged her to have them done at [facility] since it is so expensive for her to do them here.       She feels okay otherwise in general.  She continues to have some musculoskeletal pain but does not think it is really a lot worse.     INTERVAL HISTORY (05/18/2017):  Since we have last seen the patient, she developed a pretty bad sore throat.  This really sounds viral but she got a Z-GRIFFIN for it.  The soreness in her throat was posterior oropharyngeal extending only a little ways down her throat, but she was  concerned that it could represent recurrent esophageal candidiasis since that was diagnosed on an endoscopy at [place].  She was then told by one of her physicians that she should have had a repeat EGD, despite the fact that the original report said repeat EGD needed to be done only if she had persistent or ongoing symptoms.      The trouble with that is that her only symptoms initially were not pain but were dysphagia.  She has had some longstanding low-grade dysphagia.  She does not believe that has progressed in any way.  It certainly is not worse since she has been on the Z-GRIFFIN which she has now completed.      We have therefore discussed with her that certainly after a Z-GRIFFIN with 2 weeks of therapy with it that were this esophageal candidiasis it should not be better and it might well be worse.  I think given the overall situation that is very unlikely.      She had a lot of constipation and we gave her a bowel regimen for that and with that she has done quite well.      One of the things that is bothering her more is that by the end of the day, both the bottom of her feet and her fingers really burner.  This is not as bad first thing in the day.      What is better is that she has had only 1 digital ulcer, very low-grade in the fifth PIP currently, with no distal pulp ulcers since Dr. Beauchamp gave her the Botox injection.      She also has no evidence of any progressive cardiopulmonary disease and indeed her last pulmonary function tests were completely stable.  She does not do a lot of her aerobic exercise, but she can climb stairs without difficulties.      She has had a history of blood in the urine.  Because of her smoking history, she saw an urologist who did a cystoscopy that was negative.  She was then told she should see a renal doctor.      However, since January here she has had 2 years that has ever completely free of any evidence of blood.  Blood has not observed microscopic stable as far as I can  tell and was positive only on the dip stick.  This did still occur in 2016.  Prior to that, she had been on methotrexate, which is known to potentially cause this.      The other thing is she is noticing and is quite concerned about is worsening oral aperture and more and more problems with her teeth including retraction.  She continues to smoke.  She has significant keratoconjunctivitis sicca, although it is definitely improved with twice daily Evoxac.       She feels okay otherwise in general.  She continues to have some musculoskeletal pain but does not think it is really a lot worse.     AUGUST 17, 2017, INTERVAL HISTORY:  Since we have last seen the patient, she has been having a lot of stressors.  Specifically, her daughter had a baby in late July, then had a complication that kept her in the hospital for some time.  Her sister has also been in the hospital with what she reports has a brain hemorrhage.  She is 42 years old and this all apparently developed after an infected back injection for pain.  She has now been told that she has positive autoimmune antibodies and that she might have systemic lupus, apparently.       With all of this stress, she has again resumed smoking after having had cessation.  She has some intermittent ulcers over her right third and right fifth PIP joints.  She is, if anything, more contractured there.  She is not doing stretching because of the pain.  They are not open currently, however.  If she hits them, of course, they hurt more and open up.       She is also getting a lot of hip pain, particularly over the right hip at night.  Much of the pain she describes as consistent with trochanteric bursitis, but some may be consistent with true hip pain deeper in.  She feels a tendon rub at times over this area as well.       She has been off of methotrexate for a long time, as her skin had improved enough that we felt we could get her off of it and she had some gastrointestinal  symptoms that were arguably worsened from it.  She continues to have a lot of GI symptoms currently with intermittent bloating and nausea.  She has persistently had some low-grade dysphagia as well.  None of that is all that severe right now, and pain is the bigger problem.       She has apparently had some recent mix-up with the narcotic prescriptions as well.  I had signed a Percocet prescription last week, but that was apparently not the one that was due; it was rather the oxycodone, which I did sign just 2 days ago.  She has apparently not picked up the Percocet prescription and is wanting to make sure she gets it refilled now for 2 weeks from now rather 10 days from now when it is due.        She is having some skin irritation over her upper back with a lot of itching and burning pain.  She has excoriated that area.  She wants to make sure that it does not look like a skin cancer.       Other features of her disease remained relatively stable.  She is getting Raynaud's attacks despite the fact that it is not particularly cool right now.  This may be being worsened by her cigarette smoking, and we discussed that.     SEPTEMBER 21, 2017, INTERVAL HISTORY:  The patient started methotrexate back in mid August, but she felt so much worse on it with gastrointestinal symptoms that after she recently stopped it, she did not resume it.  She is still having some musculoskeletal pain, particularly in the right side of her ribs.  This is one of the reasons we started it.  She is not sure whether it helped at all, but she was only on it for a very short time.       She is also having some musculoskeletal pain in her neck and, of course, within her hands which have the marked contractures.       She still has some GERD going on, but that is better on the Nexium b.i.d.  She has to sleep sitting up, however, and she has been doing that for some time to avoid severe GERD.        She called us to tell us that she had a really  bad experience with the most recent Percocet that she got.  She believes that this was because of a change in .  She returned the pills to the pharmacy and they gave her some from the  she had used previously, and she felt those were better so she has asked that on an ongoing basis that those are the ones that she gets.       She is concerned about some areas in her fingers that feel like she might be developing new ulcerations, but she has had nothing open up there.  She does get some Raynaud's and is concerned about that worsening with the onset of colder weather.     INTERVAL HISTORY:  Since we have last seen her, she finally started having her back pain get better.  She thinks some of that was because she was getting strain there when she was walking her dog from the dog pulling on the leash.  She did have to give her dog away because she was having trouble taking care of it.      Her primary care doctor had actually done some plain films and was concerned about a possible lesion but a followup CT scan showed only shadowing.  A DEXA scan was done and she said that showed low bone density.  Although we have Care Everywhere, I cannot see the actual results of the DEXA scans so I do not know precisely what it said.  It was recommended to her she take calcium and vitamin D, but she is already taking 2000 international units a day of vitamin D and eating a diet that is almost entirely dairy.      She says that in part because she is having so much trouble eating.  She says the food feels like her enemy.  She says protein drinks make her sick and in general she feels better if she does not eat but she knows she needs to eat something so she will try to eat food.  She has already had an endoscopy at [place] and additional workup there and that was unrevealing.  She does, however, remain on around-the-clock, oxycodone and we started to discuss that.      Because her gut is so problematic she has not  had any methotrexate since August or September and she felt like that made her sicker.      On the positive side, she is able to control her Raynaud's phenomena pretty well with rewarming.  She is on Aggrenox now and she thinks that has definitely helped her ulcers.  She is not however doing regular stretching of any kind let alone her hands.      She is due for labs.  The last ones were done 3 months ago.  She did on her own in addition to stopping the methotrexate reduce her MMF to 3 tablets a day.  That is probably okay based on her most recent pulmonary function tests done last April which were very good.  She is not having any increased shortness of breath, decreased exercise tolerance, or dry cough.  On the other hand, she is doing virtually no exercise.  She does continue to smoke.     02/27/2018 Interval history:     Since we have last seen her, she was doing pretty well for a while this winter but unfortunately just in the last several weeks has developed several digital ulcers.  The first was on her right fifth finger over the PIP joint.  This probably was a calcinosis lesion because she has had those before and she was able to pick something out of it and then it healed nicely.  Based on her description that was likely calcinosis.      However, a more troublesome lesion developed shortly thereafter in the right fourth PIP extensor surface.  She is severely contractured there and previously had osteomyelitis at this location.  She has over the last 4-5 days again developed really painful and significant redness around that site consistent with cellulitis.      When she had osteomyelitis there several years ago we were able to successfully treat her with oral antibiotics under the guidance of Dr. Ramirez.  He had given her cephalexin 500 mg q.i.d. plus azithromycin 250 mg daily after trials of several other antibiotic combinations and she did finally heal with this.  We therefore discussed using that again  today.      She fortunately has not had other digital ulcers or distal digital ulcers.  She remains on CellCept, though not on methotrexate.      Her breathing feels fine to her and she denies any dry or productive cough.  She has been ongoing laboratory monitoring.  It has been almost a year however since she had PFTs.      She does have some headaches at night.  She does not tend to have these so much during the day.  They are not on an everyday basis and she cannot really link them to anything.      She continues to have a lot of gout symptoms.  In general, she feels sick to her stomach easily and has gut pain easily.  She is doing better with respect to constipation on a regimen I have provided her before with a combination of fiber, MiraLax and titration of magnesium to keep things moving.      She denies any other new symptoms or other new features of disease.      She does acknowledge, however, pretty significant depression.  She says this always worsens her in the winter.  She has previously had counseling, but cannot afford it because of a high co-pay.  She is not following with anyone on a regular basis for it other than when she discusses it with me.                         ROS  Gen: Denies weight loss or fevers or night sweats  HEENT:  No ocular inflammation..  Denies swollen glands  CV:  Denies chest pains  Resp:  denies chronic cough or dyspnea.  No pleurisy  Ext:  No swelling in extremities.    Skin:  No new rashes  Remainder of 10-point ROS as per Interval history or negative,     Past Medical History  1.  Diffuse systemic sclerosis  2.  CCP+ inflammatory arthritis  3.  Small bowel overgrowth syndrome - positive hydrogen test   4.  GERD  5.  Plantar fasciitis - seen by Dr. Jorgensen  6.  Chronic neuropathic pain - on lyrica and chronic percocet    Allergies   Allergies   Allergen Reactions     Penicillin [Esters] Other (See Comments)     Unable to move     Bactroban [Mupirocin Calcium]      Inability  "to move     Levaquin Other (See Comments)     Muscle weakness       Rifampin Other (See Comments)     Makes pain medication less effective     Medications  Current Outpatient Prescriptions   Medication     LORazepam (ATIVAN) 0.5 MG tablet     valACYclovir (VALTREX) 500 MG tablet     oxyCODONE (ROXICODONE) 5 MG immediate release tablet     oxyCODONE-acetaminophen (PERCOCET) 5-325 MG per tablet     pregabalin (LYRICA) 75 MG capsule     rifaximin (XIFAXAN) 550 MG TABS     mycophenolate (CELLCEPT-BRAND NAME) 500 MG tablet     omeprazole (PRILOSEC) 40 MG capsule     ondansetron (ZOFRAN) 4 MG tablet     Needle, Disp, 30G X 1/2\" MISC     diazepam (VALIUM) 5 MG tablet     fish oil-omega-3 fatty acids (FISH OIL) 1000 MG capsule     METHOTREXate 25 MG/ML injection     leucovorin (WELLCOVORIN) 5 MG tablet     Syringe/Needle, Disp, 27G X 1/2\" 1 ML MISC     Physical Exam  /63 (BP Location: Right arm, Patient Position: Sitting, Cuff Size: Adult Regular)  Pulse 75  Temp 98.2  F (36.8  C) (Oral)  Ht 1.638 m (5' 4.5\")  Wt 57.2 kg (126 lb 3.2 oz)  SpO2 99%  BMI 21.33 kg/m2  General: pleasant, but dysphoric  HEENT: EOMI, PERRL, no conjunctivitis or scleritis. Chronic facial skin thickenig without facial rash. Slight pursing of her lips. Slight restriction in opening of the jaw. Oropharynx exam reveals 4-5mm shallow ulceration on right lower lip. Moderate saliva pooling. No cervical adenopathy  CV: regular rhythm; no murmurs.    Resp: End-expiratory wheezing heard diffusely throughout  GI:  Soft, minimal  discomfort in the lower abdomen to deeper palpation; no masses or hepatomegaly  Ext:  No peripheral edema.   Skin:  Chronic sclerodactyly, most prominent in R hand (contracture at 50% finger flexion) vs. Left (missing final 20' extension).  Chronic skin thickening with evidence for improvement along forearms, upper arms, chest, abdomen, lower legs and feet.  Stable Calcinosis-like deposit noted on her right anterior thigh " just above the knee joint and on right fith finger. . Salt/pepper appearance to areas of skin (christiano arms and upper chest).  No other rashes.   Closed  DU  over  the right 5th and  3rd  PIP joint: no otherDU.   Mild skin thinning/erythema along other PIPs. 2+ over fingers ; 1+ maximum over hands, and feet and shins, and face, pregressing to atrophic status. Open cellulitic appearing ulcer over r 4th PIP extensor surface.    MSK:   Full joint exam without evidence for synovitis, but right hip pain in joint and over trochate. Chronic joint contractures of fingers (christiano right); minimally also along wrists and elbows.       RESULTS:  Component      Latest Ref Rng 1/17/2013           2:15 PM   WBC      4.0 - 11.0 10e9/L 4.6   RBC Count      3.8 - 5.2 10e12/L 3.71 (L)   Hemoglobin      11.7 - 15.7 g/dL 11.8   Hematocrit      35.0 - 47.0 % 37.4   MCV      78 - 100 fl 101 (H)   MCH      26.5 - 33.0 pg 31.8   MCHC      31.5 - 36.5 g/dL 31.6   RDW      10.0 - 15.0 % 13.9   Platelet Count      150 - 450 10e9/L 200     Component      Latest Ref Rng 1/17/2013   ALT      0 - 50 U/L 23   AST      0 - 45 U/L 30   Albumin      3.9 - 5.1 g/dL 4.2   CRP Inflammation      0.0 - 8.0 mg/L <5.0   Sed Rate      0 - 20 mm/h 18         ASSESSMENT: Per the Problem LIst    PLAN AND RECOMMENDATIONS:     I think the biggest concern today is probable cellulitis over the right fourth digital ulcer in the PIP.      We will accordingly get her started on cephalexin 500 mg q.i.d. and azithromycin 250 mg daily.  If this does not improve things she will let us know.        If the ulcer does not fully healed within the next 3 weeks, I would consider extending the antibiotic prescription because there is so little tissue underneath this ulcer, besides the bone, and I do not want her again to have osteomyelitis.      She should have repeat pulmonary function testing when we next see her because it will be a year in April and I am going to plan on seeing her  around that time given the finger lesion.      We did discuss her depression.  I would really like to see her in counseling.  I also think she has a bad overlap between depression, anxiety and her pain, which she has multiple reasons for including her bad finger contractures and in this case now an ulcer.  This is not make her pain control easier, however.      We have gone ahead and refilled her narcotic pain prescriptions today.  She has gotten to the point where she can titrate her bowel regimen well enough that it does let her get so severely constipated, but my impression is still that a lot of her gut pain has to do with the combination of dysmotility and narcotics worsening that issue.      I will see her back in approximately 6 weeks, sooner p.r.n.     This visit was 40 minutes in duration, greater than 50% in counseling.    Sincerely,    Brennen Child MD

## 2018-02-27 NOTE — LETTER
2/27/2018       RE: Willard Grayson  [address]    [facility] [id number]     Dear Colleague,    Thank you for referring your patient, Willard Grayson, to the [facility]Y at [facility] acute [facility]. Please see a copy of my visit note below.    Rheumatology Clinic Follow-up  [facility], Rheumatology        History of rheumatologic diagnosis:    Problem List:  1. 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   2. Associated marked neuropathic skin pain markedly improved with Lyrica with prior medication therapy with gabapentin.   3. Diffuse musculoskeletal pain requiring methotrexate plus CellCept, and narcotic to control, but also improved.   4. Initial clinical overlap with anti-CCP seropositive rheumatoid arthritis with continued anti-CCP seropositivity as of 09/2010.   5. Raynaud's phenomena with new active digital ulcers with easily cracked skin.   6. GI involvement consistent with bacterial small bowel overgrowth with marked improvement after a course of neomycin followed by Dr. Glez; positive hydrogen breath test in 3/2012 - treated wtih rifaximin through Dr. Jacob  7. No convincing evidence of lung involvement on serial pulmonary function tests or clinically.   8. History of heart palpitations prior to the onset of SSc with stable symptoms since.   9. History of medication failures with Remicade, methotrexate, methotrexate plus CellCept and with Orencia and with improvement since initiation of IV IG but with complicating headaches and overall sense of malaise with IV IG infusions.   10. History of silicone breast implants.   11. Incidental finding of calcified splenic artery aneurysm.  12. EGD in 3/2012 with hiatal  hernia and gastral antral vascular ectasia        13. Recurrent right  third finger contracture with  extensor surface ulceration, healed        INTERVAL HISTORY, Since our last visit and her November hospitalization, she has had no more digital ulcers and decreased pain in her fingers.  She did quit smoking at the time of that hospitalization and she has not resumed.  She still has pain in her fingers, but not nearly so severe.  Her biggest problem with her fingers remains the marked contractures.  She has difficulty using the paraffin bath because the paraffin is too hot, so we discussed simply unplugging it for a few minutes and letting it cool down a little and then before it gels using it to warm her hands and allow the stretches.       Since we have last seen her, she has seen her primary care.  I had requested that the lorazepam prescriptions be moved back over to primary care since I had simply assumed them when she had no primary care physician for her existing diagnosis of anxiety.  That has now been accomplished.      She also continues on oxycodone and we again today discussed attempting to taper that down.  She has decreased pain and hopefully can accomplish that, although she still does have multiple sources of musculoskeletal pain.  She has been off of methotrexate since the fall as she was having some gastrointestinal issues with it, but we did discuss that if tapering the narcotics becomes too problematic because of musculoskeletal pain with morning stiffness, resuming low-dose methotrexate might be possible and desirable.        She was started on a statin during the hospitalization and I also asked that that be taken over the primary care.  In fact, she brings her lipid results with her today and they are very good with her total cholesterol only 202, just barely above target, LDL only 133, just barely above target, HDL at 45 which is target, and triglycerides under target, so I am not sure that  particularly now that she has the cigarette smoking removed as a risk factor and she has minimal family risk that she necessarily will need statins long-term.  On the other hand, they may have a vascular stabilizing effect, so I am certainly not opposed to her remaining on it and she does not have side effect with it.       Her biggest issues recently have been gastrointestinal.  She has felt bloated, nauseated, and at times is having significant constipation.  She does not get diarrhea suggesting bacterial small bowel overgrowth.       She is having a lot of issues with her insurance and whether she can pay for her medical bills.  She therefore would like to see our .      She does get some dysphagia, but that is not changing and is not severe.       As noted, she has a number of sources of musculoskeletal pain including pain in her knees and hips and greater trochanteric area.  She did have what sounds like a Baker's cyst a few weeks ago, but that resolved quickly and she did not have any known antecedent injury that would explain that.     FEBRUARY 16, 2017, INTERVAL HISTORY:  Since I last saw the patient, she went down to Gastroenterology at the [facility] and saw Dr. Tatum there.  She was found to have recurrent versus ongoing esophageal candidiasis on upper endoscopy but had a normal colonoscopy.  A rectal balloon test, however, was abnormal, consistent with sphincter dysfunction potentially contributing to her chronic constipation.       She was also told she needed iron infusions.  This is somewhat curious in that her iron studies done at [facility] on 01/27 were only minimally abnormal and her hemoglobin and hematocrit were only minimally abnormal as well.  Apparently, her ferritin was very low, however.       We do not have any of those records to look at.  She says that she asked that they be sent here, but she was told they would be sent only to her primary care doctor.  I have looked in her  [facility] notes on Care Everywhere, but I cannot see that they have been scanned in at this point.       She has some financial issues at this point.  Her labs and other tests are best paid for if they are done at [facility].  Fortunately, her primary care doctor at [facility] was adept enough with the system that she was able to get the labs done at [facility] and have them come directly to my box.  I have reviewed all of them with her today and, as noted, she  has only had some very modest anemia.  She has no other evidence of any toxicity from the CellCept or really any other abnormal labs.       She has been doing well with her Raynaud's.  She has had no episodes of severe distal digital ischemia.  She is very careful about stopping attacks as soon as they get started with rewarming.  She has hand warmers with her at all times and a hand muff that she uses.       Unfortunately, she has restarted smoking again.  She is much more careful with it, she says, however, and is not smoking outdoors when in the cold the way she was previously when she developed a severe ulcer that would not heal.       Although she hated the Botox treatment that Dr. Beauchamp gave her, she has not had any further episodes since that time.       She does believe her lungs are stable.  She is not doing a lot of significant aerobic exercise, but she has not noticed any drop-off in her exercise tolerance with walking, etc.  She has not had PFTs done and I urged her to have them done at [facility] since it is so expensive for her to do them here.       She feels okay otherwise in general.  She continues to have some musculoskeletal pain but does not think it is really a lot worse.     INTERVAL HISTORY (05/18/2017):  Since we have last seen the patient, she developed a pretty bad sore throat.  This really sounds viral but she got a Z-GRIFFIN for it.  The soreness in her throat was posterior oropharyngeal extending only a little ways down her throat, but she was  concerned that it could represent recurrent esophageal candidiasis since that was diagnosed on an endoscopy at [facility].  She was then told by one of her physicians that she should have had a repeat EGD, despite the fact that the original report said repeat EGD needed to be done only if she had persistent or ongoing symptoms.      The trouble with that is that her only symptoms initially were not pain but were dysphagia.  She has had some longstanding low-grade dysphagia.  She does not believe that has progressed in any way.  It certainly is not worse since she has been on the Z-GRIFFIN which she has now completed.      We have therefore discussed with her that certainly after a Z-GRIFFIN with 2 weeks of therapy with it that were this esophageal candidiasis it should not be better and it might well be worse.  I think given the overall situation that is very unlikely.      She had a lot of constipation and we gave her a bowel regimen for that and with that she has done quite well.      One of the things that is bothering her more is that by the end of the day, both the bottom of her feet and her fingers really burner.  This is not as bad first thing in the day.      What is better is that she has had only 1 digital ulcer, very low-grade in the fifth PIP currently, with no distal pulp ulcers since Dr. Beauchamp gave her the Botox injection.      She also has no evidence of any progressive cardiopulmonary disease and indeed her last pulmonary function tests were completely stable.  She does not do a lot of her aerobic exercise, but she can climb stairs without difficulties.      She has had a history of blood in the urine.  Because of her smoking history, she saw an urologist who did a cystoscopy that was negative.  She was then told she should see a renal doctor.      However, since January here she has had 2 years that has ever completely free of any evidence of blood.  Blood has not observed microscopic stable as far as I can  tell and was positive only on the dip stick.  This did still occur in 2016.  Prior to that, she had been on methotrexate, which is known to potentially cause this.      The other thing is she is noticing and is quite concerned about is worsening oral aperture and more and more problems with her teeth including retraction.  She continues to smoke.  She has significant keratoconjunctivitis sicca, although it is definitely improved with twice daily Evoxac.       She feels okay otherwise in general.  She continues to have some musculoskeletal pain but does not think it is really a lot worse.     AUGUST 17, 2017, INTERVAL HISTORY:  Since we have last seen the patient, she has been having a lot of stressors.  Specifically, her daughter had a baby in late July, then had a complication that kept her in the hospital for some time.  Her sister has also been in the hospital with what she reports has a brain hemorrhage.  She is 42 years old and this all apparently developed after an infected back injection for pain.  She has now been told that she has positive autoimmune antibodies and that she might have systemic lupus, apparently.       With all of this stress, she has again resumed smoking after having had cessation.  She has some intermittent ulcers over her right third and right fifth PIP joints.  She is, if anything, more contractured there.  She is not doing stretching because of the pain.  They are not open currently, however.  If she hits them, of course, they hurt more and open up.       She is also getting a lot of hip pain, particularly over the right hip at night.  Much of the pain she describes as consistent with trochanteric bursitis, but some may be consistent with true hip pain deeper in.  She feels a tendon rub at times over this area as well.       She has been off of methotrexate for a long time, as her skin had improved enough that we felt we could get her off of it and she had some gastrointestinal  symptoms that were arguably worsened from it.  She continues to have a lot of GI symptoms currently with intermittent bloating and nausea.  She has persistently had some low-grade dysphagia as well.  None of that is all that severe right now, and pain is the bigger problem.       She has apparently had some recent mix-up with the narcotic prescriptions as well.  I had signed a Percocet prescription last week, but that was apparently not the one that was due; it was rather the oxycodone, which I did sign just 2 days ago.  She has apparently not picked up the Percocet prescription and is wanting to make sure she gets it refilled now for 2 weeks from now rather 10 days from now when it is due.        She is having some skin irritation over her upper back with a lot of itching and burning pain.  She has excoriated that area.  She wants to make sure that it does not look like a skin cancer.       Other features of her disease remained relatively stable.  She is getting Raynaud's attacks despite the fact that it is not particularly cool right now.  This may be being worsened by her cigarette smoking, and we discussed that.     SEPTEMBER 21, 2017, INTERVAL HISTORY:  The patient started methotrexate back in mid August, but she felt so much worse on it with gastrointestinal symptoms that after she recently stopped it, she did not resume it.  She is still having some musculoskeletal pain, particularly in the right side of her ribs.  This is one of the reasons we started it.  She is not sure whether it helped at all, but she was only on it for a very short time.       She is also having some musculoskeletal pain in her neck and, of course, within her hands which have the marked contractures.       She still has some GERD going on, but that is better on the Nexium b.i.d.  She has to sleep sitting up, however, and she has been doing that for some time to avoid severe GERD.        She called us to tell us that she had a really  bad experience with the most recent Percocet that she got.  She believes that this was because of a change in .  She returned the pills to the pharmacy and they gave her some from the  she had used previously, and she felt those were better so she has asked that on an ongoing basis that those are the ones that she gets.       She is concerned about some areas in her fingers that feel like she might be developing new ulcerations, but she has had nothing open up there.  She does get some Raynaud's and is concerned about that worsening with the onset of colder weather.     INTERVAL HISTORY:  Since we have last seen her, she finally started having her back pain get better.  She thinks some of that was because she was getting strain there when she was walking her dog from the dog pulling on the leash.  She did have to give her dog away because she was having trouble taking care of it.      Her primary care doctor had actually done some plain films and was concerned about a possible lesion but a followup CT scan showed only shadowing.  A DEXA scan was done and she said that showed low bone density.  Although we have Care Everywhere, I cannot see the actual results of the DEXA scans so I do not know precisely what it said.  It was recommended to her she take calcium and vitamin D, but she is already taking 2000 international units a day of vitamin D and eating a diet that is almost entirely dairy.      She says that in part because she is having so much trouble eating.  She says the food feels like her enemy.  She says protein drinks make her sick and in general she feels better if she does not eat but she knows she needs to eat something so she will try to eat food.  She has already had an endoscopy at [place] and additional workup there and that was unrevealing.  She does, however, remain on around-the-clock, oxycodone and we started to discuss that.      Because her gut is so problematic she has not  had any methotrexate since August or September and she felt like that made her sicker.      On the positive side, she is able to control her Raynaud's phenomena pretty well with rewarming.  She is on Aggrenox now and she thinks that has definitely helped her ulcers.  She is not however doing regular stretching of any kind let alone her hands.      She is due for labs.  The last ones were done 3 months ago.  She did on her own in addition to stopping the methotrexate reduce her MMF to 3 tablets a day.  That is probably okay based on her most recent pulmonary function tests done last April which were very good.  She is not having any increased shortness of breath, decreased exercise tolerance, or dry cough.  On the other hand, she is doing virtually no exercise.  She does continue to smoke.     02/27/2018 Interval history:     Since we have last seen her, she was doing pretty well for a while this winter but unfortunately just in the last several weeks has developed several digital ulcers.  The first was on her right fifth finger over the PIP joint.  This probably was a calcinosis lesion because she has had those before and she was able to pick something out of it and then it healed nicely.  Based on her description that was likely calcinosis.      However, a more troublesome lesion developed shortly thereafter in the right fourth PIP extensor surface.  She is severely contractured there and previously had osteomyelitis at this location.  She has over the last 4-5 days again developed really painful and significant redness around that site consistent with cellulitis.      When she had osteomyelitis there several years ago we were able to successfully treat her with oral antibiotics under the guidance of Dr. Ramirez.  He had given her cephalexin 500 mg q.i.d. plus azithromycin 250 mg daily after trials of several other antibiotic combinations and she did finally heal with this.  We therefore discussed using that again  today.      She fortunately has not had other digital ulcers or distal digital ulcers.  She remains on CellCept, though not on methotrexate.      Her breathing feels fine to her and she denies any dry or productive cough.  She has been ongoing laboratory monitoring.  It has been almost a year however since she had PFTs.      She does have some headaches at night.  She does not tend to have these so much during the day.  They are not on an everyday basis and she cannot really link them to anything.      She continues to have a lot of gout symptoms.  In general, she feels sick to her stomach easily and has gut pain easily.  She is doing better with respect to constipation on a regimen I have provided her before with a combination of fiber, MiraLax and titration of magnesium to keep things moving.      She denies any other new symptoms or other new features of disease.      She does acknowledge, however, pretty significant depression.  She says this always worsens her in the winter.  She has previously had counseling, but cannot afford it because of a high co-pay.  She is not following with anyone on a regular basis for it other than when she discusses it with me.                         ROS  Gen: Denies weight loss or fevers or night sweats  HEENT:  No ocular inflammation..  Denies swollen glands  CV:  Denies chest pains  Resp:  denies chronic cough or dyspnea.  No pleurisy  Ext:  No swelling in extremities.    Skin:  No new rashes  Remainder of 10-point ROS as per Interval history or negative,     Past Medical History  1.  Diffuse systemic sclerosis  2.  CCP+ inflammatory arthritis  3.  Small bowel overgrowth syndrome - positive hydrogen test   4.  GERD  5.  Plantar fasciitis - seen by Dr. Jorgensen  6.  Chronic neuropathic pain - on lyrica and chronic percocet    Allergies   Allergies   Allergen Reactions     Penicillin [Esters] Other (See Comments)     Unable to move     Bactroban [Mupirocin Calcium]      Inability  "to move     Levaquin Other (See Comments)     Muscle weakness       Rifampin Other (See Comments)     Makes pain medication less effective     Medications  Current Outpatient Prescriptions   Medication     LORazepam (ATIVAN) 0.5 MG tablet     valACYclovir (VALTREX) 500 MG tablet     oxyCODONE (ROXICODONE) 5 MG immediate release tablet     oxyCODONE-acetaminophen (PERCOCET) 5-325 MG per tablet     pregabalin (LYRICA) 75 MG capsule     rifaximin (XIFAXAN) 550 MG TABS     mycophenolate (CELLCEPT-BRAND NAME) 500 MG tablet     omeprazole (PRILOSEC) 40 MG capsule     ondansetron (ZOFRAN) 4 MG tablet     Needle, Disp, 30G X 1/2\" MISC     diazepam (VALIUM) 5 MG tablet     fish oil-omega-3 fatty acids (FISH OIL) 1000 MG capsule     METHOTREXate 25 MG/ML injection     leucovorin (WELLCOVORIN) 5 MG tablet     Syringe/Needle, Disp, 27G X 1/2\" 1 ML MISC     Physical Exam  /63 (BP Location: Right arm, Patient Position: Sitting, Cuff Size: Adult Regular)  Pulse 75  Temp 98.2  F (36.8  C) (Oral)  Ht 1.638 m (5' 4.5\")  Wt 57.2 kg (126 lb 3.2 oz)  SpO2 99%  BMI 21.33 kg/m2  General: pleasant, but dysphoric  HEENT: EOMI, PERRL, no conjunctivitis or scleritis. Chronic facial skin thickenig without facial rash. Slight pursing of her lips. Slight restriction in opening of the jaw. Oropharynx exam reveals 4-5mm shallow ulceration on right lower lip. Moderate saliva pooling. No cervical adenopathy  CV: regular rhythm; no murmurs.    Resp: End-expiratory wheezing heard diffusely throughout  GI:  Soft, minimal  discomfort in the lower abdomen to deeper palpation; no masses or hepatomegaly  Ext:  No peripheral edema.   Skin:  Chronic sclerodactyly, most prominent in R hand (contracture at 50% finger flexion) vs. Left (missing final 20' extension).  Chronic skin thickening with evidence for improvement along forearms, upper arms, chest, abdomen, lower legs and feet.  Stable Calcinosis-like deposit noted on her right anterior thigh " just above the knee joint and on right fith finger. . Salt/pepper appearance to areas of skin (christiano arms and upper chest).  No other rashes.   Closed  DU  over  the right 5th and  3rd  PIP joint: no otherDU.   Mild skin thinning/erythema along other PIPs. 2+ over fingers ; 1+ maximum over hands, and feet and shins, and face, pregressing to atrophic status. Open cellulitic appearing ulcer over r 4th PIP extensor surface.    MSK:   Full joint exam without evidence for synovitis, but right hip pain in joint and over trochate. Chronic joint contractures of fingers (christiano right); minimally also along wrists and elbows.       RESULTS:  Component      Latest Ref Rng 1/17/2013           2:15 PM   WBC      4.0 - 11.0 10e9/L 4.6   RBC Count      3.8 - 5.2 10e12/L 3.71 (L)   Hemoglobin      11.7 - 15.7 g/dL 11.8   Hematocrit      35.0 - 47.0 % 37.4   MCV      78 - 100 fl 101 (H)   MCH      26.5 - 33.0 pg 31.8   MCHC      31.5 - 36.5 g/dL 31.6   RDW      10.0 - 15.0 % 13.9   Platelet Count      150 - 450 10e9/L 200     Component      Latest Ref Rng 1/17/2013   ALT      0 - 50 U/L 23   AST      0 - 45 U/L 30   Albumin      3.9 - 5.1 g/dL 4.2   CRP Inflammation      0.0 - 8.0 mg/L <5.0   Sed Rate      0 - 20 mm/h 18         ASSESSMENT: Per the Problem LIst    PLAN AND RECOMMENDATIONS:     I think the biggest concern today is probable cellulitis over the right fourth digital ulcer in the PIP.      We will accordingly get her started on cephalexin 500 mg q.i.d. and azithromycin 250 mg daily.  If this does not improve things she will let us know.        If the ulcer does not fully healed within the next 3 weeks, I would consider extending the antibiotic prescription because there is so little tissue underneath this ulcer, besides the bone, and I do not want her again to have osteomyelitis.      She should have repeat pulmonary function testing when we next see her because it will be a year in April and I am going to plan on seeing her  around that time given the finger lesion.      We did discuss her depression.  I would really like to see her in counseling.  I also think she has a bad overlap between depression, anxiety and her pain, which she has multiple reasons for including her bad finger contractures and in this case now an ulcer.  This is not make her pain control easier, however.      We have gone ahead and refilled her narcotic pain prescriptions today.  She has gotten to the point where she can titrate her bowel regimen well enough that it does let her get so severely constipated, but my impression is still that a lot of her gut pain has to do with the combination of dysmotility and narcotics worsening that issue.      I will see her back in approximately 6 weeks, sooner p.r.n.         This visit was 40 minutes in duration, greater than 50% in counseling.         Again, thank you for allowing me to participate in the care of your patient.      Sincerely,    Brennen Child MD

## 2018-03-29 ENCOUNTER — TELEPHONE (OUTPATIENT)
Dept: RHEUMATOLOGY | Facility: CLINIC | Age: 53
End: 2018-03-29

## 2018-03-29 NOTE — TELEPHONE ENCOUNTER
Pt called to report that she is not feeling well & is requesting a callback from CESARIO Morales or Dr Child.    Pt states Dr Child prescribed an Abx for bone infection in 2 fingers, while she was on the Abx, her stomach felt descent, better. Pt states she has been off Abx for about 1 week.    Pt states her stomach hurts & has been in bed since stopping the Abx appx 1 wk ago. Pt states she has nausea but has not thown up. She says she feels gassy. Her last BM was 03/28, it was loose & medium in size, previous BM was 03/27 very loose & large in size. Pt says she doesn't think she has C-Diff. Pt says she urinates a lot, but she's also been drinking a lot of fluids.     Pt says she doesn't have a fever, but gets the chills & sweats, however [she] is 53. Pt also says she has pain that states in her R shoulder blade, around her ribs & down to her hip.    Please callback pt at [phone].

## 2018-03-29 NOTE — TELEPHONE ENCOUNTER
"See note below. Returned call to pt who reports that she feels awful. she feels \"icky\" and sick-nauseated but has not vomited. Pt reports having 1 loose stool yesterday and another today. The stool is soft. Willard also reports having pain between her shoulder blades and down to her waist. Eating doesn't change the pain or discomfort. She does feel hungry. Has had an increase in \"burping, gurgling, and farting\". Does not feel bloated after eating and the loose stools are not after meals they occur any time.    Willard states she had felt better while she was taking the antibiotics for her finger, but within the week of finishing them her stomach started causing her problems.    Pt wanted to know what to do. Suggested she contact her GI provider or PCP. Since pt has been having chills and sweats she will contact her PCP and try to get in tomorrow. Pt will keep this clinic updated with the findings.    RAÚL Frank RN  Rheumatology RN Coordinator  [facility]    "

## 2018-04-03 NOTE — TELEPHONE ENCOUNTER
Discussed with Dr Child. He is agreeable that pt should see GI or PCP.    TIARRA FrankN RN  Rheumatology RN Coordinator  [facility]

## 2018-04-10 ENCOUNTER — OFFICE VISIT (OUTPATIENT)
Dept: RHEUMATOLOGY | Facility: CLINIC | Age: 53
End: 2018-04-10
Attending: INTERNAL MEDICINE
Payer: MEDICARE

## 2018-04-10 VITALS
WEIGHT: 123.8 LBS | OXYGEN SATURATION: 98 % | TEMPERATURE: 98.2 F | SYSTOLIC BLOOD PRESSURE: 101 MMHG | DIASTOLIC BLOOD PRESSURE: 57 MMHG | HEIGHT: 65 IN | BODY MASS INDEX: 20.62 KG/M2 | HEART RATE: 66 BPM

## 2018-04-10 DIAGNOSIS — M77.40 METATARSALGIA, UNSPECIFIED LATERALITY: ICD-10-CM

## 2018-04-10 DIAGNOSIS — M77.42 METATARSALGIA OF BOTH FEET: ICD-10-CM

## 2018-04-10 DIAGNOSIS — M77.41 METATARSALGIA OF BOTH FEET: ICD-10-CM

## 2018-04-10 DIAGNOSIS — M05.79 RHEUMATOID ARTHRITIS INVOLVING MULTIPLE SITES WITH POSITIVE RHEUMATOID FACTOR (H): ICD-10-CM

## 2018-04-10 DIAGNOSIS — M85.80 OSTEOPENIA, UNSPECIFIED LOCATION: ICD-10-CM

## 2018-04-10 DIAGNOSIS — I73.01 RAYNAUD'S DISEASE WITH GANGRENE (H): ICD-10-CM

## 2018-04-10 DIAGNOSIS — M34.9 SYSTEMIC SCLEROSIS (H): ICD-10-CM

## 2018-04-10 PROCEDURE — G0463 HOSPITAL OUTPT CLINIC VISIT: HCPCS | Mod: ZF

## 2018-04-10 ASSESSMENT — PAIN SCALES - GENERAL: PAINLEVEL: MODERATE PAIN (5)

## 2018-04-10 NOTE — LETTER
4/10/2018      RE: Willard Grayson  [address]    [facility] [address]       Rheumatology Clinic Follow-up  [facility], Rheumatology        History of rheumatologic diagnosis:    Problem List:  1. 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   2. Associated marked neuropathic skin pain markedly improved with Lyrica with prior medication therapy with gabapentin.   3. Diffuse musculoskeletal pain requiring methotrexate plus CellCept, and narcotic to control, but also improved.   4. Initial clinical overlap with anti-CCP seropositive rheumatoid arthritis with continued anti-CCP seropositivity as of 09/2010.   5. Raynaud's phenomena with digital ulcers with easily cracked skin, improving 4/18.   6. GI involvement consistent with bacterial small bowel overgrowth with marked improvement after a course of neomycin followed by Dr. Glez; positive hydrogen breath test in 3/2012 - treated wtih rifaximin through Dr. Jacob  7. No convincing evidence of lung involvement on serial pulmonary function tests or clinically.   8. History of heart palpitations prior to the onset of SSc with stable symptoms since.   9. History of medication failures with Remicade, methotrexate, methotrexate plus CellCept and with Orencia and with improvement since initiation of IV IG but with complicating headaches and overall sense of malaise with IV IG infusions.   10. History of silicone breast implants.   11. Incidental finding of calcified splenic artery aneurysm.  12. EGD in 3/2012 with hiatal hernia and gastral antral vascular ectasia        13. Recurrent right  third finger contracture with  extensor surface ulceration, healed           INTERVAL HISTORY, Since our last visit and her  November hospitalization, she has had no more digital ulcers and decreased pain in her fingers.  She did quit smoking at the time of that hospitalization and she has not resumed.  She still has pain in her fingers, but not nearly so severe.  Her biggest problem with her fingers remains the marked contractures.  She has difficulty using the paraffin bath because the paraffin is too hot, so we discussed simply unplugging it for a few minutes and letting it cool down a little and then before it gels using it to warm her hands and allow the stretches.       Since we have last seen her, she has seen her primary care.  I had requested that the lorazepam prescriptions be moved back over to primary care since I had simply assumed them when she had no primary care physician for her existing diagnosis of anxiety.  That has now been accomplished.      She also continues on oxycodone and we again today discussed attempting to taper that down.  She has decreased pain and hopefully can accomplish that, although she still does have multiple sources of musculoskeletal pain.  She has been off of methotrexate since the fall as she was having some gastrointestinal issues with it, but we did discuss that if tapering the narcotics becomes too problematic because of musculoskeletal pain with morning stiffness, resuming low-dose methotrexate might be possible and desirable.        She was started on a statin during the hospitalization and I also asked that that be taken over the primary care.  In fact, she brings her lipid results with her today and they are very good with her total cholesterol only 202, just barely above target, LDL only 133, just barely above target, HDL at 45 which is target, and triglycerides under target, so I am not sure that particularly now that she has the cigarette smoking removed as a risk factor and she has minimal family risk that she necessarily will need statins long-term.  On the other hand, they may have  a vascular stabilizing effect, so I am certainly not opposed to her remaining on it and she does not have side effect with it.       Her biggest issues recently have been gastrointestinal.  She has felt bloated, nauseated, and at times is having significant constipation.  She does not get diarrhea suggesting bacterial small bowel overgrowth.       She is having a lot of issues with her insurance and whether she can pay for her medical bills.  She therefore would like to see our .      She does get some dysphagia, but that is not changing and is not severe.       As noted, she has a number of sources of musculoskeletal pain including pain in her knees and hips and greater trochanteric area.  She did have what sounds like a Baker's cyst a few weeks ago, but that resolved quickly and she did not have any known antecedent injury that would explain that.     FEBRUARY 16, 2017, INTERVAL HISTORY:  Since I last saw the patient, she went down to Gastroenterology at the [facility] and saw Dr. Tatum there.  She was found to have recurrent versus ongoing esophageal candidiasis on upper endoscopy but had a normal colonoscopy.  A rectal balloon test, however, was abnormal, consistent with sphincter dysfunction potentially contributing to her chronic constipation.       She was also told she needed iron infusions.  This is somewhat curious in that her iron studies done at [facility] on 01/27 were only minimally abnormal and her hemoglobin and hematocrit were only minimally abnormal as well.  Apparently, her ferritin was very low, however.       We do not have any of those records to look at.  She says that she asked that they be sent here, but she was told they would be sent only to her primary care doctor.  I have looked in her [facility] notes on Care Everywhere, but I cannot see that they have been scanned in at this point.       She has some financial issues at this point.  Her labs and other tests are best paid for if  they are done at [facility].  Fortunately, her primary care doctor at [facility] was adept enough with the system that she was able to get the labs done at [facility] and have them come directly to my box.  I have reviewed all of them with her today and, as noted, she  has only had some very modest anemia.  She has no other evidence of any toxicity from the CellCept or really any other abnormal labs.       She has been doing well with her Raynaud's.  She has had no episodes of severe distal digital ischemia.  She is very careful about stopping attacks as soon as they get started with rewarming.  She has hand warmers with her at all times and a hand muff that she uses.       Unfortunately, she has restarted smoking again.  She is much more careful with it, she says, however, and is not smoking outdoors when in the cold the way she was previously when she developed a severe ulcer that would not heal.       Although she hated the Botox treatment that Dr. Beauchamp gave her, she has not had any further episodes since that time.       She does believe her lungs are stable.  She is not doing a lot of significant aerobic exercise, but she has not noticed any drop-off in her exercise tolerance with walking, etc.  She has not had PFTs done and I urged her to have them done at [facility] since it is so expensive for her to do them here.       She feels okay otherwise in general.  She continues to have some musculoskeletal pain but does not think it is really a lot worse.     INTERVAL HISTORY (05/18/2017):  Since we have last seen the patient, she developed a pretty bad sore throat.  This really sounds viral but she got a Z-RGIFFIN for it.  The soreness in her throat was posterior oropharyngeal extending only a little ways down her throat, but she was concerned that it could represent recurrent esophageal candidiasis since that was diagnosed on an endoscopy at [place].  She was then told by one of her physicians that she should have had a repeat EGD,  despite the fact that the original report said repeat EGD needed to be done only if she had persistent or ongoing symptoms.      The trouble with that is that her only symptoms initially were not pain but were dysphagia.  She has had some longstanding low-grade dysphagia.  She does not believe that has progressed in any way.  It certainly is not worse since she has been on the Z-GRIFFIN which she has now completed.      We have therefore discussed with her that certainly after a Z-GRIFFIN with 2 weeks of therapy with it that were this esophageal candidiasis it should not be better and it might well be worse.  I think given the overall situation that is very unlikely.      She had a lot of constipation and we gave her a bowel regimen for that and with that she has done quite well.      One of the things that is bothering her more is that by the end of the day, both the bottom of her feet and her fingers really burner.  This is not as bad first thing in the day.      What is better is that she has had only 1 digital ulcer, very low-grade in the fifth PIP currently, with no distal pulp ulcers since Dr. Beauchamp gave her the Botox injection.      She also has no evidence of any progressive cardiopulmonary disease and indeed her last pulmonary function tests were completely stable.  She does not do a lot of her aerobic exercise, but she can climb stairs without difficulties.      She has had a history of blood in the urine.  Because of her smoking history, she saw an urologist who did a cystoscopy that was negative.  She was then told she should see a renal doctor.      However, since January here she has had 2 years that has ever completely free of any evidence of blood.  Blood has not observed microscopic stable as far as I can tell and was positive only on the dip stick.  This did still occur in 2016.  Prior to that, she had been on methotrexate, which is known to potentially cause this.      The other thing is she is  noticing and is quite concerned about is worsening oral aperture and more and more problems with her teeth including retraction.  She continues to smoke.  She has significant keratoconjunctivitis sicca, although it is definitely improved with twice daily Evoxac.       She feels okay otherwise in general.  She continues to have some musculoskeletal pain but does not think it is really a lot worse.     AUGUST 17, 2017, INTERVAL HISTORY:  Since we have last seen the patient, she has been having a lot of stressors.  Specifically, her daughter had a baby in late July, then had a complication that kept her in the hospital for some time.  Her sister has also been in the hospital with what she reports has a brain hemorrhage.  She is 42 years old and this all apparently developed after an infected back injection for pain.  She has now been told that she has positive autoimmune antibodies and that she might have systemic lupus, apparently.       With all of this stress, she has again resumed smoking after having had cessation.  She has some intermittent ulcers over her right third and right fifth PIP joints.  She is, if anything, more contractured there.  She is not doing stretching because of the pain.  They are not open currently, however.  If she hits them, of course, they hurt more and open up.       She is also getting a lot of hip pain, particularly over the right hip at night.  Much of the pain she describes as consistent with trochanteric bursitis, but some may be consistent with true hip pain deeper in.  She feels a tendon rub at times over this area as well.       She has been off of methotrexate for a long time, as her skin had improved enough that we felt we could get her off of it and she had some gastrointestinal symptoms that were arguably worsened from it.  She continues to have a lot of GI symptoms currently with intermittent bloating and nausea.  She has persistently had some low-grade dysphagia as well.   None of that is all that severe right now, and pain is the bigger problem.       She has apparently had some recent mix-up with the narcotic prescriptions as well.  I had signed a Percocet prescription last week, but that was apparently not the one that was due; it was rather the oxycodone, which I did sign just 2 days ago.  She has apparently not picked up the Percocet prescription and is wanting to make sure she gets it refilled now for 2 weeks from now rather 10 days from now when it is due.        She is having some skin irritation over her upper back with a lot of itching and burning pain.  She has excoriated that area.  She wants to make sure that it does not look like a skin cancer.       Other features of her disease remained relatively stable.  She is getting Raynaud's attacks despite the fact that it is not particularly cool right now.  This may be being worsened by her cigarette smoking, and we discussed that.     SEPTEMBER 21, 2017, INTERVAL HISTORY:  The patient started methotrexate back in mid August, but she felt so much worse on it with gastrointestinal symptoms that after she recently stopped it, she did not resume it.  She is still having some musculoskeletal pain, particularly in the right side of her ribs.  This is one of the reasons we started it.  She is not sure whether it helped at all, but she was only on it for a very short time.       She is also having some musculoskeletal pain in her neck and, of course, within her hands which have the marked contractures.       She still has some GERD going on, but that is better on the Nexium b.i.d.  She has to sleep sitting up, however, and she has been doing that for some time to avoid severe GERD.        She called us to tell us that she had a really bad experience with the most recent Percocet that she got.  She believes that this was because of a change in .  She returned the pills to the pharmacy and they gave her some from the   she had used previously, and she felt those were better so she has asked that on an ongoing basis that those are the ones that she gets.       She is concerned about some areas in her fingers that feel like she might be developing new ulcerations, but she has had nothing open up there.  She does get some Raynaud's and is concerned about that worsening with the onset of colder weather.     INTERVAL HISTORY:  Since we have last seen her, she finally started having her back pain get better.  She thinks some of that was because she was getting strain there when she was walking her dog from the dog pulling on the leash.  She did have to give her dog away because she was having trouble taking care of it.      Her primary care doctor had actually done some plain films and was concerned about a possible lesion but a followup CT scan showed only shadowing.  A DEXA scan was done and she said that showed low bone density.  Although we have Care Everywhere, I cannot see the actual results of the DEXA scans so I do not know precisely what it said.  It was recommended to her she take calcium and vitamin D, but she is already taking 2000 international units a day of vitamin D and eating a diet that is almost entirely dairy.      She says that in part because she is having so much trouble eating.  She says the food feels like her enemy.  She says protein drinks make her sick and in general she feels better if she does not eat but she knows she needs to eat something so she will try to eat food.  She has already had an endoscopy at [place] and additional workup there and that was unrevealing.  She does, however, remain on around-the-clock, oxycodone and we started to discuss that.      Because her gut is so problematic she has not had any methotrexate since August or September and she felt like that made her sicker.      On the positive side, she is able to control her Raynaud's phenomena pretty well with rewarming.  She  is on Aggrenox now and she thinks that has definitely helped her ulcers.  She is not however doing regular stretching of any kind let alone her hands.      She is due for labs.  The last ones were done 3 months ago.  She did on her own in addition to stopping the methotrexate reduce her MMF to 3 tablets a day.  That is probably okay based on her most recent pulmonary function tests done last April which were very good.  She is not having any increased shortness of breath, decreased exercise tolerance, or dry cough.  On the other hand, she is doing virtually no exercise.  She does continue to smoke.     02/27/2018 Interval history:     Since we have last seen her, she was doing pretty well for a while this winter but unfortunately just in the last several weeks has developed several digital ulcers.  The first was on her right fifth finger over the PIP joint.  This probably was a calcinosis lesion because she has had those before and she was able to pick something out of it and then it healed nicely.  Based on her description that was likely calcinosis.      However, a more troublesome lesion developed shortly thereafter in the right fourth PIP extensor surface.  She is severely contractured there and previously had osteomyelitis at this location.  She has over the last 4-5 days again developed really painful and significant redness around that site consistent with cellulitis.      When she had osteomyelitis there several years ago we were able to successfully treat her with oral antibiotics under the guidance of Dr. Ramirez.  He had given her cephalexin 500 mg q.i.d. plus azithromycin 250 mg daily after trials of several other antibiotic combinations and she did finally heal with this.  We therefore discussed using that again today.      She fortunately has not had other digital ulcers or distal digital ulcers.  She remains on CellCept, though not on methotrexate.      Her breathing feels fine to her and she denies  any dry or productive cough.  She has been ongoing laboratory monitoring.  It has been almost a year however since she had PFTs.      She does have some headaches at night.  She does not tend to have these so much during the day.  They are not on an everyday basis and she cannot really link them to anything.      She continues to have a lot of gout symptoms.  In general, she feels sick to her stomach easily and has gut pain easily.  She is doing better with respect to constipation on a regimen I have provided her before with a combination of fiber, MiraLax and titration of magnesium to keep things moving.      She denies any other new symptoms or other new features of disease.      She does acknowledge, however, pretty significant depression.  She says this always worsens her in the winter.  She has previously had counseling, but cannot afford it because of a high co-pay.  She is not following with anyone on a regular basis for it other than when she discusses it with me.      Interval history 4/10/2018   Though Willard reports feeling very good today, she reports significant illness since she was last seen. At last visit 2/27, she was given keflex and azithromycin which she took for 21 days, for concern of developing infection of the ulceration over the 4th and 5th dorsal PIP joints. Willard tolerated the antibiotics well, but reports that att the end of March, she had bad GI upset- bloating, gas, nausea, 1 episode of loose stools after finishing her course of antibiotics that has lasted up until 2 days prior to this appointment. While she was sick, she quit drinking mountain dew and quit smoking. She says for the last 2 days shes been feeling better finally- She had been taking a probiotic when her gut symptoms started. After starting the probiotic she developed vaginal discomfort/itching so stopped it. Shes unsure why she's started feeling better over the last 2 days. Cancelled her PFTs because she was not feeling  "well, she is due for them.   Currently on cellcept 1500mg/d, tolerating well.      ROS  Gen: Denies weight loss or fevers or night sweats  HEENT:  No ocular inflammation..  Denies swollen glands  CV:  Denies chest pains  Resp:  denies chronic cough or dyspnea.  No pleurisy  Ext:  No swelling in extremities.    Skin:  No new rashes  Remainder of 10-point ROS as per Interval history or negative,     Past Medical History  1.  Diffuse systemic sclerosis  2.  CCP+ inflammatory arthritis  3.  Small bowel overgrowth syndrome - positive hydrogen test   4.  GERD  5.  Plantar fasciitis - seen by Dr. Jorgensen  6.  Chronic neuropathic pain - on lyrica and chronic percocet    Allergies   Allergies   Allergen Reactions     Penicillin [Esters] Other (See Comments)     Unable to move     Bactroban [Mupirocin Calcium]      Inability to move     Levaquin Other (See Comments)     Muscle weakness       Rifampin Other (See Comments)     Makes pain medication less effective     Medications  Current Outpatient Prescriptions   Medication     LORazepam (ATIVAN) 0.5 MG tablet     valACYclovir (VALTREX) 500 MG tablet     oxyCODONE (ROXICODONE) 5 MG immediate release tablet     oxyCODONE-acetaminophen (PERCOCET) 5-325 MG per tablet     pregabalin (LYRICA) 75 MG capsule     rifaximin (XIFAXAN) 550 MG TABS     mycophenolate (CELLCEPT-BRAND NAME) 500 MG tablet     omeprazole (PRILOSEC) 40 MG capsule     ondansetron (ZOFRAN) 4 MG tablet     Needle, Disp, 30G X 1/2\" MISC     diazepam (VALIUM) 5 MG tablet     fish oil-omega-3 fatty acids (FISH OIL) 1000 MG capsule     METHOTREXate 25 MG/ML injection     leucovorin (WELLCOVORIN) 5 MG tablet     Syringe/Needle, Disp, 27G X 1/2\" 1 ML MISC     Physical Exam  There were no vitals taken for this visit.  General: pleasant, cooperative  HEENT: EOMI,no conjunctivitis or scleritis. Chronic facial skin thickening without facial rash. Slight pursing of her lips. Slight restriction in opening of the jaw. " Oropharynx exam reveals no lesions. Moderate saliva pooling. No cervical adenopathy  CV: regular rhythm; no murmurs.    Resp: scattered crackles bilaterally  GI:  Soft, NTND  Ext:  No peripheral edema.   Skin:  Chronic sclerodactyly, most prominent in R hand (contracture at 50% finger flexion) vs. Left (missing final 20' extension).  Chronic skin thickening with evidence for improvement along forearms, upper arms, chest, abdomen, lower legs and feet with shiny appearance of the skin over the dorsal R sided digits. No cellulitis today, ulceration  Healed.  Stable Calcinosis-like deposit noted on her right anterior thigh just above the knee joint and on right fith finger. . Salt/pepper appearance to areas of skin (christiano arms and upper chest).  No other rashes.   Closed  DU  over  the right 5th and  3rd  PIP joint: no otherDU.   Mild skin thinning/erythema along other PIPs. 2+ over fingers ; 1+ maximum over hands, and feet and shins, and face, progressing to atrophic status.      MSK:   Full joint exam without evidence for synovitis, but right hip pain in joint and over trochate. Chronic joint contractures of fingers (christiano right); minimally also along wrists and elbows.       RESULTS:  Component      Latest Ref Rng 1/17/2013           2:15 PM   WBC      4.0 - 11.0 10e9/L 4.6   RBC Count      3.8 - 5.2 10e12/L 3.71 (L)   Hemoglobin      11.7 - 15.7 g/dL 11.8   Hematocrit      35.0 - 47.0 % 37.4   MCV      78 - 100 fl 101 (H)   MCH      26.5 - 33.0 pg 31.8   MCHC      31.5 - 36.5 g/dL 31.6   RDW      10.0 - 15.0 % 13.9   Platelet Count      150 - 450 10e9/L 200     Component      Latest Ref Rng 1/17/2013   ALT      0 - 50 U/L 23   AST      0 - 45 U/L 30   Albumin      3.9 - 5.1 g/dL 4.2   CRP Inflammation      0.0 - 8.0 mg/L <5.0   Sed Rate      0 - 20 mm/h 18         ASSESSMENT: Per the Problem List    PLAN AND RECOMMENDATIONS:   Cellulitis over the right fourth digital ulcer in the PIP has healed after 3 w course of  cephalexin 500 mg q.i.d. and azithromycin 250 mg daily, however did have significant GI upset after stopping this medication. WE did discuss that the GI upset may have been due to changes in the GI kash, however we also reviewed that her opioid use may play a role further slowing her gut motility. Encouraged eating foods with diverse bacterial burden such as cheeses or yogurts to help steady her gut kash, particularly if she needs antibiotics again. We encouraged her to reduce her opioid dose as much as possible.   Encouraged her to continue her lyrica for pain.         She should have repeat pulmonary function testing when we next see her because it was due at this visit, but she canceled her appointment as she felt unwell. She will reschedule these for next visit.     For her diffuse SSc, recommended continuing Cellcept 1.5g daily. Continue evoxac for dry mouth.     Did recommend Shingrix vaccination today, patient will think about it.        I will see her back in approximately 3 months, sooner p.r.n.     This patient was seen and staffed with Dr. Child, rheumatology attending.     Tricia Mckenna MD  Medicine/Dermatology, PGY-5  (p) [phone]       I saw the patient with the resident.  My exam and recomendations are as described.          Brennen Child MD

## 2018-04-10 NOTE — LETTER
4/10/2018       RE: Willard Grayson  [address]    [facility] [address]     Dear Colleague,    Thank you for referring your patient, Willard Grayson, to the [facility] RHEUMATOLOGY at [facility]. Please see a copy of my visit note below.    Rheumatology Clinic Follow-up  [facility], Rheumatology        History of rheumatologic diagnosis:    Problem List:  1. 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   2. Associated marked neuropathic skin pain markedly improved with Lyrica with prior medication therapy with gabapentin.   3. Diffuse musculoskeletal pain requiring methotrexate plus CellCept, and narcotic to control, but also improved.   4. Initial clinical overlap with anti-CCP seropositive rheumatoid arthritis with continued anti-CCP seropositivity as of 09/2010.   5. Raynaud's phenomena with digital ulcers with easily cracked skin, improving 4/18.   6. GI involvement consistent with bacterial small bowel overgrowth with marked improvement after a course of neomycin followed by Dr. Glez; positive hydrogen breath test in 3/2012 - treated wtih rifaximin through Dr. Jacob  7. No convincing evidence of lung involvement on serial pulmonary function tests or clinically.   8. History of heart palpitations prior to the onset of SSc with stable symptoms since.   9. History of medication failures with Remicade, methotrexate, methotrexate plus CellCept and with Orencia and with improvement since initiation of IV IG but with complicating headaches and overall sense of malaise with IV IG infusions.   10. History of silicone breast implants.   11. Incidental finding of calcified splenic artery aneurysm.  12. EGD in 3/2012 with  hiatal hernia and gastral antral vascular ectasia        13. Recurrent right  third finger contracture with  extensor surface ulceration, healed           INTERVAL HISTORY, Since our last visit and her November hospitalization, she has had no more digital ulcers and decreased pain in her fingers.  She did quit smoking at the time of that hospitalization and she has not resumed.  She still has pain in her fingers, but not nearly so severe.  Her biggest problem with her fingers remains the marked contractures.  She has difficulty using the paraffin bath because the paraffin is too hot, so we discussed simply unplugging it for a few minutes and letting it cool down a little and then before it gels using it to warm her hands and allow the stretches.       Since we have last seen her, she has seen her primary care.  I had requested that the lorazepam prescriptions be moved back over to primary care since I had simply assumed them when she had no primary care physician for her existing diagnosis of anxiety.  That has now been accomplished.      She also continues on oxycodone and we again today discussed attempting to taper that down.  She has decreased pain and hopefully can accomplish that, although she still does have multiple sources of musculoskeletal pain.  She has been off of methotrexate since the fall as she was having some gastrointestinal issues with it, but we did discuss that if tapering the narcotics becomes too problematic because of musculoskeletal pain with morning stiffness, resuming low-dose methotrexate might be possible and desirable.        She was started on a statin during the hospitalization and I also asked that that be taken over the primary care.  In fact, she brings her lipid results with her today and they are very good with her total cholesterol only 202, just barely above target, LDL only 133, just barely above target, HDL at 45 which is target, and triglycerides under target, so I am not  sure that particularly now that she has the cigarette smoking removed as a risk factor and she has minimal family risk that she necessarily will need statins long-term.  On the other hand, they may have a vascular stabilizing effect, so I am certainly not opposed to her remaining on it and she does not have side effect with it.       Her biggest issues recently have been gastrointestinal.  She has felt bloated, nauseated, and at times is having significant constipation.  She does not get diarrhea suggesting bacterial small bowel overgrowth.       She is having a lot of issues with her insurance and whether she can pay for her medical bills.  She therefore would like to see our .      She does get some dysphagia, but that is not changing and is not severe.       As noted, she has a number of sources of musculoskeletal pain including pain in her knees and hips and greater trochanteric area.  She did have what sounds like a Baker's cyst a few weeks ago, but that resolved quickly and she did not have any known antecedent injury that would explain that.     FEBRUARY 16, 2017, INTERVAL HISTORY:  Since I last saw the patient, she went down to Gastroenterology at the [facility] and saw Dr. Tatum there.  She was found to have recurrent versus ongoing esophageal candidiasis on upper endoscopy but had a normal colonoscopy.  A rectal balloon test, however, was abnormal, consistent with sphincter dysfunction potentially contributing to her chronic constipation.       She was also told she needed iron infusions.  This is somewhat curious in that her iron studies done at [facility] on 01/27 were only minimally abnormal and her hemoglobin and hematocrit were only minimally abnormal as well.  Apparently, her ferritin was very low, however.       We do not have any of those records to look at.  She says that she asked that they be sent here, but she was told they would be sent only to her primary care doctor.  I have  looked in her [facility] notes on Care Everywhere, but I cannot see that they have been scanned in at this point.       She has some financial issues at this point.  Her labs and other tests are best paid for if they are done at [facility].  Fortunately, her primary care doctor at [facility] was adept enough with the system that she was able to get the labs done at [facility] and have them come directly to my box.  I have reviewed all of them with her today and, as noted, she  has only had some very modest anemia.  She has no other evidence of any toxicity from the CellCept or really any other abnormal labs.       She has been doing well with her Raynaud's.  She has had no episodes of severe distal digital ischemia.  She is very careful about stopping attacks as soon as they get started with rewarming.  She has hand warmers with her at all times and a hand muff that she uses.       Unfortunately, she has restarted smoking again.  She is much more careful with it, she says, however, and is not smoking outdoors when in the cold the way she was previously when she developed a severe ulcer that would not heal.       Although she hated the Botox treatment that Dr. Beauchamp gave her, she has not had any further episodes since that time.       She does believe her lungs are stable.  She is not doing a lot of significant aerobic exercise, but she has not noticed any drop-off in her exercise tolerance with walking, etc.  She has not had PFTs done and I urged her to have them done at [facility] since it is so expensive for her to do them here.       She feels okay otherwise in general.  She continues to have some musculoskeletal pain but does not think it is really a lot worse.     INTERVAL HISTORY (05/18/2017):  Since we have last seen the patient, she developed a pretty bad sore throat.  This really sounds viral but she got a Z-GRIFFIN for it.  The soreness in her throat was posterior oropharyngeal extending only a little ways down her throat, but  she was concerned that it could represent recurrent esophageal candidiasis since that was diagnosed on an endoscopy at [place].  She was then told by one of her physicians that she should have had a repeat EGD, despite the fact that the original report said repeat EGD needed to be done only if she had persistent or ongoing symptoms.      The trouble with that is that her only symptoms initially were not pain but were dysphagia.  She has had some longstanding low-grade dysphagia.  She does not believe that has progressed in any way.  It certainly is not worse since she has been on the Z-GRIFFIN which she has now completed.      We have therefore discussed with her that certainly after a Z-GRIFFIN with 2 weeks of therapy with it that were this esophageal candidiasis it should not be better and it might well be worse.  I think given the overall situation that is very unlikely.      She had a lot of constipation and we gave her a bowel regimen for that and with that she has done quite well.      One of the things that is bothering her more is that by the end of the day, both the bottom of her feet and her fingers really burner.  This is not as bad first thing in the day.      What is better is that she has had only 1 digital ulcer, very low-grade in the fifth PIP currently, with no distal pulp ulcers since Dr. Beauchamp gave her the Botox injection.      She also has no evidence of any progressive cardiopulmonary disease and indeed her last pulmonary function tests were completely stable.  She does not do a lot of her aerobic exercise, but she can climb stairs without difficulties.      She has had a history of blood in the urine.  Because of her smoking history, she saw an urologist who did a cystoscopy that was negative.  She was then told she should see a renal doctor.      However, since January here she has had 2 years that has ever completely free of any evidence of blood.  Blood has not observed microscopic stable as far as  I can tell and was positive only on the dip stick.  This did still occur in 2016.  Prior to that, she had been on methotrexate, which is known to potentially cause this.      The other thing is she is noticing and is quite concerned about is worsening oral aperture and more and more problems with her teeth including retraction.  She continues to smoke.  She has significant keratoconjunctivitis sicca, although it is definitely improved with twice daily Evoxac.       She feels okay otherwise in general.  She continues to have some musculoskeletal pain but does not think it is really a lot worse.     AUGUST 17, 2017, INTERVAL HISTORY:  Since we have last seen the patient, she has been having a lot of stressors.  Specifically, her daughter had a baby in late July, then had a complication that kept her in the hospital for some time.  Her sister has also been in the hospital with what she reports has a brain hemorrhage.  She is 42 years old and this all apparently developed after an infected back injection for pain.  She has now been told that she has positive autoimmune antibodies and that she might have systemic lupus, apparently.       With all of this stress, she has again resumed smoking after having had cessation.  She has some intermittent ulcers over her right third and right fifth PIP joints.  She is, if anything, more contractured there.  She is not doing stretching because of the pain.  They are not open currently, however.  If she hits them, of course, they hurt more and open up.       She is also getting a lot of hip pain, particularly over the right hip at night.  Much of the pain she describes as consistent with trochanteric bursitis, but some may be consistent with true hip pain deeper in.  She feels a tendon rub at times over this area as well.       She has been off of methotrexate for a long time, as her skin had improved enough that we felt we could get her off of it and she had some gastrointestinal  symptoms that were arguably worsened from it.  She continues to have a lot of GI symptoms currently with intermittent bloating and nausea.  She has persistently had some low-grade dysphagia as well.  None of that is all that severe right now, and pain is the bigger problem.       She has apparently had some recent mix-up with the narcotic prescriptions as well.  I had signed a Percocet prescription last week, but that was apparently not the one that was due; it was rather the oxycodone, which I did sign just 2 days ago.  She has apparently not picked up the Percocet prescription and is wanting to make sure she gets it refilled now for 2 weeks from now rather 10 days from now when it is due.        She is having some skin irritation over her upper back with a lot of itching and burning pain.  She has excoriated that area.  She wants to make sure that it does not look like a skin cancer.       Other features of her disease remained relatively stable.  She is getting Raynaud's attacks despite the fact that it is not particularly cool right now.  This may be being worsened by her cigarette smoking, and we discussed that.     SEPTEMBER 21, 2017, INTERVAL HISTORY:  The patient started methotrexate back in mid August, but she felt so much worse on it with gastrointestinal symptoms that after she recently stopped it, she did not resume it.  She is still having some musculoskeletal pain, particularly in the right side of her ribs.  This is one of the reasons we started it.  She is not sure whether it helped at all, but she was only on it for a very short time.       She is also having some musculoskeletal pain in her neck and, of course, within her hands which have the marked contractures.       She still has some GERD going on, but that is better on the Nexium b.i.d.  She has to sleep sitting up, however, and she has been doing that for some time to avoid severe GERD.        She called us to tell us that she had a really  bad experience with the most recent Percocet that she got.  She believes that this was because of a change in .  She returned the pills to the pharmacy and they gave her some from the  she had used previously, and she felt those were better so she has asked that on an ongoing basis that those are the ones that she gets.       She is concerned about some areas in her fingers that feel like she might be developing new ulcerations, but she has had nothing open up there.  She does get some Raynaud's and is concerned about that worsening with the onset of colder weather.     INTERVAL HISTORY:  Since we have last seen her, she finally started having her back pain get better.  She thinks some of that was because she was getting strain there when she was walking her dog from the dog pulling on the leash.  She did have to give her dog away because she was having trouble taking care of it.      Her primary care doctor had actually done some plain films and was concerned about a possible lesion but a followup CT scan showed only shadowing.  A DEXA scan was done and she said that showed low bone density.  Although we have Care Everywhere, I cannot see the actual results of the DEXA scans so I do not know precisely what it said.  It was recommended to her she take calcium and vitamin D, but she is already taking 2000 international units a day of vitamin D and eating a diet that is almost entirely dairy.      She says that in part because she is having so much trouble eating.  She says the food feels like her enemy.  She says protein drinks make her sick and in general she feels better if she does not eat but she knows she needs to eat something so she will try to eat food.  She has already had an endoscopy at [place] and additional workup there and that was unrevealing.  She does, however, remain on around-the-clock, oxycodone and we started to discuss that.      Because her gut is so problematic she has not  had any methotrexate since August or September and she felt like that made her sicker.      On the positive side, she is able to control her Raynaud's phenomena pretty well with rewarming.  She is on Aggrenox now and she thinks that has definitely helped her ulcers.  She is not however doing regular stretching of any kind let alone her hands.      She is due for labs.  The last ones were done 3 months ago.  She did on her own in addition to stopping the methotrexate reduce her MMF to 3 tablets a day.  That is probably okay based on her most recent pulmonary function tests done last April which were very good.  She is not having any increased shortness of breath, decreased exercise tolerance, or dry cough.  On the other hand, she is doing virtually no exercise.  She does continue to smoke.     02/27/2018 Interval history:     Since we have last seen her, she was doing pretty well for a while this winter but unfortunately just in the last several weeks has developed several digital ulcers.  The first was on her right fifth finger over the PIP joint.  This probably was a calcinosis lesion because she has had those before and she was able to pick something out of it and then it healed nicely.  Based on her description that was likely calcinosis.      However, a more troublesome lesion developed shortly thereafter in the right fourth PIP extensor surface.  She is severely contractured there and previously had osteomyelitis at this location.  She has over the last 4-5 days again developed really painful and significant redness around that site consistent with cellulitis.      When she had osteomyelitis there several years ago we were able to successfully treat her with oral antibiotics under the guidance of Dr. Ramirez.  He had given her cephalexin 500 mg q.i.d. plus azithromycin 250 mg daily after trials of several other antibiotic combinations and she did finally heal with this.  We therefore discussed using that again  today.      She fortunately has not had other digital ulcers or distal digital ulcers.  She remains on CellCept, though not on methotrexate.      Her breathing feels fine to her and she denies any dry or productive cough.  She has been ongoing laboratory monitoring.  It has been almost a year however since she had PFTs.      She does have some headaches at night.  She does not tend to have these so much during the day.  They are not on an everyday basis and she cannot really link them to anything.      She continues to have a lot of gout symptoms.  In general, she feels sick to her stomach easily and has gut pain easily.  She is doing better with respect to constipation on a regimen I have provided her before with a combination of fiber, MiraLax and titration of magnesium to keep things moving.      She denies any other new symptoms or other new features of disease.      She does acknowledge, however, pretty significant depression.  She says this always worsens her in the winter.  She has previously had counseling, but cannot afford it because of a high co-pay.  She is not following with anyone on a regular basis for it other than when she discusses it with me.      Interval history 4/10/2018   Though Willard reports feeling very good today, she reports significant illness since she was last seen. At last visit 2/27, she was given keflex and azithromycin which she took for 21 days, for concern of developing infection of the ulceration over the 4th and 5th dorsal PIP joints. Willard tolerated the antibiotics well, but reports that att the end of March, she had bad GI upset- bloating, gas, nausea, 1 episode of loose stools after finishing her course of antibiotics that has lasted up until 2 days prior to this appointment. While she was sick, she quit drinking mountain dew and quit smoking. She says for the last 2 days shes been feeling better finally- She had been taking a probiotic when her gut symptoms started. After  "starting the probiotic she developed vaginal discomfort/itching so stopped it. Shes unsure why she's started feeling better over the last 2 days. Cancelled her PFTs because she was not feeling well, she is due for them.   Currently on cellcept 1500mg/d, tolerating well.      ROS  Gen: Denies weight loss or fevers or night sweats  HEENT:  No ocular inflammation..  Denies swollen glands  CV:  Denies chest pains  Resp:  denies chronic cough or dyspnea.  No pleurisy  Ext:  No swelling in extremities.    Skin:  No new rashes  Remainder of 10-point ROS as per Interval history or negative,     Past Medical History  1.  Diffuse systemic sclerosis  2.  CCP+ inflammatory arthritis  3.  Small bowel overgrowth syndrome - positive hydrogen test   4.  GERD  5.  Plantar fasciitis - seen by Dr. Jorgensen  6.  Chronic neuropathic pain - on lyrica and chronic percocet    Allergies   Allergies   Allergen Reactions     Penicillin [Esters] Other (See Comments)     Unable to move     Bactroban [Mupirocin Calcium]      Inability to move     Levaquin Other (See Comments)     Muscle weakness       Rifampin Other (See Comments)     Makes pain medication less effective     Medications  Current Outpatient Prescriptions   Medication     LORazepam (ATIVAN) 0.5 MG tablet     valACYclovir (VALTREX) 500 MG tablet     oxyCODONE (ROXICODONE) 5 MG immediate release tablet     oxyCODONE-acetaminophen (PERCOCET) 5-325 MG per tablet     pregabalin (LYRICA) 75 MG capsule     rifaximin (XIFAXAN) 550 MG TABS     mycophenolate (CELLCEPT-BRAND NAME) 500 MG tablet     omeprazole (PRILOSEC) 40 MG capsule     ondansetron (ZOFRAN) 4 MG tablet     Needle, Disp, 30G X 1/2\" MISC     diazepam (VALIUM) 5 MG tablet     fish oil-omega-3 fatty acids (FISH OIL) 1000 MG capsule     METHOTREXate 25 MG/ML injection     leucovorin (WELLCOVORIN) 5 MG tablet     Syringe/Needle, Disp, 27G X 1/2\" 1 ML MISC     Physical Exam  There were no vitals taken for this visit.  General: " pleasant, cooperative  HEENT: EOMI,no conjunctivitis or scleritis. Chronic facial skin thickening without facial rash. Slight pursing of her lips. Slight restriction in opening of the jaw. Oropharynx exam reveals no lesions. Moderate saliva pooling. No cervical adenopathy  CV: regular rhythm; no murmurs.    Resp: scattered crackles bilaterally  GI:  Soft, NTND  Ext:  No peripheral edema.   Skin:  Chronic sclerodactyly, most prominent in R hand (contracture at 50% finger flexion) vs. Left (missing final 20' extension).  Chronic skin thickening with evidence for improvement along forearms, upper arms, chest, abdomen, lower legs and feet with shiny appearance of the skin over the dorsal R sided digits. No cellulitis today, ulceration  Healed.  Stable Calcinosis-like deposit noted on her right anterior thigh just above the knee joint and on right fith finger. . Salt/pepper appearance to areas of skin (christiano arms and upper chest).  No other rashes.   Closed  DU  over  the right 5th and  3rd  PIP joint: no otherDU.   Mild skin thinning/erythema along other PIPs. 2+ over fingers ; 1+ maximum over hands, and feet and shins, and face, progressing to atrophic status.      MSK:   Full joint exam without evidence for synovitis, but right hip pain in joint and over trochate. Chronic joint contractures of fingers (christiano right); minimally also along wrists and elbows.       RESULTS:  Component      Latest Ref Rn 1/17/2013           2:15 PM   WBC      4.0 - 11.0 10e9/L 4.6   RBC Count      3.8 - 5.2 10e12/L 3.71 (L)   Hemoglobin      11.7 - 15.7 g/dL 11.8   Hematocrit      35.0 - 47.0 % 37.4   MCV      78 - 100 fl 101 (H)   MCH      26.5 - 33.0 pg 31.8   MCHC      31.5 - 36.5 g/dL 31.6   RDW      10.0 - 15.0 % 13.9   Platelet Count      150 - 450 10e9/L 200     Component      Latest Ref Rn 1/17/2013   ALT      0 - 50 U/L 23   AST      0 - 45 U/L 30   Albumin      3.9 - 5.1 g/dL 4.2   CRP Inflammation      0.0 - 8.0 mg/L <5.0   Sed  Rate      0 - 20 mm/h 18         ASSESSMENT: Per the Problem List    PLAN AND RECOMMENDATIONS:   Cellulitis over the right fourth digital ulcer in the PIP has healed after 3 w course of cephalexin 500 mg q.i.d. and azithromycin 250 mg daily, however did have significant GI upset after stopping this medication. WE did discuss that the GI upset may have been due to changes in the GI kash, however we also reviewed that her opioid use may play a role further slowing her gut motility. Encouraged eating foods with diverse bacterial burden such as cheeses or yogurts to help steady her gut kash, particularly if she needs antibiotics again. We encouraged her to reduce her opioid dose as much as possible.   Encouraged her to continue her lyrica for pain.         She should have repeat pulmonary function testing when we next see her because it was due at this visit, but she canceled her appointment as she felt unwell. She will reschedule these for next visit.     For her diffuse SSc, recommended continuing Cellcept 1.5g daily. Continue evoxac for dry mouth.     Did recommend Shingrix vaccination today, patient will think about it.        I will see her back in approximately 3 months, sooner p.r.n.     This patient was seen and staffed with Dr. Child, rheumatology attending.     Tricia Mckenna MD  Medicine/Dermatology, PGY-5  (p) [phone]       I saw the patient with the resident.  My exam and recomendations are as described.      Brennen Child MD

## 2018-04-10 NOTE — NURSING NOTE
"Chief Complaint   Patient presents with     RECHECK     Scleroderma       Initial /57  Pulse 66  Temp 98.2  F (36.8  C) (Oral)  Ht 1.638 m (5' 4.5\")  Wt 56.2 kg (123 lb 12.8 oz)  SpO2 98%  BMI 20.92 kg/m2 Estimated body mass index is 20.92 kg/(m^2) as calculated from the following:    Height as of this encounter: 1.638 m (5' 4.5\").    Weight as of this encounter: 56.2 kg (123 lb 12.8 oz).  Medication Reconciliation: complete     Harry Muse MA    "

## 2018-04-10 NOTE — MR AVS SNAPSHOT
After Visit Summary   4/10/2018    Willard rGayson    MRN: [id number]           Patient Information     Date Of Birth          1965        Visit Information        Provider Department      4/10/2018 5:00 PM Brennen Child MD [facility] Rheumatology        Today's Diagnoses     Systemic sclerosis (H)        Rheumatoid arthritis involving multiple sites with positive rheumatoid factor (H)        Raynaud's disease with gangrene (H)        Metatarsalgia, unspecified laterality        Metatarsalgia of both feet        Osteopenia, unspecified location           Follow-ups after your visit        Your next 10 appointments already scheduled     May 08, 2018  2:30 PM CDT   FULL PULMONARY FUNCTION with UC PFL C   [facility] Pulmonary Function Testing ([place])    [address]  [address] [facility] [address] [phone]            May 08, 2018  3:30 PM CDT   Six Minute Walk with UC PFL 6 MINUTE WALK 1   [facility] Pulmonary Function Testing ([place])    [address]  [address] [facility] [address] [phone]            Jul 19, 2018  2:30 PM CDT   (Arrive by 2:15 PM)   RETURN SCLERODERMA with Brennen Child MD   [facility] Rheumatology ([place])    [address]  [address] [place] [address] [phone]              Who to contact     If you have questions or need follow up information about today's clinic visit or your schedule please contact [facility]AT[facility] directly at [phone].  Normal or non-critical lab and imaging results will be communicated to you by MyChart, letter or phone within 4 business days after the clinic has received the results. If you do not hear from us within 7 days, please contact the clinic through MyChart or phone. If you have a critical or abnormal lab result, we will notify you by phone as soon as possible.  Submit refill requests through  "Timt or call your pharmacy and they will forward the refill request to us. Please allow 3 business days for your refill to be completed.          Additional Information About Your Visit        MyChart Information     [employer] lets you send messages to your doctor, view your test results, renew your prescriptions, schedule appointments and more. To sign up, go to [email or web address] . Click on \"Log in\" on the left side of the screen, which will take you to the Welcome page. Then click on \"Sign up Now\" on the right side of the page.     You will be asked to enter the access code listed below, as well as some personal information. Please follow the directions to create your username and password.     Your access code is: [email or web address]  Expires: 2018  7:30 AM     Your access code will  in 90 days. If you need help or a new code, please call your [facility] clinic or [phone].        Care EveryWhere ID     This is your Care EveryWhere ID. This could be used by other organizations to access your [facility] medical records  [id number]        Your Vitals Were     Pulse Temperature Height Pulse Oximetry BMI (Body Mass Index)       66 98.2  F (36.8  C) (Oral) 1.638 m (5' 4.5\") 98% 20.92 kg/m2        Blood Pressure from Last 3 Encounters:   04/10/18 101/57   18 122/63   17 106/64    Weight from Last 3 Encounters:   04/10/18 56.2 kg (123 lb 12.8 oz)   18 57.2 kg (126 lb 3.2 oz)   17 54.9 kg (121 lb)              Today, you had the following     No orders found for display         Today's Medication Changes          These changes are accurate as of 4/10/18  6:05 PM.  If you have any questions, ask your nurse or doctor.               These medicines have changed or have updated prescriptions.        Dose/Directions    valACYclovir 500 MG tablet   Commonly known as:  VALTREX   This may have changed:  when to take this   Used for:  Herpes, Systemic sclerosis (H), Anxiety        Dose:  500 " mg   Take 1 tablet (500 mg) by mouth 2 times daily   Quantity:  180 tablet   Refills:  1                Primary Care Provider Office Phone # Fax #    Lindy Santacruz Leatha Plasencia [phone]       [facility] [address] [address]        Equal Access to Services     SOL CHAU : Hadii ru ku hadasho Soomaali, waaxda luqadaha, qaybta kaalmada adeegyada, quinten leblancvaleriewalker caruso. So [facility] [phone].    ATENCIÓN: Si habla español, tiene a paige disposición servicios gratuitos de asistencia lingüística. [facility] [phone].    We comply with applicable federal civil rights laws and [place] laws. We do not discriminate on the basis of race, color, national origin, age, disability, sex, sexual orientation, or gender identity.            Thank you!     Thank you for choosing [facility] R[facility]UMATOLOG[facility]  for your care. Our goal is always to provide you with excellent care. Hearing back from our patients is one way we can continue to improve our services. Please take a few minutes to complete the written survey that you may receive in the mail after your visit with us. Thank you!             Your Updated Medication List - Protect others around you: Learn how to safely use, store and throw away your medicines at www.disposemymeds.org.          This list is accurate as of 4/10/18  6:05 PM.  Always use your most recent med list.                   Brand Name Dispense Instructions for use Diagnosis    aspirin-dipyridamole  MG per 12 hr capsule    AGGRENOX     Take 1 capsule by mouth        Blood Pressure Monitoring Kit     1 kit    Check BP every 7 days.    Rheumatoid arthritis(714.0), Sicca syndrome (H), Systemic sclerosis (H), Encounter for long-term (current) use of other medications, Abnormal hemoglobin (Hgb) (H), Ulcer of finger, with fat layer exposed (H), Chronic osteomyelitis, site unspecified, Anxiety, Herpes, Rheumatoid arthritis(714.0)       cevimeline 30 MG  capsule    EVOXAC    270 capsule    Take 1 capsule (30 mg) by mouth 3 times daily as needed    Metatarsalgia, unspecified laterality, Systemic sclerosis (H), Sicca syndrome (H)       cyanocobalamin 1000 MCG/ML injection    VITAMIN B12     Inject 1 mL into the muscle every 30 days        esomeprazole 40 MG CR capsule    nexIUM    180 capsule    Take 1 capsule (40 mg) by mouth 2 times daily Take 30-60 minutes before eating.    GAVE (gastric antral vascular ectasia)       folic acid 1 MG tablet    FOLVITE    90 tablet    Take 1 tablet (1 mg) by mouth daily    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 factor (H), Osteomyelitis of finger of right hand (H), Encounter for long-term current use of medication, Sicca syndrome (H), GAVE (gastric antral vascular ectasia), Raynaud's disease with gangrene (H)       LORazepam 0.5 MG tablet    ATIVAN    60 tablet    Take 1 - 2 tablets by mouth three times daily as needed.    Systemic sclerosis (H), Anxiety, Ulcer of finger, with fat layer exposed (H), Raynaud's disease with gangrene (H), Osteomyelitis of finger of right hand (H), Rheumatoid arthritis involving multiple sites with positive rheumatoid factor (H), Systemic sclerosis (H)       mycophenolate 500 MG tablet    GENERIC EQUIVALENT    360 tablet    Take 3 tablets (1,500 mg) by mouth daily    Systemic sclerosis (H)       ondansetron 4 MG ODT tab    ZOFRAN-ODT    20 tablet    LET 1 TABLET DISSOLVE IN MOUTH EVERY 8 HOURS AS NEEDED FOR NAUSEA    Nausea       * oxyCODONE IR 5 MG tablet    ROXICODONE    204 tablet    Take 1 tablet (5 mg) by mouth every 4 hours as needed for moderate to severe pain or pain maximum 6 tablet(s) per day    Systemic sclerosis (H), Rheumatoid arthritis involving multiple sites with positive rheumatoid factor (H), Raynaud's disease with gangrene (H), Metatarsalgia, unspecified laterality, Metatarsalgia of both feet, Osteopenia, unspecified location       * oxyCODONE IR 5 MG  tablet   Start taking on:  4/24/2018    ROXICODONE    204 tablet    Take 1 tablet (5 mg) by mouth every 4 hours as needed for moderate to severe pain or pain maximum 6 tablet(s) per day    Systemic sclerosis (H), Rheumatoid arthritis involving multiple sites with positive rheumatoid factor (H), Raynaud's disease with gangrene (H), Metatarsalgia, unspecified laterality, Metatarsalgia of both feet, Osteopenia, unspecified location       * oxyCODONE-acetaminophen 5-325 MG per tablet    PERCOCET    90 tablet    Take 1 tablet by mouth every 4 hours as needed for moderate to severe pain Max 3 tabs per day    Systemic sclerosis (H), Rheumatoid arthritis involving multiple sites with positive rheumatoid factor (H), Raynaud's disease with gangrene (H), Metatarsalgia, unspecified laterality, Metatarsalgia of both feet, Osteopenia, unspecified location       * oxyCODONE-acetaminophen 5-325 MG per tablet    PERCOCET    90 tablet    Take 1 tablet by mouth every 4 hours as needed for moderate to severe pain or pain maximum 3 tablet(s) per day    Systemic sclerosis (H), Rheumatoid arthritis involving multiple sites with positive rheumatoid factor (H), Raynaud's disease with gangrene (H), Metatarsalgia, unspecified laterality, Metatarsalgia of both feet, Osteopenia, unspecified location       * oxyCODONE-acetaminophen 5-325 MG per tablet   Start taking on:  4/24/2018    PERCOCET    90 tablet    Take 1 tablet by mouth every 4 hours as needed for moderate to severe pain or pain maximum 3 tablet(s) per day    Systemic sclerosis (H), Rheumatoid arthritis involving multiple sites with positive rheumatoid factor (H), Raynaud's disease with gangrene (H), Metatarsalgia, unspecified laterality, Metatarsalgia of both feet, Osteopenia, unspecified location       pravastatin 10 MG tablet    PRAVACHOL    30 tablet    Take 1 tablet (10 mg) by mouth daily    Rheumatoid arthritis involving multiple sites with positive rheumatoid factor (H)        pregabalin 75 MG capsule    LYRICA    180 capsule    Take 1 capsule (75 mg) by mouth 2 times daily    Systemic sclerosis (H)       valACYclovir 500 MG tablet    VALTREX    180 tablet    Take 1 tablet (500 mg) by mouth 2 times daily    Herpes, Systemic sclerosis (H), Anxiety       Vitamin D (Cholecalciferol) 1000 units Tabs      Take 4,000 Units by mouth daily        * Notice:  This list has 5 medication(s) that are the same as other medications prescribed for you. Read the directions carefully, and ask your doctor or other care provider to review them with you.

## 2018-04-10 NOTE — PROGRESS NOTES
Rheumatology Clinic Follow-up  [facility], Rheumatology        History of rheumatologic diagnosis:    Problem List:  1. 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   2. Associated marked neuropathic skin pain markedly improved with Lyrica with prior medication therapy with gabapentin.   3. Diffuse musculoskeletal pain requiring methotrexate plus CellCept, and narcotic to control, but also improved.   4. Initial clinical overlap with anti-CCP seropositive rheumatoid arthritis with continued anti-CCP seropositivity as of 09/2010.   5. Raynaud's phenomena with digital ulcers with easily cracked skin, improving 4/18.   6. GI involvement consistent with bacterial small bowel overgrowth with marked improvement after a course of neomycin followed by Dr. Glez; positive hydrogen breath test in 3/2012 - treated wtih rifaximin through Dr. Jacob  7. No convincing evidence of lung involvement on serial pulmonary function tests or clinically.   8. History of heart palpitations prior to the onset of SSc with stable symptoms since.   9. History of medication failures with Remicade, methotrexate, methotrexate plus CellCept and with Orencia and with improvement since initiation of IV IG but with complicating headaches and overall sense of malaise with IV IG infusions.   10. History of silicone breast implants.   11. Incidental finding of calcified splenic artery aneurysm.  12. EGD in 3/2012 with hiatal hernia and gastral antral vascular ectasia        13. Recurrent right  third finger contracture with  extensor surface ulceration, healed           INTERVAL HISTORY, Since our last visit and her November hospitalization, she has had no more digital ulcers and decreased pain in her fingers.  She  did quit smoking at the time of that hospitalization and she has not resumed.  She still has pain in her fingers, but not nearly so severe.  Her biggest problem with her fingers remains the marked contractures.  She has difficulty using the paraffin bath because the paraffin is too hot, so we discussed simply unplugging it for a few minutes and letting it cool down a little and then before it gels using it to warm her hands and allow the stretches.       Since we have last seen her, she has seen her primary care.  I had requested that the lorazepam prescriptions be moved back over to primary care since I had simply assumed them when she had no primary care physician for her existing diagnosis of anxiety.  That has now been accomplished.      She also continues on oxycodone and we again today discussed attempting to taper that down.  She has decreased pain and hopefully can accomplish that, although she still does have multiple sources of musculoskeletal pain.  She has been off of methotrexate since the fall as she was having some gastrointestinal issues with it, but we did discuss that if tapering the narcotics becomes too problematic because of musculoskeletal pain with morning stiffness, resuming low-dose methotrexate might be possible and desirable.        She was started on a statin during the hospitalization and I also asked that that be taken over the primary care.  In fact, she brings her lipid results with her today and they are very good with her total cholesterol only 202, just barely above target, LDL only 133, just barely above target, HDL at 45 which is target, and triglycerides under target, so I am not sure that particularly now that she has the cigarette smoking removed as a risk factor and she has minimal family risk that she necessarily will need statins long-term.  On the other hand, they may have a vascular stabilizing effect, so I am certainly not opposed to her remaining on it and she does not  have side effect with it.       Her biggest issues recently have been gastrointestinal.  She has felt bloated, nauseated, and at times is having significant constipation.  She does not get diarrhea suggesting bacterial small bowel overgrowth.       She is having a lot of issues with her insurance and whether she can pay for her medical bills.  She therefore would like to see our .      She does get some dysphagia, but that is not changing and is not severe.       As noted, she has a number of sources of musculoskeletal pain including pain in her knees and hips and greater trochanteric area.  She did have what sounds like a Baker's cyst a few weeks ago, but that resolved quickly and she did not have any known antecedent injury that would explain that.     FEBRUARY 16, 2017, INTERVAL HISTORY:  Since I last saw the patient, she went down to Gastroenterology at the [facility] and saw Dr. Tatum there.  She was found to have recurrent versus ongoing esophageal candidiasis on upper endoscopy but had a normal colonoscopy.  A rectal balloon test, however, was abnormal, consistent with sphincter dysfunction potentially contributing to her chronic constipation.       She was also told she needed iron infusions.  This is somewhat curious in that her iron studies done at [facility] on 01/27 were only minimally abnormal and her hemoglobin and hematocrit were only minimally abnormal as well.  Apparently, her ferritin was very low, however.       We do not have any of those records to look at.  She says that she asked that they be sent here, but she was told they would be sent only to her primary care doctor.  I have looked in her [facility] notes on Care Everywhere, but I cannot see that they have been scanned in at this point.       She has some financial issues at this point.  Her labs and other tests are best paid for if they are done at [facility].  Fortunately, her primary care doctor at [facility] was adept enough with the  system that she was able to get the labs done at [facility] and have them come directly to my box.  I have reviewed all of them with her today and, as noted, she  has only had some very modest anemia.  She has no other evidence of any toxicity from the CellCept or really any other abnormal labs.       She has been doing well with her Raynaud's.  She has had no episodes of severe distal digital ischemia.  She is very careful about stopping attacks as soon as they get started with rewarming.  She has hand warmers with her at all times and a hand muff that she uses.       Unfortunately, she has restarted smoking again.  She is much more careful with it, she says, however, and is not smoking outdoors when in the cold the way she was previously when she developed a severe ulcer that would not heal.       Although she hated the Botox treatment that Dr. Beauchamp gave her, she has not had any further episodes since that time.       She does believe her lungs are stable.  She is not doing a lot of significant aerobic exercise, but she has not noticed any drop-off in her exercise tolerance with walking, etc.  She has not had PFTs done and I urged her to have them done at [facility] since it is so expensive for her to do them here.       She feels okay otherwise in general.  She continues to have some musculoskeletal pain but does not think it is really a lot worse.     INTERVAL HISTORY (05/18/2017):  Since we have last seen the patient, she developed a pretty bad sore throat.  This really sounds viral but she got a Z-GRIFFIN for it.  The soreness in her throat was posterior oropharyngeal extending only a little ways down her throat, but she was concerned that it could represent recurrent esophageal candidiasis since that was diagnosed on an endoscopy at [facility].  She was then told by one of her physicians that she should have had a repeat EGD, despite the fact that the original report said repeat EGD needed to be done only if she had  persistent or ongoing symptoms.      The trouble with that is that her only symptoms initially were not pain but were dysphagia.  She has had some longstanding low-grade dysphagia.  She does not believe that has progressed in any way.  It certainly is not worse since she has been on the Z-GRIFFIN which she has now completed.      We have therefore discussed with her that certainly after a Z-GRIFFIN with 2 weeks of therapy with it that were this esophageal candidiasis it should not be better and it might well be worse.  I think given the overall situation that is very unlikely.      She had a lot of constipation and we gave her a bowel regimen for that and with that she has done quite well.      One of the things that is bothering her more is that by the end of the day, both the bottom of her feet and her fingers really burner.  This is not as bad first thing in the day.      What is better is that she has had only 1 digital ulcer, very low-grade in the fifth PIP currently, with no distal pulp ulcers since Dr. Beauchamp gave her the Botox injection.      She also has no evidence of any progressive cardiopulmonary disease and indeed her last pulmonary function tests were completely stable.  She does not do a lot of her aerobic exercise, but she can climb stairs without difficulties.      She has had a history of blood in the urine.  Because of her smoking history, she saw an urologist who did a cystoscopy that was negative.  She was then told she should see a renal doctor.      However, since January here she has had 2 years that has ever completely free of any evidence of blood.  Blood has not observed microscopic stable as far as I can tell and was positive only on the dip stick.  This did still occur in 2016.  Prior to that, she had been on methotrexate, which is known to potentially cause this.      The other thing is she is noticing and is quite concerned about is worsening oral aperture and more and more problems with  her teeth including retraction.  She continues to smoke.  She has significant keratoconjunctivitis sicca, although it is definitely improved with twice daily Evoxac.       She feels okay otherwise in general.  She continues to have some musculoskeletal pain but does not think it is really a lot worse.     AUGUST 17, 2017, INTERVAL HISTORY:  Since we have last seen the patient, she has been having a lot of stressors.  Specifically, her daughter had a baby in late July, then had a complication that kept her in the hospital for some time.  Her sister has also been in the hospital with what she reports has a brain hemorrhage.  She is 42 years old and this all apparently developed after an infected back injection for pain.  She has now been told that she has positive autoimmune antibodies and that she might have systemic lupus, apparently.       With all of this stress, she has again resumed smoking after having had cessation.  She has some intermittent ulcers over her right third and right fifth PIP joints.  She is, if anything, more contractured there.  She is not doing stretching because of the pain.  They are not open currently, however.  If she hits them, of course, they hurt more and open up.       She is also getting a lot of hip pain, particularly over the right hip at night.  Much of the pain she describes as consistent with trochanteric bursitis, but some may be consistent with true hip pain deeper in.  She feels a tendon rub at times over this area as well.       She has been off of methotrexate for a long time, as her skin had improved enough that we felt we could get her off of it and she had some gastrointestinal symptoms that were arguably worsened from it.  She continues to have a lot of GI symptoms currently with intermittent bloating and nausea.  She has persistently had some low-grade dysphagia as well.  None of that is all that severe right now, and pain is the bigger problem.       She has apparently  had some recent mix-up with the narcotic prescriptions as well.  I had signed a Percocet prescription last week, but that was apparently not the one that was due; it was rather the oxycodone, which I did sign just 2 days ago.  She has apparently not picked up the Percocet prescription and is wanting to make sure she gets it refilled now for 2 weeks from now rather 10 days from now when it is due.        She is having some skin irritation over her upper back with a lot of itching and burning pain.  She has excoriated that area.  She wants to make sure that it does not look like a skin cancer.       Other features of her disease remained relatively stable.  She is getting Raynaud's attacks despite the fact that it is not particularly cool right now.  This may be being worsened by her cigarette smoking, and we discussed that.     SEPTEMBER 21, 2017, INTERVAL HISTORY:  The patient started methotrexate back in mid August, but she felt so much worse on it with gastrointestinal symptoms that after she recently stopped it, she did not resume it.  She is still having some musculoskeletal pain, particularly in the right side of her ribs.  This is one of the reasons we started it.  She is not sure whether it helped at all, but she was only on it for a very short time.       She is also having some musculoskeletal pain in her neck and, of course, within her hands which have the marked contractures.       She still has some GERD going on, but that is better on the Nexium b.i.d.  She has to sleep sitting up, however, and she has been doing that for some time to avoid severe GERD.        She called us to tell us that she had a really bad experience with the most recent Percocet that she got.  She believes that this was because of a change in .  She returned the pills to the pharmacy and they gave her some from the  she had used previously, and she felt those were better so she has asked that on an ongoing  basis that those are the ones that she gets.       She is concerned about some areas in her fingers that feel like she might be developing new ulcerations, but she has had nothing open up there.  She does get some Raynaud's and is concerned about that worsening with the onset of colder weather.     INTERVAL HISTORY:  Since we have last seen her, she finally started having her back pain get better.  She thinks some of that was because she was getting strain there when she was walking her dog from the dog pulling on the leash.  She did have to give her dog away because she was having trouble taking care of it.      Her primary care doctor had actually done some plain films and was concerned about a possible lesion but a followup CT scan showed only shadowing.  A DEXA scan was done and she said that showed low bone density.  Although we have Care Everywhere, I cannot see the actual results of the DEXA scans so I do not know precisely what it said.  It was recommended to her she take calcium and vitamin D, but she is already taking 2000 international units a day of vitamin D and eating a diet that is almost entirely dairy.      She says that in part because she is having so much trouble eating.  She says the food feels like her enemy.  She says protein drinks make her sick and in general she feels better if she does not eat but she knows she needs to eat something so she will try to eat food.  She has already had an endoscopy at [facility] and additional workup there and that was unrevealing.  She does, however, remain on around-the-clock, oxycodone and we started to discuss that.      Because her gut is so problematic she has not had any methotrexate since August or September and she felt like that made her sicker.      On the positive side, she is able to control her Raynaud's phenomena pretty well with rewarming.  She is on Aggrenox now and she thinks that has definitely helped her ulcers.  She is not however doing regular  stretching of any kind let alone her hands.      She is due for labs.  The last ones were done 3 months ago.  She did on her own in addition to stopping the methotrexate reduce her MMF to 3 tablets a day.  That is probably okay based on her most recent pulmonary function tests done last April which were very good.  She is not having any increased shortness of breath, decreased exercise tolerance, or dry cough.  On the other hand, she is doing virtually no exercise.  She does continue to smoke.     02/27/2018 Interval history:     Since we have last seen her, she was doing pretty well for a while this winter but unfortunately just in the last several weeks has developed several digital ulcers.  The first was on her right fifth finger over the PIP joint.  This probably was a calcinosis lesion because she has had those before and she was able to pick something out of it and then it healed nicely.  Based on her description that was likely calcinosis.      However, a more troublesome lesion developed shortly thereafter in the right fourth PIP extensor surface.  She is severely contractured there and previously had osteomyelitis at this location.  She has over the last 4-5 days again developed really painful and significant redness around that site consistent with cellulitis.      When she had osteomyelitis there several years ago we were able to successfully treat her with oral antibiotics under the guidance of Dr. Ramirez.  He had given her cephalexin 500 mg q.i.d. plus azithromycin 250 mg daily after trials of several other antibiotic combinations and she did finally heal with this.  We therefore discussed using that again today.      She fortunately has not had other digital ulcers or distal digital ulcers.  She remains on CellCept, though not on methotrexate.      Her breathing feels fine to her and she denies any dry or productive cough.  She has been ongoing laboratory monitoring.  It has been almost a year however  since she had PFTs.      She does have some headaches at night.  She does not tend to have these so much during the day.  They are not on an everyday basis and she cannot really link them to anything.      She continues to have a lot of gout symptoms.  In general, she feels sick to her stomach easily and has gut pain easily.  She is doing better with respect to constipation on a regimen I have provided her before with a combination of fiber, MiraLax and titration of magnesium to keep things moving.      She denies any other new symptoms or other new features of disease.      She does acknowledge, however, pretty significant depression.  She says this always worsens her in the winter.  She has previously had counseling, but cannot afford it because of a high co-pay.  She is not following with anyone on a regular basis for it other than when she discusses it with me.      Interval history 4/10/2018   Though Willard reports feeling very good today, she reports significant illness since she was last seen. At last visit 2/27, she was given keflex and azithromycin which she took for 21 days, for concern of developing infection of the ulceration over the 4th and 5th dorsal PIP joints. Willard tolerated the antibiotics well, but reports that att the end of March, she had bad GI upset- bloating, gas, nausea, 1 episode of loose stools after finishing her course of antibiotics that has lasted up until 2 days prior to this appointment. While she was sick, she quit drinking mountain dew and quit smoking. She says for the last 2 days shes been feeling better finally- She had been taking a probiotic when her gut symptoms started. After starting the probiotic she developed vaginal discomfort/itching so stopped it. Shes unsure why she's started feeling better over the last 2 days. Cancelled her PFTs because she was not feeling well, she is due for them.   Currently on cellcept 1500mg/d, tolerating well.      ROS  Gen: Denies weight loss  "or fevers or night sweats  HEENT:  No ocular inflammation..  Denies swollen glands  CV:  Denies chest pains  Resp:  denies chronic cough or dyspnea.  No pleurisy  Ext:  No swelling in extremities.    Skin:  No new rashes  Remainder of 10-point ROS as per Interval history or negative,     Past Medical History  1.  Diffuse systemic sclerosis  2.  CCP+ inflammatory arthritis  3.  Small bowel overgrowth syndrome - positive hydrogen test   4.  GERD  5.  Plantar fasciitis - seen by Dr. Jorgensen  6.  Chronic neuropathic pain - on lyrica and chronic percocet    Allergies   Allergies   Allergen Reactions     Penicillin [Esters] Other (See Comments)     Unable to move     Bactroban [Mupirocin Calcium]      Inability to move     Levaquin Other (See Comments)     Muscle weakness       Rifampin Other (See Comments)     Makes pain medication less effective     Medications  Current Outpatient Prescriptions   Medication     LORazepam (ATIVAN) 0.5 MG tablet     valACYclovir (VALTREX) 500 MG tablet     oxyCODONE (ROXICODONE) 5 MG immediate release tablet     oxyCODONE-acetaminophen (PERCOCET) 5-325 MG per tablet     pregabalin (LYRICA) 75 MG capsule     rifaximin (XIFAXAN) 550 MG TABS     mycophenolate (CELLCEPT-BRAND NAME) 500 MG tablet     omeprazole (PRILOSEC) 40 MG capsule     ondansetron (ZOFRAN) 4 MG tablet     Needle, Disp, 30G X 1/2\" MISC     diazepam (VALIUM) 5 MG tablet     fish oil-omega-3 fatty acids (FISH OIL) 1000 MG capsule     METHOTREXate 25 MG/ML injection     leucovorin (WELLCOVORIN) 5 MG tablet     Syringe/Needle, Disp, 27G X 1/2\" 1 ML MISC     Physical Exam  There were no vitals taken for this visit.  General: pleasant, cooperative  HEENT: EOMI,no conjunctivitis or scleritis. Chronic facial skin thickening without facial rash. Slight pursing of her lips. Slight restriction in opening of the jaw. Oropharynx exam reveals no lesions. Moderate saliva pooling. No cervical adenopathy  CV: regular rhythm; no murmurs.  "   Resp: scattered crackles bilaterally  GI:  Soft, NTND  Ext:  No peripheral edema.   Skin:  Chronic sclerodactyly, most prominent in R hand (contracture at 50% finger flexion) vs. Left (missing final 20' extension).  Chronic skin thickening with evidence for improvement along forearms, upper arms, chest, abdomen, lower legs and feet with shiny appearance of the skin over the dorsal R sided digits. No cellulitis today, ulceration  Healed.  Stable Calcinosis-like deposit noted on her right anterior thigh just above the knee joint and on right fith finger. . Salt/pepper appearance to areas of skin (christiano arms and upper chest).  No other rashes.   Closed  DU  over  the right 5th and  3rd  PIP joint: no otherDU.   Mild skin thinning/erythema along other PIPs. 2+ over fingers ; 1+ maximum over hands, and feet and shins, and face, progressing to atrophic status.      MSK:   Full joint exam without evidence for synovitis, but right hip pain in joint and over trochate. Chronic joint contractures of fingers (christiano right); minimally also along wrists and elbows.       RESULTS:  Component      Latest Ref Rng 1/17/2013           2:15 PM   WBC      4.0 - 11.0 10e9/L 4.6   RBC Count      3.8 - 5.2 10e12/L 3.71 (L)   Hemoglobin      11.7 - 15.7 g/dL 11.8   Hematocrit      35.0 - 47.0 % 37.4   MCV      78 - 100 fl 101 (H)   MCH      26.5 - 33.0 pg 31.8   MCHC      31.5 - 36.5 g/dL 31.6   RDW      10.0 - 15.0 % 13.9   Platelet Count      150 - 450 10e9/L 200     Component      Latest Ref Rng 1/17/2013   ALT      0 - 50 U/L 23   AST      0 - 45 U/L 30   Albumin      3.9 - 5.1 g/dL 4.2   CRP Inflammation      0.0 - 8.0 mg/L <5.0   Sed Rate      0 - 20 mm/h 18         ASSESSMENT: Per the Problem List    PLAN AND RECOMMENDATIONS:   Cellulitis over the right fourth digital ulcer in the PIP has healed after 3 w course of cephalexin 500 mg q.i.d. and azithromycin 250 mg daily, however did have significant GI upset after stopping this  medication. WE did discuss that the GI upset may have been due to changes in the GI kash, however we also reviewed that her opioid use may play a role further slowing her gut motility. Encouraged eating foods with diverse bacterial burden such as cheeses or yogurts to help steady her gut kash, particularly if she needs antibiotics again. We encouraged her to reduce her opioid dose as much as possible.   Encouraged her to continue her lyrica for pain.         She should have repeat pulmonary function testing when we next see her because it was due at this visit, but she canceled her appointment as she felt unwell. She will reschedule these for next visit.     For her diffuse SSc, recommended continuing Cellcept 1.5g daily. Continue evoxac for dry mouth.     Did recommend Shingrix vaccination today, patient will think about it.        I will see her back in approximately 3 months, sooner p.r.n.     This patient was seen and staffed with Dr. Child, rheumatology attending.     Tricia Mckenna MD  Medicine/Dermatology, PGY-5  (p) [phone]       I saw the patient with the resident.  My exam and recomendations are as described.      Brennen Child MD

## 2018-04-11 ENCOUNTER — TELEPHONE (OUTPATIENT)
Dept: RHEUMATOLOGY | Facility: CLINIC | Age: 53
End: 2018-04-11

## 2018-04-25 DIAGNOSIS — M34.9 SYSTEMIC SCLEROSIS (H): ICD-10-CM

## 2018-04-26 RX ORDER — MYCOPHENOLATE MOFETIL 500 MG/1
1500 TABLET ORAL DAILY
Qty: 90 TABLET | Refills: 0 | Status: SHIPPED | OUTPATIENT
Start: 2018-04-26 | End: 2018-05-08

## 2018-04-26 NOTE — TELEPHONE ENCOUNTER
mycophenolate (GENERIC EQUIVALENT) 500 MG   Last Written Prescription Date: UNK  Last Fill Quantity: UNK,   # refills: UNK  Last Office Visit: 4/10/18  Future Office visit: 7/19/18    CBC RESULTS:   Recent Labs   Lab Test  01/17/18   1320   WBC  6.8   RBC  3.84   HGB  12.1   HCT  38.3   MCV  100   MCH  31.5   MCHC  31.6   RDW  12.8   PLT  205       Creatinine   Date Value Ref Range Status   01/17/2018 0.80 0.52 - 1.04 mg/dL Final   ]    Liver Function Studies -   Recent Labs   Lab Test  01/17/18   1320   11/24/15   1848   PROTTOTAL   --    --   7.8   ALBUMIN  4.0   < >  4.1   BILITOTAL   --    --   0.4   ALKPHOS   --    --   49   AST  15   < >  12   ALT  12   < >  15    < > = values in this interval not displayed.       Routing refill request to provider for review/approval because:  Medication is reported/historical  LABS   1/17/18

## 2018-05-01 ENCOUNTER — TELEPHONE (OUTPATIENT)
Dept: RHEUMATOLOGY | Facility: CLINIC | Age: 53
End: 2018-05-01

## 2018-05-01 DIAGNOSIS — M34.9 SYSTEMIC SCLEROSIS (H): ICD-10-CM

## 2018-05-01 NOTE — TELEPHONE ENCOUNTER
[facility] Call Center    Phone Message    May a detailed message be left on voicemail: yes    Reason for Call: Other: questions about the supply of medication     Action Taken: Message routed to:  [facility] ([facility]): Rheumatology

## 2018-05-02 DIAGNOSIS — M34.9 SYSTEMIC SCLEROSIS (H): ICD-10-CM

## 2018-05-02 DIAGNOSIS — D50.9 ANEMIA, IRON DEFICIENCY: ICD-10-CM

## 2018-05-02 DIAGNOSIS — K31.819 GAVE (GASTRIC ANTRAL VASCULAR ECTASIA): ICD-10-CM

## 2018-05-02 DIAGNOSIS — Z79.899 HIGH RISK MEDICATIONS (NOT ANTICOAGULANTS) LONG-TERM USE: ICD-10-CM

## 2018-05-02 DIAGNOSIS — M06.9 RHEUMATOID ARTHRITIS (H): ICD-10-CM

## 2018-05-02 LAB
ALBUMIN SERPL-MCNC: 4.2 G/DL (ref 3.4–5)
ALBUMIN UR-MCNC: NEGATIVE MG/DL
ALT SERPL W P-5'-P-CCNC: 15 U/L (ref 0–50)
APPEARANCE UR: CLEAR
AST SERPL W P-5'-P-CCNC: 17 U/L (ref 0–45)
BASOPHILS # BLD AUTO: 0 10E9/L (ref 0–0.2)
BASOPHILS NFR BLD AUTO: 0.4 %
BILIRUB UR QL STRIP: NEGATIVE
COLOR UR AUTO: ABNORMAL
CREAT SERPL-MCNC: 0.72 MG/DL (ref 0.52–1.04)
CRP SERPL-MCNC: <2.9 MG/L (ref 0–8)
DIFFERENTIAL METHOD BLD: NORMAL
EOSINOPHIL # BLD AUTO: 0.3 10E9/L (ref 0–0.7)
EOSINOPHIL NFR BLD AUTO: 6.2 %
ERYTHROCYTE [DISTWIDTH] IN BLOOD BY AUTOMATED COUNT: 12 % (ref 10–15)
ERYTHROCYTE [SEDIMENTATION RATE] IN BLOOD BY WESTERGREN METHOD: 11 MM/H (ref 0–30)
GFR SERPL CREATININE-BSD FRML MDRD: 84 ML/MIN/1.7M2
GLUCOSE UR STRIP-MCNC: NEGATIVE MG/DL
HCT VFR BLD AUTO: 37.9 % (ref 35–47)
HGB BLD-MCNC: 12.1 G/DL (ref 11.7–15.7)
HGB UR QL STRIP: ABNORMAL
IMM GRANULOCYTES # BLD: 0 10E9/L (ref 0–0.4)
IMM GRANULOCYTES NFR BLD: 0.2 %
IRON SATN MFR SERPL: 20 % (ref 15–46)
IRON SERPL-MCNC: 76 UG/DL (ref 35–180)
KETONES UR STRIP-MCNC: NEGATIVE MG/DL
LEUKOCYTE ESTERASE UR QL STRIP: NEGATIVE
LYMPHOCYTES # BLD AUTO: 1.9 10E9/L (ref 0.8–5.3)
LYMPHOCYTES NFR BLD AUTO: 42.6 %
MCH RBC QN AUTO: 31.7 PG (ref 26.5–33)
MCHC RBC AUTO-ENTMCNC: 31.9 G/DL (ref 31.5–36.5)
MCV RBC AUTO: 99 FL (ref 78–100)
MONOCYTES # BLD AUTO: 0.4 10E9/L (ref 0–1.3)
MONOCYTES NFR BLD AUTO: 8.2 %
MUCOUS THREADS #/AREA URNS LPF: PRESENT /LPF
NEUTROPHILS # BLD AUTO: 1.9 10E9/L (ref 1.6–8.3)
NEUTROPHILS NFR BLD AUTO: 42.4 %
NITRATE UR QL: NEGATIVE
NRBC # BLD AUTO: 0 10*3/UL
NRBC BLD AUTO-RTO: 0 /100
PH UR STRIP: 6 PH (ref 5–7)
PLATELET # BLD AUTO: 181 10E9/L (ref 150–450)
RBC # BLD AUTO: 3.82 10E12/L (ref 3.8–5.2)
RBC #/AREA URNS AUTO: <1 /HPF (ref 0–2)
SOURCE: ABNORMAL
SP GR UR STRIP: 1 (ref 1–1.03)
TIBC SERPL-MCNC: 376 UG/DL (ref 240–430)
UROBILINOGEN UR STRIP-MCNC: 0 MG/DL (ref 0–2)
WBC # BLD AUTO: 4.5 10E9/L (ref 4–11)
WBC #/AREA URNS AUTO: <1 /HPF (ref 0–5)

## 2018-05-04 NOTE — TELEPHONE ENCOUNTER
[facility] Call Center    Phone Message    May a detailed message be left on voicemail: yes    Reason for Call: Other: RX: mycophenolate (GENERIC EQUIVALENT) 500 MG.  Pt ALWAYS gets 3 month supply at each refill but, only received 30 days the last refill.  [facility] has been calling [facility] but, not getting any response.  Please follow up with [facility] and pt.     Action Taken: Other: p Med Refills Team [facility]

## 2018-05-07 NOTE — TELEPHONE ENCOUNTER
[facility] Call [facility]    Phone Message    May a detailed message be left on voicemail: yes    Reason for Call: Other: Patient called back to verify dose.  She states she takes 500mg 3 times a day.  She also stated she would like a 3 month supply.      Action Taken: Message routed to:  [facility] ([facility] armando

## 2018-05-07 NOTE — TELEPHONE ENCOUNTER
Quantity previously given to pt was 360, directions states she is to take 3 tabs daily.  Left message on answering machine for patient to call back with her current dose. Mycophenolate set up for a 3 month supply and sent to provider to approve.    TIARRA FrankN RN  Rheumatology RN Coordinator  [facility]

## 2018-05-08 RX ORDER — MYCOPHENOLATE MOFETIL 500 MG/1
1500 TABLET ORAL DAILY
Qty: 270 TABLET | Refills: 0 | Status: SHIPPED | OUTPATIENT
Start: 2018-05-08 | End: 2018-08-16

## 2018-05-08 NOTE — TELEPHONE ENCOUNTER
Last seen: 4/10/18  Pending appt:7/19/18   Medication: Mycophenolate     Last filled: 2/27/18  Qty: 270  Lab: 5/2/18    WBC   Date Value Ref Range Status   05/02/2018 4.5 4.0 - 11.0 10e9/L Final     RBC Count   Date Value Ref Range Status   05/02/2018 3.82 3.8 - 5.2 10e12/L Final     Hemoglobin   Date Value Ref Range Status   05/02/2018 12.1 11.7 - 15.7 g/dL Final     Hematocrit   Date Value Ref Range Status   05/02/2018 37.9 35.0 - 47.0 % Final     MCV   Date Value Ref Range Status   05/02/2018 99 78 - 100 fl Final     MCH   Date Value Ref Range Status   05/02/2018 31.7 26.5 - 33.0 pg Final     Platelet Count   Date Value Ref Range Status   05/02/2018 181 150 - 450 10e9/L Final     % Lymphocytes   Date Value Ref Range Status   05/02/2018 42.6 % Final     AST   Date Value Ref Range Status   05/02/2018 17 0 - 45 U/L Final     ALT   Date Value Ref Range Status   05/02/2018 15 0 - 50 U/L Final     Albumin   Date Value Ref Range Status   05/02/2018 4.2 3.4 - 5.0 g/dL Final     Alkaline Phosphatase   Date Value Ref Range Status   11/24/2015 49 40 - 150 U/L Final     Creatinine   Date Value Ref Range Status   05/02/2018 0.72 0.52 - 1.04 mg/dL Final     GFR Estimate   Date Value Ref Range Status   05/02/2018 84 >60 mL/min/1.7m2 Final     Comment:     Non  GFR Calc     GFR Estimate If Black   Date Value Ref Range Status   05/02/2018 >90 >60 mL/min/1.7m2 Final     Comment:      GFR Calc     Creatinine Urine   Date Value Ref Range Status   05/13/2013 7 mg/dL Final     Sed Rate   Date Value Ref Range Status   05/02/2018 11 0 - 30 mm/h Final     CRP Cardiac Risk   Date Value Ref Range Status   08/24/2010 1.0 mg/L Final     Comment:     Reference Values:   Low Risk:           <1.0 mg/L   Average Risk:       1.0-3.0 mg/L   High Risk:          >3.0 mg/L   Acute Inflammation: >8.0 mg/L     CRP Inflammation   Date Value Ref Range Status   05/02/2018 <2.9 0.0 - 8.0 mg/L Final     Refill forwarded to   D[facility] for approval.    Andrew Chiu, BSN RN  Rheumatology RN Coordinator  [facility]

## 2018-05-25 DIAGNOSIS — M77.40 METATARSALGIA, UNSPECIFIED LATERALITY: ICD-10-CM

## 2018-05-25 DIAGNOSIS — M34.9 SYSTEMIC SCLEROSIS (H): ICD-10-CM

## 2018-05-25 DIAGNOSIS — M77.41 METATARSALGIA OF BOTH FEET: ICD-10-CM

## 2018-05-25 DIAGNOSIS — I73.01 RAYNAUD'S DISEASE WITH GANGRENE (H): ICD-10-CM

## 2018-05-25 DIAGNOSIS — M85.80 OSTEOPENIA, UNSPECIFIED LOCATION: ICD-10-CM

## 2018-05-25 DIAGNOSIS — M77.42 METATARSALGIA OF BOTH FEET: ICD-10-CM

## 2018-05-25 DIAGNOSIS — M05.79 RHEUMATOID ARTHRITIS INVOLVING MULTIPLE SITES WITH POSITIVE RHEUMATOID FACTOR (H): ICD-10-CM

## 2018-05-25 NOTE — TELEPHONE ENCOUNTER
[facility] Call [facility]    Phone Message    May a detailed message be left on voicemail: yes    Reason for Call: Medication Refill Request    Has the patient contacted the pharmacy for the refill? Yes   Name of medication being requested: Roxicodone and Percocet - Per pt, switched pharmacy's. Now at the [facility] pharmacy on [address] phone# [phone]    Provider who prescribed the medication: Dr. Child  Pharmacy: Now at the [facility] pharmacy on [address] phone# [phone]  Date medication is needed: Per pt, not due yet but would like to get a head start.      Action Taken: Message routed to:  [facility] Rheum

## 2018-05-25 NOTE — TELEPHONE ENCOUNTER
I spoke to pt & she said she is good on refills until June, or so. Pt asked to have refill request sent to [facility].    Yeimy Kauffman LPN

## 2018-05-31 RX ORDER — OXYCODONE HYDROCHLORIDE 5 MG/1
5 TABLET ORAL EVERY 4 HOURS PRN
Qty: 204 TABLET | Refills: 0 | Status: SHIPPED | OUTPATIENT
Start: 2018-07-10 | End: 2018-10-04

## 2018-05-31 RX ORDER — OXYCODONE AND ACETAMINOPHEN 5; 325 MG/1; MG/1
1 TABLET ORAL EVERY 4 HOURS PRN
Qty: 90 TABLET | Refills: 0 | Status: SHIPPED | OUTPATIENT
Start: 2018-06-17 | End: 2018-07-19

## 2018-05-31 RX ORDER — OXYCODONE HYDROCHLORIDE 5 MG/1
5 TABLET ORAL EVERY 4 HOURS PRN
Qty: 204 TABLET | Refills: 0 | Status: SHIPPED | OUTPATIENT
Start: 2018-08-09 | End: 2018-10-04

## 2018-05-31 RX ORDER — OXYCODONE AND ACETAMINOPHEN 5; 325 MG/1; MG/1
1 TABLET ORAL EVERY 4 HOURS PRN
Qty: 90 TABLET | Refills: 0 | Status: SHIPPED | OUTPATIENT
Start: 2018-07-17 | End: 2018-10-04

## 2018-05-31 RX ORDER — OXYCODONE AND ACETAMINOPHEN 5; 325 MG/1; MG/1
1 TABLET ORAL EVERY 4 HOURS PRN
Qty: 90 TABLET | Refills: 0 | Status: SHIPPED | OUTPATIENT
Start: 2018-08-16 | End: 2018-09-10

## 2018-05-31 RX ORDER — OXYCODONE HYDROCHLORIDE 5 MG/1
5 TABLET ORAL EVERY 4 HOURS PRN
Qty: 204 TABLET | Refills: 0 | Status: SHIPPED | OUTPATIENT
Start: 2018-06-10 | End: 2018-07-19

## 2018-06-01 ENCOUNTER — TELEPHONE (OUTPATIENT)
Dept: RHEUMATOLOGY | Facility: CLINIC | Age: 53
End: 2018-06-01

## 2018-06-01 NOTE — TELEPHONE ENCOUNTER
[facility] Call [facility]    Phone Message    May a detailed message be left on voicemail: yes    Reason for Call: Other: Patient is wondering if the RX for oxyCODONE IR (ROXICODONE) 5 MG tablet can be sent down to the lockbox instead of going to [facility], as that pharmacy has lost the RX in the past. Please call to let patient know where to pick-up.      Action Taken: Message routed to:  [facility] [place]

## 2018-06-01 NOTE — TELEPHONE ENCOUNTER
I called pt. Pt requested Rx for Roxicodone & Percocet to be mailed to [facility] pharm at [address].    Rx mailed to pharm per pt's request.    Yeimy Kauffman LPN

## 2018-06-11 DIAGNOSIS — M77.40 METATARSALGIA, UNSPECIFIED LATERALITY: ICD-10-CM

## 2018-06-11 DIAGNOSIS — M34.9 SYSTEMIC SCLEROSIS (H): ICD-10-CM

## 2018-06-11 DIAGNOSIS — M35.00 SICCA SYNDROME (H): ICD-10-CM

## 2018-06-12 RX ORDER — CEVIMELINE HYDROCHLORIDE 30 MG/1
30 CAPSULE ORAL 3 TIMES DAILY PRN
Qty: 270 CAPSULE | Refills: 3 | Status: SHIPPED | OUTPATIENT
Start: 2018-06-12 | End: 2019-08-29

## 2018-07-18 NOTE — PROGRESS NOTES
[facility]  Rheumatology Clinic  Brennen Child MD  2018     Name: Willard Grayson  MRN: [id number]  Age: 53 year old  : 1965  Referring provider: Lindy Stokes*    Problem List:  1. 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   2. Associated marked neuropathic skin pain markedly improved with Lyrica with prior medication therapy with gabapentin.   3. Diffuse musculoskeletal pain requiring methotrexate plus CellCept, and narcotic to control, but also improved.   4. Initial clinical overlap with anti-CCP seropositive rheumatoid arthritis with continued anti-CCP seropositivity as of 2010.   5. Raynaud's phenomena with digital ulcers with easily cracked skin, improving .   6. GI involvement consistent with bacterial small bowel overgrowth with marked improvement after a course of neomycin followed by Dr. Glez; positiv  hydrogen breath test in 3/2012 - treated wtih rifaximin through Dr. Jacob  7. No convincing evidence of lung involvement on serial pulmonary function tests or clinically.   8. History of heart palpitations prior to the onset of SSc with stable symptoms since.   9. History of medication failures with Remicade, methotrexate, methotrexate plus CellCept and with Orencia and with improvement since initiation of IV IG but with complicating headaches and overall sense of malaise with IV IG infusions.   10. History of silicone breast implants.   11. Incidental finding of calcified splenic artery aneurysm.  12. EGD in 3/2012 with hiatal hernia and gastral antral vascular ectasia  13. Recurrent right  third finger contracture with  extensor surface ulceration, healed      Assessment and Plan:    Systemic sclerosis (H)  Rheumatoid arthritis involving  "multiple sites with positive rheumatoid factor (H)  Raynaud's disease with gangrene (H)  Metatarsalgia, unspecified laterality  Metatarsalgia of both feet  Osteopenia, unspecified location  Plan is to renew her narcotics for 90 days and continue resumption of low dose methotrexate. We discussed lactose elimination and re-challenge, as it is unclear whether this is contributing to GI issues. We will get PFTs to check for worsening restriction - intercostal muscle involvement could be causing her pain. We will also refer her to a dentist at [facility] specializing in SSc .     - oxyCODONE IR (ROXICODONE) 5 MG tablet  Dispense: 204 tablet; Refill: 0  - oxyCODONE-acetaminophen (PERCOCET) 5-325 MG per tablet  Dispense: 102 tablet; Refill: 0  - oxyCODONE IR (ROXICODONE) 5 MG tablet  Dispense: 204 tablet; Refill: 0  - oxyCODONE-acetaminophen (PERCOCET) 5-325 MG per tablet  Dispense: 102 tablet; Refill: 0  - oxyCODONE IR (ROXICODONE) 5 MG tablet  Dispense: 204 tablet; Refill: 0  - oxyCODONE-acetaminophen (PERCOCET) 5-325 MG per tablet  Dispense: 102 tablet; Refill: 0     Follow-up: RTC in 3 months     This visit was 40 mins in duration, the majority spent in counseling.       HPI:   Willard Grayson is a 53 year old female with a history of diffuse cutaneous systemic sclerosis who presents for follow up. She has previously been treated with methotrexate but was stopped due to GI issues. She has a long history of GI troubles, Raynaud's, and joint pain. She was last seen on 4/10/18 at which time she was not feeling well. Plan at that time was to continue her cellcept 1500mg/day, which she was tolerating well. She was also advised to continue lyrica for pain and evoxac for dry mouth.     Today, the patient reports that she has not been feeling great. She continues to have pain \"deep\" in her \"kidney area\" (at the base of the ribs on her right side) that has been there for around a year now. She also complains of headaches " "that \"go up her neck\" and are described as burning and vascular. She also complains of light sensitivity and trouble reading. She is taking evoxac twice a day.     She complains that she has difficulty eating and says that it feels like there's a knot in her stomach. Even if she is still hungry, the knot in her stomach stops her from eating more. She is concerned that she might be lactose intolerant. She has a diet that is reportedly heavily dairy based. She does note however, that she has gained weight recently. She also mentions that she recently restarted smoking. WE discussed again that narcotic use is a possible/likely cause of these symptoms     She complains of dental problems but states that her dentist has trouble getting in her mouth because her mouth is so tight. She also complains that she is having difficulty finding care.     She recently developed fingertip ulcers and mentions that she has had trouble with Raynaud's even in the summer, but her ulcer has healed .     We discussed her  Depression. She has a plan to go to [facility] to see a psychiatrist.      Review of Systems:   Pertinent items are noted in HPI, remainder of complete ROS is negative.    No recent problems with hearing. No swallowing problems.   No breathing difficulty, shortness of breath, coughing, or wheezing  No chest pain or palpitations  No heart burn, indigestion, abdominal pain, nausea, vomiting, diarrhea  No urination problems, no bloody, cloudy urine, no dysuria  No numbing, tingling, weakness  No confusion  No rashes. No easy bleeding or bruising.  +eyes sensitive to light  +headaches    Active Medications:     Current Outpatient Prescriptions:      aspirin-dipyridamole (AGGRENOX)  MG per 12 hr capsule, Take 1 capsule by mouth daily , Disp: , Rfl:      Blood Pressure Monitoring KIT, Check BP every 7 days., Disp: 1 kit, Rfl: 0     cevimeline (EVOXAC) 30 MG capsule, Take 1 capsule (30 mg) by mouth 3 times daily " as needed, Disp: 270 capsule, Rfl: 3     cyanocobalamin (VITAMIN B12) 1000 MCG/ML injection, Inject 1 mL into the muscle every 30 days, Disp: , Rfl:      esomeprazole (NEXIUM) 40 MG capsule, Take 1 capsule (40 mg) by mouth 2 times daily Take 30-60 minutes before eating., Disp: 180 capsule, Rfl: 3     folic acid (FOLVITE) 1 MG tablet, Take 1 tablet (1 mg) by mouth daily, Disp: 90 tablet, Rfl: 3     LORazepam (ATIVAN) 0.5 MG tablet, Take 1 - 2 tablets by mouth three times daily as needed., Disp: 60 tablet, Rfl: 0     mycophenolate (GENERIC EQUIVALENT) 500 MG tablet, Take 3 tablets (1,500 mg) by mouth daily, Disp: 270 tablet, Rfl: 0     ondansetron (ZOFRAN-ODT) 4 MG ODT tab, LET 1 TABLET DISSOLVE IN MOUTH EVERY 8 HOURS AS NEEDED FOR NAUSEA, Disp: 20 tablet, Rfl: 0     oxyCODONE IR (ROXICODONE) 5 MG tablet, Take 1 tablet (5 mg) by mouth every 4 hours as needed for moderate to severe pain or pain maximum 6 tablet(s) per day, Disp: 204 tablet, Rfl: 0     oxyCODONE-acetaminophen (PERCOCET) 5-325 MG per tablet, Take 1 tablet by mouth every 4 hours as needed for pain or moderate to severe pain (maximum 3 tablets per day) maximum 3 tablet(s) per day, Disp: 90 tablet, Rfl: 0     pravastatin (PRAVACHOL) 10 MG tablet, Take 1 tablet (10 mg) by mouth daily, Disp: 30 tablet, Rfl: 5     pregabalin (LYRICA) 75 MG capsule, Take 1 capsule (75 mg) by mouth 2 times daily, Disp: 180 capsule, Rfl: 1     valACYclovir (VALTREX) 500 MG tablet, Take 1 tablet (500 mg) by mouth 2 times daily (Patient taking differently: Take 500 mg by mouth daily ), Disp: 180 tablet, Rfl: 1     Vitamin D, Cholecalciferol, 1000 UNITS TABS, Take 2,000 Units by mouth daily , Disp: , Rfl:      [START ON 8/9/2018] oxyCODONE IR (ROXICODONE) 5 MG tablet, Take 1 tablet (5 mg) by mouth every 4 hours as needed for moderate to severe pain (maximum 6 tablets per day) (Patient not taking: Reported on 7/19/2018), Disp: 204 tablet, Rfl: 0     [START ON 8/16/2018]  "oxyCODONE-acetaminophen (PERCOCET) 5-325 MG per tablet, Take 1 tablet by mouth every 4 hours as needed for pain or moderate to severe pain (maximum 3 tablets per day) maximum 3 tablet(s) per day (Patient not taking: Reported on 7/19/2018), Disp: 90 tablet, Rfl: 0      Allergies:   Penicillin [penicillins]; Bactroban [mupirocin calcium]; Levaquin; and Rifampin      Past Medical History:  Rheumatoid arthritis/scleroderma  Gastric antral vascular ectasia  GERD  Iron deficiency anemia  Irregular heart beat  Osteomyelitis, right 3rd finger  Raynaud's syndrome w/ gangrene  Scleroderma  Sjogren's syndrome  Systemic sclerosis  Metatarsalgia  Dysuria  Ulcer of finger  Sicca syndrome  Palpitations  Ischemia of upper extremity  Intercostal pain     Past Surgical History:  Appendectomy  Hydrogen breath test  Esophageal motility study  Mammoplasty augmentation bilateral    Family History:   Other - brain cancer     Social History:   Former cigarette smoker, quit 11/2017, recently restarted  No smokeless tobacco use  No alcohol use     Physical Exam:   /77 (BP Location: Left arm, Patient Position: Sitting, Cuff Size: Adult Regular)  Pulse 63  Temp 98  F (36.7  C) (Oral)  Resp 16  Ht 1.638 m (5' 4.5\")  Wt 58.1 kg (128 lb)  SpO2 100%  BMI 21.63 kg/m2   Wt Readings from Last 4 Encounters:   07/19/18 58.1 kg (128 lb)   04/10/18 56.2 kg (123 lb 12.8 oz)   02/27/18 57.2 kg (126 lb 3.2 oz)   11/21/17 54.9 kg (121 lb)   Constitutional: Well-developed, appearing stated age; cooperative  Eyes: Normal EOM, PERRLA, vision, conjunctiva, sclera  ENT: Restricted oral aperture. Normal external ears, nose, hearing, lips, teeth, gums, throat. No mucous membrane lesions, normal saliva pool  Neck: No mass or thyroid enlargement  Resp: Lungs clear to auscultation, nl to palpation  CV: RRR, no murmurs, rubs or gallops, no edema  GI: Right sided abdominal tenderness. No ABD mass, no HSM  Lymph: No cervical, supraclavicular, inguinal or " epitrochlear nodes  MS: The TMJ, neck, shoulder, elbow, wrist, MCP/PIP/DIP, spine, hip, knee, ankle, and foot MTP/IP joints were examined and found normal. No active synovitis or altered joint anatomy. Full joint ROM. Normal  strength. No dactylitis,  tenosynovitis, enthesopathy.  Skin: Right 5th and right 3rd PIP extensor surface ulcer, both healed without being widely open. Chronic sclerodactyly, most prominent in R hand (contracture at 50% finger flexion) vs. Left (missing final 20' extension).  Chronic skin thickening with evidence for improvement along forearms, upper arms, chest, abdomen, lower legs and feet with shiny appearance of the skin over the dorsal R sided digits. No cellulitis today, ulceration  Healed.  Stable Calcinosis-like deposit noted on her right anterior thigh just above the knee joint and on right fith finger. . Salt/pepper appearance to areas of skin (christiano arms and upper chest).  No other rashes. Mild skin thinning/erythema along other PIPs. 2+ over fingers ; 1+ maximum over hands, and feet and shins, and face, progressing to atrophic status. No nail pitting, alopecia, rash, nodules  Neuro: Normal cranial nerves, strength, sensation, DTRs.   Psych: Normal judgement, orientation, memory, affect.    Laboratory:   Component      Latest Ref Rng & Units 5/2/2018   WBC      4.0 - 11.0 10e9/L 4.5   RBC Count      3.8 - 5.2 10e12/L 3.82   Hemoglobin      11.7 - 15.7 g/dL 12.1   Hematocrit      35.0 - 47.0 % 37.9   MCV      78 - 100 fl 99   MCH      26.5 - 33.0 pg 31.7   MCHC      31.5 - 36.5 g/dL 31.9   RDW      10.0 - 15.0 % 12.0   Platelet Count      150 - 450 10e9/L 181   Diff Method       Automated Method   % Neutrophils      % 42.4   % Lymphocytes      % 42.6   % Monocytes      % 8.2   % Eosinophils      % 6.2   % Basophils      % 0.4   % Immature Granulocytes      % 0.2   Nucleated RBCs      0 /100 0   Absolute Neutrophil      1.6 - 8.3 10e9/L 1.9   Absolute Lymphocytes      0.8 - 5.3  10e9/L 1.9   Absolute Monocytes      0.0 - 1.3 10e9/L 0.4   Absolute Eosinophils      0.0 - 0.7 10e9/L 0.3   Absolute Basophils      0.0 - 0.2 10e9/L 0.0   Abs Immature Granulocytes      0 - 0.4 10e9/L 0.0   Absolute Nucleated RBC       0.0   Color Urine       Straw   Appearance Urine       Clear   Glucose Urine      NEG:Negative mg/dL Negative   Bilirubin Urine      NEG:Negative Negative   Ketones Urine      NEG:Negative mg/dL Negative   Specific Gravity Urine      1.003 - 1.035 1.003   Blood Urine      NEG:Negative Small (A)   pH Urine      5.0 - 7.0 pH 6.0   Protein Albumin Urine      NEG:Negative mg/dL Negative   Urobilinogen mg/dL      0.0 - 2.0 mg/dL 0.0   Nitrite Urine      NEG:Negative Negative   Leukocyte Esterase Urine      NEG:Negative Negative   Source       Midstream Urine   WBC Urine      0 - 5 /HPF <1   RBC Urine      0 - 2 /HPF <1   Mucous Urine      NEG:Negative /LPF Present (A)   Creatinine      0.52 - 1.04 mg/dL 0.72   GFR Estimate      >60 mL/min/1.7m2 84   GFR Estimate If Black      >60 mL/min/1.7m2 >90   Iron      35 - 180 ug/dL 76   Iron Binding Cap      240 - 430 ug/dL 376   Iron Saturation Index      15 - 46 % 20   Albumin      3.4 - 5.0 g/dL 4.2   ALT      0 - 50 U/L 15   AST      0 - 45 U/L 17   CRP Inflammation      0.0 - 8.0 mg/L <2.9   Sed Rate      0 - 30 mm/h 11     Scribe Disclosure:   I, Anuj Godoy, am serving as a scribe to document services personally performed by Brennen Child MD at this visit, based upon the provider's statements to me. All documentation has been reviewed by the aforementioned provider prior to being entered into the official medical record.     Portions of this medical record were completed by a scribe. UPON MY REVIEW AND AUTHENTICATION BY ELECTRONIC SIGNATURE, this confirms (a) I performed the applicable clinical services, and (b) the record is accurate.    JANET Child MD, PhD    Rheumatology

## 2018-07-19 ENCOUNTER — OFFICE VISIT (OUTPATIENT)
Dept: RHEUMATOLOGY | Facility: CLINIC | Age: 53
End: 2018-07-19
Attending: INTERNAL MEDICINE
Payer: MEDICARE

## 2018-07-19 VITALS
SYSTOLIC BLOOD PRESSURE: 115 MMHG | TEMPERATURE: 98 F | OXYGEN SATURATION: 100 % | RESPIRATION RATE: 16 BRPM | WEIGHT: 128 LBS | HEIGHT: 65 IN | BODY MASS INDEX: 21.33 KG/M2 | HEART RATE: 63 BPM | DIASTOLIC BLOOD PRESSURE: 68 MMHG

## 2018-07-19 DIAGNOSIS — K31.819 GAVE (GASTRIC ANTRAL VASCULAR ECTASIA): ICD-10-CM

## 2018-07-19 DIAGNOSIS — M34.9 SYSTEMIC SCLEROSIS (H): ICD-10-CM

## 2018-07-19 DIAGNOSIS — M35.00 SICCA SYNDROME (H): Primary | ICD-10-CM

## 2018-07-19 DIAGNOSIS — D50.9 ANEMIA, IRON DEFICIENCY: ICD-10-CM

## 2018-07-19 DIAGNOSIS — M77.40 METATARSALGIA, UNSPECIFIED LATERALITY: ICD-10-CM

## 2018-07-19 DIAGNOSIS — M77.42 METATARSALGIA OF BOTH FEET: ICD-10-CM

## 2018-07-19 DIAGNOSIS — M06.9 RHEUMATOID ARTHRITIS (H): ICD-10-CM

## 2018-07-19 DIAGNOSIS — M85.80 OSTEOPENIA, UNSPECIFIED LOCATION: ICD-10-CM

## 2018-07-19 DIAGNOSIS — Z79.899 HIGH RISK MEDICATIONS (NOT ANTICOAGULANTS) LONG-TERM USE: ICD-10-CM

## 2018-07-19 DIAGNOSIS — M77.41 METATARSALGIA OF BOTH FEET: ICD-10-CM

## 2018-07-19 DIAGNOSIS — I73.01 RAYNAUD'S DISEASE WITH GANGRENE (H): ICD-10-CM

## 2018-07-19 DIAGNOSIS — M05.79 RHEUMATOID ARTHRITIS INVOLVING MULTIPLE SITES WITH POSITIVE RHEUMATOID FACTOR (H): ICD-10-CM

## 2018-07-19 LAB
ALBUMIN SERPL-MCNC: 4.1 G/DL (ref 3.4–5)
ALBUMIN UR-MCNC: NEGATIVE MG/DL
ALT SERPL W P-5'-P-CCNC: 15 U/L (ref 0–50)
APPEARANCE UR: CLEAR
AST SERPL W P-5'-P-CCNC: 14 U/L (ref 0–45)
BASOPHILS # BLD AUTO: 0 10E9/L (ref 0–0.2)
BASOPHILS NFR BLD AUTO: 0.4 %
BILIRUB UR QL STRIP: NEGATIVE
COLOR UR AUTO: ABNORMAL
CREAT SERPL-MCNC: 0.66 MG/DL (ref 0.52–1.04)
CRP SERPL-MCNC: <2.9 MG/L (ref 0–8)
DIFFERENTIAL METHOD BLD: NORMAL
EOSINOPHIL # BLD AUTO: 0.2 10E9/L (ref 0–0.7)
EOSINOPHIL NFR BLD AUTO: 3.2 %
ERYTHROCYTE [DISTWIDTH] IN BLOOD BY AUTOMATED COUNT: 12.3 % (ref 10–15)
ERYTHROCYTE [SEDIMENTATION RATE] IN BLOOD BY WESTERGREN METHOD: 15 MM/H (ref 0–30)
GFR SERPL CREATININE-BSD FRML MDRD: >90 ML/MIN/1.7M2
GLUCOSE UR STRIP-MCNC: NEGATIVE MG/DL
HCT VFR BLD AUTO: 38.3 % (ref 35–47)
HGB BLD-MCNC: 12.2 G/DL (ref 11.7–15.7)
HGB UR QL STRIP: ABNORMAL
IMM GRANULOCYTES # BLD: 0 10E9/L (ref 0–0.4)
IMM GRANULOCYTES NFR BLD: 0.2 %
IRON SATN MFR SERPL: 21 % (ref 15–46)
IRON SERPL-MCNC: 82 UG/DL (ref 35–180)
KETONES UR STRIP-MCNC: NEGATIVE MG/DL
LEUKOCYTE ESTERASE UR QL STRIP: NEGATIVE
LYMPHOCYTES # BLD AUTO: 2.3 10E9/L (ref 0.8–5.3)
LYMPHOCYTES NFR BLD AUTO: 39.8 %
MCH RBC QN AUTO: 31.4 PG (ref 26.5–33)
MCHC RBC AUTO-ENTMCNC: 31.9 G/DL (ref 31.5–36.5)
MCV RBC AUTO: 99 FL (ref 78–100)
MONOCYTES # BLD AUTO: 0.5 10E9/L (ref 0–1.3)
MONOCYTES NFR BLD AUTO: 8.3 %
MUCOUS THREADS #/AREA URNS LPF: PRESENT /LPF
NEUTROPHILS # BLD AUTO: 2.7 10E9/L (ref 1.6–8.3)
NEUTROPHILS NFR BLD AUTO: 48.1 %
NITRATE UR QL: NEGATIVE
NRBC # BLD AUTO: 0 10*3/UL
NRBC BLD AUTO-RTO: 0 /100
PH UR STRIP: 6 PH (ref 5–7)
PLATELET # BLD AUTO: 219 10E9/L (ref 150–450)
RBC # BLD AUTO: 3.88 10E12/L (ref 3.8–5.2)
RBC #/AREA URNS AUTO: 1 /HPF (ref 0–2)
SOURCE: ABNORMAL
SP GR UR STRIP: 1 (ref 1–1.03)
SQUAMOUS #/AREA URNS AUTO: <1 /HPF (ref 0–1)
TIBC SERPL-MCNC: 400 UG/DL (ref 240–430)
UROBILINOGEN UR STRIP-MCNC: 0 MG/DL (ref 0–2)
WBC # BLD AUTO: 5.7 10E9/L (ref 4–11)
WBC #/AREA URNS AUTO: 0 /HPF (ref 0–5)

## 2018-07-19 PROCEDURE — 82040 ASSAY OF SERUM ALBUMIN: CPT | Performed by: INTERNAL MEDICINE

## 2018-07-19 PROCEDURE — 84450 TRANSFERASE (AST) (SGOT): CPT | Performed by: INTERNAL MEDICINE

## 2018-07-19 PROCEDURE — G0463 HOSPITAL OUTPT CLINIC VISIT: HCPCS | Mod: ZF

## 2018-07-19 PROCEDURE — 83550 IRON BINDING TEST: CPT | Performed by: INTERNAL MEDICINE

## 2018-07-19 PROCEDURE — 85652 RBC SED RATE AUTOMATED: CPT | Performed by: INTERNAL MEDICINE

## 2018-07-19 PROCEDURE — 83540 ASSAY OF IRON: CPT | Performed by: INTERNAL MEDICINE

## 2018-07-19 PROCEDURE — 86140 C-REACTIVE PROTEIN: CPT | Performed by: INTERNAL MEDICINE

## 2018-07-19 PROCEDURE — 81001 URINALYSIS AUTO W/SCOPE: CPT | Performed by: INTERNAL MEDICINE

## 2018-07-19 PROCEDURE — 85025 COMPLETE CBC W/AUTO DIFF WBC: CPT | Performed by: INTERNAL MEDICINE

## 2018-07-19 PROCEDURE — 82565 ASSAY OF CREATININE: CPT | Performed by: INTERNAL MEDICINE

## 2018-07-19 PROCEDURE — 36415 COLL VENOUS BLD VENIPUNCTURE: CPT | Performed by: INTERNAL MEDICINE

## 2018-07-19 PROCEDURE — 84460 ALANINE AMINO (ALT) (SGPT): CPT | Performed by: INTERNAL MEDICINE

## 2018-07-19 RX ORDER — OXYCODONE HYDROCHLORIDE 5 MG/1
5 TABLET ORAL EVERY 4 HOURS PRN
Qty: 204 TABLET | Refills: 0 | Status: CANCELLED | OUTPATIENT
Start: 2018-08-20

## 2018-07-19 RX ORDER — OLOPATADINE HYDROCHLORIDE 1 MG/ML
1 SOLUTION/ DROPS OPHTHALMIC 2 TIMES DAILY
Qty: 5 ML | Refills: 3 | Status: SHIPPED | OUTPATIENT
Start: 2018-07-19 | End: 2019-12-05

## 2018-07-19 RX ORDER — OXYCODONE AND ACETAMINOPHEN 5; 325 MG/1; MG/1
1 TABLET ORAL EVERY 4 HOURS PRN
Qty: 102 TABLET | Refills: 0 | Status: SHIPPED | OUTPATIENT
Start: 2018-08-16 | End: 2018-10-04

## 2018-07-19 RX ORDER — OXYCODONE AND ACETAMINOPHEN 5; 325 MG/1; MG/1
1 TABLET ORAL EVERY 4 HOURS PRN
Qty: 102 TABLET | Refills: 0 | Status: CANCELLED | OUTPATIENT
Start: 2018-10-19

## 2018-07-19 RX ORDER — OXYCODONE AND ACETAMINOPHEN 5; 325 MG/1; MG/1
1 TABLET ORAL EVERY 4 HOURS PRN
Qty: 102 TABLET | Refills: 0 | Status: CANCELLED | OUTPATIENT
Start: 2018-09-18

## 2018-07-19 RX ORDER — OXYCODONE HYDROCHLORIDE 5 MG/1
5 TABLET ORAL EVERY 4 HOURS PRN
Qty: 204 TABLET | Refills: 0 | Status: CANCELLED | OUTPATIENT
Start: 2018-10-27

## 2018-07-19 RX ORDER — OXYCODONE HYDROCHLORIDE 5 MG/1
5 TABLET ORAL EVERY 4 HOURS PRN
Qty: 204 TABLET | Refills: 0 | Status: CANCELLED | OUTPATIENT
Start: 2018-09-23

## 2018-07-19 ASSESSMENT — PAIN SCALES - GENERAL: PAINLEVEL: MODERATE PAIN (5)

## 2018-07-19 NOTE — NURSING NOTE
"Chief Complaint   Patient presents with     RECHECK     Scleroderma       Pt complains of headaches and ulcers on her fingers.    /77 (BP Location: Left arm, Patient Position: Sitting, Cuff Size: Adult Regular)  Pulse 63  Temp 98  F (36.7  C) (Oral)  Resp 16  Ht 1.638 m (5' 4.5\")  Wt 58.1 kg (128 lb)  SpO2 100%  BMI 21.63 kg/m2    Torrie Driver CMA  7/19/2018 2:46 PM      "

## 2018-07-19 NOTE — MR AVS SNAPSHOT
After Visit Summary   7/19/2018    Willard Grayson    MRN: [id number]           Patient Information     Date Of Birth          1965        Visit Information        Provider Department      7/19/2018 2:30 PM Brennen Child MD [facility] Rheumatology        Today's Diagnoses     Sicca syndrome (H)    -  1    Systemic sclerosis (H)        Rheumatoid arthritis involving multiple sites with positive rheumatoid factor (H)        Raynaud's disease with gangrene (H)        Metatarsalgia, unspecified laterality        Metatarsalgia of both feet        Osteopenia, unspecified location           Follow-ups after your visit        Your next 10 appointments already scheduled     Aug 06, 2018  2:30 PM CDT   FULL PULMONARY FUNCTION with UC PFL B   [facility] Pulmonary Function Testing ([facility])    [address]  3rd Floor  [facility] [id number] [phone]            Aug 06, 2018  3:30 PM CDT   Six Minute Walk with UC PFL 6 MINUTE WALK 1   [facility] Pulmonary Function Testing ([facility])    [address]  3rd [facility] [id number] [phone]            Oct 04, 2018  3:00 PM CDT   (Arrive by 2:45 PM)   RETURN SCLERODERMA with Brennen Child MD   [facility] Rheumatology ([facility])    [address]  [address] [place] [id number] [phone]              Who to contact     If you have questions or need follow up information about today's clinic visit or your schedule please contact [facility] RHEUMATOLOGY directly at [phone].  Normal or non-critical lab and imaging results will be communicated to you by MyChart, letter or phone within 4 business days after the clinic has received the results. If you do not hear from us within 7 days, please contact the clinic through MyChart or phone. If you have a critical or abnormal lab result, we will notify you by phone as soon as  "possible.  Submit refill requests through [employer] or call your pharmacy and they will forward the refill request to us. Please allow 3 business days for your refill to be completed.          Additional Information About Your Visit        [employer] Information     [employer] lets you send messages to your doctor, view your test results, renew your prescriptions, schedule appointments and more. To sign up, go to [email or web address] . Click on \"Log in\" on the left side of the screen, which will take you to the Welcome page. Then click on \"Sign up Now\" on the right side of the page.     You will be asked to enter the access code listed below, as well as some personal information. Please follow the directions to create your username and password.     Your access code is: [email or web address]  Expires: 10/17/2018  2:28 PM     Your access code will  in 90 days. If you need help or a new code, please call your [facility] clinic or [phone].        Care EveryWhere ID     This is your Care EveryWhere ID. This could be used by other organizations to access your [facility] medical records  [id number]        Your Vitals Were     Pulse Temperature Respirations Height Pulse Oximetry BMI (Body Mass Index)    63 98  F (36.7  C) (Oral) 16 1.638 m (5' 4.5\") 100% 21.63 kg/m2       Blood Pressure from Last 3 Encounters:   18 127/77   04/10/18 101/57   18 122/63    Weight from Last 3 Encounters:   18 58.1 kg (128 lb)   04/10/18 56.2 kg (123 lb 12.8 oz)   18 57.2 kg (126 lb 3.2 oz)              Today, you had the following     No orders found for display         Today's Medication Changes          These changes are accurate as of 18  4:14 PM.  If you have any questions, ask your nurse or doctor.               Start taking these medicines.        Dose/Directions    olopatadine 0.1 % ophthalmic solution   Commonly known as:  PATANOL   Used for:  Sicca syndrome (H)   Started by:  Brennen Child MD        Dose:  " 1 drop   Place 1 drop into both eyes 2 times daily   Quantity:  5 mL   Refills:  3         These medicines have changed or have updated prescriptions.        Dose/Directions    * oxyCODONE-acetaminophen 5-325 MG per tablet   Commonly known as:  PERCOCET   This may have changed:  Another medication with the same name was changed. Make sure you understand how and when to take each.   Used for:  Systemic sclerosis (H), Rheumatoid arthritis involving multiple sites with positive rheumatoid factor (H), Raynaud's disease with gangrene (H), Metatarsalgia, unspecified laterality, Metatarsalgia of both feet, Osteopenia, unspecified location   Changed by:  Brennen Child MD        Dose:  1 tablet   Take 1 tablet by mouth every 4 hours as needed for pain or moderate to severe pain (maximum 3 tablets per day) maximum 3 tablet(s) per day   Quantity:  90 tablet   Refills:  0       * oxyCODONE-acetaminophen 5-325 MG per tablet   Commonly known as:  PERCOCET   This may have changed:  Another medication with the same name was changed. Make sure you understand how and when to take each.   Used for:  Systemic sclerosis (H), Rheumatoid arthritis involving multiple sites with positive rheumatoid factor (H), Raynaud's disease with gangrene (H), Metatarsalgia, unspecified laterality, Metatarsalgia of both feet, Osteopenia, unspecified location   Changed by:  Brennen Child MD        Dose:  1 tablet   Start taking on:  8/16/2018   Take 1 tablet by mouth every 4 hours as needed for pain or moderate to severe pain (maximum 3 tablets per day) maximum 3 tablet(s) per day   Quantity:  90 tablet   Refills:  0       * oxyCODONE-acetaminophen 5-325 MG per tablet   Commonly known as:  PERCOCET   This may have changed:  These instructions start on 8/16/2018. If you are unsure what to do until then, ask your doctor or other care provider.   Used for:  Systemic sclerosis (H), Rheumatoid arthritis involving multiple sites with positive rheumatoid  factor (H), Raynaud's disease with gangrene (H), Metatarsalgia, unspecified laterality, Metatarsalgia of both feet, Osteopenia, unspecified location   Changed by:  Brennen Child MD        Dose:  1 tablet   Start taking on:  8/16/2018   Take 1 tablet by mouth every 4 hours as needed for moderate to severe pain (maximum 3 tablets per day) Max 3 tabs per day   Quantity:  102 tablet   Refills:  0       valACYclovir 500 MG tablet   Commonly known as:  VALTREX   This may have changed:  when to take this   Used for:  Herpes, Systemic sclerosis (H), Anxiety        Dose:  500 mg   Take 1 tablet (500 mg) by mouth 2 times daily   Quantity:  180 tablet   Refills:  1       * Notice:  This list has 3 medication(s) that are the same as other medications prescribed for you. Read the directions carefully, and ask your doctor or other care provider to review them with you.         Where to get your medicines      These medications were sent to [facility] #[id number] [place] [id number]     Phone:  [phone]     olopatadine 0.1 % ophthalmic solution         Some of these will need a paper prescription and others can be bought over the counter.  Ask your nurse if you have questions.     Bring a paper prescription for each of these medications     oxyCODONE-acetaminophen 5-325 MG per tablet               Information about OPIOIDS     PRESCRIPTION OPIOIDS: WHAT YOU NEED TO KNOW   We gave you an opioid (narcotic) pain medicine. It is important to manage your pain, but opioids are not always the best choice. You should first try all the other options your care team gave you. Take this medicine for as short a time (and as few doses) as possible.     These medicines have risks:    DO NOT drive when on new or higher doses of pain medicine. These medicines can affect your alertness and reaction times, and you could be arrested for driving under the influence (DUI). If  you need to use opioids long-term, talk to your care team about driving.    DO NOT operate heave machinery    DO NOT do any other dangerous activities while taking these medicines.     DO NOT drink any alcohol while taking these medicines.      If the opioid prescribed includes acetaminophen, DO NOT take with any other medicines that contain acetaminophen. Read all labels carefully. Look for the word  acetaminophen  or  Tylenol.  Ask your pharmacist if you have questions or are unsure.    You can get addicted to pain medicines, especially if you have a history of addiction (chemical, alcohol or substance dependence). Talk to your care team about ways to reduce this risk.    Store your pills in a secure place, locked if possible. We will not replace any lost or stolen medicine. If you don t finish your medicine, please throw away (dispose) as directed by your pharmacist. The [place] Pollution Control Agency has more information about safe disposal: https://www.pca.[facility].[place].us/living-green/managing-unwanted-medications.     All opioids tend to cause constipation. Drink plenty of water and eat foods that have a lot of fiber, such as fruits, vegetables, prune juice, apple juice and high-fiber cereal. Take a laxative (Miralax, milk of magnesia, Colace, Senna) if you don t move your bowels at least every other day.          Primary Care Provider Office Phone # Fax #    Lindy Neeta Plasencia [phone]       [facility] [address] [facility] [address]        Equal Access to Services     SOL CHAU AH: Hadii ru lopezo Sokathleen, waaxda luqadaha, qaybta kaalmada adewisamyada, quinten caruso. So [facility] [phone].    ATENCIÓN: Si habla español, tiene a paige disposición servicios gratuitos de asistencia lingüística. Llame al [phone].    We comply with applicable federal civil rights laws and [place] laws. We do not discriminate on the basis of race, color,  national origin, age, disability, sex, sexual orientation, or gender identity.            Thank you!     Thank you for choosing [facility] RHEUMATOLOGY  for your care. Our goal is always to provide you with excellent care. Hearing back from our patients is one way we can continue to improve our services. Please take a few minutes to complete the written survey that you may receive in the mail after your visit with us. Thank you!             Your Updated Medication List - Protect others around you: Learn how to safely use, store and throw away your medicines at www.disposemymeds.org.          This list is accurate as of 7/19/18  4:14 PM.  Always use your most recent med list.                   Brand Name Dispense Instructions for use Diagnosis    aspirin-dipyridamole  MG per 12 hr capsule    AGGRENOX     Take 1 capsule by mouth daily        Blood Pressure Monitoring Kit     1 kit    Check BP every 7 days.    Rheumatoid arthritis(714.0), Sicca syndrome (H), Systemic sclerosis (H), Encounter for long-term (current) use of other medications, Abnormal hemoglobin (Hgb) (H), Ulcer of finger, with fat layer exposed (H), Chronic osteomyelitis, site unspecified, Anxiety, Herpes, Rheumatoid arthritis(714.0)       cevimeline 30 MG capsule    EVOXAC    270 capsule    Take 1 capsule (30 mg) by mouth 3 times daily as needed    Metatarsalgia, unspecified laterality, Systemic sclerosis (H), Sicca syndrome (H)       cyanocobalamin 1000 MCG/ML injection    VITAMIN B12     Inject 1 mL into the muscle every 30 days        esomeprazole 40 MG CR capsule    nexIUM    180 capsule    Take 1 capsule (40 mg) by mouth 2 times daily Take 30-60 minutes before eating.    GAVE (gastric antral vascular ectasia)       folic acid 1 MG tablet    FOLVITE    90 tablet    Take 1 tablet (1 mg) by mouth daily    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 factor (H), Osteomyelitis of finger of right hand (H), Encounter for  long-term current use of medication, Sicca syndrome (H), GAVE (gastric antral vascular ectasia), Raynaud's disease with gangrene (H)       LORazepam 0.5 MG tablet    ATIVAN    60 tablet    Take 1 - 2 tablets by mouth three times daily as needed.    Systemic sclerosis (H), Anxiety, Ulcer of finger, with fat layer exposed (H), Raynaud's disease with gangrene (H), Osteomyelitis of finger of right hand (H), Rheumatoid arthritis involving multiple sites with positive rheumatoid factor (H), Systemic sclerosis (H)       mycophenolate 500 MG tablet    GENERIC EQUIVALENT    270 tablet    Take 3 tablets (1,500 mg) by mouth daily    Systemic sclerosis (H)       olopatadine 0.1 % ophthalmic solution    PATANOL    5 mL    Place 1 drop into both eyes 2 times daily    Sicca syndrome (H)       ondansetron 4 MG ODT tab    ZOFRAN-ODT    20 tablet    LET 1 TABLET DISSOLVE IN MOUTH EVERY 8 HOURS AS NEEDED FOR NAUSEA    Nausea       * oxyCODONE IR 5 MG tablet    ROXICODONE    204 tablet    Take 1 tablet (5 mg) by mouth every 4 hours as needed for moderate to severe pain or pain maximum 6 tablet(s) per day    Systemic sclerosis (H), Rheumatoid arthritis involving multiple sites with positive rheumatoid factor (H), Raynaud's disease with gangrene (H), Metatarsalgia, unspecified laterality, Metatarsalgia of both feet, Osteopenia, unspecified location       * oxyCODONE IR 5 MG tablet   Start taking on:  8/9/2018    ROXICODONE    204 tablet    Take 1 tablet (5 mg) by mouth every 4 hours as needed for moderate to severe pain (maximum 6 tablets per day)    Systemic sclerosis (H), Rheumatoid arthritis involving multiple sites with positive rheumatoid factor (H), Raynaud's disease with gangrene (H), Metatarsalgia, unspecified laterality, Metatarsalgia of both feet, Osteopenia, unspecified location       * oxyCODONE-acetaminophen 5-325 MG per tablet    PERCOCET    90 tablet    Take 1 tablet by mouth every 4 hours as needed for pain or moderate to  severe pain (maximum 3 tablets per day) maximum 3 tablet(s) per day    Systemic sclerosis (H), Rheumatoid arthritis involving multiple sites with positive rheumatoid factor (H), Raynaud's disease with gangrene (H), Metatarsalgia, unspecified laterality, Metatarsalgia of both feet, Osteopenia, unspecified location       * oxyCODONE-acetaminophen 5-325 MG per tablet   Start taking on:  8/16/2018    PERCOCET    90 tablet    Take 1 tablet by mouth every 4 hours as needed for pain or moderate to severe pain (maximum 3 tablets per day) maximum 3 tablet(s) per day    Systemic sclerosis (H), Rheumatoid arthritis involving multiple sites with positive rheumatoid factor (H), Raynaud's disease with gangrene (H), Metatarsalgia, unspecified laterality, Metatarsalgia of both feet, Osteopenia, unspecified location       * oxyCODONE-acetaminophen 5-325 MG per tablet   Start taking on:  8/16/2018    PERCOCET    102 tablet    Take 1 tablet by mouth every 4 hours as needed for moderate to severe pain (maximum 3 tablets per day) Max 3 tabs per day    Systemic sclerosis (H), Rheumatoid arthritis involving multiple sites with positive rheumatoid factor (H), Raynaud's disease with gangrene (H), Metatarsalgia, unspecified laterality, Metatarsalgia of both feet, Osteopenia, unspecified location       pravastatin 10 MG tablet    PRAVACHOL    30 tablet    Take 1 tablet (10 mg) by mouth daily    Rheumatoid arthritis involving multiple sites with positive rheumatoid factor (H)       pregabalin 75 MG capsule    LYRICA    180 capsule    Take 1 capsule (75 mg) by mouth 2 times daily    Systemic sclerosis (H)       valACYclovir 500 MG tablet    VALTREX    180 tablet    Take 1 tablet (500 mg) by mouth 2 times daily    Herpes, Systemic sclerosis (H), Anxiety       Vitamin D (Cholecalciferol) 1000 units Tabs      Take 2,000 Units by mouth daily        * Notice:  This list has 5 medication(s) that are the same as other medications prescribed for you.  Read the directions carefully, and ask your doctor or other care provider to review them with you.

## 2018-07-19 NOTE — LETTER
2018       RE: Willard Grayson  [address]  [address]  [facility] [address]     Dear Colleague,    Thank you for referring your patient, Willard Grayson, to the [facility] RHEUMATOLOGY at [facility]. Please see a copy of my visit note below.    [facility]  Rheumatology Clinic  Brennen Child MD  2018     Name: Willard Grayson  MRN: [id number]  Age: 53 year old  : 1965  Referring provider: Lindy Stokes*    Problem List:  1. 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   2. Associated marked neuropathic skin pain markedly improved with Lyrica with prior medication therapy with gabapentin.   3. Diffuse musculoskeletal pain requiring methotrexate plus CellCept, and narcotic to control, but also improved.   4. Initial clinical overlap with anti-CCP seropositive rheumatoid arthritis with continued anti-CCP seropositivity as of 2010.   5. Raynaud's phenomena with digital ulcers with easily cracked skin, improving .   6. GI involvement consistent with bacterial small bowel overgrowth with marked improvement after a course of neomycin followed by Dr. Glez; positiv  hydrogen breath test in 3/2012 - treated wtih rifaximin through Dr. Jacob  7. No convincing evidence of lung involvement on serial pulmonary function tests or clinically.   8. History of heart palpitations prior to the onset of SSc with stable symptoms since.   9. History of medication failures with Remicade, methotrexate, methotrexate plus CellCept and with Orencia and with improvement since initiation of IV IG but with complicating headaches and overall sense of malaise with IV IG infusions.   10. History of silicone breast implants.   11. Incidental  finding of calcified splenic artery aneurysm.  12. EGD in 3/2012 with hiatal hernia and gastral antral vascular ectasia  13. Recurrent right  third finger contracture with  extensor surface ulceration, healed      Assessment and Plan:    Systemic sclerosis (H)  Rheumatoid arthritis involving multiple sites with positive rheumatoid factor (H)  Raynaud's disease with gangrene (H)  Metatarsalgia, unspecified laterality  Metatarsalgia of both feet  Osteopenia, unspecified location  Plan is to renew her narcotics for 90 days and continue resumption of low dose methotrexate. We discussed lactose elimination and re-challenge, as it is unclear whether this is contributing to GI issues. We will get PFTs to check for worsening restriction - intercostal muscle involvement could be causing her pain. We will also refer her to a dentist at [facility] specializing in SSc .     - oxyCODONE IR (ROXICODONE) 5 MG tablet  Dispense: 204 tablet; Refill: 0  - oxyCODONE-acetaminophen (PERCOCET) 5-325 MG per tablet  Dispense: 102 tablet; Refill: 0  - oxyCODONE IR (ROXICODONE) 5 MG tablet  Dispense: 204 tablet; Refill: 0  - oxyCODONE-acetaminophen (PERCOCET) 5-325 MG per tablet  Dispense: 102 tablet; Refill: 0  - oxyCODONE IR (ROXICODONE) 5 MG tablet  Dispense: 204 tablet; Refill: 0  - oxyCODONE-acetaminophen (PERCOCET) 5-325 MG per tablet  Dispense: 102 tablet; Refill: 0     Follow-up: RTC in 3 months     This visit was 40 mins in duration, the majority spent in counseling.       HPI:   Willard Grayson is a 53 year old female with a history of diffuse cutaneous systemic sclerosis who presents for follow up. She has previously been treated with methotrexate but was stopped due to GI issues. She has a long history of GI troubles, Raynaud's, and joint pain. She was last seen on 4/10/18 at which time she was not feeling well. Plan at that time was to continue her cellcept 1500mg/day, which she was tolerating well. She was also advised to  "continue lyrica for pain and evoxac for dry mouth.     Today, the patient reports that she has not been feeling great. She continues to have pain \"deep\" in her \"kidney area\" (at the base of the ribs on her right side) that has been there for around a year now. She also complains of headaches that \"go up her neck\" and are described as burning and vascular. She also complains of light sensitivity and trouble reading. She is taking evoxac twice a day.     She complains that she has difficulty eating and says that it feels like there's a knot in her stomach. Even if she is still hungry, the knot in her stomach stops her from eating more. She is concerned that she might be lactose intolerant. She has a diet that is reportedly heavily dairy based. She does note however, that she has gained weight recently. She also mentions that she recently restarted smoking. WE discussed again that narcotic use is a possible/likely cause of these symptoms     She complains of dental problems but states that her dentist has trouble getting in her mouth because her mouth is so tight. She also complains that she is having difficulty finding care.     She recently developed fingertip ulcers and mentions that she has had trouble with Raynaud's even in the summer, but her ulcer has healed .     We discussed her  Depression. She has a plan to go to [facility] to see a psychiatrist.      Review of Systems:   Pertinent items are noted in HPI, remainder of complete ROS is negative.    No recent problems with hearing. No swallowing problems.   No breathing difficulty, shortness of breath, coughing, or wheezing  No chest pain or palpitations  No heart burn, indigestion, abdominal pain, nausea, vomiting, diarrhea  No urination problems, no bloody, cloudy urine, no dysuria  No numbing, tingling, weakness  No confusion  No rashes. No easy bleeding or bruising.  +eyes sensitive to light  +headaches    Active Medications:     Current " Outpatient Prescriptions:      aspirin-dipyridamole (AGGRENOX)  MG per 12 hr capsule, Take 1 capsule by mouth daily , Disp: , Rfl:      Blood Pressure Monitoring KIT, Check BP every 7 days., Disp: 1 kit, Rfl: 0     cevimeline (EVOXAC) 30 MG capsule, Take 1 capsule (30 mg) by mouth 3 times daily as needed, Disp: 270 capsule, Rfl: 3     cyanocobalamin (VITAMIN B12) 1000 MCG/ML injection, Inject 1 mL into the muscle every 30 days, Disp: , Rfl:      esomeprazole (NEXIUM) 40 MG capsule, Take 1 capsule (40 mg) by mouth 2 times daily Take 30-60 minutes before eating., Disp: 180 capsule, Rfl: 3     folic acid (FOLVITE) 1 MG tablet, Take 1 tablet (1 mg) by mouth daily, Disp: 90 tablet, Rfl: 3     LORazepam (ATIVAN) 0.5 MG tablet, Take 1 - 2 tablets by mouth three times daily as needed., Disp: 60 tablet, Rfl: 0     mycophenolate (GENERIC EQUIVALENT) 500 MG tablet, Take 3 tablets (1,500 mg) by mouth daily, Disp: 270 tablet, Rfl: 0     ondansetron (ZOFRAN-ODT) 4 MG ODT tab, LET 1 TABLET DISSOLVE IN MOUTH EVERY 8 HOURS AS NEEDED FOR NAUSEA, Disp: 20 tablet, Rfl: 0     oxyCODONE IR (ROXICODONE) 5 MG tablet, Take 1 tablet (5 mg) by mouth every 4 hours as needed for moderate to severe pain or pain maximum 6 tablet(s) per day, Disp: 204 tablet, Rfl: 0     oxyCODONE-acetaminophen (PERCOCET) 5-325 MG per tablet, Take 1 tablet by mouth every 4 hours as needed for pain or moderate to severe pain (maximum 3 tablets per day) maximum 3 tablet(s) per day, Disp: 90 tablet, Rfl: 0     pravastatin (PRAVACHOL) 10 MG tablet, Take 1 tablet (10 mg) by mouth daily, Disp: 30 tablet, Rfl: 5     pregabalin (LYRICA) 75 MG capsule, Take 1 capsule (75 mg) by mouth 2 times daily, Disp: 180 capsule, Rfl: 1     valACYclovir (VALTREX) 500 MG tablet, Take 1 tablet (500 mg) by mouth 2 times daily (Patient taking differently: Take 500 mg by mouth daily ), Disp: 180 tablet, Rfl: 1     Vitamin D, Cholecalciferol, 1000 UNITS TABS, Take 2,000 Units by mouth  "daily , Disp: , Rfl:      [START ON 8/9/2018] oxyCODONE IR (ROXICODONE) 5 MG tablet, Take 1 tablet (5 mg) by mouth every 4 hours as needed for moderate to severe pain (maximum 6 tablets per day) (Patient not taking: Reported on 7/19/2018), Disp: 204 tablet, Rfl: 0     [START ON 8/16/2018] oxyCODONE-acetaminophen (PERCOCET) 5-325 MG per tablet, Take 1 tablet by mouth every 4 hours as needed for pain or moderate to severe pain (maximum 3 tablets per day) maximum 3 tablet(s) per day (Patient not taking: Reported on 7/19/2018), Disp: 90 tablet, Rfl: 0      Allergies:   Penicillin [penicillins]; Bactroban [mupirocin calcium]; Levaquin; and Rifampin      Past Medical History:  Rheumatoid arthritis/scleroderma  Gastric antral vascular ectasia  GERD  Iron deficiency anemia  Irregular heart beat  Osteomyelitis, right 3rd finger  Raynaud's syndrome w/ gangrene  Scleroderma  Sjogren's syndrome  Systemic sclerosis  Metatarsalgia  Dysuria  Ulcer of finger  Sicca syndrome  Palpitations  Ischemia of upper extremity  Intercostal pain     Past Surgical History:  Appendectomy  Hydrogen breath test  Esophageal motility study  Mammoplasty augmentation bilateral    Family History:   Other - brain cancer     Social History:   Former cigarette smoker, quit 11/2017, recently restarted  No smokeless tobacco use  No alcohol use     Physical Exam:   /77 (BP Location: Left arm, Patient Position: Sitting, Cuff Size: Adult Regular)  Pulse 63  Temp 98  F (36.7  C) (Oral)  Resp 16  Ht 1.638 m (5' 4.5\")  Wt 58.1 kg (128 lb)  SpO2 100%  BMI 21.63 kg/m2   Wt Readings from Last 4 Encounters:   07/19/18 58.1 kg (128 lb)   04/10/18 56.2 kg (123 lb 12.8 oz)   02/27/18 57.2 kg (126 lb 3.2 oz)   11/21/17 54.9 kg (121 lb)   Constitutional: Well-developed, appearing stated age; cooperative  Eyes: Normal EOM, PERRLA, vision, conjunctiva, sclera  ENT: Restricted oral aperture. Normal external ears, nose, hearing, lips, teeth, gums, throat. No " mucous membrane lesions, normal saliva pool  Neck: No mass or thyroid enlargement  Resp: Lungs clear to auscultation, nl to palpation  CV: RRR, no murmurs, rubs or gallops, no edema  GI: Right sided abdominal tenderness. No ABD mass, no HSM  Lymph: No cervical, supraclavicular, inguinal or epitrochlear nodes  MS: The TMJ, neck, shoulder, elbow, wrist, MCP/PIP/DIP, spine, hip, knee, ankle, and foot MTP/IP joints were examined and found normal. No active synovitis or altered joint anatomy. Full joint ROM. Normal  strength. No dactylitis,  tenosynovitis, enthesopathy.  Skin: Right 5th and right 3rd PIP extensor surface ulcer, both healed without being widely open. Chronic sclerodactyly, most prominent in R hand (contracture at 50% finger flexion) vs. Left (missing final 20' extension).  Chronic skin thickening with evidence for improvement along forearms, upper arms, chest, abdomen, lower legs and feet with shiny appearance of the skin over the dorsal R sided digits. No cellulitis today, ulceration  Healed.  Stable Calcinosis-like deposit noted on her right anterior thigh just above the knee joint and on right fith finger. . Salt/pepper appearance to areas of skin (christiano arms and upper chest).  No other rashes. Mild skin thinning/erythema along other PIPs. 2+ over fingers ; 1+ maximum over hands, and feet and shins, and face, progressing to atrophic status. No nail pitting, alopecia, rash, nodules  Neuro: Normal cranial nerves, strength, sensation, DTRs.   Psych: Normal judgement, orientation, memory, affect.    Laboratory:   Component      Latest Ref Rng & Units 5/2/2018   WBC      4.0 - 11.0 10e9/L 4.5   RBC Count      3.8 - 5.2 10e12/L 3.82   Hemoglobin      11.7 - 15.7 g/dL 12.1   Hematocrit      35.0 - 47.0 % 37.9   MCV      78 - 100 fl 99   MCH      26.5 - 33.0 pg 31.7   MCHC      31.5 - 36.5 g/dL 31.9   RDW      10.0 - 15.0 % 12.0   Platelet Count      150 - 450 10e9/L 181   Diff Method       Automated Method    % Neutrophils      % 42.4   % Lymphocytes      % 42.6   % Monocytes      % 8.2   % Eosinophils      % 6.2   % Basophils      % 0.4   % Immature Granulocytes      % 0.2   Nucleated RBCs      0 /100 0   Absolute Neutrophil      1.6 - 8.3 10e9/L 1.9   Absolute Lymphocytes      0.8 - 5.3 10e9/L 1.9   Absolute Monocytes      0.0 - 1.3 10e9/L 0.4   Absolute Eosinophils      0.0 - 0.7 10e9/L 0.3   Absolute Basophils      0.0 - 0.2 10e9/L 0.0   Abs Immature Granulocytes      0 - 0.4 10e9/L 0.0   Absolute Nucleated RBC       0.0   Color Urine       Straw   Appearance Urine       Clear   Glucose Urine      NEG:Negative mg/dL Negative   Bilirubin Urine      NEG:Negative Negative   Ketones Urine      NEG:Negative mg/dL Negative   Specific Gravity Urine      1.003 - 1.035 1.003   Blood Urine      NEG:Negative Small (A)   pH Urine      5.0 - 7.0 pH 6.0   Protein Albumin Urine      NEG:Negative mg/dL Negative   Urobilinogen mg/dL      0.0 - 2.0 mg/dL 0.0   Nitrite Urine      NEG:Negative Negative   Leukocyte Esterase Urine      NEG:Negative Negative   Source       Midstream Urine   WBC Urine      0 - 5 /HPF <1   RBC Urine      0 - 2 /HPF <1   Mucous Urine      NEG:Negative /LPF Present (A)   Creatinine      0.52 - 1.04 mg/dL 0.72   GFR Estimate      >60 mL/min/1.7m2 84   GFR Estimate If Black      >60 mL/min/1.7m2 >90   Iron      35 - 180 ug/dL 76   Iron Binding Cap      240 - 430 ug/dL 376   Iron Saturation Index      15 - 46 % 20   Albumin      3.4 - 5.0 g/dL 4.2   ALT      0 - 50 U/L 15   AST      0 - 45 U/L 17   CRP Inflammation      0.0 - 8.0 mg/L <2.9   Sed Rate      0 - 30 mm/h 11     Scribe Disclosure:   Anuj CLAYTON, am serving as a scribe to document services personally performed by Brennen Child MD at this visit, based upon the provider's statements to me. All documentation has been reviewed by the aforementioned provider prior to being entered into the official medical record.     Portions of this medical record  were completed by a scribe. UPON MY REVIEW AND AUTHENTICATION BY ELECTRONIC SIGNATURE, this confirms (a) I performed the applicable clinical services, and (b) the record is accurate.    JANET Child MD, PhD    Rheumatology

## 2018-07-19 NOTE — LETTER
2018      RE: Willard Grayson  [address]  [address]  [facility] [address]       [facility]  Rheumatology Clinic  Brennen Child MD  2018     Name: Willard Grayson  MRN: [id number]  Age: 53 year old  : 1965  Referring provider: Lindy Stokes*    Problem List:  1. 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   2. Associated marked neuropathic skin pain markedly improved with Lyrica with prior medication therapy with gabapentin.   3. Diffuse musculoskeletal pain requiring methotrexate plus CellCept, and narcotic to control, but also improved.   4. Initial clinical overlap with anti-CCP seropositive rheumatoid arthritis with continued anti-CCP seropositivity as of 2010.   5. Raynaud's phenomena with digital ulcers with easily cracked skin, improving .   6. GI involvement consistent with bacterial small bowel overgrowth with marked improvement after a course of neomycin followed by Dr. Glez; positiv  hydrogen breath test in 3/2012 - treated wtih rifaximin through Dr. Jacob  7. No convincing evidence of lung involvement on serial pulmonary function tests or clinically.   8. History of heart palpitations prior to the onset of SSc with stable symptoms since.   9. History of medication failures with Remicade, methotrexate, methotrexate plus CellCept and with Orencia and with improvement since initiation of IV IG but with complicating headaches and overall sense of malaise with IV IG infusions.   10. History of silicone breast implants.   11. Incidental finding of calcified splenic artery aneurysm.  12. EGD in 3/2012 with hiatal hernia and gastral antral vascular ectasia  13. Recurrent right  third finger contracture with  extensor surface  "ulceration, healed      Assessment and Plan:    Systemic sclerosis (H)  Rheumatoid arthritis involving multiple sites with positive rheumatoid factor (H)  Raynaud's disease with gangrene (H)  Metatarsalgia, unspecified laterality  Metatarsalgia of both feet  Osteopenia, unspecified location  Plan is to renew her narcotics for 90 days and continue resumption of low dose methotrexate. We discussed lactose elimination and re-challenge, as it is unclear whether this is contributing to GI issues. We will get PFTs to check for worsening restriction - intercostal muscle involvement could be causing her pain. We will also refer her to a dentist at [facility] specializing in SSc .     - oxyCODONE IR (ROXICODONE) 5 MG tablet  Dispense: 204 tablet; Refill: 0  - oxyCODONE-acetaminophen (PERCOCET) 5-325 MG per tablet  Dispense: 102 tablet; Refill: 0  - oxyCODONE IR (ROXICODONE) 5 MG tablet  Dispense: 204 tablet; Refill: 0  - oxyCODONE-acetaminophen (PERCOCET) 5-325 MG per tablet  Dispense: 102 tablet; Refill: 0  - oxyCODONE IR (ROXICODONE) 5 MG tablet  Dispense: 204 tablet; Refill: 0  - oxyCODONE-acetaminophen (PERCOCET) 5-325 MG per tablet  Dispense: 102 tablet; Refill: 0     Follow-up: RTC in 3 months     This visit was 40 mins in duration, the majority spent in counseling.       HPI:   Willard Grayson is a 53 year old female with a history of diffuse cutaneous systemic sclerosis who presents for follow up. She has previously been treated with methotrexate but was stopped due to GI issues. She has a long history of GI troubles, Raynaud's, and joint pain. She was last seen on 4/10/18 at which time she was not feeling well. Plan at that time was to continue her cellcept 1500mg/day, which she was tolerating well. She was also advised to continue lyrica for pain and evoxac for dry mouth.     Today, the patient reports that she has not been feeling great. She continues to have pain \"deep\" in her \"kidney area\" (at the base of " "the ribs on her right side) that has been there for around a year now. She also complains of headaches that \"go up her neck\" and are described as burning and vascular. She also complains of light sensitivity and trouble reading. She is taking evoxac twice a day.     She complains that she has difficulty eating and says that it feels like there's a knot in her stomach. Even if she is still hungry, the knot in her stomach stops her from eating more. She is concerned that she might be lactose intolerant. She has a diet that is reportedly heavily dairy based. She does note however, that she has gained weight recently. She also mentions that she recently restarted smoking. WE discussed again that narcotic use is a possible/likely cause of these symptoms     She complains of dental problems but states that her dentist has trouble getting in her mouth because her mouth is so tight. She also complains that she is having difficulty finding care.     She recently developed fingertip ulcers and mentions that she has had trouble with Raynaud's even in the summer, but her ulcer has healed .     We discussed her  Depression. She has a plan to go to [facility] to see a psychiatrist.      Review of Systems:   Pertinent items are noted in HPI, remainder of complete ROS is negative.    No recent problems with hearing. No swallowing problems.   No breathing difficulty, shortness of breath, coughing, or wheezing  No chest pain or palpitations  No heart burn, indigestion, abdominal pain, nausea, vomiting, diarrhea  No urination problems, no bloody, cloudy urine, no dysuria  No numbing, tingling, weakness  No confusion  No rashes. No easy bleeding or bruising.  +eyes sensitive to light  +headaches    Active Medications:     Current Outpatient Prescriptions:      aspirin-dipyridamole (AGGRENOX)  MG per 12 hr capsule, Take 1 capsule by mouth daily , Disp: , Rfl:      Blood Pressure Monitoring KIT, Check BP every 7 days., " Disp: 1 kit, Rfl: 0     cevimeline (EVOXAC) 30 MG capsule, Take 1 capsule (30 mg) by mouth 3 times daily as needed, Disp: 270 capsule, Rfl: 3     cyanocobalamin (VITAMIN B12) 1000 MCG/ML injection, Inject 1 mL into the muscle every 30 days, Disp: , Rfl:      esomeprazole (NEXIUM) 40 MG capsule, Take 1 capsule (40 mg) by mouth 2 times daily Take 30-60 minutes before eating., Disp: 180 capsule, Rfl: 3     folic acid (FOLVITE) 1 MG tablet, Take 1 tablet (1 mg) by mouth daily, Disp: 90 tablet, Rfl: 3     LORazepam (ATIVAN) 0.5 MG tablet, Take 1 - 2 tablets by mouth three times daily as needed., Disp: 60 tablet, Rfl: 0     mycophenolate (GENERIC EQUIVALENT) 500 MG tablet, Take 3 tablets (1,500 mg) by mouth daily, Disp: 270 tablet, Rfl: 0     ondansetron (ZOFRAN-ODT) 4 MG ODT tab, LET 1 TABLET DISSOLVE IN MOUTH EVERY 8 HOURS AS NEEDED FOR NAUSEA, Disp: 20 tablet, Rfl: 0     oxyCODONE IR (ROXICODONE) 5 MG tablet, Take 1 tablet (5 mg) by mouth every 4 hours as needed for moderate to severe pain or pain maximum 6 tablet(s) per day, Disp: 204 tablet, Rfl: 0     oxyCODONE-acetaminophen (PERCOCET) 5-325 MG per tablet, Take 1 tablet by mouth every 4 hours as needed for pain or moderate to severe pain (maximum 3 tablets per day) maximum 3 tablet(s) per day, Disp: 90 tablet, Rfl: 0     pravastatin (PRAVACHOL) 10 MG tablet, Take 1 tablet (10 mg) by mouth daily, Disp: 30 tablet, Rfl: 5     pregabalin (LYRICA) 75 MG capsule, Take 1 capsule (75 mg) by mouth 2 times daily, Disp: 180 capsule, Rfl: 1     valACYclovir (VALTREX) 500 MG tablet, Take 1 tablet (500 mg) by mouth 2 times daily (Patient taking differently: Take 500 mg by mouth daily ), Disp: 180 tablet, Rfl: 1     Vitamin D, Cholecalciferol, 1000 UNITS TABS, Take 2,000 Units by mouth daily , Disp: , Rfl:      [START ON 8/9/2018] oxyCODONE IR (ROXICODONE) 5 MG tablet, Take 1 tablet (5 mg) by mouth every 4 hours as needed for moderate to severe pain (maximum 6 tablets per day)  "(Patient not taking: Reported on 7/19/2018), Disp: 204 tablet, Rfl: 0     [START ON 8/16/2018] oxyCODONE-acetaminophen (PERCOCET) 5-325 MG per tablet, Take 1 tablet by mouth every 4 hours as needed for pain or moderate to severe pain (maximum 3 tablets per day) maximum 3 tablet(s) per day (Patient not taking: Reported on 7/19/2018), Disp: 90 tablet, Rfl: 0      Allergies:   Penicillin [penicillins]; Bactroban [mupirocin calcium]; Levaquin; and Rifampin      Past Medical History:  Rheumatoid arthritis/scleroderma  Gastric antral vascular ectasia  GERD  Iron deficiency anemia  Irregular heart beat  Osteomyelitis, right 3rd finger  Raynaud's syndrome w/ gangrene  Scleroderma  Sjogren's syndrome  Systemic sclerosis  Metatarsalgia  Dysuria  Ulcer of finger  Sicca syndrome  Palpitations  Ischemia of upper extremity  Intercostal pain     Past Surgical History:  Appendectomy  Hydrogen breath test  Esophageal motility study  Mammoplasty augmentation bilateral    Family History:   Other - brain cancer     Social History:   Former cigarette smoker, quit 11/2017, recently restarted  No smokeless tobacco use  No alcohol use     Physical Exam:   /77 (BP Location: Left arm, Patient Position: Sitting, Cuff Size: Adult Regular)  Pulse 63  Temp 98  F (36.7  C) (Oral)  Resp 16  Ht 1.638 m (5' 4.5\")  Wt 58.1 kg (128 lb)  SpO2 100%  BMI 21.63 kg/m2   Wt Readings from Last 4 Encounters:   07/19/18 58.1 kg (128 lb)   04/10/18 56.2 kg (123 lb 12.8 oz)   02/27/18 57.2 kg (126 lb 3.2 oz)   11/21/17 54.9 kg (121 lb)   Constitutional: Well-developed, appearing stated age; cooperative  Eyes: Normal EOM, PERRLA, vision, conjunctiva, sclera  ENT: Restricted oral aperture. Normal external ears, nose, hearing, lips, teeth, gums, throat. No mucous membrane lesions, normal saliva pool  Neck: No mass or thyroid enlargement  Resp: Lungs clear to auscultation, nl to palpation  CV: RRR, no murmurs, rubs or gallops, no edema  GI: Right sided " abdominal tenderness. No ABD mass, no HSM  Lymph: No cervical, supraclavicular, inguinal or epitrochlear nodes  MS: The TMJ, neck, shoulder, elbow, wrist, MCP/PIP/DIP, spine, hip, knee, ankle, and foot MTP/IP joints were examined and found normal. No active synovitis or altered joint anatomy. Full joint ROM. Normal  strength. No dactylitis,  tenosynovitis, enthesopathy.  Skin: Right 5th and right 3rd PIP extensor surface ulcer, both healed without being widely open. Chronic sclerodactyly, most prominent in R hand (contracture at 50% finger flexion) vs. Left (missing final 20' extension).  Chronic skin thickening with evidence for improvement along forearms, upper arms, chest, abdomen, lower legs and feet with shiny appearance of the skin over the dorsal R sided digits. No cellulitis today, ulceration  Healed.  Stable Calcinosis-like deposit noted on her right anterior thigh just above the knee joint and on right fith finger. . Salt/pepper appearance to areas of skin (christiano arms and upper chest).  No other rashes. Mild skin thinning/erythema along other PIPs. 2+ over fingers ; 1+ maximum over hands, and feet and shins, and face, progressing to atrophic status. No nail pitting, alopecia, rash, nodules  Neuro: Normal cranial nerves, strength, sensation, DTRs.   Psych: Normal judgement, orientation, memory, affect.    Laboratory:   Component      Latest Ref Rng & Units 5/2/2018   WBC      4.0 - 11.0 10e9/L 4.5   RBC Count      3.8 - 5.2 10e12/L 3.82   Hemoglobin      11.7 - 15.7 g/dL 12.1   Hematocrit      35.0 - 47.0 % 37.9   MCV      78 - 100 fl 99   MCH      26.5 - 33.0 pg 31.7   MCHC      31.5 - 36.5 g/dL 31.9   RDW      10.0 - 15.0 % 12.0   Platelet Count      150 - 450 10e9/L 181   Diff Method       Automated Method   % Neutrophils      % 42.4   % Lymphocytes      % 42.6   % Monocytes      % 8.2   % Eosinophils      % 6.2   % Basophils      % 0.4   % Immature Granulocytes      % 0.2   Nucleated RBCs      0 /100  0   Absolute Neutrophil      1.6 - 8.3 10e9/L 1.9   Absolute Lymphocytes      0.8 - 5.3 10e9/L 1.9   Absolute Monocytes      0.0 - 1.3 10e9/L 0.4   Absolute Eosinophils      0.0 - 0.7 10e9/L 0.3   Absolute Basophils      0.0 - 0.2 10e9/L 0.0   Abs Immature Granulocytes      0 - 0.4 10e9/L 0.0   Absolute Nucleated RBC       0.0   Color Urine       Straw   Appearance Urine       Clear   Glucose Urine      NEG:Negative mg/dL Negative   Bilirubin Urine      NEG:Negative Negative   Ketones Urine      NEG:Negative mg/dL Negative   Specific Gravity Urine      1.003 - 1.035 1.003   Blood Urine      NEG:Negative Small (A)   pH Urine      5.0 - 7.0 pH 6.0   Protein Albumin Urine      NEG:Negative mg/dL Negative   Urobilinogen mg/dL      0.0 - 2.0 mg/dL 0.0   Nitrite Urine      NEG:Negative Negative   Leukocyte Esterase Urine      NEG:Negative Negative   Source       Midstream Urine   WBC Urine      0 - 5 /HPF <1   RBC Urine      0 - 2 /HPF <1   Mucous Urine      NEG:Negative /LPF Present (A)   Creatinine      0.52 - 1.04 mg/dL 0.72   GFR Estimate      >60 mL/min/1.7m2 84   GFR Estimate If Black      >60 mL/min/1.7m2 >90   Iron      35 - 180 ug/dL 76   Iron Binding Cap      240 - 430 ug/dL 376   Iron Saturation Index      15 - 46 % 20   Albumin      3.4 - 5.0 g/dL 4.2   ALT      0 - 50 U/L 15   AST      0 - 45 U/L 17   CRP Inflammation      0.0 - 8.0 mg/L <2.9   Sed Rate      0 - 30 mm/h 11     Scribe Disclosure:   I, Anuj Godoy, am serving as a scribe to document services personally performed by Brennen Child MD at this visit, based upon the provider's statements to me. All documentation has been reviewed by the aforementioned provider prior to being entered into the official medical record.     Portions of this medical record were completed by a scribe. UPON MY REVIEW AND AUTHENTICATION BY ELECTRONIC SIGNATURE, this confirms (a) I performed the applicable clinical services, and (b) the record is accurate.    JANET Child,  MD, PhD    Rheumatology

## 2018-07-20 ENCOUNTER — TELEPHONE (OUTPATIENT)
Dept: RHEUMATOLOGY | Facility: CLINIC | Age: 53
End: 2018-07-20

## 2018-07-20 NOTE — TELEPHONE ENCOUNTER
PA Initiation    Medication: OLOPATADINE 0.1%  Insurance Company: [employer] - Phone [phone] Fax [phone]  [facility]illing the Rx[facility] - [place], [place] - [facility] [address]  Filling Pharmacy Phone:    Filling Pharmacy Fax:    Start Date: 7/20/2018

## 2018-07-24 NOTE — TELEPHONE ENCOUNTER
Prior Authorization Approval    Authorization Effective Date: 4/21/2018  Authorization Expiration Date: 7/20/2019  Medication: OLOPATADINE 0.1% - APPROVED  Approved Dose/Quantity: ONE MONTH AT A TIME  Reference #: [id number]   Insurance Company: [employer] - [employer] [phone] Fax [phone]  Expected CoPay: UNKNOWN     CoPay Card Available:      Foundation Assistance Needed:    Which Pharmacy is filling the prescription (Not needed for infusion/clinic administered): [id number] - [place], [place] - [address] [place] [address]  Pharmacy Notified: Yes - via phone  Patient Notified: Yes - via pharmacy

## 2018-08-06 DIAGNOSIS — M34.9 SYSTEMIC SCLEROSIS (H): Primary | ICD-10-CM

## 2018-08-06 LAB
6 MIN WALK (FT): 1350 FT
6 MIN WALK (M): 411 M

## 2018-08-16 DIAGNOSIS — M34.9 SYSTEMIC SCLEROSIS (H): ICD-10-CM

## 2018-08-16 RX ORDER — MYCOPHENOLATE MOFETIL 500 MG/1
TABLET ORAL
Qty: 270 TABLET | Refills: 0 | Status: SHIPPED | OUTPATIENT
Start: 2018-08-16 | End: 2018-09-18

## 2018-08-16 NOTE — TELEPHONE ENCOUNTER
Mycophenolate Mofetil Oral Tablet 500 MG     Last Written Prescription Date:  5/8/2018  Last Fill Quantity: 270,   # refills: 0  Last Office Visit: 7/19/2018  Future Office visit:  10/4/2018    CBC RESULTS:   Recent Labs   Lab Test  07/19/18   1640   WBC  5.7   RBC  3.88   HGB  12.2   HCT  38.3   MCV  99   MCH  31.4   MCHC  31.9   RDW  12.3   PLT  219       Creatinine   Date Value Ref Range Status   07/19/2018 0.66 0.52 - 1.04 mg/dL Final   ]    Liver Function Studies -   Recent Labs   Lab Test  07/19/18   1640   11/24/15   1848   PROTTOTAL   --    --   7.8   ALBUMIN  4.1   < >  4.1   BILITOTAL   --    --   0.4   ALKPHOS   --    --   49   AST  14   < >  12   ALT  15   < >  15    < > = values in this interval not displayed.       Routing refill request to provider for review/approval because:  DMARD: LFD 5/8/18, labs 7/19/18, RTC on 10/4/2018

## 2018-09-07 DIAGNOSIS — M85.80 OSTEOPENIA, UNSPECIFIED LOCATION: ICD-10-CM

## 2018-09-07 DIAGNOSIS — M77.41 METATARSALGIA OF BOTH FEET: ICD-10-CM

## 2018-09-07 DIAGNOSIS — M34.9 SYSTEMIC SCLEROSIS (H): ICD-10-CM

## 2018-09-07 DIAGNOSIS — M77.42 METATARSALGIA OF BOTH FEET: ICD-10-CM

## 2018-09-07 DIAGNOSIS — M77.40 METATARSALGIA, UNSPECIFIED LATERALITY: ICD-10-CM

## 2018-09-07 DIAGNOSIS — I73.01 RAYNAUD'S DISEASE WITH GANGRENE (H): ICD-10-CM

## 2018-09-07 DIAGNOSIS — M05.79 RHEUMATOID ARTHRITIS INVOLVING MULTIPLE SITES WITH POSITIVE RHEUMATOID FACTOR (H): ICD-10-CM

## 2018-09-07 NOTE — TELEPHONE ENCOUNTER
percocet   Last Written Prescription Date:  8/16/18  Last Fill Quantity: 102  # refills: 0  Also entry 8/16 for #90  Duplicate percocet entries on med list?  Last Office Visit : 7/19/18  Future Office visit:  10/2/18    Routing refill request to nurse for review/approval because:  Drug not on the [facility], UMP or  Health refill protocol or controlled substance  Pharmacy: [facility] on [address]-pt comes to [facility] to receive paper copy.  Date medication is needed: ASAP-pt has 1 dosage left.

## 2018-09-10 RX ORDER — OXYCODONE AND ACETAMINOPHEN 5; 325 MG/1; MG/1
1 TABLET ORAL EVERY 4 HOURS PRN
Qty: 102 TABLET | Refills: 0 | Status: CANCELLED | OUTPATIENT
Start: 2018-09-10

## 2018-09-10 RX ORDER — OXYCODONE HYDROCHLORIDE 5 MG/1
5 TABLET ORAL EVERY 4 HOURS PRN
Qty: 204 TABLET | Refills: 0 | Status: CANCELLED | OUTPATIENT
Start: 2018-09-10

## 2018-09-10 NOTE — TELEPHONE ENCOUNTER
Pt is asking for a 34 day supply of each of these meds so that they can be on the same schedule. To be on the same schedule pt is asking for 1 percocet script and she will get the next 3 month worth of refills at her appointment 10/4/18

## 2018-09-11 LAB
DLCOUNC-%PRED-PRE: 81 %
DLCOUNC-PRE: 18.28 ML/MIN/MMHG
DLCOUNC-PRED: 22.36 ML/MIN/MMHG
ERV-%PRED-PRE: 138 %
ERV-PRE: 1.45 L
ERV-PRED: 1.05 L
EXPTIME-PRE: 7.95 SEC
FEF2575-%PRED-PRE: 68 %
FEF2575-PRE: 1.63 L/SEC
FEF2575-PRED: 2.38 L/SEC
FEFMAX-%PRED-PRE: 102 %
FEFMAX-PRE: 6.74 L/SEC
FEFMAX-PRED: 6.61 L/SEC
FEV1-%PRED-PRE: 95 %
FEV1-PRE: 2.37 L
FEV1FEV6-PRE: 74 %
FEV1FEV6-PRED: 81 %
FEV1FVC-PRE: 73 %
FEV1FVC-PRED: 79 %
FEV1SVC-PRE: 75 %
FEV1SVC-PRED: 73 %
FIFMAX-PRE: 5.87 L/SEC
FRCPLETH-%PRED-PRE: 116 %
FRCPLETH-PRE: 3.18 L
FRCPLETH-PRED: 2.73 L
FVC-%PRED-PRE: 104 %
FVC-PRE: 3.24 L
FVC-PRED: 3.1 L
IC-%PRED-PRE: 72 %
IC-PRE: 1.72 L
IC-PRED: 2.38 L
RVPLETH-%PRED-PRE: 93 %
RVPLETH-PRE: 1.72 L
RVPLETH-PRED: 1.85 L
TLCPLETH-%PRED-PRE: 97 %
TLCPLETH-PRE: 4.89 L
TLCPLETH-PRED: 5.02 L
VA-%PRED-PRE: 90 %
VA-PRE: 4.65 L
VC-%PRED-PRE: 92 %
VC-PRE: 3.17 L
VC-PRED: 3.43 L

## 2018-09-11 RX ORDER — OXYCODONE AND ACETAMINOPHEN 5; 325 MG/1; MG/1
1 TABLET ORAL EVERY 4 HOURS PRN
Qty: 102 TABLET | Refills: 0 | Status: SHIPPED | OUTPATIENT
Start: 2018-09-16 | End: 2018-10-04

## 2018-09-18 DIAGNOSIS — M34.9 SYSTEMIC SCLEROSIS (H): ICD-10-CM

## 2018-09-21 RX ORDER — MYCOPHENOLATE MOFETIL 500 MG/1
1500 TABLET ORAL DAILY
Qty: 270 TABLET | Refills: 0 | Status: SHIPPED | OUTPATIENT
Start: 2018-09-21 | End: 2019-01-22

## 2018-09-21 NOTE — TELEPHONE ENCOUNTER
mycophenolate (GENERIC EQUIVALENT) 500 MG tablet    Last Written Prescription Date:  8/161/8  Last Fill Quantity: 270,   # refills: 0  Last Office Visit:7/19/18  Future Office visit:  10/4/18    CBC RESULTS:   Recent Labs   Lab Test  07/19/18   1640   WBC  5.7   RBC  3.88   HGB  12.2   HCT  38.3   MCV  99   MCH  31.4   MCHC  31.9   RDW  12.3   PLT  219       Creatinine   Date Value Ref Range Status   07/19/2018 0.66 0.52 - 1.04 mg/dL Final   ]    Liver Function Studies -   Recent Labs   Lab Test  07/19/18   1640   11/24/15   1848   PROTTOTAL   --    --   7.8   ALBUMIN  4.1   < >  4.1   BILITOTAL   --    --   0.4   ALKPHOS   --    --   49   AST  14   < >  12   ALT  15   < >  15    < > = values in this interval not displayed.       Routing refill request to provider for review/approval because:  Protocol   Dosing alert

## 2018-10-04 ENCOUNTER — OFFICE VISIT (OUTPATIENT)
Dept: PULMONOLOGY | Facility: CLINIC | Age: 53
End: 2018-10-04
Attending: INTERNAL MEDICINE
Payer: MEDICARE

## 2018-10-04 VITALS
WEIGHT: 128 LBS | SYSTOLIC BLOOD PRESSURE: 110 MMHG | HEART RATE: 67 BPM | RESPIRATION RATE: 17 BRPM | HEIGHT: 65 IN | DIASTOLIC BLOOD PRESSURE: 71 MMHG | BODY MASS INDEX: 21.33 KG/M2

## 2018-10-04 DIAGNOSIS — M77.41 METATARSALGIA OF BOTH FEET: ICD-10-CM

## 2018-10-04 DIAGNOSIS — Z79.899 HIGH RISK MEDICATIONS (NOT ANTICOAGULANTS) LONG-TERM USE: Primary | ICD-10-CM

## 2018-10-04 DIAGNOSIS — M34.9 SYSTEMIC SCLEROSIS (H): ICD-10-CM

## 2018-10-04 DIAGNOSIS — M77.40 METATARSALGIA, UNSPECIFIED LATERALITY: ICD-10-CM

## 2018-10-04 DIAGNOSIS — Z79.899 HIGH RISK MEDICATIONS (NOT ANTICOAGULANTS) LONG-TERM USE: ICD-10-CM

## 2018-10-04 DIAGNOSIS — M85.80 OSTEOPENIA, UNSPECIFIED LOCATION: ICD-10-CM

## 2018-10-04 DIAGNOSIS — M05.79 RHEUMATOID ARTHRITIS INVOLVING MULTIPLE SITES WITH POSITIVE RHEUMATOID FACTOR (H): ICD-10-CM

## 2018-10-04 DIAGNOSIS — M77.42 METATARSALGIA OF BOTH FEET: ICD-10-CM

## 2018-10-04 DIAGNOSIS — I73.01 RAYNAUD'S DISEASE WITH GANGRENE (H): ICD-10-CM

## 2018-10-04 LAB
ALBUMIN SERPL-MCNC: 4.1 G/DL (ref 3.4–5)
ALBUMIN UR-MCNC: NEGATIVE MG/DL
ALT SERPL W P-5'-P-CCNC: 16 U/L (ref 0–50)
APPEARANCE UR: ABNORMAL
AST SERPL W P-5'-P-CCNC: 16 U/L (ref 0–45)
BACTERIA #/AREA URNS HPF: ABNORMAL /HPF
BASOPHILS # BLD AUTO: 0 10E9/L (ref 0–0.2)
BASOPHILS NFR BLD AUTO: 0.2 %
BILIRUB UR QL STRIP: NEGATIVE
COLOR UR AUTO: COLORLESS
CREAT SERPL-MCNC: 0.76 MG/DL (ref 0.52–1.04)
CRP SERPL-MCNC: 3.4 MG/L (ref 0–8)
DIFFERENTIAL METHOD BLD: ABNORMAL
EOSINOPHIL # BLD AUTO: 0.1 10E9/L (ref 0–0.7)
EOSINOPHIL NFR BLD AUTO: 1.8 %
ERYTHROCYTE [DISTWIDTH] IN BLOOD BY AUTOMATED COUNT: 12.7 % (ref 10–15)
ERYTHROCYTE [SEDIMENTATION RATE] IN BLOOD BY WESTERGREN METHOD: 20 MM/H (ref 0–30)
GFR SERPL CREATININE-BSD FRML MDRD: 79 ML/MIN/1.7M2
GLUCOSE UR STRIP-MCNC: NEGATIVE MG/DL
HCT VFR BLD AUTO: 39.1 % (ref 35–47)
HGB BLD-MCNC: 12.1 G/DL (ref 11.7–15.7)
HGB UR QL STRIP: ABNORMAL
IMM GRANULOCYTES # BLD: 0 10E9/L (ref 0–0.4)
IMM GRANULOCYTES NFR BLD: 0.2 %
KETONES UR STRIP-MCNC: NEGATIVE MG/DL
LEUKOCYTE ESTERASE UR QL STRIP: ABNORMAL
LYMPHOCYTES # BLD AUTO: 1.8 10E9/L (ref 0.8–5.3)
LYMPHOCYTES NFR BLD AUTO: 33 %
MCH RBC QN AUTO: 30.3 PG (ref 26.5–33)
MCHC RBC AUTO-ENTMCNC: 30.9 G/DL (ref 31.5–36.5)
MCV RBC AUTO: 98 FL (ref 78–100)
MONOCYTES # BLD AUTO: 0.4 10E9/L (ref 0–1.3)
MONOCYTES NFR BLD AUTO: 7.6 %
MUCOUS THREADS #/AREA URNS LPF: PRESENT /LPF
NEUTROPHILS # BLD AUTO: 3.2 10E9/L (ref 1.6–8.3)
NEUTROPHILS NFR BLD AUTO: 57.2 %
NITRATE UR QL: NEGATIVE
NRBC # BLD AUTO: 0 10*3/UL
NRBC BLD AUTO-RTO: 0 /100
PH UR STRIP: 7 PH (ref 5–7)
PLATELET # BLD AUTO: 207 10E9/L (ref 150–450)
RBC # BLD AUTO: 3.99 10E12/L (ref 3.8–5.2)
RBC #/AREA URNS AUTO: 1 /HPF (ref 0–2)
SOURCE: ABNORMAL
SP GR UR STRIP: 1 (ref 1–1.03)
SQUAMOUS #/AREA URNS AUTO: 6 /HPF (ref 0–1)
UROBILINOGEN UR STRIP-MCNC: 0 MG/DL (ref 0–2)
WBC # BLD AUTO: 5.5 10E9/L (ref 4–11)
WBC #/AREA URNS AUTO: 2 /HPF (ref 0–5)

## 2018-10-04 PROCEDURE — 84450 TRANSFERASE (AST) (SGOT): CPT | Performed by: INTERNAL MEDICINE

## 2018-10-04 PROCEDURE — 36415 COLL VENOUS BLD VENIPUNCTURE: CPT | Performed by: INTERNAL MEDICINE

## 2018-10-04 PROCEDURE — G0463 HOSPITAL OUTPT CLINIC VISIT: HCPCS | Mod: ZF

## 2018-10-04 PROCEDURE — 84460 ALANINE AMINO (ALT) (SGPT): CPT | Performed by: INTERNAL MEDICINE

## 2018-10-04 PROCEDURE — 82040 ASSAY OF SERUM ALBUMIN: CPT | Performed by: INTERNAL MEDICINE

## 2018-10-04 PROCEDURE — 82565 ASSAY OF CREATININE: CPT | Performed by: INTERNAL MEDICINE

## 2018-10-04 PROCEDURE — 85652 RBC SED RATE AUTOMATED: CPT | Performed by: INTERNAL MEDICINE

## 2018-10-04 PROCEDURE — 85025 COMPLETE CBC W/AUTO DIFF WBC: CPT | Performed by: INTERNAL MEDICINE

## 2018-10-04 PROCEDURE — 81001 URINALYSIS AUTO W/SCOPE: CPT | Performed by: INTERNAL MEDICINE

## 2018-10-04 PROCEDURE — 86140 C-REACTIVE PROTEIN: CPT | Performed by: INTERNAL MEDICINE

## 2018-10-04 RX ORDER — OXYCODONE AND ACETAMINOPHEN 5; 325 MG/1; MG/1
1 TABLET ORAL EVERY 4 HOURS PRN
Qty: 102 TABLET | Refills: 0 | Status: SHIPPED | OUTPATIENT
Start: 2018-11-15 | End: 2019-04-25

## 2018-10-04 RX ORDER — OXYCODONE HYDROCHLORIDE 5 MG/1
5 TABLET ORAL EVERY 4 HOURS PRN
Qty: 204 TABLET | Refills: 0 | Status: SHIPPED | OUTPATIENT
Start: 2018-12-14 | End: 2019-01-28

## 2018-10-04 RX ORDER — OXYCODONE AND ACETAMINOPHEN 5; 325 MG/1; MG/1
1 TABLET ORAL EVERY 4 HOURS PRN
Qty: 102 TABLET | Refills: 0 | Status: SHIPPED | OUTPATIENT
Start: 2018-12-14 | End: 2019-01-28

## 2018-10-04 RX ORDER — OXYCODONE HYDROCHLORIDE 5 MG/1
5 TABLET ORAL EVERY 4 HOURS PRN
Qty: 204 TABLET | Refills: 0 | Status: SHIPPED | OUTPATIENT
Start: 2018-10-17 | End: 2019-08-15

## 2018-10-04 RX ORDER — OXYCODONE AND ACETAMINOPHEN 5; 325 MG/1; MG/1
1 TABLET ORAL EVERY 4 HOURS PRN
Qty: 102 TABLET | Refills: 0 | Status: SHIPPED | OUTPATIENT
Start: 2018-10-17 | End: 2020-10-08

## 2018-10-04 RX ORDER — OXYCODONE HYDROCHLORIDE 5 MG/1
5 TABLET ORAL EVERY 4 HOURS PRN
Qty: 204 TABLET | Refills: 0 | Status: SHIPPED | OUTPATIENT
Start: 2018-11-15 | End: 2019-11-21

## 2018-10-04 ASSESSMENT — PAIN SCALES - GENERAL: PAINLEVEL: MODERATE PAIN (5)

## 2018-10-04 NOTE — PROGRESS NOTES
[facility]  Rheumatology Clinic  Brennen Child MD  10/04/2018     Name: Willard Grayson  MRN: [id number]  Age: 53 year old  : 1965  Referring provider: Lindy Stokes*     Problem List:  1. 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   2. Associated marked neuropathic skin pain markedly improved with Lyrica with prior medication therapy with gabapentin.   3. Diffuse musculoskeletal pain requiring methotrexate plus CellCept, and narcotic to control, but also improved.   4. Initial clinical overlap with anti-CCP seropositive rheumatoid arthritis with continued anti-CCP seropositivity as of 2010.   5. Raynaud's phenomena with digital ulcers with easily cracked skin, improving .   6. GI involvement consistent with bacterial small bowel overgrowth with marked improvement after a course of neomycin followed by Dr. Glez; positiv  hydrogen breath test in 3/2012 - treated wtih rifaximin through Dr. Jacob  7. No convincing evidence of lung involvement on serial pulmonary function tests or clinically.   8. History of heart palpitations prior to the onset of SSc with stable symptoms since.   9. History of medication failures with Remicade, methotrexate, methotrexate plus CellCept and with Orencia and with improvement since initiation of IV IG but with complicating headaches and overall sense of malaise with IV IG infusions.   10. History of silicone breast implants.   11. Incidental finding of calcified splenic artery aneurysm.  12. EGD in 3/2012 with hiatal hernia and gastral antral vascular ectasia  13. Recurrent right  third finger contracture with  extensor surface ulceration, healed     Assessment and Plan:  - Systemic sclerosis (H)  - Rheumatoid arthritis involving  multiple sites with positive rheumatoid factor (H)  - Raynaud's disease with gangrene (H)  - Metatarsalgia of both feet  - Osteopenia, unspecified location  - High risk medications (not anticoagulants) long-term use  Today the patient reports overall improvement in her eating habits and bowel movements, although she recently had to strain to pass some softer stool and has since had bilateral abdominal pain, worse on the left. She also reports continued, worsening difficulty swallowing pills and foods, although she reports this is actually the worst with liquids.      We discussed that Miralax could be causing stomach irritation. Her abdominal pain could be due to a variety of factors, including muscle strain, constipation, or diverticulosis. I recommended monitoring her symptoms and trying milk of magnesium, which should be well tolerated. She was previously evaluated at [facility] and I recommended she follow up with them again regarding her difficulty swallowing and also her abdominal pain if this does not improve. If she prefers, we could also refer her to GI here. Her most recent endoscopy was at [place] in 02/2017. She should also follow up with speech pathology for a swallow evaluation.     Her recent PFT was stable and 6 minute walk test showed normal Norm score and ability to walk without oxygen desaturation, although her distance was slightly decreased. We will plan to repeat these in 1 year. Her labs were normal in July 2018. We will repeat these today. I also advised her to wrap her right middle finger to avoid bumping the wound over her PIP joint.     - oxyCODONE IR (ROXICODONE) 5 MG tablet  Dispense: 204 tablet; Refill: 0  - oxyCODONE IR (ROXICODONE) 5 MG tablet  Dispense: 204 tablet; Refill: 0  - oxyCODONE IR (ROXICODONE) 5 MG tablet  Dispense: 204 tablet; Refill: 0  - oxyCODONE-acetaminophen (PERCOCET) 5-325 MG per tablet  Dispense: 102 tablet; Refill: 0  - oxyCODONE-acetaminophen (PERCOCET) 5-325 MG per  tablet  Dispense: 102 tablet; Refill: 0  - oxyCODONE-acetaminophen (PERCOCET) 5-325 MG per tablet  Dispense: 102 tablet; Refill: 0  - Albumin level  - ALT  - AST  - CBC with platelets differential  - Creatinine  - CRP inflammation  - Erythrocyte sedimentation rate auto  - Routine UA with Micro Reflex to Culture  - Albumin level  - ALT  - AST  - CBC with platelets differential  - Creatinine  - CRP inflammation  - Erythrocyte sedimentation rate auto  - Routine UA with Micro Reflex to Culture    Follow-up: 3 months     HPI:   Willard Grayson is a 53 year old female with a history of diffuse cutaneous systemic sclerosis who presents for follow up. She has previously been treated with methotrexate but was stopped due to GI issues. She has a long history of GI symptoms, Raynaud's, and joint pain. She was last seen on 07/19/2018, at which time she continued to have flank pain, headaches, and difficulty eating. She had also noted Raynaud's attacks but her ulcer was healed. Plan was to renew narcotics for 90 days and continue resumption of low dose methotrexate.     Today the patient reports that she had been eating more and having normal bowel movements. However, she recently ate something different and had some diarrhea following this. She is now having bilateral abdominal pain, worse on the left side, for the past week after having to strain to pass some softer stool. She has since been having normal bowel movements daily or every other day but her abdominal pain continues.     She additionally reports difficulty swallowing pills and certain foods. This mostly occurs with liquids and she ends up chocking and spitting the liquid back up. She localized this problem to the upper esophagus and is concerned that she may be aspirating. Food/pills getting stuck was previously a problem for her daily and this is now occurring 3-4 times per week, although it is gradually worsening. She does report that she had improvement in the  past after stretching. She also reports that she lost the guard she was using for her right hand and has a healing wound to her right middle finger PIP joint. She is unable to get a new guard due to lack of insurance coverage. She is on Cellcept but is not on methotrexate.     Review of Systems:   Pertinent items are noted in HPI or as below, remainder of complete ROS is negative.      No recent problems with hearing or vision. No swallowing problems.   No breathing difficulty, shortness of breath, coughing, or wheezing  No chest pain or palpitations  No heart burn, indigestion, nausea, vomiting  No urination problems, no bloody, cloudy urine, no dysuria  No numbing, tingling, weakness  No headaches or confusion  No rashes. No easy bleeding or bruising.   + difficulty swallowing   + abdominal pain   + diarrhea, resolved     Active Medications:      aspirin-dipyridamole (AGGRENOX)  MG per 12 hr capsule, Take 1 capsule by mouth daily , Disp: , Rfl:      cevimeline (EVOXAC) 30 MG capsule, Take 1 capsule (30 mg) by mouth 3 times daily as needed, Disp: 270 capsule, Rfl: 3     cyanocobalamin (VITAMIN B12) 1000 MCG/ML injection, Inject 1 mL into the muscle every 30 days, Disp: , Rfl:      esomeprazole (NEXIUM) 40 MG capsule, Take 1 capsule (40 mg) by mouth 2 times daily Take 30-60 minutes before eating., Disp: 180 capsule, Rfl: 3     folic acid (FOLVITE) 1 MG tablet, Take 1 tablet (1 mg) by mouth daily, Disp: 90 tablet, Rfl: 3     LORazepam (ATIVAN) 0.5 MG tablet, Take 1 - 2 tablets by mouth three times daily as needed., Disp: 60 tablet, Rfl: 0     mycophenolate (GENERIC EQUIVALENT) 500 MG tablet, Take 3 tablets (1,500 mg) by mouth daily, Disp: 270 tablet, Rfl: 0     olopatadine (PATANOL) 0.1 % ophthalmic solution, Place 1 drop into both eyes 2 times daily, Disp: 5 mL, Rfl: 3     ondansetron (ZOFRAN-ODT) 4 MG ODT tab, LET 1 TABLET DISSOLVE IN MOUTH EVERY 8 HOURS AS NEEDED FOR NAUSEA, Disp: 20 tablet, Rfl: 0      [START ON 12/14/2018] oxyCODONE IR (ROXICODONE) 5 MG tablet, Take 1 tablet (5 mg) by mouth every 4 hours as needed for moderate to severe pain or pain maximum 6 tablet(s) per day, Disp: 204 tablet, Rfl: 0     [START ON 11/15/2018] oxyCODONE IR (ROXICODONE) 5 MG tablet, Take 1 tablet (5 mg) by mouth every 4 hours as needed for moderate to severe pain (maximum 6 tablets per day), Disp: 204 tablet, Rfl: 0     [START ON 10/17/2018] oxyCODONE IR (ROXICODONE) 5 MG tablet, Take 1 tablet (5 mg) by mouth every 4 hours as needed for moderate to severe pain or pain maximum 6 tablet(s) per day, Disp: 204 tablet, Rfl: 0     [START ON 12/14/2018] oxyCODONE-acetaminophen (PERCOCET) 5-325 MG per tablet, Take 1 tablet by mouth every 4 hours as needed for pain or moderate to severe pain (maximum 3 tablets per day), Disp: 102 tablet, Rfl: 0     [START ON 11/15/2018] oxyCODONE-acetaminophen (PERCOCET) 5-325 MG per tablet, Take 1 tablet by mouth every 4 hours as needed for moderate to severe pain (maximum 3 tablets per day), Disp: 102 tablet, Rfl: 0     [START ON 10/17/2018] oxyCODONE-acetaminophen (PERCOCET) 5-325 MG per tablet, Take 1 tablet by mouth every 4 hours as needed for pain or moderate to severe pain (maximum 3 tablets per day), Disp: 102 tablet, Rfl: 0     pravastatin (PRAVACHOL) 10 MG tablet, Take 1 tablet (10 mg) by mouth daily, Disp: 30 tablet, Rfl: 5     pregabalin (LYRICA) 75 MG capsule, Take 1 capsule (75 mg) by mouth 2 times daily, Disp: 180 capsule, Rfl: 1     valACYclovir (VALTREX) 500 MG tablet, Take 1 tablet (500 mg) by mouth 2 times daily (Patient taking differently: Take 500 mg by mouth daily ), Disp: 180 tablet, Rfl: 1     Vitamin D, Cholecalciferol, 1000 UNITS TABS, Take 2,000 Units by mouth daily , Disp: , Rfl:      Blood Pressure Monitoring KIT, Check BP every 7 days., Disp: 1 kit, Rfl: 0      Allergies:   Penicillin [penicillins]; Bactroban [mupirocin calcium]; Levaquin; and Rifampin      Past Medical  "History:  Rheumatoid arthritis/scleroderma  Gastric antral vascular ectasia  GERD  Iron deficiency anemia  Irregular heart beat  Osteomyelitis, right 3rd finger  Raynaud's syndrome w/ gangrene  Scleroderma  Sjogren's syndrome  Systemic sclerosis  Metatarsalgia  Dysuria  Ulcer of finger  Sicca syndrome  Palpitations  Ischemia of upper extremity  Intercostal pain      Past Surgical History:  Appendectomy  Hydrogen breath test  Esophageal motility study  Mammoplasty augmentation bilateral  Esophagogastroduodenoscopy, multiple      Family History:   Brain cancer      Social History:   Former cigarette smoker, quit 11/2017, recently restarted  No smokeless tobacco use  No alcohol use     Physical Exam:   /71  Pulse 67  Resp 17  Ht 1.638 m (5' 4.5\")  Wt 58.1 kg (128 lb)  BMI 21.63 kg/m2   Wt Readings from Last 4 Encounters:   10/04/18 58.1 kg (128 lb)   07/19/18 58.1 kg (128 lb)   04/10/18 56.2 kg (123 lb 12.8 oz)   02/27/18 57.2 kg (126 lb 3.2 oz)     Constitutional: Well-developed, appearing stated age; cooperative  Eyes: Normal EOM, PERRLA, vision, conjunctiva, sclera  ENT: Normal external ears, nose, hearing, lips, teeth, gums, throat. No mucous membrane lesions, normal saliva pool  Neck: No mass or thyroid enlargement  Resp: Lungs clear to auscultation, nl to palpation  CV: RRR, no murmurs, rubs or gallops, no edema  GI: No ABD mass or tenderness, no HSM  : Not tested  Lymph: No cervical, supraclavicular, inguinal or epitrochlear nodes  MS: The TMJ, neck, shoulder, elbow, spine, hip, knee, ankle, and foot MTP/IP joints were examined and found normal. No active synovitis or altered joint anatomy. Full joint ROM. Normal  strength. No dactylitis,  tenosynovitis, enthesopathy. Slight restriction in wrist range of motion but not significant. Proximally 15   contracture in PIPs and 5-10   in DIPs of the left hand. No open ulcers on the left hand. Right hand greater than 90   contractures in PIPs 3-5 with a " scabbed over ulcer on the medial aspect of the right 3rd PIP joint. Slight surrounding erythema and some modest tenderness. Obvious calcinosis lesion over the lateral aspect of the right 4th PIP.   Skin: No nail pitting, alopecia, rash, nodules.  Neuro: Normal cranial nerves, strength, sensation, DTRs.   Psych: Normal judgement, orientation, memory, affect.     Laboratory:   Results for orders placed or performed in visit on 08/06/18   General PFT Lab (Please always keep checked)   Result Value Ref Range    FVC-Pred 3.10 L    FVC-Pre 3.24 L    FVC-%Pred-Pre 104 %    FEV1-Pre 2.37 L    FEV1-%Pred-Pre 95 %    FEV1FVC-Pred 79 %    FEV1FVC-Pre 73 %    FEFMax-Pred 6.61 L/sec    FEFMax-Pre 6.74 L/sec    FEFMax-%Pred-Pre 102 %    FEF2575-Pred 2.38 L/sec    FEF2575-Pre 1.63 L/sec    BUJ6974-%Pred-Pre 68 %    ExpTime-Pre 7.95 sec    FIFMax-Pre 5.87 L/sec    VC-Pred 3.43 L    VC-Pre 3.17 L    VC-%Pred-Pre 92 %    IC-Pred 2.38 L    IC-Pre 1.72 L    IC-%Pred-Pre 72 %    ERV-Pred 1.05 L    ERV-Pre 1.45 L    ERV-%Pred-Pre 138 %    FEV1FEV6-Pred 81 %    FEV1FEV6-Pre 74 %    FRCPleth-Pred 2.73 L    FRCPleth-Pre 3.18 L    FRCPleth-%Pred-Pre 116 %    RVPleth-Pred 1.85 L    RVPleth-Pre 1.72 L    RVPleth-%Pred-Pre 93 %    TLCPleth-Pred 5.02 L    TLCPleth-Pre 4.89 L    TLCPleth-%Pred-Pre 97 %    DLCOunc-Pred 22.36 ml/min/mmHg    DLCOunc-Pre 18.28 ml/min/mmHg    DLCOunc-%Pred-Pre 81 %    VA-Pre 4.65 L    VA-%Pred-Pre 90 %    FEV1SVC-Pred 73 %    FEV1SVC-Pre 75 %    Narrative    The FVC, FEV1, FEV1/FVC ratio and GVO83-32% are within normal limits.  The inspiratory flow rates are within normal limits.  Lung volumes are within normal limits.  The diffusing capacity is normal.  However, the diffusing capacity was not corrected for   the patient's hemoglobin.  IMPRESSION:  Normal Pulmonary Function.  Reduced wakj distance on room air with no significant desaturation or hypoxia. lowest sat was 97%.   Dr. Danielle Palma.    6 minute walk test    Result Value Ref Range    6 min walk (FT) 1350 ft    6 Min Walk (M) 411 m       Recent Labs   Lab Test  07/19/18   1640  05/02/18   1255  01/17/18   1320   11/27/15   0708  11/25/15   0620  11/24/15   1848   WBC  5.7  4.5  6.8   < >   --   4.4  4.9   RBC  3.88  3.82  3.84   < >   --   4.20  4.06   HGB  12.2  12.1  12.1   < >   --   12.8  12.4   HCT  38.3  37.9  38.3   < >   --   39.8  38.2   MCV  99  99  100   < >   --   95  94   RDW  12.3  12.0  12.8   < >   --   12.8  12.8   PLT  219  181  205   < >   --   160  173   ALBUMIN  4.1  4.2  4.0   < >   --    --   4.1   CRP  <2.9  <2.9  <2.9   < >   --    --   <2.9   BUN   --    --    --    --   8  7  7    < > = values in this interval not displayed.      Recent Labs   Lab Test  09/08/14   1519  02/27/12   1504  11/05/10   1400   TSH  1.64  2.81  1.26   T4  0.85   --   7.3     Hemoglobin   Date Value Ref Range Status   10/04/2018 12.1 11.7 - 15.7 g/dL Final   07/19/2018 12.2 11.7 - 15.7 g/dL Final   05/02/2018 12.1 11.7 - 15.7 g/dL Final     Urea Nitrogen   Date Value Ref Range Status   11/27/2015 8 7 - 30 mg/dL Final   11/25/2015 7 7 - 30 mg/dL Final   11/24/2015 7 7 - 30 mg/dL Final     Sed Rate   Date Value Ref Range Status   10/04/2018 20 0 - 30 mm/h Final   07/19/2018 15 0 - 30 mm/h Final   05/02/2018 11 0 - 30 mm/h Final     CRP Cardiac Risk   Date Value Ref Range Status   08/24/2010 1.0 mg/L Final     Comment:     Reference Values:   Low Risk:           <1.0 mg/L   Average Risk:       1.0-3.0 mg/L   High Risk:          >3.0 mg/L   Acute Inflammation: >8.0 mg/L     CRP Inflammation   Date Value Ref Range Status   10/04/2018 3.4 0.0 - 8.0 mg/L Final   07/19/2018 <2.9 0.0 - 8.0 mg/L Final   05/02/2018 <2.9 0.0 - 8.0 mg/L Final     AST   Date Value Ref Range Status   10/04/2018 16 0 - 45 U/L Final   07/19/2018 14 0 - 45 U/L Final   05/02/2018 17 0 - 45 U/L Final     Albumin   Date Value Ref Range Status   10/04/2018 4.1 3.4 - 5.0 g/dL Final   07/19/2018 4.1 3.4 -  5.0 g/dL Final   05/02/2018 4.2 3.4 - 5.0 g/dL Final     Alkaline Phosphatase   Date Value Ref Range Status   11/24/2015 49 40 - 150 U/L Final   10/24/2014 50 40 - 150 U/L Final   09/08/2014 53 40 - 150 U/L Final     ALT   Date Value Ref Range Status   10/04/2018 16 0 - 50 U/L Final   07/19/2018 15 0 - 50 U/L Final   05/02/2018 15 0 - 50 U/L Final     Rheumatoid Factor   Date Value Ref Range Status   06/26/2009 13 0 - 14 IU/mL Final     Recent Labs   Lab Test  10/04/18   1623  07/19/18   1640  05/02/18   1255   11/27/15   0708  11/25/15   0620  11/24/15   1848   10/24/14   1526  09/08/14   1519   02/27/12   1504   11/05/10   1400   WBC  5.5  5.7  4.5   < >   --   4.4  4.9   < >  4.0  3.7*   < >   --    < >   --    HGB  12.1  12.2  12.1   < >   --   12.8  12.4   < >  10.4*  10.1*   < >   --    < >   --    HCT  39.1  38.3  37.9   < >   --   39.8  38.2   < >  35.2  33.8*   < >   --    < >   --    MCV  98  99  99   < >   --   95  94   < >  93  92   < >   --    < >   --    PLT  207  219  181   < >   --   160  173   < >  213  256   < >   --    < >   --    BUN   --    --    --    --   8  7  7   --   9  7   < >   --    < >   --    TSH   --    --    --    --    --    --    --    --    --   1.64   --   2.81   --   1.26   AST  16  14  17   < >   --    --   12   < >  15  12   < >   --    < >   --    ALT  16  15  15   < >   --    --   15   < >  16  13   < >   --    < >   --    ALKPHOS   --    --    --    --    --    --   49   --   50  53   < >   --    < >   --     < > = values in this interval not displayed.     Reviewed Rheumatology lab flowsheet    Scribe Disclosure:   I, Noy Arango, am serving as a scribe to document services personally performed by Brennen Child MD at this visit, based upon the provider's statements to me. All documentation has been reviewed by the aforementioned provider prior to being entered into the official medical record.     Portions of this medical record were completed by a scribe. UPON MY  REVIEW AND AUTHENTICATION BY ELECTRONIC SIGNATURE, this confirms (a) I performed the applicable clinical services, and (b) the record is accurate.    JANET Child MD, PhD    Rheumatology

## 2018-10-04 NOTE — MR AVS SNAPSHOT
After Visit Summary   10/4/2018    Willard Grayson    MRN: [id number]           Patient Information     Date Of Birth          1965        Visit Information        Provider Department      10/4/2018 3:00 PM Brennen Child MD [facility]        Today's Diagnoses     Systemic sclerosis (H)        Rheumatoid arthritis involving multiple sites with positive rheumatoid factor (H)        Raynaud's disease with gangrene (H)        Metatarsalgia, unspecified laterality        Metatarsalgia of both feet        Osteopenia, unspecified location        Systemic sclerosis           Follow-ups after your visit        Your next 10 appointments already scheduled     Jabari 10, 2019  2:30 PM CST   (Arrive by 2:15 PM)   RETURN SCLERODERMA with Brennen Child MD   [facility] ([facility])    [address] [place] [address]  [address] [place] [id number] [phone]              Who to contact     If you have questions or need follow up information about today's clinic visit or your schedule please contact [facility] directly at [phone].  Normal or non-critical lab and imaging results will be communicated to you by MyChart, letter or phone within 4 business days after the clinic has received the results. If you do not hear from us within 7 days, please contact the clinic through MyChart or phone. If you have a critical or abnormal lab result, we will notify you by phone as soon as possible.  Submit refill requests through [employer] or call your pharmacy and they will forward the refill request to us. Please allow 3 business days for your refill to be completed.          Additional Information About Your Visit        Care EveryWhere ID     This is your Care EveryWhere ID. This could be used by other organizations to access your [place] medical records  [id number]        Your Vitals Were      "Pulse Respirations Height BMI (Body Mass Index)          67 17 1.638 m (5' 4.5\") 21.63 kg/m2         Blood Pressure from Last 3 Encounters:   10/04/18 110/71   07/19/18 115/68   04/10/18 101/57    Weight from Last 3 Encounters:   10/04/18 58.1 kg (128 lb)   07/19/18 58.1 kg (128 lb)   04/10/18 56.2 kg (123 lb 12.8 oz)              Today, you had the following     No orders found for display         Today's Medication Changes          These changes are accurate as of 10/4/18  3:59 PM.  If you have any questions, ask your nurse or doctor.               These medicines have changed or have updated prescriptions.        Dose/Directions    * oxyCODONE IR 5 MG tablet   Commonly known as:  ROXICODONE   This may have changed:  You were already taking a medication with the same name, and this prescription was added. Make sure you understand how and when to take each.   Used for:  Systemic sclerosis (H), Rheumatoid arthritis involving multiple sites with positive rheumatoid factor (H), Raynaud's disease with gangrene (H), Systemic sclerosis (H), Metatarsalgia, unspecified laterality, Metatarsalgia of both feet, Osteopenia, unspecified location   Changed by:  Brennen Child MD        Dose:  5 mg   Start taking on:  10/17/2018   Take 1 tablet (5 mg) by mouth every 4 hours as needed for moderate to severe pain or pain maximum 6 tablet(s) per day   Quantity:  204 tablet   Refills:  0       * oxyCODONE IR 5 MG tablet   Commonly known as:  ROXICODONE   This may have changed:  These instructions start on 11/15/2018. If you are unsure what to do until then, ask your doctor or other care provider.   Used for:  Systemic sclerosis (H), Rheumatoid arthritis involving multiple sites with positive rheumatoid factor (H), Raynaud's disease with gangrene (H), Metatarsalgia, unspecified laterality, Metatarsalgia of both feet, Osteopenia, unspecified location   Changed by:  Brennen Child MD        Dose:  5 mg   Start taking on:  " 11/15/2018   Take 1 tablet (5 mg) by mouth every 4 hours as needed for moderate to severe pain (maximum 6 tablets per day)   Quantity:  204 tablet   Refills:  0       * oxyCODONE IR 5 MG tablet   Commonly known as:  ROXICODONE   This may have changed:  These instructions start on 12/14/2018. If you are unsure what to do until then, ask your doctor or other care provider.   Used for:  Systemic sclerosis (H), Rheumatoid arthritis involving multiple sites with positive rheumatoid factor (H), Raynaud's disease with gangrene (H), Metatarsalgia, unspecified laterality, Metatarsalgia of both feet, Osteopenia, unspecified location   Changed by:  Brennen Child MD        Dose:  5 mg   Start taking on:  12/14/2018   Take 1 tablet (5 mg) by mouth every 4 hours as needed for moderate to severe pain or pain maximum 6 tablet(s) per day   Quantity:  204 tablet   Refills:  0       * oxyCODONE-acetaminophen 5-325 MG per tablet   Commonly known as:  PERCOCET   This may have changed:    - additional instructions  - These instructions start on 10/17/2018. If you are unsure what to do until then, ask your doctor or other care provider.   Used for:  Systemic sclerosis (H), Rheumatoid arthritis involving multiple sites with positive rheumatoid factor (H), Raynaud's disease with gangrene (H), Metatarsalgia, unspecified laterality, Metatarsalgia of both feet, Osteopenia, unspecified location   Changed by:  Brennen Child MD        Dose:  1 tablet   Start taking on:  10/17/2018   Take 1 tablet by mouth every 4 hours as needed for pain or moderate to severe pain (maximum 3 tablets per day)   Quantity:  102 tablet   Refills:  0       * oxyCODONE-acetaminophen 5-325 MG per tablet   Commonly known as:  PERCOCET   This may have changed:    - additional instructions  - These instructions start on 11/15/2018. If you are unsure what to do until then, ask your doctor or other care provider.   Used for:  Systemic sclerosis (H), Rheumatoid  arthritis involving multiple sites with positive rheumatoid factor (H), Raynaud's disease with gangrene (H), Metatarsalgia, unspecified laterality, Metatarsalgia of both feet, Osteopenia, unspecified location   Changed by:  Brennen Child MD        Dose:  1 tablet   Start taking on:  11/15/2018   Take 1 tablet by mouth every 4 hours as needed for moderate to severe pain (maximum 3 tablets per day)   Quantity:  102 tablet   Refills:  0       * oxyCODONE-acetaminophen 5-325 MG per tablet   Commonly known as:  PERCOCET   This may have changed:    - additional instructions  - These instructions start on 12/14/2018. If you are unsure what to do until then, ask your doctor or other care provider.   Used for:  Systemic sclerosis (H), Rheumatoid arthritis involving multiple sites with positive rheumatoid factor (H), Raynaud's disease with gangrene (H), Metatarsalgia, unspecified laterality, Metatarsalgia of both feet, Osteopenia, unspecified location   Changed by:  Brennen Child MD        Dose:  1 tablet   Start taking on:  12/14/2018   Take 1 tablet by mouth every 4 hours as needed for pain or moderate to severe pain (maximum 3 tablets per day)   Quantity:  102 tablet   Refills:  0       valACYclovir 500 MG tablet   Commonly known as:  VALTREX   This may have changed:  when to take this   Used for:  Herpes, Systemic sclerosis (H), Anxiety        Dose:  500 mg   Take 1 tablet (500 mg) by mouth 2 times daily   Quantity:  180 tablet   Refills:  1       * Notice:  This list has 6 medication(s) that are the same as other medications prescribed for you. Read the directions carefully, and ask your doctor or other care provider to review them with you.         Where to get your medicines      Some of these will need a paper prescription and others can be bought over the counter.  Ask your nurse if you have questions.     Bring a paper prescription for each of these medications     oxyCODONE IR 5 MG tablet    oxyCODONE IR 5  MG tablet    oxyCODONE IR 5 MG tablet    oxyCODONE-acetaminophen 5-325 MG per tablet    oxyCODONE-acetaminophen 5-325 MG per tablet    oxyCODONE-acetaminophen 5-325 MG per tablet               Information about OPIOIDS     PRESCRIPTION OPIOIDS: WHAT YOU NEED TO KNOW   We gave you an opioid (narcotic) pain medicine. It is important to manage your pain, but opioids are not always the best choice. You should first try all the other options your care team gave you. Take this medicine for as short a time (and as few doses) as possible.    Some activities can increase your pain, such as bandage changes or therapy sessions. It may help to take your pain medicine 30 to 60 minutes before these activities. Reduce your stress by getting enough sleep, working on hobbies you enjoy and practicing relaxation or meditation. Talk to your care team about ways to manage your pain beyond prescription opioids.    These medicines have risks:    DO NOT drive when on new or higher doses of pain medicine. These medicines can affect your alertness and reaction times, and you could be arrested for driving under the influence (DUI). If you need to use opioids long-term, talk to your care team about driving.    DO NOT operate heavy machinery    DO NOT do any other dangerous activities while taking these medicines.    DO NOT drink any alcohol while taking these medicines.     If the opioid prescribed includes acetaminophen, DO NOT take with any other medicines that contain acetaminophen. Read all labels carefully. Look for the word  acetaminophen  or  Tylenol.  Ask your pharmacist if you have questions or are unsure.    You can get addicted to pain medicines, especially if you have a history of addiction (chemical, alcohol or substance dependence). Talk to your care team about ways to reduce this risk.    All opioids tend to cause constipation. Drink plenty of water and eat foods that have a lot of fiber, such as fruits, vegetables, prune juice,  apple juice and high-fiber cereal. Take a laxative (Miralax, milk of magnesia, Colace, Senna) if you don t move your bowels at least every other day. Other side effects include upset stomach, sleepiness, dizziness, throwing up, tolerance (needing more of the medicine to have the same effect), physical dependence and slowed breathing.    Store your pills in a secure place, locked if possible. We will not replace any lost or stolen medicine. If you don t finish your medicine, please throw away (dispose) as directed by your pharmacist. The [place] [employer] has more information about safe disposal: [email or web address]         Primary Care Provider Office Phone # Fax #    Lindy Neeta Plasencia [phone]       [facility] [address] [facility] [address]        Equal Access to Services     PAT [facility] Center[facility] : Hadii ru lopezo Sokathleen, waaxda luqadaha, qaybta kaalmada adeeddie, quinten romeo . So [facility] [phone].    ATENCIÓN: Si habla español, tiene a paige disposición servicios gratuitos de asistencia lingüística. Collin al [phone].    We comply with applicable federal civil rights laws and [place] laws. We do not discriminate on the basis of race, color, national origin, age, disability, sex, sexual orientation, or gender identity.            Thank you!     Thank you for choosing [facility]  for your care. Our goal is always to provide you with excellent care. Hearing back from our patients is one way we can continue to improve our services. Please take a few minutes to complete the written survey that you may receive in the mail after your visit with us. Thank you!             Your Updated Medication List - Protect others around you: Learn how to safely use, store and throw away your medicines at www.disposemymeds.org.          This list is accurate as of  10/4/18  3:59 PM.  Always use your most recent med list.                   Brand Name Dispense Instructions for use Diagnosis    aspirin-dipyridamole  MG per 12 hr capsule    AGGRENOX     Take 1 capsule by mouth daily        Blood Pressure Monitoring Kit     1 kit    Check BP every 7 days.    Rheumatoid arthritis(714.0), Sicca syndrome (H), Systemic sclerosis (H), Encounter for long-term (current) use of other medications, Abnormal hemoglobin (Hgb) (H), Ulcer of finger, with fat layer exposed (H), Chronic osteomyelitis, site unspecified, Anxiety, Herpes, Rheumatoid arthritis(714.0)       cevimeline 30 MG capsule    EVOXAC    270 capsule    Take 1 capsule (30 mg) by mouth 3 times daily as needed    Metatarsalgia, unspecified laterality, Systemic sclerosis (H), Sicca syndrome (H)       cyanocobalamin 1000 MCG/ML injection    VITAMIN B12     Inject 1 mL into the muscle every 30 days        esomeprazole 40 MG CR capsule    nexIUM    180 capsule    Take 1 capsule (40 mg) by mouth 2 times daily Take 30-60 minutes before eating.    GAVE (gastric antral vascular ectasia)       folic acid 1 MG tablet    FOLVITE    90 tablet    Take 1 tablet (1 mg) by mouth daily    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 factor (H), Osteomyelitis of finger of right hand (H), Encounter for long-term current use of medication, Sicca syndrome (H), GAVE (gastric antral vascular ectasia), Raynaud's disease with gangrene (H)       LORazepam 0.5 MG tablet    ATIVAN    60 tablet    Take 1 - 2 tablets by mouth three times daily as needed.    Systemic sclerosis (H), Anxiety, Ulcer of finger, with fat layer exposed (H), Raynaud's disease with gangrene (H), Osteomyelitis of finger of right hand (H), Rheumatoid arthritis involving multiple sites with positive rheumatoid factor (H), Systemic sclerosis (H)       mycophenolate 500 MG tablet    GENERIC EQUIVALENT    270 tablet    Take 3 tablets (1,500 mg) by mouth daily     Systemic sclerosis (H)       olopatadine 0.1 % ophthalmic solution    PATANOL    5 mL    Place 1 drop into both eyes 2 times daily    Sicca syndrome (H)       ondansetron 4 MG ODT tab    ZOFRAN-ODT    20 tablet    LET 1 TABLET DISSOLVE IN MOUTH EVERY 8 HOURS AS NEEDED FOR NAUSEA    Nausea       * oxyCODONE IR 5 MG tablet   Start taking on:  10/17/2018    ROXICODONE    204 tablet    Take 1 tablet (5 mg) by mouth every 4 hours as needed for moderate to severe pain or pain maximum 6 tablet(s) per day    Systemic sclerosis (H), Rheumatoid arthritis involving multiple sites with positive rheumatoid factor (H), Raynaud's disease with gangrene (H), Systemic sclerosis (H), Metatarsalgia, unspecified laterality, Metatarsalgia of both feet, Osteopenia, unspecified location       * oxyCODONE IR 5 MG tablet   Start taking on:  11/15/2018    ROXICODONE    204 tablet    Take 1 tablet (5 mg) by mouth every 4 hours as needed for moderate to severe pain (maximum 6 tablets per day)    Systemic sclerosis (H), Rheumatoid arthritis involving multiple sites with positive rheumatoid factor (H), Raynaud's disease with gangrene (H), Metatarsalgia, unspecified laterality, Metatarsalgia of both feet, Osteopenia, unspecified location       * oxyCODONE IR 5 MG tablet   Start taking on:  12/14/2018    ROXICODONE    204 tablet    Take 1 tablet (5 mg) by mouth every 4 hours as needed for moderate to severe pain or pain maximum 6 tablet(s) per day    Systemic sclerosis (H), Rheumatoid arthritis involving multiple sites with positive rheumatoid factor (H), Raynaud's disease with gangrene (H), Metatarsalgia, unspecified laterality, Metatarsalgia of both feet, Osteopenia, unspecified location       * oxyCODONE-acetaminophen 5-325 MG per tablet   Start taking on:  10/17/2018    PERCOCET    102 tablet    Take 1 tablet by mouth every 4 hours as needed for pain or moderate to severe pain (maximum 3 tablets per day)    Systemic sclerosis (H), Rheumatoid  arthritis involving multiple sites with positive rheumatoid factor (H), Raynaud's disease with gangrene (H), Metatarsalgia, unspecified laterality, Metatarsalgia of both feet, Osteopenia, unspecified location       * oxyCODONE-acetaminophen 5-325 MG per tablet   Start taking on:  11/15/2018    PERCOCET    102 tablet    Take 1 tablet by mouth every 4 hours as needed for moderate to severe pain (maximum 3 tablets per day)    Systemic sclerosis (H), Rheumatoid arthritis involving multiple sites with positive rheumatoid factor (H), Raynaud's disease with gangrene (H), Metatarsalgia, unspecified laterality, Metatarsalgia of both feet, Osteopenia, unspecified location       * oxyCODONE-acetaminophen 5-325 MG per tablet   Start taking on:  12/14/2018    PERCOCET    102 tablet    Take 1 tablet by mouth every 4 hours as needed for pain or moderate to severe pain (maximum 3 tablets per day)    Systemic sclerosis (H), Rheumatoid arthritis involving multiple sites with positive rheumatoid factor (H), Raynaud's disease with gangrene (H), Metatarsalgia, unspecified laterality, Metatarsalgia of both feet, Osteopenia, unspecified location       pravastatin 10 MG tablet    PRAVACHOL    30 tablet    Take 1 tablet (10 mg) by mouth daily    Rheumatoid arthritis involving multiple sites with positive rheumatoid factor (H)       pregabalin 75 MG capsule    LYRICA    180 capsule    Take 1 capsule (75 mg) by mouth 2 times daily    Systemic sclerosis (H)       valACYclovir 500 MG tablet    VALTREX    180 tablet    Take 1 tablet (500 mg) by mouth 2 times daily    Herpes, Systemic sclerosis (H), Anxiety       Vitamin D (Cholecalciferol) 1000 units Tabs      Take 2,000 Units by mouth daily        * Notice:  This list has 6 medication(s) that are the same as other medications prescribed for you. Read the directions carefully, and ask your doctor or other care provider to review them with you.

## 2018-10-04 NOTE — LETTER
10/4/2018       RE: Willard Grayson  [address]  [address]  [facility] [address]     Dear Colleague,    Thank you for referring your patient, Willard Grayson, to the [facility] at [facility]. Please see a copy of my visit note below.    [facility]  Rheumatology Clinic  Brennen Child MD  10/04/2018     Name: Willard Grayson  MRN: [id number]  Age: 53 year old  : 1965  Referring provider: Lindy Stokes*     Problem List:  1. 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   2. Associated marked neuropathic skin pain markedly improved with Lyrica with prior medication therapy with gabapentin.   3. Diffuse musculoskeletal pain requiring methotrexate plus CellCept, and narcotic to control, but also improved.   4. Initial clinical overlap with anti-CCP seropositive rheumatoid arthritis with continued anti-CCP seropositivity as of 2010.   5. Raynaud's phenomena with digital ulcers with easily cracked skin, improving .   6. GI involvement consistent with bacterial small bowel overgrowth with marked improvement after a course of neomycin followed by Dr. Glez; positiv  hydrogen breath test in 3/2012 - treated [facility] rifaximin through Dr. Jacob  7. No convincing evidence of lung involvement on serial pulmonary function tests or clinically.   8. History of heart palpitations prior to the onset of SSc with stable symptoms since.   9. History of medication failures with Remicade, methotrexate, methotrexate plus CellCept and with Orencia and with improvement since initiation of IV IG but with complicating headaches and overall sense of malaise with IV IG infusions.   10. History of silicone breast  implants.   11. Incidental finding of calcified splenic artery aneurysm.  12. EGD in 3/2012 with hiatal hernia and gastral antral vascular ectasia  13. Recurrent right  third finger contracture with  extensor surface ulceration, healed     Assessment and Plan:  - Systemic sclerosis (H)  - Rheumatoid arthritis involving multiple sites with positive rheumatoid factor (H)  - Raynaud's disease with gangrene (H)  - Metatarsalgia of both feet  - Osteopenia, unspecified location  - High risk medications (not anticoagulants) long-term use  Today the patient reports overall improvement in her eating habits and bowel movements, although she recently had to strain to pass some softer stool and has since had bilateral abdominal pain, worse on the left. She also reports continued, worsening difficulty swallowing pills and foods, although she reports this is actually the worst with liquids.      We discussed that Miralax could be causing stomach irritation. Her abdominal pain could be due to a variety of factors, including muscle strain, constipation, or diverticulosis. I recommended monitoring her symptoms and trying milk of magnesium, which should be well tolerated. She was previously evaluated at [facility] and I recommended she follow up with them again regarding her difficulty swallowing and also her abdominal pain if this does not improve. If she prefers, we could also refer her to GI here. Her most recent endoscopy was at [place] in 02/2017. She should also follow up with speech pathology for a swallow evaluation.     Her recent PFT was stable and 6 minute walk test showed normal Norm score and ability to walk without oxygen desaturation, although her distance was slightly decreased. We will plan to repeat these in 1 year. Her labs were normal in July 2018. We will repeat these today. I also advised her to wrap her right middle finger to avoid bumping the wound over her PIP joint.     - oxyCODONE IR (ROXICODONE) 5 MG tablet   Dispense: 204 tablet; Refill: 0  - oxyCODONE IR (ROXICODONE) 5 MG tablet  Dispense: 204 tablet; Refill: 0  - oxyCODONE IR (ROXICODONE) 5 MG tablet  Dispense: 204 tablet; Refill: 0  - oxyCODONE-acetaminophen (PERCOCET) 5-325 MG per tablet  Dispense: 102 tablet; Refill: 0  - oxyCODONE-acetaminophen (PERCOCET) 5-325 MG per tablet  Dispense: 102 tablet; Refill: 0  - oxyCODONE-acetaminophen (PERCOCET) 5-325 MG per tablet  Dispense: 102 tablet; Refill: 0  - Albumin level  - ALT  - AST  - CBC with platelets differential  - Creatinine  - CRP inflammation  - Erythrocyte sedimentation rate auto  - Routine UA with Micro Reflex to Culture  - Albumin level  - ALT  - AST  - CBC with platelets differential  - Creatinine  - CRP inflammation  - Erythrocyte sedimentation rate auto  - Routine UA with Micro Reflex to Culture    Follow-up: 3 months     HPI:   Willard Grayson is a 53 year old female with a history of diffuse cutaneous systemic sclerosis who presents for follow up. She has previously been treated with methotrexate but was stopped due to GI issues. She has a long history of GI symptoms, Raynaud's, and joint pain. She was last seen on 07/19/2018, at which time she continued to have flank pain, headaches, and difficulty eating. She had also noted Raynaud's attacks but her ulcer was healed. Plan was to renew narcotics for 90 days and continue resumption of low dose methotrexate.     Today the patient reports that she had been eating more and having normal bowel movements. However, she recently ate something different and had some diarrhea following this. She is now having bilateral abdominal pain, worse on the left side, for the past week after having to strain to pass some softer stool. She has since been having normal bowel movements daily or every other day but her abdominal pain continues.     She additionally reports difficulty swallowing pills and certain foods. This mostly occurs with liquids and she ends up chocking  and spitting the liquid back up. She localized this problem to the upper esophagus and is concerned that she may be aspirating. Food/pills getting stuck was previously a problem for her daily and this is now occurring 3-4 times per week, although it is gradually worsening. She does report that she had improvement in the past after stretching. She also reports that she lost the guard she was using for her right hand and has a healing wound to her right middle finger PIP joint. She is unable to get a new guard due to lack of insurance coverage. She is on Cellcept but is not on methotrexate.     Review of Systems:   Pertinent items are noted in HPI or as below, remainder of complete ROS is negative.      No recent problems with hearing or vision. No swallowing problems.   No breathing difficulty, shortness of breath, coughing, or wheezing  No chest pain or palpitations  No heart burn, indigestion, nausea, vomiting  No urination problems, no bloody, cloudy urine, no dysuria  No numbing, tingling, weakness  No headaches or confusion  No rashes. No easy bleeding or bruising.   + difficulty swallowing   + abdominal pain   + diarrhea, resolved     Active Medications:      aspirin-dipyridamole (AGGRENOX)  MG per 12 hr capsule, Take 1 capsule by mouth daily , Disp: , Rfl:      cevimeline (EVOXAC) 30 MG capsule, Take 1 capsule (30 mg) by mouth 3 times daily as needed, Disp: 270 capsule, Rfl: 3     cyanocobalamin (VITAMIN B12) 1000 MCG/ML injection, Inject 1 mL into the muscle every 30 days, Disp: , Rfl:      esomeprazole (NEXIUM) 40 MG capsule, Take 1 capsule (40 mg) by mouth 2 times daily Take 30-60 minutes before eating., Disp: 180 capsule, Rfl: 3     folic acid (FOLVITE) 1 MG tablet, Take 1 tablet (1 mg) by mouth daily, Disp: 90 tablet, Rfl: 3     LORazepam (ATIVAN) 0.5 MG tablet, Take 1 - 2 tablets by mouth three times daily as needed., Disp: 60 tablet, Rfl: 0     mycophenolate (GENERIC EQUIVALENT) 500 MG tablet,  Take 3 tablets (1,500 mg) by mouth daily, Disp: 270 tablet, Rfl: 0     olopatadine (PATANOL) 0.1 % ophthalmic solution, Place 1 drop into both eyes 2 times daily, Disp: 5 mL, Rfl: 3     ondansetron (ZOFRAN-ODT) 4 MG ODT tab, LET 1 TABLET DISSOLVE IN MOUTH EVERY 8 HOURS AS NEEDED FOR NAUSEA, Disp: 20 tablet, Rfl: 0     [START ON 12/14/2018] oxyCODONE IR (ROXICODONE) 5 MG tablet, Take 1 tablet (5 mg) by mouth every 4 hours as needed for moderate to severe pain or pain maximum 6 tablet(s) per day, Disp: 204 tablet, Rfl: 0     [START ON 11/15/2018] oxyCODONE IR (ROXICODONE) 5 MG tablet, Take 1 tablet (5 mg) by mouth every 4 hours as needed for moderate to severe pain (maximum 6 tablets per day), Disp: 204 tablet, Rfl: 0     [START ON 10/17/2018] oxyCODONE IR (ROXICODONE) 5 MG tablet, Take 1 tablet (5 mg) by mouth every 4 hours as needed for moderate to severe pain or pain maximum 6 tablet(s) per day, Disp: 204 tablet, Rfl: 0     [START ON 12/14/2018] oxyCODONE-acetaminophen (PERCOCET) 5-325 MG per tablet, Take 1 tablet by mouth every 4 hours as needed for pain or moderate to severe pain (maximum 3 tablets per day), Disp: 102 tablet, Rfl: 0     [START ON 11/15/2018] oxyCODONE-acetaminophen (PERCOCET) 5-325 MG per tablet, Take 1 tablet by mouth every 4 hours as needed for moderate to severe pain (maximum 3 tablets per day), Disp: 102 tablet, Rfl: 0     [START ON 10/17/2018] oxyCODONE-acetaminophen (PERCOCET) 5-325 MG per tablet, Take 1 tablet by mouth every 4 hours as needed for pain or moderate to severe pain (maximum 3 tablets per day), Disp: 102 tablet, Rfl: 0     pravastatin (PRAVACHOL) 10 MG tablet, Take 1 tablet (10 mg) by mouth daily, Disp: 30 tablet, Rfl: 5     pregabalin (LYRICA) 75 MG capsule, Take 1 capsule (75 mg) by mouth 2 times daily, Disp: 180 capsule, Rfl: 1     valACYclovir (VALTREX) 500 MG tablet, Take 1 tablet (500 mg) by mouth 2 times daily (Patient taking differently: Take 500 mg by mouth daily ),  "Disp: 180 tablet, Rfl: 1     Vitamin D, Cholecalciferol, 1000 UNITS TABS, Take 2,000 Units by mouth daily , Disp: , Rfl:      Blood Pressure Monitoring KIT, Check BP every 7 days., Disp: 1 kit, Rfl: 0      Allergies:   Penicillin [penicillins]; Bactroban [mupirocin calcium]; Levaquin; and Rifampin      Past Medical History:  Rheumatoid arthritis/scleroderma  Gastric antral vascular ectasia  GERD  Iron deficiency anemia  Irregular heart beat  Osteomyelitis, right 3rd finger  Raynaud's syndrome w/ gangrene  Scleroderma  Sjogren's syndrome  Systemic sclerosis  Metatarsalgia  Dysuria  Ulcer of finger  Sicca syndrome  Palpitations  Ischemia of upper extremity  Intercostal pain      Past Surgical History:  Appendectomy  Hydrogen breath test  Esophageal motility study  Mammoplasty augmentation bilateral  Esophagogastroduodenoscopy, multiple      Family History:   Brain cancer      Social History:   Former cigarette smoker, quit 11/2017, recently restarted  No smokeless tobacco use  No alcohol use     Physical Exam:   /71  Pulse 67  Resp 17  Ht 1.638 m (5' 4.5\")  Wt 58.1 kg (128 lb)  BMI 21.63 kg/m2   Wt Readings from Last 4 Encounters:   10/04/18 58.1 kg (128 lb)   07/19/18 58.1 kg (128 lb)   04/10/18 56.2 kg (123 lb 12.8 oz)   02/27/18 57.2 kg (126 lb 3.2 oz)     Constitutional: Well-developed, appearing stated age; cooperative  Eyes: Normal EOM, PERRLA, vision, conjunctiva, sclera  ENT: Normal external ears, nose, hearing, lips, teeth, gums, throat. No mucous membrane lesions, normal saliva pool  Neck: No mass or thyroid enlargement  Resp: Lungs clear to auscultation, nl to palpation  CV: RRR, no murmurs, rubs or gallops, no edema  GI: No ABD mass or tenderness, no HSM  : Not tested  Lymph: No cervical, supraclavicular, inguinal or epitrochlear nodes  MS: The TMJ, neck, shoulder, elbow, spine, hip, knee, ankle, and foot MTP/IP joints were examined and found normal. No active synovitis or altered joint " anatomy. Full joint ROM. Normal  strength. No dactylitis,  tenosynovitis, enthesopathy. Slight restriction in wrist range of motion but not significant. Proximally 15   contracture in PIPs and 5-10   in DIPs of the left hand. No open ulcers on the left hand. Right hand greater than 90   contractures in PIPs 3-5 with a scabbed over ulcer on the medial aspect of the right 3rd PIP joint. Slight surrounding erythema and some modest tenderness. Obvious calcinosis lesion over the lateral aspect of the right 4th PIP.   Skin: No nail pitting, alopecia, rash, nodules.  Neuro: Normal cranial nerves, strength, sensation, DTRs.   Psych: Normal judgement, orientation, memory, affect.     Laboratory:   Results for orders placed or performed in visit on 08/06/18   General PFT Lab (Please always keep checked)   Result Value Ref Range    FVC-Pred 3.10 L    FVC-Pre 3.24 L    FVC-%Pred-Pre 104 %    FEV1-Pre 2.37 L    FEV1-%Pred-Pre 95 %    FEV1FVC-Pred 79 %    FEV1FVC-Pre 73 %    FEFMax-Pred 6.61 L/sec    FEFMax-Pre 6.74 L/sec    FEFMax-%Pred-Pre 102 %    FEF2575-Pred 2.38 L/sec    FEF2575-Pre 1.63 L/sec    PLA5007-%Pred-Pre 68 %    ExpTime-Pre 7.95 sec    FIFMax-Pre 5.87 L/sec    VC-Pred 3.43 L    VC-Pre 3.17 L    VC-%Pred-Pre 92 %    IC-Pred 2.38 L    IC-Pre 1.72 L    IC-%Pred-Pre 72 %    ERV-Pred 1.05 L    ERV-Pre 1.45 L    ERV-%Pred-Pre 138 %    FEV1FEV6-Pred 81 %    FEV1FEV6-Pre 74 %    FRCPleth-Pred 2.73 L    FRCPleth-Pre 3.18 L    FRCPleth-%Pred-Pre 116 %    RVPleth-Pred 1.85 L    RVPleth-Pre 1.72 L    RVPleth-%Pred-Pre 93 %    TLCPleth-Pred 5.02 L    TLCPleth-Pre 4.89 L    TLCPleth-%Pred-Pre 97 %    DLCOunc-Pred 22.36 ml/min/mmHg    DLCOunc-Pre 18.28 ml/min/mmHg    DLCOunc-%Pred-Pre 81 %    VA-Pre 4.65 L    VA-%Pred-Pre 90 %    FEV1SVC-Pred 73 %    FEV1SVC-Pre 75 %    Narrative    The FVC, FEV1, FEV1/FVC ratio and NCC96-69% are within normal limits.  The inspiratory flow rates are within normal limits.  Lung volumes are  within normal limits.  The diffusing capacity is normal.  However, the diffusing capacity was not corrected for   the patient's hemoglobin.  IMPRESSION:  Normal Pulmonary Function.  Reduced wakj distance on room air with no significant desaturation or hypoxia. lowest sat was 97%.   Dr. Danielle Palma.    6 minute walk test   Result Value Ref Range    6 min walk (FT) 1350 ft    6 Min Walk (M) 411 m       Recent Labs   Lab Test  07/19/18   1640  05/02/18   1255  01/17/18   1320   11/27/15   0708  11/25/15   0620  11/24/15   1848   WBC  5.7  4.5  6.8   < >   --   4.4  4.9   RBC  3.88  3.82  3.84   < >   --   4.20  4.06   HGB  12.2  12.1  12.1   < >   --   12.8  12.4   HCT  38.3  37.9  38.3   < >   --   39.8  38.2   MCV  99  99  100   < >   --   95  94   RDW  12.3  12.0  12.8   < >   --   12.8  12.8   PLT  219  181  205   < >   --   160  173   ALBUMIN  4.1  4.2  4.0   < >   --    --   4.1   CRP  <2.9  <2.9  <2.9   < >   --    --   <2.9   BUN   --    --    --    --   8  7  7    < > = values in this interval not displayed.      Recent Labs   Lab Test  09/08/14   1519  02/27/12   1504  11/05/10   1400   TSH  1.64  2.81  1.26   T4  0.85   --   7.3     Hemoglobin   Date Value Ref Range Status   10/04/2018 12.1 11.7 - 15.7 g/dL Final   07/19/2018 12.2 11.7 - 15.7 g/dL Final   05/02/2018 12.1 11.7 - 15.7 g/dL Final     Urea Nitrogen   Date Value Ref Range Status   11/27/2015 8 7 - 30 mg/dL Final   11/25/2015 7 7 - 30 mg/dL Final   11/24/2015 7 7 - 30 mg/dL Final     Sed Rate   Date Value Ref Range Status   10/04/2018 20 0 - 30 mm/h Final   07/19/2018 15 0 - 30 mm/h Final   05/02/2018 11 0 - 30 mm/h Final     CRP Cardiac Risk   Date Value Ref Range Status   08/24/2010 1.0 mg/L Final     Comment:     Reference Values:   Low Risk:           <1.0 mg/L   Average Risk:       1.0-3.0 mg/L   High Risk:          >3.0 mg/L   Acute Inflammation: >8.0 mg/L     CRP Inflammation   Date Value Ref Range Status   10/04/2018 3.4 0.0 - 8.0 mg/L Final    07/19/2018 <2.9 0.0 - 8.0 mg/L Final   05/02/2018 <2.9 0.0 - 8.0 mg/L Final     AST   Date Value Ref Range Status   10/04/2018 16 0 - 45 U/L Final   07/19/2018 14 0 - 45 U/L Final   05/02/2018 17 0 - 45 U/L Final     Albumin   Date Value Ref Range Status   10/04/2018 4.1 3.4 - 5.0 g/dL Final   07/19/2018 4.1 3.4 - 5.0 g/dL Final   05/02/2018 4.2 3.4 - 5.0 g/dL Final     Alkaline Phosphatase   Date Value Ref Range Status   11/24/2015 49 40 - 150 U/L Final   10/24/2014 50 40 - 150 U/L Final   09/08/2014 53 40 - 150 U/L Final     ALT   Date Value Ref Range Status   10/04/2018 16 0 - 50 U/L Final   07/19/2018 15 0 - 50 U/L Final   05/02/2018 15 0 - 50 U/L Final     Rheumatoid Factor   Date Value Ref Range Status   06/26/2009 13 0 - 14 IU/mL Final     Recent Labs   Lab Test  10/04/18   1623  07/19/18   1640  05/02/18   1255   11/27/15   0708  11/25/15   0620  11/24/15   1848   10/24/14   1526  09/08/14   1519   02/27/12   1504   11/05/10   1400   WBC  5.5  5.7  4.5   < >   --   4.4  4.9   < >  4.0  3.7*   < >   --    < >   --    HGB  12.1  12.2  12.1   < >   --   12.8  12.4   < >  10.4*  10.1*   < >   --    < >   --    HCT  39.1  38.3  37.9   < >   --   39.8  38.2   < >  35.2  33.8*   < >   --    < >   --    MCV  98  99  99   < >   --   95  94   < >  93  92   < >   --    < >   --    PLT  207  219  181   < >   --   160  173   < >  213  256   < >   --    < >   --    BUN   --    --    --    --   8  7  7   --   9  7   < >   --    < >   --    TSH   --    --    --    --    --    --    --    --    --   1.64   --   2.81   --   1.26   AST  16  14  17   < >   --    --   12   < >  15  12   < >   --    < >   --    ALT  16  15  15   < >   --    --   15   < >  16  13   < >   --    < >   --    ALKPHOS   --    --    --    --    --    --   49   --   50  53   < >   --    < >   --     < > = values in this interval not displayed.     Reviewed Rheumatology lab flowsheet    Scribe Disclosure:   Noy CLAYTON, am serving as a scribe to  document services personally performed by Brennen Child MD at this visit, based upon the provider's statements to me. All documentation has been reviewed by the aforementioned provider prior to being entered into the official medical record.     Portions of this medical record were completed by a scribe. UPON MY REVIEW AND AUTHENTICATION BY ELECTRONIC SIGNATURE, this confirms (a) I performed the applicable clinical services, and (b) the record is accurate.    JANET Child MD, PhD    Rheumatology      Again, thank you for allowing me to participate in the care of your patient.      Sincerely,    Brennen Child MD

## 2018-10-23 DIAGNOSIS — R11.0 NAUSEA: ICD-10-CM

## 2018-10-24 RX ORDER — ONDANSETRON 4 MG/1
4 TABLET, ORALLY DISINTEGRATING ORAL EVERY 8 HOURS PRN
Qty: 20 TABLET | Refills: 0 | Status: SHIPPED | OUTPATIENT
Start: 2018-10-24 | End: 2018-12-10

## 2018-10-24 NOTE — TELEPHONE ENCOUNTER
Ondansetron Oral Tablet Disintegrating 4 MG  Last Written Prescription Date:  12/19/2017  Last Fill Quantity: 20,   # refills: 0  Last Office Visit : 10/4/2018  Future Office visit:  1/10/2019    Routing refill request to provider for review/approval because:  Drug not on  refill protocol

## 2018-11-05 ENCOUNTER — HOSPITAL ENCOUNTER (OUTPATIENT)
Dept: MAMMOGRAPHY | Facility: CLINIC | Age: 53
Discharge: HOME | End: 2018-11-05
Attending: FAMILY MEDICINE | Admitting: FAMILY MEDICINE
Payer: MEDICARE

## 2018-11-05 DIAGNOSIS — N64.4 MASTODYNIA: ICD-10-CM

## 2018-11-05 DIAGNOSIS — Z12.31 VISIT FOR SCREENING MAMMOGRAM: ICD-10-CM

## 2018-11-05 PROCEDURE — 77067 SCR MAMMO BI INCL CAD: CPT

## 2018-11-30 ENCOUNTER — TELEPHONE (OUTPATIENT)
Dept: RHEUMATOLOGY | Facility: CLINIC | Age: 53
End: 2018-11-30

## 2018-11-30 NOTE — TELEPHONE ENCOUNTER
Prior Authorization Retail Medication Request    Medication/Dose: cevimeline  ICD code (if different than what is on RX):  M77.40  Previously Tried and Failed:    Rationale:      Insurance Name: MEDICARE    Insurance ID:  [id number]      Pharmacy Information (if different than what is on RX)  Name:  [facility]  Phone:  [phone]

## 2018-11-30 NOTE — TELEPHONE ENCOUNTER
Central Prior Authorization Team   Phone: [phone]    PA Initiation    Medication: cevimeline (EVOXAC) 30 MG capsule  Insurance Company: [employer] - Phone [phone] Fax [phone]  Pharmacy Filling the Rx: [facility] [address] [place] [address] - [facility] [address]  Filling Pharmacy Phone: [phone]  Filling Pharmacy Fax: [phone]  Start Date: 11/30/2018

## 2018-12-03 NOTE — TELEPHONE ENCOUNTER
Prior Authorization Approval    Authorization Effective Date: 9/1/2018  Authorization Expiration Date: 11/30/2019  Medication: cevimeline (EVOXAC) 30 MG cap - P/A APPROVED  Approved Dose/Quantity: 270  Reference #: [id number] [id number]   Insurance Company: [employer] - Phone [phone] Fax [phone]  Expected CoPay:       CoPay Card Available:      Foundation Assistance Needed:    Which Pharmacy is filling the prescription (Not needed for infusion/clinic administered): [facility] [address] - [place] [address] [address]  Pharmacy Notified: Yes - via voicemail  Patient Notified:

## 2018-12-10 DIAGNOSIS — R11.0 NAUSEA: ICD-10-CM

## 2018-12-12 NOTE — TELEPHONE ENCOUNTER
ondansetron (ZOFRAN-ODT) 4 MG   Last Written Prescription Date:  10/24/18  Last Fill Quantity: 20,   # refills: 0  Last Office Visit : 7/19/18  Future Office visit:  2/7/19    Routing refill request to provider for review/approval because:  Drug not on the refill protocol

## 2018-12-13 RX ORDER — ONDANSETRON 4 MG/1
TABLET, ORALLY DISINTEGRATING ORAL
Qty: 20 TABLET | Refills: 0 | Status: SHIPPED | OUTPATIENT
Start: 2018-12-13 | End: 2019-05-09

## 2018-12-19 NOTE — TELEPHONE ENCOUNTER
Verified with pharmacy that pt has picked up the Evoxac rx.    Andrew Chiu, TIARRAN RN  Rheumatology RN Coordinator  [facility]

## 2019-01-22 DIAGNOSIS — M34.9 SYSTEMIC SCLEROSIS (H): ICD-10-CM

## 2019-01-22 RX ORDER — MYCOPHENOLATE MOFETIL 500 MG/1
1500 TABLET ORAL DAILY
Qty: 90 TABLET | Refills: 0 | Status: SHIPPED | OUTPATIENT
Start: 2019-01-22 | End: 2019-03-05

## 2019-01-23 NOTE — TELEPHONE ENCOUNTER
mycophenolate (GENERIC EQUIVALENT) 500 MG tablet      Last Written Prescription Date:  9/21/18  Last Fill Quantity: 270,   # refills: 0  Last Office Visit: 10/4/18  Future Office visit:  1/28/19    CBC RESULTS:   Recent Labs   Lab Test 10/04/18  1623   WBC 5.5   RBC 3.99   HGB 12.1   HCT 39.1   MCV 98   MCH 30.3   MCHC 30.9*   RDW 12.7          Creatinine   Date Value Ref Range Status   10/04/2018 0.76 0.52 - 1.04 mg/dL Final   ]    Liver Function Studies -   Recent Labs   Lab Test 10/04/18  1623  11/24/15  1848   PROTTOTAL  --   --  7.8   ALBUMIN 4.1   < > 4.1   BILITOTAL  --   --  0.4   ALKPHOS  --   --  49   AST 16   < > 12   ALT 16   < > 15    < > = values in this interval not displayed.       Routing refill request to provider and clinic nurse for review/approval because:  DMARD medication.  Labs due.

## 2019-01-28 ENCOUNTER — OFFICE VISIT (OUTPATIENT)
Dept: PULMONOLOGY | Facility: CLINIC | Age: 54
End: 2019-01-28
Attending: INTERNAL MEDICINE
Payer: MEDICARE

## 2019-01-28 VITALS
HEIGHT: 64 IN | SYSTOLIC BLOOD PRESSURE: 109 MMHG | WEIGHT: 132.4 LBS | OXYGEN SATURATION: 99 % | HEART RATE: 65 BPM | BODY MASS INDEX: 22.61 KG/M2 | DIASTOLIC BLOOD PRESSURE: 71 MMHG

## 2019-01-28 DIAGNOSIS — M77.42 METATARSALGIA OF BOTH FEET: ICD-10-CM

## 2019-01-28 DIAGNOSIS — M77.40 METATARSALGIA, UNSPECIFIED LATERALITY: ICD-10-CM

## 2019-01-28 DIAGNOSIS — M34.9 SYSTEMIC SCLEROSIS (H): ICD-10-CM

## 2019-01-28 DIAGNOSIS — M05.79 RHEUMATOID ARTHRITIS INVOLVING MULTIPLE SITES WITH POSITIVE RHEUMATOID FACTOR (H): ICD-10-CM

## 2019-01-28 DIAGNOSIS — M85.80 OSTEOPENIA, UNSPECIFIED LOCATION: ICD-10-CM

## 2019-01-28 DIAGNOSIS — I73.01 RAYNAUD'S DISEASE WITH GANGRENE (H): ICD-10-CM

## 2019-01-28 DIAGNOSIS — M77.41 METATARSALGIA OF BOTH FEET: ICD-10-CM

## 2019-01-28 DIAGNOSIS — Z79.899 HIGH RISK MEDICATIONS (NOT ANTICOAGULANTS) LONG-TERM USE: ICD-10-CM

## 2019-01-28 LAB
ALBUMIN SERPL-MCNC: 4 G/DL (ref 3.4–5)
ALBUMIN UR-MCNC: NEGATIVE MG/DL
ALT SERPL W P-5'-P-CCNC: 16 U/L (ref 0–50)
APPEARANCE UR: CLEAR
AST SERPL W P-5'-P-CCNC: 15 U/L (ref 0–45)
BASOPHILS # BLD AUTO: 0 10E9/L (ref 0–0.2)
BASOPHILS NFR BLD AUTO: 0.5 %
BILIRUB UR QL STRIP: NEGATIVE
COLOR UR AUTO: YELLOW
CREAT SERPL-MCNC: 0.77 MG/DL (ref 0.52–1.04)
CRP SERPL-MCNC: 6.3 MG/L (ref 0–8)
DIFFERENTIAL METHOD BLD: ABNORMAL
EOSINOPHIL # BLD AUTO: 0.1 10E9/L (ref 0–0.7)
EOSINOPHIL NFR BLD AUTO: 3.1 %
ERYTHROCYTE [DISTWIDTH] IN BLOOD BY AUTOMATED COUNT: 12.7 % (ref 10–15)
ERYTHROCYTE [SEDIMENTATION RATE] IN BLOOD BY WESTERGREN METHOD: 19 MM/H (ref 0–30)
GFR SERPL CREATININE-BSD FRML MDRD: 86 ML/MIN/{1.73_M2}
GLUCOSE UR STRIP-MCNC: NEGATIVE MG/DL
HCT VFR BLD AUTO: 38.8 % (ref 35–47)
HGB BLD-MCNC: 11.9 G/DL (ref 11.7–15.7)
HGB UR QL STRIP: ABNORMAL
IMM GRANULOCYTES # BLD: 0 10E9/L (ref 0–0.4)
IMM GRANULOCYTES NFR BLD: 0.2 %
KETONES UR STRIP-MCNC: NEGATIVE MG/DL
LEUKOCYTE ESTERASE UR QL STRIP: NEGATIVE
LYMPHOCYTES # BLD AUTO: 1.7 10E9/L (ref 0.8–5.3)
LYMPHOCYTES NFR BLD AUTO: 40.8 %
MCH RBC QN AUTO: 29.8 PG (ref 26.5–33)
MCHC RBC AUTO-ENTMCNC: 30.7 G/DL (ref 31.5–36.5)
MCV RBC AUTO: 97 FL (ref 78–100)
MONOCYTES # BLD AUTO: 0.5 10E9/L (ref 0–1.3)
MONOCYTES NFR BLD AUTO: 11.1 %
MUCOUS THREADS #/AREA URNS LPF: PRESENT /LPF
NEUTROPHILS # BLD AUTO: 1.9 10E9/L (ref 1.6–8.3)
NEUTROPHILS NFR BLD AUTO: 44.3 %
NITRATE UR QL: NEGATIVE
NRBC # BLD AUTO: 0 10*3/UL
NRBC BLD AUTO-RTO: 0 /100
PH UR STRIP: 5 PH (ref 5–7)
PLATELET # BLD AUTO: 164 10E9/L (ref 150–450)
RBC # BLD AUTO: 4 10E12/L (ref 3.8–5.2)
RBC #/AREA URNS AUTO: 5 /HPF (ref 0–2)
SOURCE: ABNORMAL
SP GR UR STRIP: 1.02 (ref 1–1.03)
UROBILINOGEN UR STRIP-MCNC: 0 MG/DL (ref 0–2)
WBC # BLD AUTO: 4.2 10E9/L (ref 4–11)
WBC #/AREA URNS AUTO: <1 /HPF (ref 0–5)

## 2019-01-28 PROCEDURE — 82565 ASSAY OF CREATININE: CPT | Performed by: INTERNAL MEDICINE

## 2019-01-28 PROCEDURE — 81001 URINALYSIS AUTO W/SCOPE: CPT | Performed by: INTERNAL MEDICINE

## 2019-01-28 PROCEDURE — 85652 RBC SED RATE AUTOMATED: CPT | Performed by: INTERNAL MEDICINE

## 2019-01-28 PROCEDURE — 84450 TRANSFERASE (AST) (SGOT): CPT | Performed by: INTERNAL MEDICINE

## 2019-01-28 PROCEDURE — 82040 ASSAY OF SERUM ALBUMIN: CPT | Performed by: INTERNAL MEDICINE

## 2019-01-28 PROCEDURE — 85025 COMPLETE CBC W/AUTO DIFF WBC: CPT | Performed by: INTERNAL MEDICINE

## 2019-01-28 PROCEDURE — 86140 C-REACTIVE PROTEIN: CPT | Performed by: INTERNAL MEDICINE

## 2019-01-28 PROCEDURE — 36415 COLL VENOUS BLD VENIPUNCTURE: CPT | Performed by: INTERNAL MEDICINE

## 2019-01-28 PROCEDURE — 84460 ALANINE AMINO (ALT) (SGPT): CPT | Performed by: INTERNAL MEDICINE

## 2019-01-28 PROCEDURE — G0463 HOSPITAL OUTPT CLINIC VISIT: HCPCS | Mod: ZF

## 2019-01-28 RX ORDER — OXYCODONE HYDROCHLORIDE 5 MG/1
5 TABLET ORAL EVERY 4 HOURS PRN
Qty: 204 TABLET | Refills: 0 | Status: SHIPPED | OUTPATIENT
Start: 2019-02-27 | End: 2019-01-28

## 2019-01-28 RX ORDER — OXYCODONE AND ACETAMINOPHEN 5; 325 MG/1; MG/1
1 TABLET ORAL EVERY 4 HOURS PRN
Qty: 102 TABLET | Refills: 0 | Status: SHIPPED | OUTPATIENT
Start: 2019-02-27 | End: 2019-01-28

## 2019-01-28 RX ORDER — OXYCODONE AND ACETAMINOPHEN 5; 325 MG/1; MG/1
1 TABLET ORAL EVERY 4 HOURS PRN
Qty: 102 TABLET | Refills: 0 | Status: SHIPPED | OUTPATIENT
Start: 2019-03-28 | End: 2020-10-08

## 2019-01-28 RX ORDER — OXYCODONE HYDROCHLORIDE 5 MG/1
5 TABLET ORAL EVERY 4 HOURS PRN
Qty: 204 TABLET | Refills: 0 | Status: SHIPPED | OUTPATIENT
Start: 2019-03-28 | End: 2019-04-25

## 2019-01-28 RX ORDER — OXYCODONE AND ACETAMINOPHEN 5; 325 MG/1; MG/1
1 TABLET ORAL EVERY 4 HOURS PRN
Qty: 102 TABLET | Refills: 0 | Status: SHIPPED | OUTPATIENT
Start: 2019-01-28 | End: 2019-01-28

## 2019-01-28 RX ORDER — OXYCODONE HYDROCHLORIDE 5 MG/1
5 TABLET ORAL EVERY 4 HOURS PRN
Qty: 204 TABLET | Refills: 0 | Status: SHIPPED | OUTPATIENT
Start: 2019-01-28 | End: 2019-01-28

## 2019-01-28 ASSESSMENT — MIFFLIN-ST. JEOR: SCORE: 1180.56

## 2019-01-28 ASSESSMENT — PAIN SCALES - GENERAL: PAINLEVEL: NO PAIN (0)

## 2019-01-28 NOTE — LETTER
2019       RE: Willard Grayson  [address]  [address]  [facility] [id number]     Dear Colleague,    Thank you for referring your patient, Willard Grayson, to the [facility] at [facility]. Please see a copy of my visit note below.    [facility]  Rheumatology Clinic  Brennen Child MD  2019     Name: Willard Grayson  MRN: [id number]  Age: 53 year old  : 1965  Referring provider: Lindy Stokes*     Problem List:  1. 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   2. Associated marked neuropathic skin pain markedly improved with Lyrica with prior medication therapy with gabapentin.   3. Diffuse musculoskeletal pain requiring methotrexate plus CellCept, and narcotic to control, but also improved. Now off methotrexate due to GI issues.  4. Initial clinical overlap with anti-CCP seropositive rheumatoid arthritis with continued anti-CCP seropositivity as of 2010.   5. Raynaud's phenomena with digital ulcers with easily cracked skin, improving .   6. GI involvement consistent with bacterial small bowel overgrowth with marked improvement after a course of neomycin followed by Dr. Glez; positive hydrogen breath test in 3/2012 - treated wtih rifaximin through Dr. Jacob  7. No convincing evidence of lung involvement on serial pulmonary function tests or clinically.   8. History of heart palpitations prior to the onset of SSc with stable symptoms since. Holter 2018 with PACs.  9. History of medication failures with Remicade, methotrexate, methotrexate plus CellCept and with Orencia and with improvement since initiation of IVIG but with complicating headaches and overall sense of malaise  with IVIG infusions.   10. History of silicone breast implants.   11. Incidental finding of calcified splenic artery aneurysm.  12. EGD in 3/2012 with hiatal hernia and gastral antral vascular ectasia  13. Recurrent right third finger contracture with extensor surface ulceration, healed   14. Trochanteric bursitis, R>L    Assessment and Plan:  - Systemic sclerosis  - Rheumatoid arthritis involving multiple sites with positive rheumatoid factor   - Raynaud's disease with history of gangrene  Patient overall doing well with improvement in ulcerations - possibly due to less trauma as no longer babysitting. Cessation of smoking likely also contributing and we encouraged her to continue working on this.     We suspect her difficulties swallowing are related to esophageal dysmotility from underlying systemic sclerosis. Unfortunately discussed there is no great treatment solution for this other than lifestyle modifications - as solid food does not cause her problems, advised to take medications (except for Cellcept) with food. Will otherwise continue medication regimen without changes. Regarding dental work, there is a dentist in the [facility] system with an interest in scleroderma. We will refer her to them today and encouraged jaw stretching exercises.  - Continue Cellcept 1500 mg daily   - Continue Evoxac prn  - Roxicodone and Perocet refilled today  - Refer to dental at [facility]  - Encouraged jaw stretching exercises  - Encouraged smoking cessation  - Monitoring labs today  - Last PFTs and 6 minute walk test in 10/2018, repeat ~October 2019    # Trochanteric bursitis.  No evidence of calcinosis on exam.  - Advised stretching exercises    Patient discussed and evaluated with Dr. Child.    Daxa Lopez MD  Medicine-Dermatology PGY-5  [phone]    Follow-up: Return in about 3 months.    I saw the patient with the resident .  My exam and recomendations are as described.      Brennen Child,  MD        HPI:   Willard Grayson is a 53 year old female with a history of diffuse cutaneous systemic sclerosis who presents for follow up. She has previously been treated with methotrexate but was stopped due to GI issues. She has a long history of GI symptoms, Raynaud's, and joint pain. I last evaluated this patient on 10/4/2018; please see this note for further details. At that time she reported continued, worsening difficulty swallowing pills and foods, although she reports this is actually the worst with liquids. Also noted abdominal pain. We recommended monitoring her symptoms and trying milk of magnesium. Her recent PFT was stable and 6 minute walk test showed normal Norm score and ability to walk without oxygen desaturation, although her distance was slightly decreased.    Saw cardiology in December 2018 for palpitations. Had a Holter monitor with showed sinus rhythm without significant ectopy. Her symptoms of chest pain/tightness, palpations/rapid HR, and lightheadedness and these correlated with PACs.    Since her last visit, has overall been doing okay. Skin is really good - no ulcers. Not sure what has made the difference. Has really cut down on smoking (only 4 cigarettes in past 2 months) but was already noting improvement prior to cutting back. Hasn't been babysitting so possibly her improvement is due to reduced trauma. Continues Cellcept 1500 mg daily without issues. Main concerns today are ongoing issues with swallowing. Has troubles with liquids and pills. Feels that when she takes liquids in, her throat closes up at the base of her throat. No difficulties with food.     Other concerns are need for dental work but has difficulty opening mouth. Dryness is okay with evoxac - takes 2 times daily. Gets hot flashes but not worse with evoxac. Notes weakness with activity - doesn't think it is feeling shortness of breath but more related to feeling wiped out. Notes R> lip hip pain. No diarrhea. No fevers  or chills. No cough. Currently has some congestion and post-nasal drip. Recently had a sore throat. Took 4000 units of vitamin D for 5 days a few weeks ago and developed symptoms which she attributed to vitamin D toxicity and her symptoms resolved with stopping the vitamin D. Otherwise no concerns.    Review of Systems:   Pertinent items are noted in HPI or as below, remainder of complete ROS is negative.      No recent problems with hearing or vision. +swallowing problems as per HPI.  No breathing difficulty, shortness of breath, coughing, or wheezing  No chest pain or palpitations  No heart burn, indigestion, abdominal pain, nausea, vomiting, diarrhea  No urination problems, no bloody, cloudy urine, no dysuria  No numbing, tingling. +weakness with exertion.  No headaches or confusion  No rashes. No easy bleeding or bruising.     Active Medications:     Current Outpatient Medications:      aspirin-dipyridamole (AGGRENOX)  MG per 12 hr capsule, Take 1 capsule by mouth daily , Disp: , Rfl:      cevimeline (EVOXAC) 30 MG capsule, Take 1 capsule (30 mg) by mouth 3 times daily as needed, Disp: 270 capsule, Rfl: 3     cyanocobalamin (VITAMIN B12) 1000 MCG/ML injection, Inject 1 mL into the muscle every 30 days, Disp: , Rfl:      esomeprazole (NEXIUM) 40 MG capsule, Take 1 capsule (40 mg) by mouth 2 times daily Take 30-60 minutes before eating., Disp: 180 capsule, Rfl: 3     folic acid (FOLVITE) 1 MG tablet, Take 1 tablet (1 mg) by mouth daily, Disp: 90 tablet, Rfl: 3     LORazepam (ATIVAN) 0.5 MG tablet, Take 1 - 2 tablets by mouth three times daily as needed., Disp: 60 tablet, Rfl: 0     mycophenolate (GENERIC EQUIVALENT) 500 MG tablet, Take 3 tablets (1,500 mg) by mouth daily, Disp: 90 tablet, Rfl: 0     olopatadine (PATANOL) 0.1 % ophthalmic solution, Place 1 drop into both eyes 2 times daily, Disp: 5 mL, Rfl: 3     ondansetron (ZOFRAN-ODT) 4 MG ODT tab, TAKE ONE TABLET IN MOUTH EVERY EIGHT HOURS AS NEEDED FOR  NAUSEA, Disp: 20 tablet, Rfl: 0     [START ON 3/28/2019] oxyCODONE (ROXICODONE) 5 MG tablet, Take 1 tablet (5 mg) by mouth every 4 hours as needed for moderate to severe pain or pain maximum 6 tablet(s) per day, Disp: 204 tablet, Rfl: 0     oxyCODONE IR (ROXICODONE) 5 MG tablet, Take 1 tablet (5 mg) by mouth every 4 hours as needed for moderate to severe pain (maximum 6 tablets per day), Disp: 204 tablet, Rfl: 0     oxyCODONE IR (ROXICODONE) 5 MG tablet, Take 1 tablet (5 mg) by mouth every 4 hours as needed for moderate to severe pain or pain maximum 6 tablet(s) per day, Disp: 204 tablet, Rfl: 0     oxyCODONE-acetaminophen (PERCOCET) 5-325 MG per tablet, Take 1 tablet by mouth every 4 hours as needed for moderate to severe pain (maximum 3 tablets per day), Disp: 102 tablet, Rfl: 0     oxyCODONE-acetaminophen (PERCOCET) 5-325 MG per tablet, Take 1 tablet by mouth every 4 hours as needed for pain or moderate to severe pain (maximum 3 tablets per day), Disp: 102 tablet, Rfl: 0     [START ON 3/28/2019] oxyCODONE-acetaminophen (PERCOCET) 5-325 MG tablet, Take 1 tablet by mouth every 4 hours as needed for pain or moderate to severe pain (maximum 3 tablets per day), Disp: 102 tablet, Rfl: 0     pravastatin (PRAVACHOL) 10 MG tablet, Take 1 tablet (10 mg) by mouth daily, Disp: 30 tablet, Rfl: 5     pregabalin (LYRICA) 75 MG capsule, Take 1 capsule (75 mg) by mouth 2 times daily, Disp: 180 capsule, Rfl: 1     valACYclovir (VALTREX) 500 MG tablet, Take 1 tablet (500 mg) by mouth 2 times daily (Patient taking differently: Take 500 mg by mouth daily ), Disp: 180 tablet, Rfl: 1     Blood Pressure Monitoring KIT, Check BP every 7 days. (Patient not taking: Reported on 1/28/2019), Disp: 1 kit, Rfl: 0     Vitamin D, Cholecalciferol, 1000 UNITS TABS, Take 2,000 Units by mouth daily , Disp: , Rfl:       Allergies:   Penicillin [penicillins]; Bactroban [mupirocin calcium]; Levaquin; and Rifampin      Past Medical History:  Past Medical  History:   Diagnosis Date     Arthritis     rheumatoid arthritis/ scleroderma     Gastric antral vascular ectasia (watermelon stomach)      Gastro-oesophageal reflux disease      Iron deficiency anaemia      Irregular heart beat      Osteomyelitis (H)     R 3rd finger     Osteomyelitis of finger of right hand (H) 9/3/2014     Other chronic pain      Raynaud's syndrome      Scleroderma (H)      Sjogren's syndrome (H)      Ulcer of finger (H) 7/22/2014     Patient Active Problem List   Diagnosis     Systemic sclerosis (H)     Encounter for long-term current use of medication     Anxiety     Metatarsalgia     Dysuria     Breast implant rupture     Personal history of other diseases of digestive system     Ulcer of finger (H)     Osteomyelitis of finger of right hand (H)     Sicca syndrome (H)     Palpitations     GAVE (gastric antral vascular ectasia)     Raynaud's disease with gangrene (H)     Ischemia of upper extremity     Other rheumatoid arthritis with rheumatoid factor of multiple sites (H)     Intercostal pain        Past Surgical History:  Past Surgical History:   Procedure Laterality Date     APPENDECTOMY       COLONOSCOPY N/A 8/4/2016    Procedure: COLONOSCOPY;  Surgeon: Mikey Kennedy MD;  Location:  GI     ESOPHAGOSCOPY, GASTROSCOPY, DUODENOSCOPY (EGD) WITH RADIO FREQUENCY ABLATION, COMBINED N/A 8/22/2014    Procedure: COMBINED ESOPHAGOSCOPY, GASTROSCOPY, DUODENOSCOPY (EGD) WITH RADIO FREQUENCY ABLATION;  Surgeon: Mikey Kennedy MD;  [facility]     ESOPHAGOSCOPY, GASTROSCOPY, DUODENOSCOPY (EGD) WITH RADIO FREQUENCY ABLATION, COMBINED N/A 12/12/2014    Procedure: COMBINED ESOPHAGOSCOPY, GASTROSCOPY, DUODENOSCOPY (EGD) WITH RADIO FREQUENCY ABLATION;  Surgeon: Mikey Kennedy MD;  [facility]     ESOPHAGOSCOPY, GASTROSCOPY, DUODENOSCOPY (EGD), COMBINED  7/8/2013    Procedure: COMBINED ESOPHAGOSCOPY, GASTROSCOPY, DUODENOSCOPY (EGD);;  Surgeon: Justin Manuel MD;  Location:  GI  "    ESOPHAGOSCOPY, GASTROSCOPY, DUODENOSCOPY (EGD), COMBINED  6/16/2014    Procedure: COMBINED ESOPHAGOSCOPY, GASTROSCOPY, DUODENOSCOPY (EGD);  Surgeon: Justin Manuel MD;  Location:  GI     ESOPHAGOSCOPY, GASTROSCOPY, DUODENOSCOPY (EGD), COMBINED N/A 8/4/2016    Procedure: COMBINED ESOPHAGOSCOPY, GASTROSCOPY, DUODENOSCOPY (EGD);  Surgeon: Mikey Kennedy MD;  Location:  GI     HC BREATH HYDROGEN TEST  3/14/2012    Procedure:HYDROGEN BREATH TEST; Surgeon:MILE VILLANUEVA; Location: GI     HC ESOPHAGEAL MOTILITY STUDY N/A 11/1/2016    Procedure: ESOPHAGEAL MOTILITY STUDY;  Surgeon: Mikey Kennedy MD;  Location:  GI     INSERT PORT VASCULAR ACCESS       MAMMOPLASTY AUGMENTATION BILATERAL         Family History:   Family History   Problem Relation Age of Onset     Respiratory Father      Cancer Other         brain cancer         Social History:   Social History     Tobacco Use     Smoking status: Light Tobacco Smoker     Packs/day: 1.00     Types: Cigarettes     Smokeless tobacco: Never Used     Tobacco comment: on and off since age 13   Substance Use Topics     Alcohol use: No     Alcohol/week: 0.0 oz     Drug use: No        Physical Exam:   /71 (BP Location: Left arm, Cuff Size: Adult Regular)   Pulse 65   Ht 1.626 m (5' 4\")   Wt 60.1 kg (132 lb 6.4 oz)   SpO2 99%   BMI 22.73 kg/m      Wt Readings from Last 4 Encounters:   01/28/19 60.1 kg (132 lb 6.4 oz)   10/04/18 58.1 kg (128 lb)   07/19/18 58.1 kg (128 lb)   04/10/18 56.2 kg (123 lb 12.8 oz)   Constitutional: Well-developed, appearing stated age; cooperative  Eyes: Normal EOM, PERRLA, vision, conjunctiva, sclera  ENT: Normal external ears, nose, hearing, lips, teeth, gums, throat. No mucous membrane lesions, normal saliva pool  Neck: No mass or thyroid enlargement  Resp: Lungs clear to auscultation, nl to palpation  CV: RRR, no murmurs, rubs or gallops, no edema  GI: No ABD mass or tenderness, no HSM  : Not tested  Lymph: No " cervical, supraclavicular, or epitrochlear nodes  MS: The TMJ, neck, shoulder, elbow, spine, hip, knee, ankle, and foot MTP/IP joints were examined and found normal. No active synovitis or altered joint anatomy. Full joint ROM  other than below. Normal  strength. No dactylitis, tenosynovitis, enthesopathy. Slight restriction in wrist range of motion but not significant. Proximally 15   contracture in PIPs and 5-10   in DIPs of the left hand. Right hand greater than 90   contractures in PIPs 3-5. Obvious calcinosis lesion over the lateral aspect of the right 4th PIP.  No open ulcers on hands. Tenderness to palpation right > left lateral hip over greater trochanter. Strength 5/5 in bilateral LEs.  Skin: No nail pitting, alopecia, rash, nodules.  Neuro: Normal cranial nerves, strength, sensation, DTRs.   Psych: Normal judgement, orientation, memory, affect.     Laboratory:   Component      Latest Ref Rng & Units 10/4/2018   WBC      4.0 - 11.0 10e9/L 5.5   RBC Count      3.8 - 5.2 10e12/L 3.99   Hemoglobin      11.7 - 15.7 g/dL 12.1   Hematocrit      35.0 - 47.0 % 39.1   MCV      78 - 100 fl 98   MCH      26.5 - 33.0 pg 30.3   MCHC      31.5 - 36.5 g/dL 30.9 (L)   RDW      10.0 - 15.0 % 12.7   Platelet Count      150 - 450 10e9/L 207   Diff Method       Automated Method   % Neutrophils      % 57.2   % Lymphocytes      % 33.0   % Monocytes      % 7.6   % Eosinophils      % 1.8   % Basophils      % 0.2   % Immature Granulocytes      % 0.2   Nucleated RBCs      0 /100 0   Absolute Neutrophil      1.6 - 8.3 10e9/L 3.2   Absolute Lymphocytes      0.8 - 5.3 10e9/L 1.8   Absolute Monocytes      0.0 - 1.3 10e9/L 0.4   Absolute Eosinophils      0.0 - 0.7 10e9/L 0.1   Absolute Basophils      0.0 - 0.2 10e9/L 0.0   Abs Immature Granulocytes      0 - 0.4 10e9/L 0.0   Absolute Nucleated RBC       0.0   Color Urine       Colorless   Appearance Urine       Slightly Cloudy   Glucose Urine      NEG:Negative mg/dL Negative    Bilirubin Urine      NEG:Negative Negative   Ketones Urine      NEG:Negative mg/dL Negative   Specific Gravity Urine      1.003 - 1.035 1.004   Blood Urine      NEG:Negative Small (A)   pH Urine      5.0 - 7.0 pH 7.0   Protein Albumin Urine      NEG:Negative mg/dL Negative   Urobilinogen mg/dL      0.0 - 2.0 mg/dL 0.0   Nitrite Urine      NEG:Negative Negative   Leukocyte Esterase Urine      NEG:Negative Trace (A)   Source       Midstream Urine   WBC Urine      0 - 5 /HPF 2   RBC Urine      0 - 2 /HPF 1   Bacteria Urine      NEG:Negative /HPF Few (A)   Squamous Epithelial /HPF Urine      0 - 1 /HPF 6 (H)   Mucous Urine      NEG:Negative /LPF Present (A)   Creatinine      0.52 - 1.04 mg/dL 0.76   GFR Estimate      >60 mL/min/1.7m2 79   GFR Estimate If Black      >60 mL/min/1.7m2 >90   Albumin      3.4 - 5.0 g/dL 4.1   ALT      0 - 50 U/L 16   AST      0 - 45 U/L 16   CRP Inflammation      0.0 - 8.0 mg/L 3.4   Sed Rate      0 - 30 mm/h 20          Scribe Disclosure:   I, Barak Sosa, am serving as a scribe to document services personally performed by Brennen Child MD at this visit, based upon the provider's statements to me. All documentation has been reviewed by the aforementioned provider prior to being entered into the official medical record.     Portions of this medical record were completed by a scribe. UPON MY REVIEW AND AUTHENTICATION BY ELECTRONIC SIGNATURE, this confirms (a) I performed the applicable clinical services, and (b) the record is accurate.    Brennen Child MD

## 2019-01-28 NOTE — PATIENT INSTRUCTIONS
We are glad your skin is doing better.    For swallowing, we think this is related to the dysmotility of your esophagus from the scleroderma.   You can try taking your pills with food to see if this helps (except mycophenolate - that is is the only one you can't take with food).  Unfortunately there is no pill to improve the dysmotility.    We think your hip pain is trochanteric bursitis.  The best thing is stretching exercises.    Please call Andrew and ask her who the [facility] dentist is that we recommend.  One hour after taking narcotics, do the stretching exercises for the jaw.    Labs today.

## 2019-01-28 NOTE — PROGRESS NOTES
[facility]  Rheumatology Clinic  Brennen Child MD  2019     Name: Willard Garyson  MRN: [id number]  Age: 53 year old  : 1965  Referring provider: Lindy Stokes*     Problem List:  1. 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   2. Associated marked neuropathic skin pain markedly improved with Lyrica with prior medication therapy with gabapentin.   3. Diffuse musculoskeletal pain requiring methotrexate plus CellCept, and narcotic to control, but also improved. Now off methotrexate due to GI issues.  4. Initial clinical overlap with anti-CCP seropositive rheumatoid arthritis with continued anti-CCP seropositivity as of 2010.   5. Raynaud's phenomena with digital ulcers with easily cracked skin, improving .   6. GI involvement consistent with bacterial small bowel overgrowth with marked improvement after a course of neomycin followed by Dr. Glez; positive hydrogen breath test in 3/2012 - treated wtih rifaximin through Dr. Jacob  7. No convincing evidence of lung involvement on serial pulmonary function tests or clinically.   8. History of heart palpitations prior to the onset of SSc with stable symptoms since. Holter 2018 with PACs.  9. History of medication failures with Remicade, methotrexate, methotrexate plus CellCept and with Orencia and with improvement since initiation of IVIG but with complicating headaches and overall sense of malaise with IVIG infusions.   10. History of silicone breast implants.   11. Incidental finding of calcified splenic artery aneurysm.  12. EGD in 3/2012 with hiatal hernia and gastral antral vascular ectasia  13. Recurrent right third finger contracture with extensor surface ulceration, healed   14. Trochanteric bursitis,  R>L    Assessment and Plan:  - Systemic sclerosis  - Rheumatoid arthritis involving multiple sites with positive rheumatoid factor   - Raynaud's disease with history of gangrene  Patient overall doing well with improvement in ulcerations - possibly due to less trauma as no longer babysitting. Cessation of smoking likely also contributing and we encouraged her to continue working on this.     We suspect her difficulties swallowing are related to esophageal dysmotility from underlying systemic sclerosis. Unfortunately discussed there is no great treatment solution for this other than lifestyle modifications - as solid food does not cause her problems, advised to take medications (except for Cellcept) with food. Will otherwise continue medication regimen without changes. Regarding dental work, there is a dentist in the [place] system with an interest in scleroderma. We will refer her to them today and encouraged jaw stretching exercises.  - Continue Cellcept 1500 mg daily   - Continue Evoxac prn  - Roxicodone and Perocet refilled today  - Refer to dental at [facility]  - Encouraged jaw stretching exercises  - Encouraged smoking cessation  - Monitoring labs today  - Last PFTs and 6 minute walk test in 10/2018, repeat ~October 2019    # Trochanteric bursitis.  No evidence of calcinosis on exam.  - Advised stretching exercises    Patient discussed and evaluated with Dr. Child.    Daxa Lopez MD  Medicine-Dermatology PGY-5  [phone]    Follow-up: Return in about 3 months.    I saw the patient with the resident .  My exam and recomendations are as described.      Brennen Child MD        HPI:   Willard Grayson is a 53 year old female with a history of diffuse cutaneous systemic sclerosis who presents for follow up. She has previously been treated with methotrexate but was stopped due to GI issues. She has a long history of GI symptoms, Raynaud's, and joint pain. I last evaluated this patient on  10/4/2018; please see this note for further details. At that time she reported continued, worsening difficulty swallowing pills and foods, although she reports this is actually the worst with liquids. Also noted abdominal pain. We recommended monitoring her symptoms and trying milk of magnesium. Her recent PFT was stable and 6 minute walk test showed normal Norm score and ability to walk without oxygen desaturation, although her distance was slightly decreased.    Saw cardiology in December 2018 for palpitations. Had a Holter monitor with showed sinus rhythm without significant ectopy. Her symptoms of chest pain/tightness, palpations/rapid HR, and lightheadedness and these correlated with PACs.    Since her last visit, has overall been doing okay. Skin is really good - no ulcers. Not sure what has made the difference. Has really cut down on smoking (only 4 cigarettes in past 2 months) but was already noting improvement prior to cutting back. Hasn't been babysitting so possibly her improvement is due to reduced trauma. Continues Cellcept 1500 mg daily without issues. Main concerns today are ongoing issues with swallowing. Has troubles with liquids and pills. Feels that when she takes liquids in, her throat closes up at the base of her throat. No difficulties with food.     Other concerns are need for dental work but has difficulty opening mouth. Dryness is okay with evoxac - takes 2 times daily. Gets hot flashes but not worse with evoxac. Notes weakness with activity - doesn't think it is feeling shortness of breath but more related to feeling wiped out. Notes R> lip hip pain. No diarrhea. No fevers or chills. No cough. Currently has some congestion and post-nasal drip. Recently had a sore throat. Took 4000 units of vitamin D for 5 days a few weeks ago and developed symptoms which she attributed to vitamin D toxicity and her symptoms resolved with stopping the vitamin D. Otherwise no concerns.    Review of Systems:    Pertinent items are noted in HPI or as below, remainder of complete ROS is negative.      No recent problems with hearing or vision. +swallowing problems as per HPI.  No breathing difficulty, shortness of breath, coughing, or wheezing  No chest pain or palpitations  No heart burn, indigestion, abdominal pain, nausea, vomiting, diarrhea  No urination problems, no bloody, cloudy urine, no dysuria  No numbing, tingling. +weakness with exertion.  No headaches or confusion  No rashes. No easy bleeding or bruising.     Active Medications:     Current Outpatient Medications:      aspirin-dipyridamole (AGGRENOX)  MG per 12 hr capsule, Take 1 capsule by mouth daily , Disp: , Rfl:      cevimeline (EVOXAC) 30 MG capsule, Take 1 capsule (30 mg) by mouth 3 times daily as needed, Disp: 270 capsule, Rfl: 3     cyanocobalamin (VITAMIN B12) 1000 MCG/ML injection, Inject 1 mL into the muscle every 30 days, Disp: , Rfl:      esomeprazole (NEXIUM) 40 MG capsule, Take 1 capsule (40 mg) by mouth 2 times daily Take 30-60 minutes before eating., Disp: 180 capsule, Rfl: 3     folic acid (FOLVITE) 1 MG tablet, Take 1 tablet (1 mg) by mouth daily, Disp: 90 tablet, Rfl: 3     LORazepam (ATIVAN) 0.5 MG tablet, Take 1 - 2 tablets by mouth three times daily as needed., Disp: 60 tablet, Rfl: 0     mycophenolate (GENERIC EQUIVALENT) 500 MG tablet, Take 3 tablets (1,500 mg) by mouth daily, Disp: 90 tablet, Rfl: 0     olopatadine (PATANOL) 0.1 % ophthalmic solution, Place 1 drop into both eyes 2 times daily, Disp: 5 mL, Rfl: 3     ondansetron (ZOFRAN-ODT) 4 MG ODT tab, TAKE ONE TABLET IN MOUTH EVERY EIGHT HOURS AS NEEDED FOR NAUSEA, Disp: 20 tablet, Rfl: 0     [START ON 3/28/2019] oxyCODONE (ROXICODONE) 5 MG tablet, Take 1 tablet (5 mg) by mouth every 4 hours as needed for moderate to severe pain or pain maximum 6 tablet(s) per day, Disp: 204 tablet, Rfl: 0     oxyCODONE IR (ROXICODONE) 5 MG tablet, Take 1 tablet (5 mg) by mouth every 4 hours  as needed for moderate to severe pain (maximum 6 tablets per day), Disp: 204 tablet, Rfl: 0     oxyCODONE IR (ROXICODONE) 5 MG tablet, Take 1 tablet (5 mg) by mouth every 4 hours as needed for moderate to severe pain or pain maximum 6 tablet(s) per day, Disp: 204 tablet, Rfl: 0     oxyCODONE-acetaminophen (PERCOCET) 5-325 MG per tablet, Take 1 tablet by mouth every 4 hours as needed for moderate to severe pain (maximum 3 tablets per day), Disp: 102 tablet, Rfl: 0     oxyCODONE-acetaminophen (PERCOCET) 5-325 MG per tablet, Take 1 tablet by mouth every 4 hours as needed for pain or moderate to severe pain (maximum 3 tablets per day), Disp: 102 tablet, Rfl: 0     [START ON 3/28/2019] oxyCODONE-acetaminophen (PERCOCET) 5-325 MG tablet, Take 1 tablet by mouth every 4 hours as needed for pain or moderate to severe pain (maximum 3 tablets per day), Disp: 102 tablet, Rfl: 0     pravastatin (PRAVACHOL) 10 MG tablet, Take 1 tablet (10 mg) by mouth daily, Disp: 30 tablet, Rfl: 5     pregabalin (LYRICA) 75 MG capsule, Take 1 capsule (75 mg) by mouth 2 times daily, Disp: 180 capsule, Rfl: 1     valACYclovir (VALTREX) 500 MG tablet, Take 1 tablet (500 mg) by mouth 2 times daily (Patient taking differently: Take 500 mg by mouth daily ), Disp: 180 tablet, Rfl: 1     Blood Pressure Monitoring KIT, Check BP every 7 days. (Patient not taking: Reported on 1/28/2019), Disp: 1 kit, Rfl: 0     Vitamin D, Cholecalciferol, 1000 UNITS TABS, Take 2,000 Units by mouth daily , Disp: , Rfl:       Allergies:   Penicillin [penicillins]; Bactroban [mupirocin calcium]; Levaquin; and Rifampin      Past Medical History:  Past Medical History:   Diagnosis Date     Arthritis     rheumatoid arthritis/ scleroderma     Gastric antral vascular ectasia (watermelon stomach)      Gastro-oesophageal reflux disease      Iron deficiency anaemia      Irregular heart beat      Osteomyelitis (H)     R 3rd finger     Osteomyelitis of finger of right hand (H) 9/3/2014      Other chronic pain      Raynaud's syndrome      Scleroderma (H)      Sjogren's syndrome (H)      Ulcer of finger (H) 7/22/2014     Patient Active Problem List   Diagnosis     Systemic sclerosis (H)     Encounter for long-term current use of medication     Anxiety     Metatarsalgia     Dysuria     Breast implant rupture     Personal history of other diseases of digestive system     Ulcer of finger (H)     Osteomyelitis of finger of right hand (H)     Sicca syndrome (H)     Palpitations     GAVE (gastric antral vascular ectasia)     Raynaud's disease with gangrene (H)     Ischemia of upper extremity     Other rheumatoid arthritis with rheumatoid factor of multiple sites (H)     Intercostal pain        Past Surgical History:  Past Surgical History:   Procedure Laterality Date     APPENDECTOMY       COLONOSCOPY N/A 8/4/2016    Procedure: COLONOSCOPY;  Surgeon: Mikey Kennedy MD;  Location: [facility]     ESOPHAGOSCOPY, GASTROSCOPY, DUODENOSCOPY (EGD) WITH RADIO FREQUENCY ABLATION, COMBINED N/A 8/22/2014    Procedure: COMBINED ESOPHAGOSCOPY, GASTROSCOPY, DUODENOSCOPY (EGD) WITH RADIO FREQUENCY ABLATION;  Surgeon: Mikey Kennedy MD;  Location: [facility]     ESOPHAGOSCOPY, GASTROSCOPY, DUODENOSCOPY (EGD) WITH RADIO FREQUENCY ABLATION, COMBINED N/A 12/12/2014    Procedure: COMBINED ESOPHAGOSCOPY, GASTROSCOPY, DUODENOSCOPY (EGD) WITH RADIO FREQUENCY ABLATION;  Surgeon: Mikey Kennedy MD;  Location: [facility]     ESOPHAGOSCOPY, GASTROSCOPY, DUODENOSCOPY (EGD), COMBINED  7/8/2013    Procedure: COMBINED ESOPHAGOSCOPY, GASTROSCOPY, DUODENOSCOPY (EGD);;  Surgeon: Justin Manuel MD;  Location: [facility]     ESOPHAGOSCOPY, GASTROSCOPY, DUODENOSCOPY (EGD), COMBINED  6/16/2014    Procedure: COMBINED ESOPHAGOSCOPY, GASTROSCOPY, DUODENOSCOPY (EGD);  Surgeon: Justin Manuel MD;  Location: [facility]     ESOPHAGOSCOPY, GASTROSCOPY, DUODENOSCOPY (EGD), COMBINED N/A 8/4/2016    Procedure: COMBINED ESOPHAGOSCOPY, GASTROSCOPY, DUODENOSCOPY  "(EGD);  Surgeon: Mikey Kennedy MD;  Location:  GI     HC BREATH HYDROGEN TEST  3/14/2012    Procedure:HYDROGEN BREATH TEST; Surgeon:MILE VILLANUEVA; Location:[facility]     HC ESOPHAGEAL MOTILITY STUDY N/A 11/1/2016    Procedure: ESOPHAGEAL MOTILITY STUDY;  Surgeon: Mikey Kennedy MD;  Location:  GI     INSERT PORT VASCULAR ACCESS       MAMMOPLASTY AUGMENTATION BILATERAL         Family History:   Family History   Problem Relation Age of Onset     Respiratory Father      Cancer Other         brain cancer         Social History:   Social History     Tobacco Use     Smoking status: Light Tobacco Smoker     Packs/day: 1.00     Types: Cigarettes     Smokeless tobacco: Never Used     Tobacco comment: on and off since age 13   Substance Use Topics     Alcohol use: No     Alcohol/week: 0.0 oz     Drug use: No        Physical Exam:   /71 (BP Location: Left arm, Cuff Size: Adult Regular)   Pulse 65   Ht 1.626 m (5' 4\")   Wt 60.1 kg (132 lb 6.4 oz)   SpO2 99%   BMI 22.73 kg/m     Wt Readings from Last 4 Encounters:   01/28/19 60.1 kg (132 lb 6.4 oz)   10/04/18 58.1 kg (128 lb)   07/19/18 58.1 kg (128 lb)   04/10/18 56.2 kg (123 lb 12.8 oz)   Constitutional: Well-developed, appearing stated age; cooperative  Eyes: Normal EOM, PERRLA, vision, conjunctiva, sclera  ENT: Normal external ears, nose, hearing, lips, teeth, gums, throat. No mucous membrane lesions, normal saliva pool  Neck: No mass or thyroid enlargement  Resp: Lungs clear to auscultation, nl to palpation  CV: RRR, no murmurs, rubs or gallops, no edema  GI: No ABD mass or tenderness, no HSM  : Not tested  Lymph: No cervical, supraclavicular, or epitrochlear nodes  MS: The TMJ, neck, shoulder, elbow, spine, hip, knee, ankle, and foot MTP/IP joints were examined and found normal. No active synovitis or altered joint anatomy. Full joint ROM other than below. Normal  strength. No dactylitis, tenosynovitis, enthesopathy. Slight restriction in " wrist range of motion but not significant. Proximally 15   contracture in PIPs and 5-10   in DIPs of the left hand. Right hand greater than 90   contractures in PIPs 3-5. Obvious calcinosis lesion over the lateral aspect of the right 4th PIP. No open ulcers on hands. Tenderness to palpation right > left lateral hip over greater trochanter. Strength 5/5 in bilateral LEs.  Skin: No nail pitting, alopecia, rash, nodules.  Neuro: Normal cranial nerves, strength, sensation, DTRs.   Psych: Normal judgement, orientation, memory, affect.     Laboratory:   Component      Latest Ref Rng & Units 10/4/2018   WBC      4.0 - 11.0 10e9/L 5.5   RBC Count      3.8 - 5.2 10e12/L 3.99   Hemoglobin      11.7 - 15.7 g/dL 12.1   Hematocrit      35.0 - 47.0 % 39.1   MCV      78 - 100 fl 98   MCH      26.5 - 33.0 pg 30.3   MCHC      31.5 - 36.5 g/dL 30.9 (L)   RDW      10.0 - 15.0 % 12.7   Platelet Count      150 - 450 10e9/L 207   Diff Method       Automated Method   % Neutrophils      % 57.2   % Lymphocytes      % 33.0   % Monocytes      % 7.6   % Eosinophils      % 1.8   % Basophils      % 0.2   % Immature Granulocytes      % 0.2   Nucleated RBCs      0 /100 0   Absolute Neutrophil      1.6 - 8.3 10e9/L 3.2   Absolute Lymphocytes      0.8 - 5.3 10e9/L 1.8   Absolute Monocytes      0.0 - 1.3 10e9/L 0.4   Absolute Eosinophils      0.0 - 0.7 10e9/L 0.1   Absolute Basophils      0.0 - 0.2 10e9/L 0.0   Abs Immature Granulocytes      0 - 0.4 10e9/L 0.0   Absolute Nucleated RBC       0.0   Color Urine       Colorless   Appearance Urine       Slightly Cloudy   Glucose Urine      NEG:Negative mg/dL Negative   Bilirubin Urine      NEG:Negative Negative   Ketones Urine      NEG:Negative mg/dL Negative   Specific Gravity Urine      1.003 - 1.035 1.004   Blood Urine      NEG:Negative Small (A)   pH Urine      5.0 - 7.0 pH 7.0   Protein Albumin Urine      NEG:Negative mg/dL Negative   Urobilinogen mg/dL      0.0 - 2.0 mg/dL 0.0   Nitrite Urine       NEG:Negative Negative   Leukocyte Esterase Urine      NEG:Negative Trace (A)   Source       Midstream Urine   WBC Urine      0 - 5 /HPF 2   RBC Urine      0 - 2 /HPF 1   Bacteria Urine      NEG:Negative /HPF Few (A)   Squamous Epithelial /HPF Urine      0 - 1 /HPF 6 (H)   Mucous Urine      NEG:Negative /LPF Present (A)   Creatinine      0.52 - 1.04 mg/dL 0.76   GFR Estimate      >60 mL/min/1.7m2 79   GFR Estimate If Black      >60 mL/min/1.7m2 >90   Albumin      3.4 - 5.0 g/dL 4.1   ALT      0 - 50 U/L 16   AST      0 - 45 U/L 16   CRP Inflammation      0.0 - 8.0 mg/L 3.4   Sed Rate      0 - 30 mm/h 20          Scribe Disclosure:   I, Baark Sosa, am serving as a scribe to document services personally performed by Brennen Child MD at this visit, based upon the provider's statements to me. All documentation has been reviewed by the aforementioned provider prior to being entered into the official medical record.     Portions of this medical record were completed by a scribe. UPON MY REVIEW AND AUTHENTICATION BY ELECTRONIC SIGNATURE, this confirms (a) I performed the applicable clinical services, and (b) the record is accurate.

## 2019-02-20 ENCOUNTER — TELEPHONE (OUTPATIENT)
Dept: RHEUMATOLOGY | Facility: CLINIC | Age: 54
End: 2019-02-20

## 2019-02-20 DIAGNOSIS — R82.90 ABNORMAL FINDING ON URINALYSIS: Primary | ICD-10-CM

## 2019-02-20 NOTE — TELEPHONE ENCOUNTER
Received the following result note from Dr Child:    Notes recorded by Brennen Child MD on 1/28/2019 at 5:54 PM CST  Please double check as to whether she has gotten cyclophosphamide previously. I thought yes, but don't see it in the past meds.     If yes, she needs repeat UA in 3-4 weeks.     Left message on pt's voice mail asking that she return call.    TIARRA FrankN RN  Rheumatology RN Coordinator  [facility]

## 2019-02-21 DIAGNOSIS — R82.90 ABNORMAL FINDING ON URINALYSIS: ICD-10-CM

## 2019-02-21 LAB
ALBUMIN UR-MCNC: NEGATIVE MG/DL
APPEARANCE UR: CLEAR
BILIRUB UR QL STRIP: NEGATIVE
COLOR UR AUTO: YELLOW
GLUCOSE UR STRIP-MCNC: NEGATIVE MG/DL
HGB UR QL STRIP: ABNORMAL
HYALINE CASTS #/AREA URNS LPF: 1 /LPF (ref 0–2)
KETONES UR STRIP-MCNC: NEGATIVE MG/DL
LEUKOCYTE ESTERASE UR QL STRIP: NEGATIVE
MUCOUS THREADS #/AREA URNS LPF: PRESENT /LPF
NITRATE UR QL: NEGATIVE
PH UR STRIP: 5 PH (ref 5–7)
RBC #/AREA URNS AUTO: 5 /HPF (ref 0–2)
SOURCE: ABNORMAL
SP GR UR STRIP: 1.02 (ref 1–1.03)
UROBILINOGEN UR STRIP-MCNC: 0 MG/DL (ref 0–2)
WBC #/AREA URNS AUTO: 1 /HPF (ref 0–5)

## 2019-02-21 NOTE — TELEPHONE ENCOUNTER
Pt returned call. Discussed the finding of moderate blood in her urine. She reports she has not been on cyclophosphamide in the past. Also states she has had blood in her urine since she was in her twenties. Willard said her other two Dr's have told her whenever the result is moderate blood she is to repeat the UA. Pt is agreeable to have this repeated today. Will come down to [facility] to have this down.    Pt is agreeable to this plan.    TIARRA FrankN RN  Rheumatology RN Coordinator  [facility]

## 2019-03-05 DIAGNOSIS — M34.9 SYSTEMIC SCLEROSIS (H): ICD-10-CM

## 2019-03-07 RX ORDER — MYCOPHENOLATE MOFETIL 500 MG/1
1500 TABLET ORAL DAILY
Qty: 90 TABLET | Refills: 2 | Status: SHIPPED | OUTPATIENT
Start: 2019-03-07 | End: 2019-06-05

## 2019-03-07 NOTE — TELEPHONE ENCOUNTER
Mycophenolate Mofetil Oral Tablet 500 MG  Take 3 tablets (1,500 mg) by mouth daily.    Last Written Prescription Date:  1/22/2019  Last Fill Quantity: 90,   # refills: 0  Last Office Visit: 1/28/2019  Future Office visit:  none    CBC RESULTS:   Recent Labs   Lab Test 01/28/19  1730   WBC 4.2   RBC 4.00   HGB 11.9   HCT 38.8   MCV 97   MCH 29.8   MCHC 30.7*   RDW 12.7          Creatinine   Date Value Ref Range Status   01/28/2019 0.77 0.52 - 1.04 mg/dL Final   ]    Liver Function Studies -   Recent Labs   Lab Test 01/28/19  1730  11/24/15  1848   PROTTOTAL  --   --  7.8   ALBUMIN 4.0   < > 4.1   BILITOTAL  --   --  0.4   ALKPHOS  --   --  49   AST 15   < > 12   ALT 16   < > 15    < > = values in this interval not displayed.       Routing refill request to provider for review/approval because:  DMARD

## 2019-04-25 ENCOUNTER — OFFICE VISIT (OUTPATIENT)
Dept: PULMONOLOGY | Facility: CLINIC | Age: 54
End: 2019-04-25
Attending: INTERNAL MEDICINE
Payer: MEDICARE

## 2019-04-25 VITALS
HEART RATE: 72 BPM | RESPIRATION RATE: 16 BRPM | DIASTOLIC BLOOD PRESSURE: 77 MMHG | SYSTOLIC BLOOD PRESSURE: 117 MMHG | HEIGHT: 64 IN | OXYGEN SATURATION: 99 % | BODY MASS INDEX: 22.72 KG/M2 | TEMPERATURE: 98.1 F

## 2019-04-25 DIAGNOSIS — M77.41 METATARSALGIA OF BOTH FEET: ICD-10-CM

## 2019-04-25 DIAGNOSIS — M05.79 RHEUMATOID ARTHRITIS INVOLVING MULTIPLE SITES WITH POSITIVE RHEUMATOID FACTOR (H): ICD-10-CM

## 2019-04-25 DIAGNOSIS — M85.80 OSTEOPENIA, UNSPECIFIED LOCATION: ICD-10-CM

## 2019-04-25 DIAGNOSIS — M77.40 METATARSALGIA, UNSPECIFIED LATERALITY: ICD-10-CM

## 2019-04-25 DIAGNOSIS — M77.42 METATARSALGIA OF BOTH FEET: ICD-10-CM

## 2019-04-25 DIAGNOSIS — M34.9 SYSTEMIC SCLEROSIS (H): ICD-10-CM

## 2019-04-25 DIAGNOSIS — Z79.899 HIGH RISK MEDICATIONS (NOT ANTICOAGULANTS) LONG-TERM USE: ICD-10-CM

## 2019-04-25 DIAGNOSIS — I73.01 RAYNAUD'S DISEASE WITH GANGRENE (H): ICD-10-CM

## 2019-04-25 DIAGNOSIS — D51.9 ANEMIA DUE TO VITAMIN B12 DEFICIENCY, UNSPECIFIED B12 DEFICIENCY TYPE: Primary | ICD-10-CM

## 2019-04-25 LAB
ALBUMIN SERPL-MCNC: 4.3 G/DL (ref 3.4–5)
ALBUMIN UR-MCNC: NEGATIVE MG/DL
ALT SERPL W P-5'-P-CCNC: 18 U/L (ref 0–50)
APPEARANCE UR: CLEAR
AST SERPL W P-5'-P-CCNC: 17 U/L (ref 0–45)
BASOPHILS # BLD AUTO: 0 10E9/L (ref 0–0.2)
BASOPHILS NFR BLD AUTO: 0.1 %
BILIRUB UR QL STRIP: NEGATIVE
COLOR UR AUTO: ABNORMAL
CREAT SERPL-MCNC: 0.77 MG/DL (ref 0.52–1.04)
CRP SERPL-MCNC: <2.9 MG/L (ref 0–8)
DIFFERENTIAL METHOD BLD: NORMAL
EOSINOPHIL # BLD AUTO: 0 10E9/L (ref 0–0.7)
EOSINOPHIL NFR BLD AUTO: 0.6 %
ERYTHROCYTE [DISTWIDTH] IN BLOOD BY AUTOMATED COUNT: 12.2 % (ref 10–15)
ERYTHROCYTE [SEDIMENTATION RATE] IN BLOOD BY WESTERGREN METHOD: 15 MM/H (ref 0–30)
GFR SERPL CREATININE-BSD FRML MDRD: 86 ML/MIN/{1.73_M2}
GLUCOSE UR STRIP-MCNC: NEGATIVE MG/DL
HCT VFR BLD AUTO: 37.5 % (ref 35–47)
HGB BLD-MCNC: 11.9 G/DL (ref 11.7–15.7)
HGB UR QL STRIP: ABNORMAL
IMM GRANULOCYTES # BLD: 0 10E9/L (ref 0–0.4)
IMM GRANULOCYTES NFR BLD: 0.3 %
KETONES UR STRIP-MCNC: NEGATIVE MG/DL
LEUKOCYTE ESTERASE UR QL STRIP: NEGATIVE
LYMPHOCYTES # BLD AUTO: 2 10E9/L (ref 0.8–5.3)
LYMPHOCYTES NFR BLD AUTO: 29.4 %
MCH RBC QN AUTO: 31.2 PG (ref 26.5–33)
MCHC RBC AUTO-ENTMCNC: 31.7 G/DL (ref 31.5–36.5)
MCV RBC AUTO: 98 FL (ref 78–100)
MONOCYTES # BLD AUTO: 0.4 10E9/L (ref 0–1.3)
MONOCYTES NFR BLD AUTO: 6.5 %
NEUTROPHILS # BLD AUTO: 4.3 10E9/L (ref 1.6–8.3)
NEUTROPHILS NFR BLD AUTO: 63.1 %
NITRATE UR QL: NEGATIVE
NRBC # BLD AUTO: 0 10*3/UL
NRBC BLD AUTO-RTO: 0 /100
PH UR STRIP: 6 PH (ref 5–7)
PLATELET # BLD AUTO: 208 10E9/L (ref 150–450)
RBC # BLD AUTO: 3.82 10E12/L (ref 3.8–5.2)
RBC #/AREA URNS AUTO: 1 /HPF (ref 0–2)
SOURCE: ABNORMAL
SP GR UR STRIP: 1 (ref 1–1.03)
UROBILINOGEN UR STRIP-MCNC: 0 MG/DL (ref 0–2)
WBC # BLD AUTO: 6.8 10E9/L (ref 4–11)
WBC #/AREA URNS AUTO: 0 /HPF (ref 0–5)

## 2019-04-25 PROCEDURE — 36415 COLL VENOUS BLD VENIPUNCTURE: CPT | Performed by: INTERNAL MEDICINE

## 2019-04-25 PROCEDURE — 85025 COMPLETE CBC W/AUTO DIFF WBC: CPT | Performed by: INTERNAL MEDICINE

## 2019-04-25 PROCEDURE — 85652 RBC SED RATE AUTOMATED: CPT | Performed by: INTERNAL MEDICINE

## 2019-04-25 PROCEDURE — 84450 TRANSFERASE (AST) (SGOT): CPT | Performed by: INTERNAL MEDICINE

## 2019-04-25 PROCEDURE — 25000128 H RX IP 250 OP 636: Mod: ZF | Performed by: INTERNAL MEDICINE

## 2019-04-25 PROCEDURE — 86140 C-REACTIVE PROTEIN: CPT | Performed by: INTERNAL MEDICINE

## 2019-04-25 PROCEDURE — 82565 ASSAY OF CREATININE: CPT | Performed by: INTERNAL MEDICINE

## 2019-04-25 PROCEDURE — 81001 URINALYSIS AUTO W/SCOPE: CPT | Performed by: INTERNAL MEDICINE

## 2019-04-25 PROCEDURE — 82040 ASSAY OF SERUM ALBUMIN: CPT | Performed by: INTERNAL MEDICINE

## 2019-04-25 PROCEDURE — 96372 THER/PROPH/DIAG INJ SC/IM: CPT

## 2019-04-25 PROCEDURE — 84460 ALANINE AMINO (ALT) (SGPT): CPT | Performed by: INTERNAL MEDICINE

## 2019-04-25 PROCEDURE — G0463 HOSPITAL OUTPT CLINIC VISIT: HCPCS | Mod: ZF

## 2019-04-25 RX ORDER — OXYCODONE HYDROCHLORIDE 5 MG/1
5 TABLET ORAL EVERY 4 HOURS PRN
Qty: 204 TABLET | Refills: 0 | Status: SHIPPED | OUTPATIENT
Start: 2019-05-30 | End: 2019-11-21

## 2019-04-25 RX ORDER — OXYCODONE AND ACETAMINOPHEN 5; 325 MG/1; MG/1
1 TABLET ORAL EVERY 4 HOURS PRN
Qty: 102 TABLET | Refills: 0 | Status: SHIPPED | OUTPATIENT
Start: 2019-05-30 | End: 2019-08-15

## 2019-04-25 RX ORDER — OXYCODONE HYDROCHLORIDE 5 MG/1
5 TABLET ORAL EVERY 4 HOURS PRN
Qty: 204 TABLET | Refills: 0 | Status: SHIPPED | OUTPATIENT
Start: 2019-04-30 | End: 2020-08-12

## 2019-04-25 RX ORDER — CYANOCOBALAMIN 1000 UG/ML
1000 INJECTION, SOLUTION INTRAMUSCULAR; SUBCUTANEOUS ONCE
Status: COMPLETED | OUTPATIENT
Start: 2019-04-25 | End: 2019-04-25

## 2019-04-25 RX ORDER — HYDROXYCHLOROQUINE SULFATE 200 MG/1
200 TABLET, FILM COATED ORAL 2 TIMES DAILY
Qty: 180 TABLET | Refills: 3 | Status: SHIPPED | OUTPATIENT
Start: 2019-04-25

## 2019-04-25 RX ORDER — OXYCODONE AND ACETAMINOPHEN 5; 325 MG/1; MG/1
1 TABLET ORAL EVERY 4 HOURS PRN
Qty: 102 TABLET | Refills: 0 | Status: SHIPPED | OUTPATIENT
Start: 2019-04-30 | End: 2019-11-21

## 2019-04-25 RX ORDER — OXYCODONE AND ACETAMINOPHEN 5; 325 MG/1; MG/1
1 TABLET ORAL EVERY 4 HOURS PRN
Qty: 102 TABLET | Refills: 0 | Status: SHIPPED | OUTPATIENT
Start: 2019-06-30 | End: 2019-07-15

## 2019-04-25 RX ORDER — OXYCODONE HYDROCHLORIDE 5 MG/1
5 TABLET ORAL EVERY 4 HOURS PRN
Qty: 204 TABLET | Refills: 0 | Status: SHIPPED | OUTPATIENT
Start: 2019-04-30 | End: 2019-04-25

## 2019-04-25 RX ORDER — OXYCODONE HYDROCHLORIDE 5 MG/1
5 TABLET ORAL EVERY 4 HOURS PRN
Qty: 204 TABLET | Refills: 0 | Status: SHIPPED | OUTPATIENT
Start: 2019-05-30 | End: 2019-04-25

## 2019-04-25 RX ORDER — OXYCODONE HYDROCHLORIDE 5 MG/1
5 TABLET ORAL EVERY 4 HOURS PRN
Qty: 204 TABLET | Refills: 0 | Status: SHIPPED | OUTPATIENT
Start: 2019-06-30 | End: 2019-07-15

## 2019-04-25 RX ADMIN — CYANOCOBALAMIN 1000 MCG: 1000 INJECTION INTRAMUSCULAR; SUBCUTANEOUS at 18:20

## 2019-04-25 ASSESSMENT — PAIN SCALES - GENERAL: PAINLEVEL: NO PAIN (0)

## 2019-04-25 NOTE — PROGRESS NOTES
[facility]  Rheumatology Clinic  Brennen Child MD  2019     Name: Willard Grayson  MRN: [id number]  Age: 53 year old  : 1965  Referring provider: Referred Self    Problem List:  1. 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   2. Associated marked neuropathic skin pain markedly improved with Lyrica with prior medication therapy with gabapentin.   3. Diffuse musculoskeletal pain requiring methotrexate plus CellCept, and narcotic to control, but also improved. Now off methotrexate due to GI issues.  4. Initial clinical overlap with anti-CCP seropositive rheumatoid arthritis with continued anti-CCP seropositivity as of 2010.   5. Raynaud's phenomena with digital ulcers with easily cracked skin.   6. GI involvement consistent with bacterial small bowel overgrowth with marked improvement after a course of neomycin followed by Dr. Glez; positive hydrogen breath test in 3/2012 - treated wtih rifaximin through Dr. Jacob  7. No convincing evidence of lung involvement on serial pulmonary function tests or clinically.   8. History of heart palpitations prior to the onset of SSc with stable symptoms since. Holter 2018 with PACs.  9. History of medication failures with Remicade, methotrexate, methotrexate plus CellCept and with Orencia and with improvement since initiation of IVIG but with complicating headaches and overall sense of malaise with IVIG infusions.   10. History of silicone breast implants.   11. Incidental finding of calcified splenic artery aneurysm.  12. EGD in 3/2012 with hiatal hernia and gastral antral vascular ectasia  13. Recurrent right third finger contracture with extensor surface ulceration, healed   14. Trochanteric bursitis, R>L    Assessment and Plan:    - Systemic sclerosis  - Rheumatoid arthritis involving multiple sites with positive rheumatoid factor   - Raynaud's disease with history of gangrene  - Anemia due to vitamin B12 deficiency, unspecified B12 deficiency type  - Metatarsalgia, unspecified laterality  - High risk medications (not anticoagulants) long-term use  - Osteopenia, unspecified location  - Trochanteric bursitis  The fact that she is having more musculoskeletal symptoms and increased fatigue may be indicative of increased inflammation. We discussed starting the patient on Plaquenil. We discussed the side effects including the rare occurrence of retinal toxicity, and the consequent need for annual opthalmalogic monitoring She wishes to try it.     She should follow up with primary care provider for hot flashes. She may try stopping Evoxac for a week to test whether the hot flashes are associated with the medication. She may also consult with a pharmacist for concern that the hot flashes are associated with medication.     We discussed stretches for her trochanteric bursitis.     We discussed the potential use of Narcan nasal spray for use with Lorazepam to prevent overdoses. The patient declined the spray since she declines using more than the prescribed dose. She is given 3 1 mo prescriptions.     She has requested a smoking cessation program referral which we will provide.     - hydroxychloroquine (PLAQUENIL) 200 MG tablet  Dispense: 180 tablet; Refill: 3  - oxyCODONE (ROXICODONE) 5 MG tablet  Dispense: 204 tablet; Refill: 0  - oxyCODONE (ROXICODONE) 5 MG tablet  Dispense: 204 tablet; Refill: 0  - oxyCODONE (ROXICODONE) 5 MG tablet  Dispense: 204 tablet; Refill: 0  - oxyCODONE-acetaminophen (PERCOCET) 5-325 MG tablet  Dispense: 102 tablet; Refill: 0  - oxyCODONE-acetaminophen (PERCOCET) 5-325 MG tablet  Dispense: 102 tablet; Refill: 0  - oxyCODONE-acetaminophen (PERCOCET) 5-325 MG tablet  Dispense: 102 tablet; Refill: 0    Follow-up: Three  months.    This visit was 40 mins in duration, the majority spent in counseling.      HPI:   Willard Grayson is a 53 year old female with a history of diffuse cutaneous systemic sclerosis who presents for follow up. I last evaluated the patient on 02/28/2019 at which time pSatient was overall doing well with improvement in ulcerations. We suspected her difficulty swallowing were related to esophageal dysmotility from underlying systemic sclerosis. She was referred to a dentist at [facility] with an interest in scleroderma.     Today, She has had an ulcer on her left 2nd digit for the past two months. She has tingling in her arms and legs. She thinks it is associated with smoking. She quit smoking for two months. The tingling began after she started smoking again. She has severe fatigue. Her depression is worse with the fatigue. She has back and neck pain. She has been having hot flashes. She stopped menstrual periods three years ago. She reports she sometimes feels sick when she has hot flashes.     Of note, The patient is interested in quitting smoking and is interested in joining a smoking cessation program.      Review of Systems:   Pertinent items are noted in HPI or as below, remainder of complete ROS is negative.      No recent problems with hearing or vision. No swallowing problems.   No breathing difficulty, shortness of breath, coughing, or wheezing  No chest pain or palpitations  No heart burn, indigestion, abdominal pain, nausea, vomiting, diarrhea  No urination problems, no bloody, cloudy urine, no dysuria  No numbing, tingling, weakness  No headaches or confusion  No rashes. No easy bleeding or bruising.    Active Medications:   Current Outpatient Medications:      aspirin-dipyridamole (AGGRENOX)  MG per 12 hr capsule, Take 1 capsule by mouth daily , Disp: , Rfl:      Blood Pressure Monitoring KIT, Check BP every 7 days. (Patient not taking: Reported on 1/28/2019), Disp: 1 kit, Rfl: 0      cevimeline (EVOXAC) 30 MG capsule, Take 1 capsule (30 mg) by mouth 3 times daily as needed, Disp: 270 capsule, Rfl: 3     cyanocobalamin (VITAMIN B12) 1000 MCG/ML injection, Inject 1 mL into the muscle every 30 days, Disp: , Rfl:      esomeprazole (NEXIUM) 40 MG capsule, Take 1 capsule (40 mg) by mouth 2 times daily Take 30-60 minutes before eating., Disp: 180 capsule, Rfl: 3     folic acid (FOLVITE) 1 MG tablet, Take 1 tablet (1 mg) by mouth daily, Disp: 90 tablet, Rfl: 3     LORazepam (ATIVAN) 0.5 MG tablet, Take 1 - 2 tablets by mouth three times daily as needed., Disp: 60 tablet, Rfl: 0     mycophenolate (GENERIC EQUIVALENT) 500 MG tablet, Take 3 tablets (1,500 mg) by mouth daily, Disp: 90 tablet, Rfl: 2     olopatadine (PATANOL) 0.1 % ophthalmic solution, Place 1 drop into both eyes 2 times daily, Disp: 5 mL, Rfl: 3     ondansetron (ZOFRAN-ODT) 4 MG ODT tab, TAKE ONE TABLET IN MOUTH EVERY EIGHT HOURS AS NEEDED FOR NAUSEA, Disp: 20 tablet, Rfl: 0     oxyCODONE (ROXICODONE) 5 MG tablet, Take 1 tablet (5 mg) by mouth every 4 hours as needed for moderate to severe pain or pain maximum 6 tablet(s) per day, Disp: 204 tablet, Rfl: 0     oxyCODONE IR (ROXICODONE) 5 MG tablet, Take 1 tablet (5 mg) by mouth every 4 hours as needed for moderate to severe pain (maximum 6 tablets per day), Disp: 204 tablet, Rfl: 0     oxyCODONE IR (ROXICODONE) 5 MG tablet, Take 1 tablet (5 mg) by mouth every 4 hours as needed for moderate to severe pain or pain maximum 6 tablet(s) per day, Disp: 204 tablet, Rfl: 0     oxyCODONE-acetaminophen (PERCOCET) 5-325 MG per tablet, Take 1 tablet by mouth every 4 hours as needed for moderate to severe pain (maximum 3 tablets per day), Disp: 102 tablet, Rfl: 0     oxyCODONE-acetaminophen (PERCOCET) 5-325 MG per tablet, Take 1 tablet by mouth every 4 hours as needed for pain or moderate to severe pain (maximum 3 tablets per day), Disp: 102 tablet, Rfl: 0     oxyCODONE-acetaminophen (PERCOCET) 5-325 MG  "tablet, Take 1 tablet by mouth every 4 hours as needed for pain or moderate to severe pain (maximum 3 tablets per day), Disp: 102 tablet, Rfl: 0     pravastatin (PRAVACHOL) 10 MG tablet, Take 1 tablet (10 mg) by mouth daily, Disp: 30 tablet, Rfl: 5     pregabalin (LYRICA) 75 MG capsule, Take 1 capsule (75 mg) by mouth 2 times daily, Disp: 180 capsule, Rfl: 1     valACYclovir (VALTREX) 500 MG tablet, Take 1 tablet (500 mg) by mouth 2 times daily (Patient taking differently: Take 500 mg by mouth daily ), Disp: 180 tablet, Rfl: 1     Vitamin D, Cholecalciferol, 1000 UNITS TABS, Take 2,000 Units by mouth daily , Disp: , Rfl:       Allergies:   Penicillin [penicillins]; Bactroban [mupirocin calcium]; Levaquin; and Rifampin      Past Medical History:  Arthritis (rheumatoid arthritis/scleroderma)  Gastric antral vascular ectasia (watermelon stomach)  GERD   Iron deficiency anaemia   Irregular heart beat  Osteomyelitis of the R third finger  Raynaud's syndrome  Scleroderma   Sjogren's syndrome   Ulcer of finger  Systemic sclerosis   Anxiety  Metatarsalgia   Sicca syndrome  Palpitations  GAVE  Ischemia of upper extremity      Past Surgical History:  Appendectomy   Colonoscopy (08/04/2016)  EGD x 5 (08/22/14 - 08/02/2016)  Mammoplasty Augmentation Bilateral    Family History:   Respiratory - Father  Brain Cancer - other      Social History:   Light tobacco smoker of a pack a day (on and off since age 13). Denies smokeless tobacco use.     Physical Exam:   /77 (BP Location: Right arm, Patient Position: Chair, Cuff Size: Adult Regular)   Pulse 72   Temp 98.1  F (36.7  C) (Oral)   Resp 16   Ht 1.626 m (5' 4.02\")   SpO2 99%   BMI 22.72 kg/m     Wt Readings from Last 4 Encounters:   01/28/19 60.1 kg (132 lb 6.4 oz)   10/04/18 58.1 kg (128 lb)   07/19/18 58.1 kg (128 lb)   04/10/18 56.2 kg (123 lb 12.8 oz)     Constitutional: Well-developed, appearing stated age; cooperative  Eyes: Normal EOM, PERRLA, vision, " conjunctiva, sclera  ENT: Normal external ears, nose, hearing, lips, teeth, gums, throat. No mucous membrane lesions, normal saliva pool  Neck: No mass or thyroid enlargement  Resp: Lungs clear to auscultation, nl to palpation  CV: RRR, no murmurs, rubs or gallops, no edema  GI: No ABD mass or tenderness, no HSM  : Not tested  Lymph: No cervical, supraclavicular, inguinal or epitrochlear nodes  MS: The TMJ, neck, shoulder, elbow, wrist, MCP/PIP/DIP, spine, hip, knee, ankle, and foot MTP/IP joints were examined and found normal. No active synovitis or altered joint anatomy. Full joint ROM. Normal  strength. No dactylitis,  tenosynovitis, enthesopathy.  Skin: No nail pitting, alopecia, rash, nodules or lesions  Neuro: Normal cranial nerves, strength, sensation, DTRs.   Psych: Normal judgement, orientation, memory, affect.       Laboratory:   Component      Latest Ref Rng & Units 1/28/2019 2/21/2019   WBC      4.0 - 11.0 10e9/L 4.2    RBC Count      3.8 - 5.2 10e12/L 4.00    Hemoglobin      11.7 - 15.7 g/dL 11.9    Hematocrit      35.0 - 47.0 % 38.8    MCV      78 - 100 fl 97    MCH      26.5 - 33.0 pg 29.8    MCHC      31.5 - 36.5 g/dL 30.7 (L)    RDW      10.0 - 15.0 % 12.7    Platelet Count      150 - 450 10e9/L 164    Diff Method       Automated Method    % Neutrophils      % 44.3    % Lymphocytes      % 40.8    % Monocytes      % 11.1    % Eosinophils      % 3.1    % Basophils      % 0.5    % Immature Granulocytes      % 0.2    Nucleated RBCs      0 /100 0    Absolute Neutrophil      1.6 - 8.3 10e9/L 1.9    Absolute Lymphocytes      0.8 - 5.3 10e9/L 1.7    Absolute Monocytes      0.0 - 1.3 10e9/L 0.5    Absolute Eosinophils      0.0 - 0.7 10e9/L 0.1    Absolute Basophils      0.0 - 0.2 10e9/L 0.0    Abs Immature Granulocytes      0 - 0.4 10e9/L 0.0    Absolute Nucleated RBC       0.0    Color Urine       Yellow Yellow   Appearance Urine       Clear Clear   Glucose Urine      NEG:Negative mg/dL Negative  Negative   Bilirubin Urine      NEG:Negative Negative Negative   Ketones Urine      NEG:Negative mg/dL Negative Negative   Specific Gravity Urine      1.003 - 1.035 1.017 1.019   Blood Urine      NEG:Negative Moderate (A) Small (A)   pH Urine      5.0 - 7.0 pH 5.0 5.0   Protein Albumin Urine      NEG:Negative mg/dL Negative Negative   Urobilinogen mg/dL      0.0 - 2.0 mg/dL 0.0 0.0   Nitrite Urine      NEG:Negative Negative Negative   Leukocyte Esterase Urine      NEG:Negative Negative Negative   Source       Midstream Urine Midstream Urine   WBC Urine      0 - 5 /HPF <1 1   RBC Urine      0 - 2 /HPF 5 (H) 5 (H)   Mucous Urine      NEG:Negative /LPF Present (A) Present (A)   Hyaline Casts      0 - 2 /LPF  1   Creatinine      0.52 - 1.04 mg/dL 0.77    GFR Estimate      >60 mL/min/1.73:m2 86    GFR Estimate If Black      >60 mL/min/1.73:m2 >90    Sed Rate      0 - 30 mm/h 19    CRP Inflammation      0.0 - 8.0 mg/L 6.3    AST      0 - 45 U/L 15    ALT      0 - 50 U/L 16    Albumin      3.4 - 5.0 g/dL 4.0      Scribe Disclosure:  I, Sherry Woodson, am serving as a scribe to document services personally performed by Brennen Child MD at this visit, based upon the provider's statements to me. All documentation has been reviewed by the aforementioned provider prior to being entered into the official medical record.    JANET Child MD, PhD    Rheumatology

## 2019-04-25 NOTE — NURSING NOTE
Given vitamin b12 injection in Left deltoid. Cleaned site with alcohol and applied bandage. Patient tolerated injection well.  Molly Niño CMA

## 2019-04-25 NOTE — LETTER
2019       RE: Willard Grayson  [address]  [address]  [facility] [id number]     Dear Colleague,    Thank you for referring your patient, Willard Grayson, to the [facility] at [facility]. Please see a copy of my visit note below.    [facility]  Rheumatology Clinic  Brennen Child MD  2019     Name: Willard Grayson  MRN: [id number]  Age: 53 year old  : 1965  Referring provider: Referred Self    Problem List:  1. 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   2. Associated marked neuropathic skin pain markedly improved with Lyrica with prior medication therapy with gabapentin.   3. Diffuse musculoskeletal pain requiring methotrexate plus CellCept, and narcotic to control, but also improved. Now off methotrexate due to GI issues.  4. Initial clinical overlap with anti-CCP seropositive rheumatoid arthritis with continued anti-CCP seropositivity as of 2010.   5. Raynaud's phenomena with digital ulcers with easily cracked skin.   6. GI involvement consistent with bacterial small bowel overgrowth with marked improvement after a course of neomycin followed by Dr. Glez; positive hydrogen breath test in 3/2012 - treated wtih rifaximin through Dr. Jacob  7. No convincing evidence of lung involvement on serial pulmonary function tests or clinically.   8. History of heart palpitations prior to the onset of SSc with stable symptoms since. Holter 2018 with PACs.  9. History of medication failures with Remicade, methotrexate, methotrexate plus CellCept and with Orencia and with improvement since initiation of IVIG but with complicating headaches and overall sense of malaise with IVIG infusions.   10.  History of silicone breast implants.   11. Incidental finding of calcified splenic artery aneurysm.  12. EGD in 3/2012 with hiatal hernia and gastral antral vascular ectasia  13. Recurrent right third finger contracture with extensor surface ulceration, healed   14. Trochanteric bursitis, R>L    Assessment and Plan:   - Systemic sclerosis  - Rheumatoid arthritis involving multiple sites with positive rheumatoid factor   - Raynaud's disease with history of gangrene  - Anemia due to vitamin B12 deficiency, unspecified B12 deficiency type  - Metatarsalgia, unspecified laterality  - High risk medications (not anticoagulants) long-term use  - Osteopenia, unspecified location  - Trochanteric bursitis  The fact that she is having more musculoskeletal symptoms and increased fatigue may be indicative of increased inflammation. We discussed starting the patient on Plaquenil. We discussed the side effects including the rare occurrence of retinal toxicity, and the consequent need for annual opthalmalogic monitoring She wishes to try it.     She should follow up with primary care provider for hot flashes. She may try stopping Evoxac for a week to test whether the hot flashes are associated with the medication. She may also consult with a pharmacist for concern that the hot flashes are associated with medication.     We discussed stretches for her trochanteric bursitis.     We discussed the potential use of Narcan nasal spray for use with Lorazepam to prevent overdoses. The patient declined the spray since she declines using more than the prescribed dose. She is given 3 1 mo prescriptions.     She has requested a smoking cessation program referral which we will provide.     - hydroxychloroquine (PLAQUENIL) 200 MG tablet  Dispense: 180 tablet; Refill: 3  - oxyCODONE (ROXICODONE) 5 MG tablet  Dispense: 204 tablet; Refill: 0  - oxyCODONE (ROXICODONE) 5 MG tablet  Dispense: 204 tablet; Refill: 0  - oxyCODONE (ROXICODONE) 5 MG tablet   Dispense: 204 tablet; Refill: 0  - oxyCODONE-acetaminophen (PERCOCET) 5-325 MG tablet  Dispense: 102 tablet; Refill: 0  - oxyCODONE-acetaminophen (PERCOCET) 5-325 MG tablet  Dispense: 102 tablet; Refill: 0  - oxyCODONE-acetaminophen (PERCOCET) 5-325 MG tablet  Dispense: 102 tablet; Refill: 0    Follow-up: Three months.    This visit was 40 mins in duration, the majority spent in counseling.      HPI:   Willard Grayson is a 53 year old female with a history of diffuse cutaneous systemic sclerosis who presents for follow up. I last evaluated the patient on 02/28/2019 at which time pSatient was overall doing well with improvement in ulcerations. We suspected her difficulty swallowing were related to esophageal dysmotility from underlying systemic sclerosis. She was referred to a dentist at [facility] with an interest in scleroderma.     Today, She has had an ulcer on her left 2nd digit for the past two months. She has tingling in her arms and legs. She thinks it is associated with smoking. She quit smoking for two months. The tingling began after she started smoking again. She has severe fatigue. Her depression is worse with the fatigue. She has back and neck pain. She has been having hot flashes. She stopped menstrual periods three years ago. She reports she sometimes feels sick when she has hot flashes.     Of note, The patient is interested in quitting smoking and is interested in joining a smoking cessation program.      Review of Systems:   Pertinent items are noted in HPI or as below, remainder of complete ROS is negative.      No recent problems with hearing or vision. No swallowing problems.   No breathing difficulty, shortness of breath, coughing, or wheezing  No chest pain or palpitations  No heart burn, indigestion, abdominal pain, nausea, vomiting, diarrhea  No urination problems, no bloody, cloudy urine, no dysuria  No numbing, tingling, weakness  No headaches or confusion  No rashes. No easy bleeding  or bruising.    Active Medications:   Current Outpatient Medications:      aspirin-dipyridamole (AGGRENOX)  MG per 12 hr capsule, Take 1 capsule by mouth daily , Disp: , Rfl:      Blood Pressure Monitoring KIT, Check BP every 7 days. (Patient not taking: Reported on 1/28/2019), Disp: 1 kit, Rfl: 0     cevimeline (EVOXAC) 30 MG capsule, Take 1 capsule (30 mg) by mouth 3 times daily as needed, Disp: 270 capsule, Rfl: 3     cyanocobalamin (VITAMIN B12) 1000 MCG/ML injection, Inject 1 mL into the muscle every 30 days, Disp: , Rfl:      esomeprazole (NEXIUM) 40 MG capsule, Take 1 capsule (40 mg) by mouth 2 times daily Take 30-60 minutes before eating., Disp: 180 capsule, Rfl: 3     folic acid (FOLVITE) 1 MG tablet, Take 1 tablet (1 mg) by mouth daily, Disp: 90 tablet, Rfl: 3     LORazepam (ATIVAN) 0.5 MG tablet, Take 1 - 2 tablets by mouth three times daily as needed., Disp: 60 tablet, Rfl: 0     mycophenolate (GENERIC EQUIVALENT) 500 MG tablet, Take 3 tablets (1,500 mg) by mouth daily, Disp: 90 tablet, Rfl: 2     olopatadine (PATANOL) 0.1 % ophthalmic solution, Place 1 drop into both eyes 2 times daily, Disp: 5 mL, Rfl: 3     ondansetron (ZOFRAN-ODT) 4 MG ODT tab, TAKE ONE TABLET IN MOUTH EVERY EIGHT HOURS AS NEEDED FOR NAUSEA, Disp: 20 tablet, Rfl: 0     oxyCODONE (ROXICODONE) 5 MG tablet, Take 1 tablet (5 mg) by mouth every 4 hours as needed for moderate to severe pain or pain maximum 6 tablet(s) per day, Disp: 204 tablet, Rfl: 0     oxyCODONE IR (ROXICODONE) 5 MG tablet, Take 1 tablet (5 mg) by mouth every 4 hours as needed for moderate to severe pain (maximum 6 tablets per day), Disp: 204 tablet, Rfl: 0     oxyCODONE IR (ROXICODONE) 5 MG tablet, Take 1 tablet (5 mg) by mouth every 4 hours as needed for moderate to severe pain or pain maximum 6 tablet(s) per day, Disp: 204 tablet, Rfl: 0     oxyCODONE-acetaminophen (PERCOCET) 5-325 MG per tablet, Take 1 tablet by mouth every 4 hours as needed for moderate to  "severe pain (maximum 3 tablets per day), Disp: 102 tablet, Rfl: 0     oxyCODONE-acetaminophen (PERCOCET) 5-325 MG per tablet, Take 1 tablet by mouth every 4 hours as needed for pain or moderate to severe pain (maximum 3 tablets per day), Disp: 102 tablet, Rfl: 0     oxyCODONE-acetaminophen (PERCOCET) 5-325 MG tablet, Take 1 tablet by mouth every 4 hours as needed for pain or moderate to severe pain (maximum 3 tablets per day), Disp: 102 tablet, Rfl: 0     pravastatin (PRAVACHOL) 10 MG tablet, Take 1 tablet (10 mg) by mouth daily, Disp: 30 tablet, Rfl: 5     pregabalin (LYRICA) 75 MG capsule, Take 1 capsule (75 mg) by mouth 2 times daily, Disp: 180 capsule, Rfl: 1     valACYclovir (VALTREX) 500 MG tablet, Take 1 tablet (500 mg) by mouth 2 times daily (Patient taking differently: Take 500 mg by mouth daily ), Disp: 180 tablet, Rfl: 1     Vitamin D, Cholecalciferol, 1000 UNITS TABS, Take 2,000 Units by mouth daily , Disp: , Rfl:       Allergies:   Penicillin [penicillins]; Bactroban [mupirocin calcium]; Levaquin; and Rifampin      Past Medical History:  Arthritis (rheumatoid arthritis/scleroderma)  Gastric antral vascular ectasia (watermelon stomach)  GERD   Iron deficiency anaemia   Irregular heart beat  Osteomyelitis of the R third finger  Raynaud's syndrome  Scleroderma   Sjogren's syndrome   Ulcer of finger  Systemic sclerosis   Anxiety  Metatarsalgia   Sicca syndrome  Palpitations  GAVE  Ischemia of upper extremity      Past Surgical History:  Appendectomy   Colonoscopy (08/04/2016)  EGD x 5 (08/22/14 - 08/02/2016)  Mammoplasty Augmentation Bilateral    Family History:   Respiratory - Father  Brain Cancer - other      Social History:   Light tobacco smoker of a pack a day (on and off since age 13). Denies smokeless tobacco use.     Physical Exam:   /77 (BP Location: Right arm, Patient Position: Chair, Cuff Size: Adult Regular)   Pulse 72   Temp 98.1  F (36.7  C) (Oral)   Resp 16   Ht 1.626 m (5' 4.02\")  "  SpO2 99%   BMI 22.72 kg/m      Wt Readings from Last 4 Encounters:   01/28/19 60.1 kg (132 lb 6.4 oz)   10/04/18 58.1 kg (128 lb)   07/19/18 58.1 kg (128 lb)   04/10/18 56.2 kg (123 lb 12.8 oz)     Constitutional: Well-developed, appearing stated age; cooperative  Eyes: Normal EOM, PERRLA, vision, conjunctiva, sclera  ENT: Normal external ears, nose, hearing, lips, teeth, gums, throat. No mucous membrane lesions, normal saliva pool  Neck: No mass or thyroid enlargement  Resp: Lungs clear to auscultation, nl to palpation  CV: RRR, no murmurs, rubs or gallops, no edema  GI: No ABD mass or tenderness, no HSM  : Not tested  Lymph: No cervical, supraclavicular, inguinal or epitrochlear nodes  MS: The TMJ, neck, shoulder, elbow, wrist, MCP/PIP/DIP, spine, hip, knee, ankle, and foot MTP/IP joints were examined and found normal. No active synovitis or altered joint anatomy. Full joint ROM. Normal  strength. No dactylitis,  tenosynovitis, enthesopathy.  Skin: No nail pitting, alopecia, rash, nodules or lesions  Neuro: Normal cranial nerves, strength, sensation, DTRs.   Psych: Normal judgement, orientation, memory, affect.       Laboratory:   Component      Latest Ref Rng & Units 1/28/2019 2/21/2019   WBC      4.0 - 11.0 10e9/L 4.2    RBC Count      3.8 - 5.2 10e12/L 4.00    Hemoglobin      11.7 - 15.7 g/dL 11.9    Hematocrit      35.0 - 47.0 % 38.8    MCV      78 - 100 fl 97    MCH      26.5 - 33.0 pg 29.8    MCHC      31.5 - 36.5 g/dL 30.7 (L)    RDW      10.0 - 15.0 % 12.7    Platelet Count      150 - 450 10e9/L 164    Diff Method       Automated Method    % Neutrophils      % 44.3    % Lymphocytes      % 40.8    % Monocytes      % 11.1    % Eosinophils      % 3.1    % Basophils      % 0.5    % Immature Granulocytes      % 0.2    Nucleated RBCs      0 /100 0    Absolute Neutrophil      1.6 - 8.3 10e9/L 1.9    Absolute Lymphocytes      0.8 - 5.3 10e9/L 1.7    Absolute Monocytes      0.0 - 1.3 10e9/L 0.5    Absolute  Eosinophils      0.0 - 0.7 10e9/L 0.1    Absolute Basophils      0.0 - 0.2 10e9/L 0.0    Abs Immature Granulocytes      0 - 0.4 10e9/L 0.0    Absolute Nucleated RBC       0.0    Color Urine       Yellow Yellow   Appearance Urine       Clear Clear   Glucose Urine      NEG:Negative mg/dL Negative Negative   Bilirubin Urine      NEG:Negative Negative Negative   Ketones Urine      NEG:Negative mg/dL Negative Negative   Specific Gravity Urine      1.003 - 1.035 1.017 1.019   Blood Urine      NEG:Negative Moderate (A) Small (A)   pH Urine      5.0 - 7.0 pH 5.0 5.0   Protein Albumin Urine      NEG:Negative mg/dL Negative Negative   Urobilinogen mg/dL      0.0 - 2.0 mg/dL 0.0 0.0   Nitrite Urine      NEG:Negative Negative Negative   Leukocyte Esterase Urine      NEG:Negative Negative Negative   Source       Midstream Urine Midstream Urine   WBC Urine      0 - 5 /HPF <1 1   RBC Urine      0 - 2 /HPF 5 (H) 5 (H)   Mucous Urine      NEG:Negative /LPF Present (A) Present (A)   Hyaline Casts      0 - 2 /LPF  1   Creatinine      0.52 - 1.04 mg/dL 0.77    GFR Estimate      >60 mL/min/1.73:m2 86    GFR Estimate If Black      >60 mL/min/1.73:m2 >90    Sed Rate      0 - 30 mm/h 19    CRP Inflammation      0.0 - 8.0 mg/L 6.3    AST      0 - 45 U/L 15    ALT      0 - 50 U/L 16    Albumin      3.4 - 5.0 g/dL 4.0      Scribe Disclosure:  I, Sherry Woodson, am serving as a scribe to document services personally performed by Brennen Child MD at this visit, based upon the provider's statements to me. All documentation has been reviewed by the aforementioned provider prior to being entered into the official medical record.    JANET Child MD, PhD    Rheumatology

## 2019-05-09 DIAGNOSIS — K21.9 GASTROESOPHAGEAL REFLUX DISEASE, ESOPHAGITIS PRESENCE NOT SPECIFIED: Primary | ICD-10-CM

## 2019-05-09 DIAGNOSIS — R11.0 NAUSEA: ICD-10-CM

## 2019-05-09 DIAGNOSIS — K22.4 ESOPHAGEAL DYSMOTILITY: ICD-10-CM

## 2019-05-11 NOTE — TELEPHONE ENCOUNTER
ondansetron (ZOFRAN-ODT) 4 MG ODT tab  Last Written Prescription Date:  12/13/18  Last Fill Quantity: 20,   # refills: 0  Last Office Visit :4/25/19  Future Office visit:  None          omeprazole (PRILOSEC) 40 MG capsule Last Written Prescription Date:  2/6/15  Last Fill Quantity: 180,   # refills: 3    Routing refill request to provider for review/approval because:  Ondansetron not on protocol  Omeprazole med end date  3/25/15

## 2019-05-13 RX ORDER — ONDANSETRON 4 MG/1
TABLET, ORALLY DISINTEGRATING ORAL
Qty: 20 TABLET | Refills: 0 | Status: SHIPPED | OUTPATIENT
Start: 2019-05-13 | End: 2019-10-24

## 2019-05-13 RX ORDER — OMEPRAZOLE 40 MG/1
CAPSULE, DELAYED RELEASE ORAL
Qty: 180 CAPSULE | Refills: 3 | Status: SHIPPED | OUTPATIENT
Start: 2019-05-13 | End: 2020-05-12

## 2019-06-05 DIAGNOSIS — M34.9 SYSTEMIC SCLEROSIS (H): ICD-10-CM

## 2019-06-06 RX ORDER — MYCOPHENOLATE MOFETIL 500 MG/1
TABLET ORAL
Qty: 270 TABLET | Refills: 0 | Status: SHIPPED | OUTPATIENT
Start: 2019-06-06 | End: 2019-08-29

## 2019-06-06 NOTE — TELEPHONE ENCOUNTER
mycophenolate  Last Written Prescription Date:  3/7/19  Last Fill Quantity: 90 # refills: 2  Last Office Visit: 4/25/19  Future Office visit:  No future appt    CBC RESULTS:   Recent Labs   Lab Test 04/25/19 1838   WBC 6.8   RBC 3.82   HGB 11.9   HCT 37.5   MCV 98   MCH 31.2   MCHC 31.7   RDW 12.2          Creatinine   Date Value Ref Range Status   04/25/2019 0.77 0.52 - 1.04 mg/dL Final   ]    Liver Function Studies -   Recent Labs   Lab Test 04/25/19  1838  11/24/15  1848   PROTTOTAL  --   --  7.8   ALBUMIN 4.3   < > 4.1   BILITOTAL  --   --  0.4   ALKPHOS  --   --  49   AST 17   < > 12   ALT 18   < > 15    < > = values in this interval not displayed.       Routing refill request to provider for review/approval because:  Drug not on the [facility], [facility] or [facility] refill protocol

## 2019-07-03 ENCOUNTER — HOSPITAL ENCOUNTER (OUTPATIENT)
Dept: MAMMOGRAPHY | Facility: CLINIC | Age: 54
Discharge: HOME | End: 2019-07-03
Attending: FAMILY MEDICINE | Admitting: FAMILY MEDICINE
Payer: MEDICARE

## 2019-07-03 ENCOUNTER — TELEPHONE (OUTPATIENT)
Dept: RHEUMATOLOGY | Facility: CLINIC | Age: 54
End: 2019-07-03

## 2019-07-03 DIAGNOSIS — N64.4 MASTODYNIA OF RIGHT BREAST: ICD-10-CM

## 2019-07-03 DIAGNOSIS — Z87.891 SMOKING HISTORY: Primary | ICD-10-CM

## 2019-07-03 PROCEDURE — G0279 TOMOSYNTHESIS, MAMMO: HCPCS

## 2019-07-03 NOTE — TELEPHONE ENCOUNTER
Health Call Center    Phone Message    May a detailed message be left on voicemail: yes    Reason for Call:  Other:Patient called stating that she had the following questions:  Does she need to make a pulmonary function test appointment? She wants to know if theres is a quit smoking plan at the ? She further states that she has been having blood in her urine and she had it checked at [facility], could Dr. Child look at her test result from [facility]. She states she is concerned with the bacteria in her urine, and the blood and the red blood cell count and she also states she has pain in her back. She would like a call back from someone on the providers team to answer these questions.    Action Taken: Message routed to:  [facility] ([facility] rheum

## 2019-07-07 NOTE — TELEPHONE ENCOUNTER
1. Yes, she should have complete PFT with [facility] @ next visit.  2. I am sure there is some quit smoking plan here; please investigate and refer her.   3. Her UA is not very impressive, potentially c/w skin contamination. REC; it be repeated as a clean catch with a reflexive culture. We can order if she wishes.

## 2019-07-11 DIAGNOSIS — M34.9 SYSTEMIC SCLEROSIS (H): Primary | ICD-10-CM

## 2019-07-22 DIAGNOSIS — M34.9 SYSTEMIC SCLEROSIS (H): ICD-10-CM

## 2019-07-23 LAB
DLCOUNC-%PRED-PRE: 84 %
DLCOUNC-PRE: 17.56 ML/MIN/MMHG
DLCOUNC-PRED: 20.9 ML/MIN/MMHG
ERV-%PRED-PRE: 121 %
ERV-PRE: 1.19 L
ERV-PRED: 0.98 L
EXPTIME-PRE: 7.14 SEC
FEF2575-%PRED-PRE: 62 %
FEF2575-PRE: 1.47 L/SEC
FEF2575-PRED: 2.35 L/SEC
FEFMAX-%PRED-PRE: 105 %
FEFMAX-PRE: 6.96 L/SEC
FEFMAX-PRED: 6.57 L/SEC
FEV1-%PRED-PRE: 91 %
FEV1-PRE: 2.27 L
FEV1FEV6-PRE: 73 %
FEV1FEV6-PRED: 81 %
FEV1FVC-PRE: 72 %
FEV1FVC-PRED: 81 %
FEV1SVC-PRE: 74 %
FEV1SVC-PRED: 72 %
FIFMAX-PRE: 5.83 L/SEC
FRCPLETH-%PRED-PRE: 105 %
FRCPLETH-PRE: 2.89 L
FRCPLETH-PRED: 2.73 L
FVC-%PRED-PRE: 102 %
FVC-PRE: 3.16 L
FVC-PRED: 3.08 L
IC-%PRED-PRE: 77 %
IC-PRE: 1.9 L
IC-PRED: 2.44 L
RVPLETH-%PRED-PRE: 91 %
RVPLETH-PRE: 1.7 L
RVPLETH-PRED: 1.86 L
TLCPLETH-%PRED-PRE: 95 %
TLCPLETH-PRE: 4.78 L
TLCPLETH-PRED: 5.02 L
VA-%PRED-PRE: 88 %
VA-PRE: 4.35 L
VC-%PRED-PRE: 90 %
VC-PRE: 3.08 L
VC-PRED: 3.42 L

## 2019-08-15 ENCOUNTER — TELEPHONE (OUTPATIENT)
Dept: GASTROENTEROLOGY | Facility: CLINIC | Age: 54
End: 2019-08-15

## 2019-08-15 ENCOUNTER — OFFICE VISIT (OUTPATIENT)
Dept: PULMONOLOGY | Facility: CLINIC | Age: 54
End: 2019-08-15
Attending: INTERNAL MEDICINE
Payer: MEDICARE

## 2019-08-15 VITALS
HEIGHT: 64 IN | SYSTOLIC BLOOD PRESSURE: 108 MMHG | BODY MASS INDEX: 22.73 KG/M2 | RESPIRATION RATE: 17 BRPM | DIASTOLIC BLOOD PRESSURE: 54 MMHG | HEART RATE: 57 BPM | OXYGEN SATURATION: 100 % | TEMPERATURE: 98 F

## 2019-08-15 DIAGNOSIS — M77.40 METATARSALGIA, UNSPECIFIED LATERALITY: ICD-10-CM

## 2019-08-15 DIAGNOSIS — M77.41 METATARSALGIA OF BOTH FEET: ICD-10-CM

## 2019-08-15 DIAGNOSIS — K22.4 ESOPHAGEAL DYSMOTILITY: ICD-10-CM

## 2019-08-15 DIAGNOSIS — M05.79 RHEUMATOID ARTHRITIS INVOLVING MULTIPLE SITES WITH POSITIVE RHEUMATOID FACTOR (H): ICD-10-CM

## 2019-08-15 DIAGNOSIS — R07.89 CHEST WALL PAIN: ICD-10-CM

## 2019-08-15 DIAGNOSIS — Z79.899 HIGH RISK MEDICATIONS (NOT ANTICOAGULANTS) LONG-TERM USE: ICD-10-CM

## 2019-08-15 DIAGNOSIS — M34.9 SYSTEMIC SCLEROSIS (H): ICD-10-CM

## 2019-08-15 DIAGNOSIS — M85.80 OSTEOPENIA, UNSPECIFIED LOCATION: ICD-10-CM

## 2019-08-15 DIAGNOSIS — I73.01 RAYNAUD'S DISEASE WITH GANGRENE (H): ICD-10-CM

## 2019-08-15 DIAGNOSIS — M77.42 METATARSALGIA OF BOTH FEET: ICD-10-CM

## 2019-08-15 DIAGNOSIS — R06.09 DOE (DYSPNEA ON EXERTION): ICD-10-CM

## 2019-08-15 DIAGNOSIS — M34.9 SYSTEMIC SCLEROSIS (H): Primary | ICD-10-CM

## 2019-08-15 DIAGNOSIS — K21.9 GASTROESOPHAGEAL REFLUX DISEASE, ESOPHAGITIS PRESENCE NOT SPECIFIED: ICD-10-CM

## 2019-08-15 DIAGNOSIS — L98.9 SKIN LESION OF LOWER EXTREMITY: ICD-10-CM

## 2019-08-15 LAB
ALBUMIN SERPL-MCNC: 4.3 G/DL (ref 3.4–5)
ALBUMIN UR-MCNC: NEGATIVE MG/DL
ALT SERPL W P-5'-P-CCNC: 18 U/L (ref 0–50)
APPEARANCE UR: CLEAR
AST SERPL W P-5'-P-CCNC: 15 U/L (ref 0–45)
BASOPHILS # BLD AUTO: 0 10E9/L (ref 0–0.2)
BASOPHILS NFR BLD AUTO: 0.2 %
BILIRUB UR QL STRIP: NEGATIVE
COLOR UR AUTO: COLORLESS
CREAT SERPL-MCNC: 0.78 MG/DL (ref 0.52–1.04)
CRP SERPL-MCNC: <2.9 MG/L (ref 0–8)
DIFFERENTIAL METHOD BLD: ABNORMAL
EOSINOPHIL # BLD AUTO: 0.1 10E9/L (ref 0–0.7)
EOSINOPHIL NFR BLD AUTO: 1.3 %
ERYTHROCYTE [DISTWIDTH] IN BLOOD BY AUTOMATED COUNT: 13.2 % (ref 10–15)
ERYTHROCYTE [SEDIMENTATION RATE] IN BLOOD BY WESTERGREN METHOD: 16 MM/H (ref 0–30)
GFR SERPL CREATININE-BSD FRML MDRD: 85 ML/MIN/{1.73_M2}
GLUCOSE UR STRIP-MCNC: NEGATIVE MG/DL
HCT VFR BLD AUTO: 37.1 % (ref 35–47)
HGB BLD-MCNC: 11.4 G/DL (ref 11.7–15.7)
HGB UR QL STRIP: ABNORMAL
IMM GRANULOCYTES # BLD: 0 10E9/L (ref 0–0.4)
IMM GRANULOCYTES NFR BLD: 0.2 %
KETONES UR STRIP-MCNC: NEGATIVE MG/DL
LEUKOCYTE ESTERASE UR QL STRIP: NEGATIVE
LYMPHOCYTES # BLD AUTO: 2.1 10E9/L (ref 0.8–5.3)
LYMPHOCYTES NFR BLD AUTO: 40.5 %
MCH RBC QN AUTO: 30.7 PG (ref 26.5–33)
MCHC RBC AUTO-ENTMCNC: 30.7 G/DL (ref 31.5–36.5)
MCV RBC AUTO: 100 FL (ref 78–100)
MONOCYTES # BLD AUTO: 0.5 10E9/L (ref 0–1.3)
MONOCYTES NFR BLD AUTO: 9 %
NEUTROPHILS # BLD AUTO: 2.6 10E9/L (ref 1.6–8.3)
NEUTROPHILS NFR BLD AUTO: 48.8 %
NITRATE UR QL: NEGATIVE
NRBC # BLD AUTO: 0 10*3/UL
NRBC BLD AUTO-RTO: 0 /100
PH UR STRIP: 6 PH (ref 5–7)
PLATELET # BLD AUTO: 212 10E9/L (ref 150–450)
RBC # BLD AUTO: 3.71 10E12/L (ref 3.8–5.2)
RBC #/AREA URNS AUTO: 1 /HPF (ref 0–2)
SOURCE: ABNORMAL
SP GR UR STRIP: 1 (ref 1–1.03)
SQUAMOUS #/AREA URNS AUTO: <1 /HPF (ref 0–1)
UROBILINOGEN UR STRIP-MCNC: 0 MG/DL (ref 0–2)
WBC # BLD AUTO: 5.2 10E9/L (ref 4–11)
WBC #/AREA URNS AUTO: 1 /HPF (ref 0–5)

## 2019-08-15 PROCEDURE — 86140 C-REACTIVE PROTEIN: CPT | Performed by: INTERNAL MEDICINE

## 2019-08-15 PROCEDURE — 84460 ALANINE AMINO (ALT) (SGPT): CPT | Performed by: INTERNAL MEDICINE

## 2019-08-15 PROCEDURE — 82565 ASSAY OF CREATININE: CPT | Performed by: INTERNAL MEDICINE

## 2019-08-15 PROCEDURE — G0463 HOSPITAL OUTPT CLINIC VISIT: HCPCS | Mod: ZF

## 2019-08-15 PROCEDURE — 36415 COLL VENOUS BLD VENIPUNCTURE: CPT | Performed by: INTERNAL MEDICINE

## 2019-08-15 PROCEDURE — 84450 TRANSFERASE (AST) (SGOT): CPT | Performed by: INTERNAL MEDICINE

## 2019-08-15 PROCEDURE — 81001 URINALYSIS AUTO W/SCOPE: CPT | Performed by: INTERNAL MEDICINE

## 2019-08-15 PROCEDURE — 85652 RBC SED RATE AUTOMATED: CPT | Performed by: INTERNAL MEDICINE

## 2019-08-15 PROCEDURE — 82040 ASSAY OF SERUM ALBUMIN: CPT | Performed by: INTERNAL MEDICINE

## 2019-08-15 PROCEDURE — 85025 COMPLETE CBC W/AUTO DIFF WBC: CPT | Performed by: INTERNAL MEDICINE

## 2019-08-15 RX ORDER — OXYCODONE HYDROCHLORIDE 5 MG/1
5 TABLET ORAL EVERY 4 HOURS PRN
Qty: 204 TABLET | Refills: 0 | Status: SHIPPED | OUTPATIENT
Start: 2019-08-15 | End: 2019-09-19

## 2019-08-15 RX ORDER — OXYCODONE AND ACETAMINOPHEN 5; 325 MG/1; MG/1
1 TABLET ORAL EVERY 4 HOURS PRN
Qty: 102 TABLET | Refills: 0 | Status: SHIPPED | OUTPATIENT
Start: 2019-08-15 | End: 2020-10-08

## 2019-08-15 ASSESSMENT — PAIN SCALES - GENERAL: PAINLEVEL: MILD PAIN (3)

## 2019-08-15 NOTE — LETTER
8/15/2019       RE: Willard Grayson  [address]  [address]  [facility] [address]     Dear Colleague,    Thank you for referring your patient, Willard Grayson, to the [facility] FOR LUNG SCIENCE AND HEALTH at [facility]. Please see a copy of my visit note below.    [facility]  Rheumatology Clinic  Brennen Child MD  08/15/2019     Name: Willard Grayson  MRN: [id number]  Age: 54 year old  : 1965  Referring provider: Referred Self     Problem List:  1. 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   2. Associated marked neuropathic skin pain markedly improved with Lyrica with prior medication therapy with gabapentin.  3. Diffuse musculoskeletal pain requiring methotrexate plus CellCept, and narcotic to control, but also improved. Now off methotrexate due to GI issues.  4. Initial clinical overlap with anti-CCP seropositive rheumatoid arthritis with continued anti-CCP seropositivity as of 2010.  5. Raynaud's phenomena with digital ulcers with easily cracked skin.   6. GI involvement consistent with bacterial small bowel overgrowth with marked improvement after a course of neomycin followed by Dr. Glez; positive hydrogen breath test in 3/2012 - treated with rifaximin through Dr. Jacob  7. No convincing evidence of lung involvement on serial pulmonary function tests or clinically.   8. History of heart palpitations prior to the onset of SSc with stable symptoms since. Holter 2018 with PACs.  9. History of medication failures with Remicade, methotrexate, methotrexate plus CellCept and with Orencia and with improvement since initiation of IVIG but with complicating headaches and overall sense of malaise with IVIG infusions.   10.  History of silicone breast implants.   11. Incidental finding of calcified splenic artery aneurysm.  12. EGD in 3/2012 with hiatal hernia and gastral antral vascular ectasia  13. Recurrent right third finger contracture with extensor surface ulceration, healed   14. Trochanteric bursitis, R>L    Assessment and Plan:  # Systemic sclerosis   # Gastroesophageal reflux disease, esophagitis presence not specified  # Esophageal dysmotility  # Raynaud's disease with gangrene  # High risk medications (not anticoagulants) long-term use  # Rheumatoid arthritis involving multiple sites with positive rheumatoid factor (H)  # Metatarsalgia, unspecified laterality  # Metatarsalgia of both feet  # Osteopenia, unspecified location  # Systemic sclerosis  # MCDANIEL (dyspnea on exertion)  # Chest wall pain  # Skin lesion of lower extremity    Given the slow decline in PFTs, we will obtain HRCT, to ensure there is no ILD< which she has not had prior evidence of . This  will also give us information about if there is any structural lesion to account for longstanding right flank/rib pain. Also, because she is a smoker, this could find potential nodules. She was counseled on smoking cessation. If there is any evidence of interstitial fibrosis, we will repeat PFTs with 6 minute walk test in 3 months.     The patient is having worsening eye dryness. She currently has a prescription for Patanol which I can refill should she find benefit from this. We did discuss the potential benefit of Restasis or regular wetting eye drops 3x daily, though the patient will be visiting an ophthalmologist for these concerns. She can also increase evoxac, currently taking only 1 tab/day.     The patient is continuing to have numbness and tingling in all extremities, which sounds consistent with pressure neuropathy. She is already on lyrica. The hand symptoms may be due to carpal tunnel syndrome. I advised she observe her symptoms vigilantly to determine if the 4th  and 5th fingers are involved.     The patient is complaining of worsening dysphagia, which may be due to stricturing, which she has had previously. Referral was provided to GI for EGD.     She has been taking Propranolol and Raynaud's has not gotten worse. She will pay attention in the Fall, if worse we will address at that time.     The patient is still having pain in the lateral aspects of both hips, c/w trochanteric bursitis. She states she has not been diligent with her stretches and I recommended she start stretching regularly.     The patient has concerns for painful scabs on her left upper leg and, for this, I provided her with a dermatology referral.     We will get labs today to monitor her blood work for ongoing immunosuppression.     Follow-up: 3 months.     This visit was 40 mins in duration, the majority spent in counseling.     JANET Child MD, PhD    Rheumatology      HPI:   Willard Grayson is a 54 year old female with a history of diffuse cutaneous systemic sclerosis who presents for follow up. The patient was last evaluated on 04/25/2018 with increased musculoskeletal symptoms and increased fatigue indicative of increased inflammation. She was started on Plaquenil.     Today, the patient voices concern for worsening eye dryness. She states she has to wear sunglasses all the time, she cannot blink after waking up in the morning and has to feel around for her eye drops. She states this does somewhat improve as the day progresses. The patient also states her mouth dryness in the setting of Sjogren's disease has been worse lately. She has been taking Evoxac before bed.     She reports difficulty swallowing food and liquids. She describes, when swallowing water, she has to cough this up and reattempts to swallow it. She notes this is happening more frequently and, sometimes when she has had esophageal dilation, these symptoms seemed improved.     She reports two painful, growing, scabs  in the upper distal quadriceps area for which she would like to see a dermatologist. She also explains she has been having two painful sites on both sides of her neck that feel swollen.     The patient reports a burning pain on the entire plantar surfaces of both feet that does wax and wane. She is also having numbness and tingling in her extremities. She explains her lower legs go numb if she crosses them when sitting in chairs. She is also having numbness in both hands and fingers. She notes, when holding her phone up to read it, her hands go numb which will sometimes cause her to drop her phone.     She states her right fourth finger has been red and painful for around two months. She notes this does resolve but, since she has hit it on other objects about four times, it has not completely resolved.     The patient states she has been feeling extremely fatigued. She feels faint after going up stairs and states anxiety may be contributing to some of this. She also endorses dyspnea with exertion along with a right-sided rib/flank pain.     She is having lateral hip pain in both hips but states she has not been diligent with her at-home stretches.    She notes she has had UTIs in the past without elevated leukocyte esterase values and, as she is having mild burning dysuria presently, she would like a urine culture ran.     Review of Systems:   Pertinent items are noted in HPI or as below, remainder of complete ROS is negative.      No wheezing  No palpitations  No heart burn, indigestion, abdominal pain, nausea, vomiting, diarrhea  No urination problems, no bloody, cloudy urine  No headaches or confusion  No easy bleeding or bruising.     Active Medications:     Current Outpatient Medications:      aspirin-dipyridamole (AGGRENOX)  MG per 12 hr capsule, Take 1 capsule by mouth daily , Disp: , Rfl:      Blood Pressure Monitoring KIT, Check BP every 7 days., Disp: 1 kit, Rfl: 0     cevimeline (EVOXAC) 30 MG  capsule, Take 1 capsule (30 mg) by mouth 3 times daily as needed, Disp: 270 capsule, Rfl: 3     cyanocobalamin (VITAMIN B12) 1000 MCG/ML injection, Inject 1 mL into the muscle every 30 days, Disp: , Rfl:      esomeprazole (NEXIUM) 40 MG capsule, Take 1 capsule (40 mg) by mouth 2 times daily Take 30-60 minutes before eating., Disp: 180 capsule, Rfl: 3     folic acid (FOLVITE) 1 MG tablet, Take 1 tablet (1 mg) by mouth daily, Disp: 90 tablet, Rfl: 3     LORazepam (ATIVAN) 0.5 MG tablet, Take 1 - 2 tablets by mouth three times daily as needed., Disp: 60 tablet, Rfl: 0     mycophenolate (GENERIC EQUIVALENT) 500 MG tablet, TAKE THREE TABLETS BY MOUTH DAILY , Disp: 270 tablet, Rfl: 0     olopatadine (PATANOL) 0.1 % ophthalmic solution, Place 1 drop into both eyes 2 times daily, Disp: 5 mL, Rfl: 3     omeprazole (PRILOSEC) 40 MG DR capsule, TAKE 1 CAPSULE (40 MG) BY MOUTH 2 TIMES DAILY, Disp: 180 capsule, Rfl: 3     ondansetron (ZOFRAN-ODT) 4 MG ODT tab, DISSOLVE ONE TABLET IN MOUTH EVERY EIGHT HOURS AS NEEDED FOR NAUSEA, Disp: 20 tablet, Rfl: 0     oxyCODONE (ROXICODONE) 5 MG tablet, Take 1 tablet (5 mg) by mouth every 4 hours as needed for moderate to severe pain or pain maximum 6 tablet(s) per day, Disp: 204 tablet, Rfl: 0     oxyCODONE (ROXICODONE) 5 MG tablet, Take 1 tablet (5 mg) by mouth every 4 hours as needed for moderate to severe pain or pain maximum 6 tablet(s) per day, Disp: 204 tablet, Rfl: 0     oxyCODONE-acetaminophen (PERCOCET) 5-325 MG per tablet, Take 1 tablet by mouth every 4 hours as needed for pain or moderate to severe pain (maximum 3 tablets per day), Disp: 102 tablet, Rfl: 0     oxyCODONE-acetaminophen (PERCOCET) 5-325 MG tablet, Take 1 tablet by mouth every 4 hours as needed for moderate to severe pain (maximum 3 tablets per day), Disp: 102 tablet, Rfl: 0     oxyCODONE-acetaminophen (PERCOCET) 5-325 MG tablet, Take 1 tablet by mouth every 4 hours as needed for pain or moderate to severe pain  (maximum 3 tablets per day), Disp: 102 tablet, Rfl: 0     pravastatin (PRAVACHOL) 10 MG tablet, Take 1 tablet (10 mg) by mouth daily, Disp: 30 tablet, Rfl: 5     pregabalin (LYRICA) 75 MG capsule, Take 1 capsule (75 mg) by mouth 2 times daily, Disp: 180 capsule, Rfl: 1     valACYclovir (VALTREX) 500 MG tablet, Take 1 tablet (500 mg) by mouth 2 times daily (Patient taking differently: Take 500 mg by mouth daily ), Disp: 180 tablet, Rfl: 1     Vitamin D, Cholecalciferol, 1000 UNITS TABS, Take 2,000 Units by mouth daily , Disp: , Rfl:      hydroxychloroquine (PLAQUENIL) 200 MG tablet, Take 1 tablet (200 mg) by mouth 2 times daily Annual Plaquenil toxicity eye screening required. (Patient not taking: Reported on 8/15/2019), Disp: 180 tablet, Rfl: 3     oxyCODONE (ROXICODONE) 5 MG tablet, Take 1 tablet (5 mg) by mouth every 4 hours as needed for moderate to severe pain or pain maximum 6 tablet(s) per day (Patient not taking: Reported on 8/15/2019), Disp: 204 tablet, Rfl: 0     oxyCODONE (ROXICODONE) 5 MG tablet, Take 1 tablet (5 mg) by mouth every 4 hours as needed for moderate to severe pain or pain maximum 6 tablet(s) per day (Patient not taking: Reported on 8/15/2019), Disp: 204 tablet, Rfl: 0     oxyCODONE IR (ROXICODONE) 5 MG tablet, Take 1 tablet (5 mg) by mouth every 4 hours as needed for moderate to severe pain (maximum 6 tablets per day) (Patient not taking: Reported on 8/15/2019), Disp: 204 tablet, Rfl: 0     oxyCODONE-acetaminophen (PERCOCET) 5-325 MG tablet, Take 1 tablet by mouth every 4 hours as needed for moderate to severe pain (maximum 3 tablets per day) (Patient not taking: Reported on 8/15/2019), Disp: 102 tablet, Rfl: 0     oxyCODONE-acetaminophen (PERCOCET) 5-325 MG tablet, Take 1 tablet by mouth every 4 hours as needed for moderate to severe pain (maximum 3 tablets per day) (Patient not taking: Reported on 8/15/2019), Disp: 102 tablet, Rfl: 0     Allergies:   Penicillin   Bactroban  "  Levaquin  Rifampin   Clarithromycin   Ofloxacin  Sertraline         Past Medical History:  Rheumatoid arthritis/scleroderma  Gastric antral vascular ectasia  Gastroesophageal reflux disease  Iron deficiency anemia  Irregular heart beat  Osteomyelitis, right 3rd finger  Raynaud's syndrome with gangrene  Scleroderma  Sjogren's syndrome  Systemic sclerosis  Metatarsalgia  Dysuria  Ulcer of finger  Sicca syndrome  Palpitations  Ischemia of upper extremity  Intercostal pain  Anxiety  Breast implant rupture  Palpitations     Past Surgical History:  Appendectomy   Colonoscopy (08/04/2016)  EGD x 5 (08/22/14 - 08/02/2016)  Mammoplasty Augmentation Bilateral    Family History:    The patient's family history includes Cancer in an other family member; Respiratory in her father.    Social History:  The patient reports that she is a current cigarette smoker. She has been smoking about 1.00 pack per day. She has never used smokeless tobacco. She reports that she does not drink alcohol or use drugs. She states she is attempting to get into a research program for quitting smoking.   PCP: Lindy Carbone  Marital Status: Single    Physical Exam:   /54   Pulse 57   Temp 98  F (36.7  C) (Oral)   Resp 17   Ht 1.626 m (5' 4\")   SpO2 100%   BMI 22.73 kg/m      Wt Readings from Last 4 Encounters:   01/28/19 60.1 kg (132 lb 6.4 oz)   10/04/18 58.1 kg (128 lb)   07/19/18 58.1 kg (128 lb)   04/10/18 56.2 kg (123 lb 12.8 oz)     Constitutional: Well-developed, appearing stated age; cooperative  Eyes: Normal EOM, PERRLA, vision, conjunctiva, sclera  ENT: Normal external ears, nose, hearing, lips, teeth, gums, throat. No mucous membrane lesions, normal saliva pool  Neck: No mass or thyroid enlargement  Resp: Lungs clear to auscultation, nl to palpation  CV: RRR, no murmurs, rubs or gallops, no edema  GI: No ABD mass or tenderness, no HSM  : Not tested  Lymph: No cervical, supraclavicular, inguinal or epitrochlear " nodes  MS: The TMJ, neck, shoulder, elbow, spine, knee, ankle, and foot MTP/IP joints were examined and found normal. No active synovitis or altered joint anatomy. Full joint ROM other than below. Normal  strength. No dactylitis, tenosynovitis, enthesopathy. Slight restriction in wrist range of motion but not significant. Proximally 15   contracture in PIPs and 5-10   in DIPs of the left hand. Right hand greater than 90   contractures in PIPs 3-5. Obvious calcinosis lesion over the lateral aspect of the right 4th PIP. Tenderness to palpation right > left lateral hip over greater trochanter.    Skin: No nail pitting, alopecia, rash, nodules or lesions. Skin thickening in a number of places, though stable. Right fourth digit has a 1 mm ulcer over the lateral aspect of the PIP which is in about 110 degrees of flexion contracture and she is tender over this site and slightly red.   Neuro: Normal cranial nerves, sensation, DTRs. Strength 5/5 in bilateral LEs.  Psych: Normal judgement, orientation, memory, affect.     PFT Results (07/22/2019):  The FVC and FEV1 and the FEV1/FVC ratio are within normal limits. The inspiratory flow rates are within normal limits. Lung volumes are within normal limits. The diffusing capacity is normal. However, the diffusing capacity was not corrected for the   patient's hemoglobin.  IMPRESSION:  Normal spirometry, lung volumes and diffusing capacity.    Laboratory:   RHEUM RESULTS Latest Ref Rng & Units 1/28/2019 4/25/2019 8/15/2019   COMPLEMENT C3 76 - 169 mg/dL - - -   COMPLEMENT C4 15 - 50 mg/dL - - -   DNA-DS 0 - 29 IU/mL - - -   SED RATE 0 - 30 mm/h 19 15 16   CRP, INFLAMMATION 0.0 - 8.0 mg/L 6.3 <2.9 <2.9   CYCLIC CIT PEPT IGG <5 U/mL - - -   CK TOTAL 30 - 225 U/L - - -   RHEUMATOID FACTOR 0 - 14 IU/mL - - -   STACY SCREEN BY EIA 0 - 1.0 - - -   AST 0 - 45 U/L 15 17 15   ALT 0 - 50 U/L 16 18 18   ALBUMIN 3.4 - 5.0 g/dL 4.0 4.3 4.3   WBC 4.0 - 11.0 10e9/L 4.2 6.8 5.2   RBC 3.8 - 5.2  10e12/L 4.00 3.82 3.71(L)   HGB 11.7 - 15.7 g/dL 11.9 11.9 11.4(L)   HCT 35.0 - 47.0 % 38.8 37.5 37.1   MCV 78 - 100 fl 97 98 100   MCHC 31.5 - 36.5 g/dL 30.7(L) 31.7 30.7(L)   RDW 10.0 - 15.0 % 12.7 12.2 13.2    - 450 10e9/L 164 208 212   CREATININE 0.52 - 1.04 mg/dL 0.77 0.77 0.78   GFR ESTIMATE, IF BLACK >60 mL/min/[1.73:m2] >90 >90 >90   GFR ESTIMATE >60 mL/min/[1.73:m2] 86 86 85   IGA 70 - 380 mg/dL - - -     Rheumatoid Factor   Date Value Ref Range Status   06/26/2009 13 0 - 14 IU/mL Final     Cyclic Citrullinated Peptide IgG   Date Value Ref Range Status   09/27/2010 43 (H) <5 U/mL Final     Comment:     Interpretation:  Positive     Ribonucleic Protein IgG Antibody   Date Value Ref Range Status   06/26/2009 3  Final     Comment:     Reference range: 0 to 40  Unit: AU/mL  (Note)  REFERENCE INTERVAL: Ribonucleic Protein (DANIEL), IgG   29 AU/mL or Less ............. Negative   30 - 40 AU/mL ................ Equivocal   41 AU/mL or Greater .......... Positive    RNP antibody is seen in % of mixed connective tissue  disease and is considered specific for this syndrome if  other antibodies are negative. RNP is also present in  20-30% of systemic lupus erythematosus (SLE) and 15-25%  of progressive systemic sclerosis (PSS).  Performed by [facility],  [address] [place] [id number] [phone]  [email or web address], Geovanni Estevez MD - Lab. Director     Urbano Antibody IgG   Date Value Ref Range Status   06/26/2009 2  Final     Comment:     Reference range: 0 to 40  Unit: AU/mL  (Note)  REFERENCE INTERVALS: SMITH (DANIEL) Ab, IgG   29 AU/mL or Less ............. Negative   30 - 40 AU/mL ................ Equivocal   41 AU/mL or Greater .......... Positive    Urbano antibody is very specific for systemic lupus  erythematosus (SLE) but only occurs in 30-35% of SLE  cases. The presence of antibodies to Urbano is often  associated with renal disease.  Performed by [facility],  [address] [place] [address], [facility] [place]  [address] [phone]  [email or web address], Geovanni Estevez MD - Lab. Director     SSA (RO) Antibody IgG   Date Value Ref Range Status   06/26/2009 106 (H)  Final     Comment:     Reference range: 0 to 40  Unit: AU/mL  (Note)  REFERENCE INTERVALS: SSA (Ro) (DANIEL) Ab, IgG   29 AU/mL or Less ............. Negative   30 - 40 AU/mL ................ Equivocal   41 AU/mL or Greater .......... Positive    SSA (Ro) antibody is seen in 70-75% of Sjogren syndrome  cases, 30-40% of systemic lupus erythematosus (SLE) and  5-10% of progressive systemic sclerosis (PSS).  Performed by [facility],  [address], [place] [id number] [phone]  [email or web address], Geovanni Estevez MD - Lab. Director     SSB (LA) Antibody IgG   Date Value Ref Range Status   06/26/2009 0  Final     Comment:     Reference range: 0 to 40  Unit: AU/mL  (Note)  REFERENCE INTERVALS: SSB (La) (DANIEL) Ab, IgG   29 AU/mL or Less ............. Negative   30 - 40 AU/mL ................ Equivocal   41 AU/mL or Greater .......... Positive    SSB (La) antibody is seen in 50-60% of Sjogren syndrome  cases and is specific if it is the only DANIEL antibody  present. 15-25% of patients with systemic lupus  erythematosus (SLE) and 5-10% of patients with progressive  systemic sclerosis (PSS) also have this antibody.  Performed by [facility],  [address] [facility],[place] [id number] [phone]  [email or web address], Gevoanni Estevez MD - Lab. Director     Scleroderma Antibody IgG   Date Value Ref Range Status   06/26/2009 5  Final     Comment:     Reference range: 0 to 40  Unit: AU/mL  (Note)  REFERENCE INTERVALS: Scleroderma (Scl-70) (DANIEL) Ab, IgG   29 AU/mL or Less ............. Negative   30 - 40 AU/mL ................ Equivocal   41 AU/mL or Greater .......... Positive    Scleroderma (Scl-70) antibody is seen in 20-60% of  patients with scleroderma and is considered diagnostic  and specific for scleroderma if it is the only DANIEL  antibody present. Scl-70 is also seen in  approximately  25% of progressive systemic sclerosis (PSS).  Performed by [facility],  [address] [facility] [place] [address] [phone]  www.[employer], Geovanni Estevez MD - Lab. Director     STACY Screen by EIA   Date Value Ref Range Status   06/26/2009 8.5 (H) 0 - 1.0 Final     Comment:     Interpretation:  Positive     DNA-ds   Date Value Ref Range Status   07/07/2011 36 (H) 0 - 29 IU/mL Final     Comment:     Borderline     IGA   Date Value Ref Range Status   09/08/2014 248 70 - 380 mg/dL Final     Scribe Disclosure:  I, Ilan Doty, am serving as a scribe to document services personally performed by Brennen Child MD at this visit, based upon the provider's statements to me. All documentation has been reviewed by the aforementioned provider prior to being entered into the official medical record.          Again, thank you for allowing me to participate in the care of your patient.      Sincerely,    Brennen Child MD

## 2019-08-15 NOTE — NURSING NOTE
Chief Complaint   Patient presents with     RECHECK     Scleroderma     Medications reviewed and vital signs taken.   Claribel Clarke CMA

## 2019-08-15 NOTE — PROGRESS NOTES
[facility]  Rheumatology Clinic  Brennen Child MD  08/15/2019     Name: Willard Grayson  MRN: [id number]  Age: 54 year old  : 1965  Referring provider: Referred Self     Problem List:  1. 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   2. Associated marked neuropathic skin pain markedly improved with Lyrica with prior medication therapy with gabapentin.  3. Diffuse musculoskeletal pain requiring methotrexate plus CellCept, and narcotic to control, but also improved. Now off methotrexate due to GI issues.  4. Initial clinical overlap with anti-CCP seropositive rheumatoid arthritis with continued anti-CCP seropositivity as of 2010.  5. Raynaud's phenomena with digital ulcers with easily cracked skin.   6. GI involvement consistent with bacterial small bowel overgrowth with marked improvement after a course of neomycin followed by Dr. Glez; positive hydrogen breath test in 3/2012 - treated with rifaximin through Dr. Jacob  7. No convincing evidence of lung involvement on serial pulmonary function tests or clinically.   8. History of heart palpitations prior to the onset of SSc with stable symptoms since. Holter 2018 with PACs.  9. History of medication failures with Remicade, methotrexate, methotrexate plus CellCept and with Orencia and with improvement since initiation of IVIG but with complicating headaches and overall sense of malaise with IVIG infusions.   10. History of silicone breast implants.   11. Incidental finding of calcified splenic artery aneurysm.  12. EGD in 3/2012 with hiatal hernia and gastral antral vascular ectasia  13. Recurrent right third finger contracture with extensor surface ulceration, healed   14. Trochanteric bursitis, R>L    Assessment and Plan:  #  Systemic sclerosis   # Gastroesophageal reflux disease, esophagitis presence not specified  # Esophageal dysmotility  # Raynaud's disease with gangrene  # High risk medications (not anticoagulants) long-term use  # Rheumatoid arthritis involving multiple sites with positive rheumatoid factor (H)  # Metatarsalgia, unspecified laterality  # Metatarsalgia of both feet  # Osteopenia, unspecified location  # Systemic sclerosis  # MCDANIEL (dyspnea on exertion)  # Chest wall pain  # Skin lesion of lower extremity    Given the slow decline in PFTs, we will obtain HRCT, to ensure there is no ILD< which she has not had prior evidence of . This  will also give us information about if there is any structural lesion to account for longstanding right flank/rib pain. Also, because she is a smoker, this could find potential nodules. She was counseled on smoking cessation. If there is any evidence of interstitial fibrosis, we will repeat PFTs with 6 minute walk test in 3 months.     The patient is having worsening eye dryness. She currently has a prescription for Patanol which I can refill should she find benefit from this. We did discuss the potential benefit of Restasis or regular wetting eye drops 3x daily, though the patient will be visiting an ophthalmologist for these concerns. She can also increase evoxac, currently taking only 1 tab/day.     The patient is continuing to have numbness and tingling in all extremities, which sounds consistent with pressure neuropathy. She is already on lyrica. The hand symptoms may be due to carpal tunnel syndrome. I advised she observe her symptoms vigilantly to determine if the 4th and 5th fingers are involved.     The patient is complaining of worsening dysphagia, which may be due to stricturing, which she has had previously. Referral was provided to GI for EGD.     She has been taking Propranolol and Raynaud's has not gotten worse. She will pay attention in the Fall, if worse we will address at  that time.     The patient is still having pain in the lateral aspects of both hips, c/w trochanteric bursitis. She states she has not been diligent with her stretches and I recommended she start stretching regularly.     The patient has concerns for painful scabs on her left upper leg and, for this, I provided her with a dermatology referral.     We will get labs today to monitor her blood work for ongoing immunosuppression.     Follow-up: 3 months.     This visit was 40 mins in duration, the majority spent in counseling.     JANET Child MD, PhD    Rheumatology      HPI:   Willard Grayson is a 54 year old female with a history of diffuse cutaneous systemic sclerosis who presents for follow up. The patient was last evaluated on 04/25/2018 with increased musculoskeletal symptoms and increased fatigue indicative of increased inflammation. She was started on Plaquenil.     Today, the patient voices concern for worsening eye dryness. She states she has to wear sunglasses all the time, she cannot blink after waking up in the morning and has to feel around for her eye drops. She states this does somewhat improve as the day progresses. The patient also states her mouth dryness in the setting of Sjogren's disease has been worse lately. She has been taking Evoxac before bed.     She reports difficulty swallowing food and liquids. She describes, when swallowing water, she has to cough this up and reattempts to swallow it. She notes this is happening more frequently and, sometimes when she has had esophageal dilation, these symptoms seemed improved.     She reports two painful, growing, scabs in the upper distal quadriceps area for which she would like to see a dermatologist. She also explains she has been having two painful sites on both sides of her neck that feel swollen.     The patient reports a burning pain on the entire plantar surfaces of both feet that does wax and wane. She is also having numbness  and tingling in her extremities. She explains her lower legs go numb if she crosses them when sitting in chairs. She is also having numbness in both hands and fingers. She notes, when holding her phone up to read it, her hands go numb which will sometimes cause her to drop her phone.     She states her right fourth finger has been red and painful for around two months. She notes this does resolve but, since she has hit it on other objects about four times, it has not completely resolved.     The patient states she has been feeling extremely fatigued. She feels faint after going up stairs and states anxiety may be contributing to some of this. She also endorses dyspnea with exertion along with a right-sided rib/flank pain.     She is having lateral hip pain in both hips but states she has not been diligent with her at-home stretches.    She notes she has had UTIs in the past without elevated leukocyte esterase values and, as she is having mild burning dysuria presently, she would like a urine culture ran.     Review of Systems:   Pertinent items are noted in HPI or as below, remainder of complete ROS is negative.      No wheezing  No palpitations  No heart burn, indigestion, abdominal pain, nausea, vomiting, diarrhea  No urination problems, no bloody, cloudy urine  No headaches or confusion  No easy bleeding or bruising.     Active Medications:     Current Outpatient Medications:      aspirin-dipyridamole (AGGRENOX)  MG per 12 hr capsule, Take 1 capsule by mouth daily , Disp: , Rfl:      Blood Pressure Monitoring KIT, Check BP every 7 days., Disp: 1 kit, Rfl: 0     cevimeline (EVOXAC) 30 MG capsule, Take 1 capsule (30 mg) by mouth 3 times daily as needed, Disp: 270 capsule, Rfl: 3     cyanocobalamin (VITAMIN B12) 1000 MCG/ML injection, Inject 1 mL into the muscle every 30 days, Disp: , Rfl:      esomeprazole (NEXIUM) 40 MG capsule, Take 1 capsule (40 mg) by mouth 2 times daily Take 30-60 minutes before eating.,  Disp: 180 capsule, Rfl: 3     folic acid (FOLVITE) 1 MG tablet, Take 1 tablet (1 mg) by mouth daily, Disp: 90 tablet, Rfl: 3     LORazepam (ATIVAN) 0.5 MG tablet, Take 1 - 2 tablets by mouth three times daily as needed., Disp: 60 tablet, Rfl: 0     mycophenolate (GENERIC EQUIVALENT) 500 MG tablet, TAKE THREE TABLETS BY MOUTH DAILY , Disp: 270 tablet, Rfl: 0     olopatadine (PATANOL) 0.1 % ophthalmic solution, Place 1 drop into both eyes 2 times daily, Disp: 5 mL, Rfl: 3     omeprazole (PRILOSEC) 40 MG DR capsule, TAKE 1 CAPSULE (40 MG) BY MOUTH 2 TIMES DAILY, Disp: 180 capsule, Rfl: 3     ondansetron (ZOFRAN-ODT) 4 MG ODT tab, DISSOLVE ONE TABLET IN MOUTH EVERY EIGHT HOURS AS NEEDED FOR NAUSEA, Disp: 20 tablet, Rfl: 0     oxyCODONE (ROXICODONE) 5 MG tablet, Take 1 tablet (5 mg) by mouth every 4 hours as needed for moderate to severe pain or pain maximum 6 tablet(s) per day, Disp: 204 tablet, Rfl: 0     oxyCODONE (ROXICODONE) 5 MG tablet, Take 1 tablet (5 mg) by mouth every 4 hours as needed for moderate to severe pain or pain maximum 6 tablet(s) per day, Disp: 204 tablet, Rfl: 0     oxyCODONE-acetaminophen (PERCOCET) 5-325 MG per tablet, Take 1 tablet by mouth every 4 hours as needed for pain or moderate to severe pain (maximum 3 tablets per day), Disp: 102 tablet, Rfl: 0     oxyCODONE-acetaminophen (PERCOCET) 5-325 MG tablet, Take 1 tablet by mouth every 4 hours as needed for moderate to severe pain (maximum 3 tablets per day), Disp: 102 tablet, Rfl: 0     oxyCODONE-acetaminophen (PERCOCET) 5-325 MG tablet, Take 1 tablet by mouth every 4 hours as needed for pain or moderate to severe pain (maximum 3 tablets per day), Disp: 102 tablet, Rfl: 0     pravastatin (PRAVACHOL) 10 MG tablet, Take 1 tablet (10 mg) by mouth daily, Disp: 30 tablet, Rfl: 5     pregabalin (LYRICA) 75 MG capsule, Take 1 capsule (75 mg) by mouth 2 times daily, Disp: 180 capsule, Rfl: 1     valACYclovir (VALTREX) 500 MG tablet, Take 1 tablet (500  mg) by mouth 2 times daily (Patient taking differently: Take 500 mg by mouth daily ), Disp: 180 tablet, Rfl: 1     Vitamin D, Cholecalciferol, 1000 UNITS TABS, Take 2,000 Units by mouth daily , Disp: , Rfl:      hydroxychloroquine (PLAQUENIL) 200 MG tablet, Take 1 tablet (200 mg) by mouth 2 times daily Annual Plaquenil toxicity eye screening required. (Patient not taking: Reported on 8/15/2019), Disp: 180 tablet, Rfl: 3     oxyCODONE (ROXICODONE) 5 MG tablet, Take 1 tablet (5 mg) by mouth every 4 hours as needed for moderate to severe pain or pain maximum 6 tablet(s) per day (Patient not taking: Reported on 8/15/2019), Disp: 204 tablet, Rfl: 0     oxyCODONE (ROXICODONE) 5 MG tablet, Take 1 tablet (5 mg) by mouth every 4 hours as needed for moderate to severe pain or pain maximum 6 tablet(s) per day (Patient not taking: Reported on 8/15/2019), Disp: 204 tablet, Rfl: 0     oxyCODONE IR (ROXICODONE) 5 MG tablet, Take 1 tablet (5 mg) by mouth every 4 hours as needed for moderate to severe pain (maximum 6 tablets per day) (Patient not taking: Reported on 8/15/2019), Disp: 204 tablet, Rfl: 0     oxyCODONE-acetaminophen (PERCOCET) 5-325 MG tablet, Take 1 tablet by mouth every 4 hours as needed for moderate to severe pain (maximum 3 tablets per day) (Patient not taking: Reported on 8/15/2019), Disp: 102 tablet, Rfl: 0     oxyCODONE-acetaminophen (PERCOCET) 5-325 MG tablet, Take 1 tablet by mouth every 4 hours as needed for moderate to severe pain (maximum 3 tablets per day) (Patient not taking: Reported on 8/15/2019), Disp: 102 tablet, Rfl: 0     Allergies:   Penicillin   Bactroban   Levaquin  Rifampin   Clarithromycin   Ofloxacin  Sertraline         Past Medical History:  Rheumatoid arthritis/scleroderma  Gastric antral vascular ectasia  Gastroesophageal reflux disease  Iron deficiency anemia  Irregular heart beat  Osteomyelitis, right 3rd finger  Raynaud's syndrome with gangrene  Scleroderma  Sjogren's syndrome  Systemic  "sclerosis  Metatarsalgia  Dysuria  Ulcer of finger  Sicca syndrome  Palpitations  Ischemia of upper extremity  Intercostal pain  Anxiety  Breast implant rupture  Palpitations     Past Surgical History:  Appendectomy   Colonoscopy (08/04/2016)  EGD x 5 (08/22/14 - 08/02/2016)  Mammoplasty Augmentation Bilateral    Family History:    The patient's family history includes Cancer in an other family member; Respiratory in her father.    Social History:  The patient reports that she is a current cigarette smoker. She has been smoking about 1.00 pack per day. She has never used smokeless tobacco. She reports that she does not drink alcohol or use drugs. She states she is attempting to get into a research program for quitting smoking.   PCP: Lindy Carbone  Marital Status: Single    Physical Exam:   /54   Pulse 57   Temp 98  F (36.7  C) (Oral)   Resp 17   Ht 1.626 m (5' 4\")   SpO2 100%   BMI 22.73 kg/m     Wt Readings from Last 4 Encounters:   01/28/19 60.1 kg (132 lb 6.4 oz)   10/04/18 58.1 kg (128 lb)   07/19/18 58.1 kg (128 lb)   04/10/18 56.2 kg (123 lb 12.8 oz)     Constitutional: Well-developed, appearing stated age; cooperative  Eyes: Normal EOM, PERRLA, vision, conjunctiva, sclera  ENT: Normal external ears, nose, hearing, lips, teeth, gums, throat. No mucous membrane lesions, normal saliva pool  Neck: No mass or thyroid enlargement  Resp: Lungs clear to auscultation, nl to palpation  CV: RRR, no murmurs, rubs or gallops, no edema  GI: No ABD mass or tenderness, no HSM  : Not tested  Lymph: No cervical, supraclavicular, inguinal or epitrochlear nodes  MS: The TMJ, neck, shoulder, elbow, spine, knee, ankle, and foot MTP/IP joints were examined and found normal. No active synovitis or altered joint anatomy. Full joint ROM other than below. Normal  strength. No dactylitis, tenosynovitis, enthesopathy. Slight restriction in wrist range of motion but not significant. Proximally 15   " contracture in PIPs and 5-10   in DIPs of the left hand. Right hand greater than 90   contractures in PIPs 3-5. Obvious calcinosis lesion over the lateral aspect of the right 4th PIP. Tenderness to palpation right > left lateral hip over greater trochanter.    Skin: No nail pitting, alopecia, rash, nodules or lesions. Skin thickening in a number of places, though stable. Right fourth digit has a 1 mm ulcer over the lateral aspect of the PIP which is in about 110 degrees of flexion contracture and she is tender over this site and slightly red.   Neuro: Normal cranial nerves, sensation, DTRs. Strength 5/5 in bilateral LEs.  Psych: Normal judgement, orientation, memory, affect.     PFT Results (07/22/2019):  The FVC and FEV1 and the FEV1/FVC ratio are within normal limits. The inspiratory flow rates are within normal limits. Lung volumes are within normal limits. The diffusing capacity is normal. However, the diffusing capacity was not corrected for the   patient's hemoglobin.  IMPRESSION:  Normal spirometry, lung volumes and diffusing capacity.    Laboratory:   RHEUM RESULTS Latest Ref Rng & Units 1/28/2019 4/25/2019 8/15/2019   COMPLEMENT C3 76 - 169 mg/dL - - -   COMPLEMENT C4 15 - 50 mg/dL - - -   DNA-DS 0 - 29 IU/mL - - -   SED RATE 0 - 30 mm/h 19 15 16   CRP, INFLAMMATION 0.0 - 8.0 mg/L 6.3 <2.9 <2.9   CYCLIC CIT PEPT IGG <5 U/mL - - -   CK TOTAL 30 - 225 U/L - - -   RHEUMATOID FACTOR 0 - 14 IU/mL - - -   STACY SCREEN BY EIA 0 - 1.0 - - -   AST 0 - 45 U/L 15 17 15   ALT 0 - 50 U/L 16 18 18   ALBUMIN 3.4 - 5.0 g/dL 4.0 4.3 4.3   WBC 4.0 - 11.0 10e9/L 4.2 6.8 5.2   RBC 3.8 - 5.2 10e12/L 4.00 3.82 3.71(L)   HGB 11.7 - 15.7 g/dL 11.9 11.9 11.4(L)   HCT 35.0 - 47.0 % 38.8 37.5 37.1   MCV 78 - 100 fl 97 98 100   MCHC 31.5 - 36.5 g/dL 30.7(L) 31.7 30.7(L)   RDW 10.0 - 15.0 % 12.7 12.2 13.2    - 450 10e9/L 164 208 212   CREATININE 0.52 - 1.04 mg/dL 0.77 0.77 0.78   GFR ESTIMATE, IF BLACK >60 mL/min/[1.73:m2] >90 >90  >90   GFR ESTIMATE >60 mL/min/[1.73:m2] 86 86 85   IGA 70 - 380 mg/dL - - -     Rheumatoid Factor   Date Value Ref Range Status   06/26/2009 13 0 - 14 IU/mL Final     Cyclic Citrullinated Peptide IgG   Date Value Ref Range Status   09/27/2010 43 (H) <5 U/mL Final     Comment:     Interpretation:  Positive     Ribonucleic Protein IgG Antibody   Date Value Ref Range Status   06/26/2009 3  Final     Comment:     Reference range: 0 to 40  Unit: AU/mL  (Note)  REFERENCE INTERVAL: Ribonucleic Protein (DANIEL), IgG   29 AU/mL or Less ............. Negative   30 - 40 AU/mL ................ Equivocal   41 AU/mL or Greater .......... Positive    RNP antibody is seen in % of mixed connective tissue  disease and is considered specific for this syndrome if  other antibodies are negative. RNP is also present in  20-30% of systemic lupus erythematosus (SLE) and 15-25%  of progressive systemic sclerosis (PSS).  Performed by [email or web address],  [address] [facility],[place] [id number] [phone]  www.[email or web address], Geovanni Estevez MD - Lab. Director     Urbano Antibody IgG   Date Value Ref Range Status   06/26/2009 2  Final     Comment:     Reference range: 0 to 40  Unit: AU/mL  (Note)  REFERENCE INTERVALS: SMITH (DANIEL) Ab, IgG   29 AU/mL or Less ............. Negative   30 - 40 AU/mL ................ Equivocal   41 AU/mL or Greater .......... Positive    Urbano antibody is very specific for systemic lupus  erythematosus (SLE) but only occurs in 30-35% of SLE  cases. The presence of antibodies to Urbano is often  associated with renal disease.  Performed by [email or web address],  [address] [facility] [facility],[place] [id number] [phone]  www.[email or web address], Geovanni Estevez MD - Lab. Director     SSA (RO) Antibody IgG   Date Value Ref Range Status   06/26/2009 106 (H)  Final     Comment:     Reference range: 0 to 40  Unit: AU/mL  (Note)  REFERENCE INTERVALS: SSA (Ro) (DANIEL) Ab, IgG   29 AU/mL or Less ............. Negative   30 - 40 AU/mL ................  Equivocal   41 AU/mL or Greater .......... Positive    SSA (Ro) antibody is seen in 70-75% of Sjogren syndrome  cases, 30-40% of systemic lupus erythematosus (SLE) and  5-10% of progressive systemic sclerosis (PSS).  Performed by [facility],  [address] [facility] [address] [phone]  www.[employer], Geovanni Estevez MD - Lab. Director     SSB (LA) Antibody IgG   Date Value Ref Range Status   06/26/2009 0  Final     Comment:     Reference range: 0 to 40  Unit: AU/mL  (Note)  REFERENCE INTERVALS: SSB (La) (DANIEL) Ab, IgG   29 AU/mL or Less ............. Negative   30 - 40 AU/mL ................ Equivocal   41 AU/mL or Greater .......... Positive    SSB (La) antibody is seen in 50-60% of Sjogren syndrome  cases and is specific if it is the only DANIEL antibody  present. 15-25% of patients with systemic lupus  erythematosus (SLE) and 5-10% of patients with progressive  systemic sclerosis (PSS) also have this antibody.  Performed by [facility],  [address] [facility] [place] [address] [phone]  www.[employer], Geovanni Estevez MD - Lab. Director     Scleroderma Antibody IgG   Date Value Ref Range Status   06/26/2009 5  Final     Comment:     Reference range: 0 to 40  Unit: AU/mL  (Note)  REFERENCE INTERVALS: Scleroderma (Scl-70) (DANIEL) Ab, IgG   29 AU/mL or Less ............. Negative   30 - 40 AU/mL ................ Equivocal   41 AU/mL or Greater .......... Positive    Scleroderma (Scl-70) antibody is seen in 20-60% of  patients with scleroderma and is considered diagnostic  and specific for scleroderma if it is the only DANIEL  antibody present. Scl-70 is also seen in approximately  25% of progressive systemic sclerosis (PSS).  Performed by [facility],  [address] [facility],[place] [id number] [phone]  www.[facility], Geovanni Estevez MD - Lab. Director     STACY Screen by EIA   Date Value Ref Range Status   06/26/2009 8.5 (H) 0 - 1.0 Final     Comment:     Interpretation:  Positive     DNA-ds   Date Value  Ref Range Status   07/07/2011 36 (H) 0 - 29 IU/mL Final     Comment:     Borderline     IGA   Date Value Ref Range Status   09/08/2014 248 70 - 380 mg/dL Final     Scribe Disclosure:  IIlan, am serving as a scribe to document services personally performed by Brennen Child MD at this visit, based upon the provider's statements to me. All documentation has been reviewed by the aforementioned provider prior to being entered into the official medical record.

## 2019-08-16 ENCOUNTER — TELEPHONE (OUTPATIENT)
Dept: RHEUMATOLOGY | Facility: CLINIC | Age: 54
End: 2019-08-16

## 2019-08-16 DIAGNOSIS — M35.00 SICCA SYNDROME (H): Primary | ICD-10-CM

## 2019-08-16 DIAGNOSIS — H04.123 BILATERAL DRY EYES: ICD-10-CM

## 2019-08-16 RX ORDER — CARBOXYMETHYLCELLULOSE SODIUM 5 MG/ML
1 SOLUTION/ DROPS OPHTHALMIC 4 TIMES DAILY PRN
Qty: 120 ML | Refills: 11 | Status: SHIPPED | OUTPATIENT
Start: 2019-08-16

## 2019-08-16 NOTE — TELEPHONE ENCOUNTER
Returned call to pt. Willard states that she had discussed her dry eyes at her appointment yesterday. Pt called back to report she has been using Olopatadine 1% and it was given by Dr Child a few years ago. Reviewed med list and this medication was listed.    Willard is wondering if Dr Child might want to switch it to something that will help her dry eyes. Pt uses [facility] [address] in [facility]    Rheumatologist name in [place] provided to pt. Message routed to Dr Child for his recommendations regarding the eye drops.    Andrew Chiu, TIARRAN RN  Rheumatology RN Coordinator  [facility]

## 2019-08-16 NOTE — TELEPHONE ENCOUNTER
Health Call Center    Phone Message    May a detailed message be left on voicemail: yes    Reason for Call: Other: Name of eye drops that patient is using is: Olopatadine.  Patient is also needing the name of the doctor in [place] that Dr. Child was going to refer patient's friend to, for scleroderma.  Please follow up with patient.  Thank you!     Action Taken: Message routed to:  [facility]

## 2019-08-22 ENCOUNTER — ANCILLARY PROCEDURE (OUTPATIENT)
Dept: CT IMAGING | Facility: CLINIC | Age: 54
End: 2019-08-22
Attending: INTERNAL MEDICINE
Payer: MEDICARE

## 2019-08-22 DIAGNOSIS — M34.9 SYSTEMIC SCLEROSIS (H): ICD-10-CM

## 2019-08-22 DIAGNOSIS — R07.89 CHEST WALL PAIN: ICD-10-CM

## 2019-08-22 DIAGNOSIS — R06.09 DOE (DYSPNEA ON EXERTION): ICD-10-CM

## 2019-08-29 ENCOUNTER — TELEPHONE (OUTPATIENT)
Dept: RHEUMATOLOGY | Facility: CLINIC | Age: 54
End: 2019-08-29

## 2019-08-29 DIAGNOSIS — M34.9 SYSTEMIC SCLEROSIS (H): ICD-10-CM

## 2019-08-29 DIAGNOSIS — M35.00 SICCA SYNDROME (H): ICD-10-CM

## 2019-08-29 DIAGNOSIS — M77.40 METATARSALGIA, UNSPECIFIED LATERALITY: ICD-10-CM

## 2019-08-29 NOTE — TELEPHONE ENCOUNTER
[facility] Call [facility]    Phone Message    May a detailed message be left on voicemail: yes    Reason for Call: Other: Pt would like a call from the clinic with her CT results ordered by Francisco Child as soon as possible     Action Taken: Message routed to:  [facility] Rheum

## 2019-09-03 DIAGNOSIS — M34.9 SYSTEMIC SCLEROSIS (H): ICD-10-CM

## 2019-09-03 RX ORDER — MYCOPHENOLATE MOFETIL 500 MG/1
TABLET ORAL
Qty: 270 TABLET | Refills: 0 | Status: SHIPPED | OUTPATIENT
Start: 2019-09-03 | End: 2019-11-21

## 2019-09-03 RX ORDER — CEVIMELINE HYDROCHLORIDE 30 MG/1
30 CAPSULE ORAL 3 TIMES DAILY PRN
Qty: 270 CAPSULE | Refills: 3 | Status: SHIPPED | OUTPATIENT
Start: 2019-09-03 | End: 2021-06-08

## 2019-09-03 NOTE — TELEPHONE ENCOUNTER
mycophenolate (GENERIC EQUIVALENT) 500 MG   Last Written Prescription Date:  6/6/19  Last Fill Quantity: 270,   # refills: 0  Last Office Visit: 8/15/19  Future Office visit:  11/21/19    CBC RESULTS:   Recent Labs   Lab Test 08/15/19  1524   WBC 5.2   RBC 3.71*   HGB 11.4*   HCT 37.1      MCH 30.7   MCHC 30.7*   RDW 13.2          Creatinine   Date Value Ref Range Status   08/15/2019 0.78 0.52 - 1.04 mg/dL Final   ]    Liver Function Studies -   Recent Labs   Lab Test 08/15/19  1524  11/24/15  1848   PROTTOTAL  --   --  7.8   ALBUMIN 4.3   < > 4.1   BILITOTAL  --   --  0.4   ALKPHOS  --   --  49   AST 15   < > 12   ALT 18   < > 15    < > = values in this interval not displayed.       Routing refill request to provider for review/approval because:  DMARD

## 2019-09-09 RX ORDER — MYCOPHENOLATE MOFETIL 500 MG/1
TABLET ORAL
Qty: 90 TABLET | Refills: 1 | OUTPATIENT
Start: 2019-09-09

## 2019-09-16 DIAGNOSIS — M77.40 METATARSALGIA, UNSPECIFIED LATERALITY: ICD-10-CM

## 2019-09-16 DIAGNOSIS — M34.9 SYSTEMIC SCLEROSIS (H): ICD-10-CM

## 2019-09-16 DIAGNOSIS — M77.41 METATARSALGIA OF BOTH FEET: ICD-10-CM

## 2019-09-16 DIAGNOSIS — M85.80 OSTEOPENIA, UNSPECIFIED LOCATION: ICD-10-CM

## 2019-09-16 DIAGNOSIS — M05.79 RHEUMATOID ARTHRITIS INVOLVING MULTIPLE SITES WITH POSITIVE RHEUMATOID FACTOR (H): ICD-10-CM

## 2019-09-16 DIAGNOSIS — M77.42 METATARSALGIA OF BOTH FEET: ICD-10-CM

## 2019-09-16 NOTE — TELEPHONE ENCOUNTER
Health Call Center    Phone Message    May a detailed message be left on voicemail: yes    Reason for Call: Medication Refill Request    Has the patient contacted the pharmacy for the refill? Yes   Name of medication being requested: oxyCODONE (ROXICODONE) 5 MG tablet   & oxyCODONE-acetaminophen (PERCOCET) 5-325 MG tablet   Provider who prescribed the medication: Brennen Child MD  Pharmacy: [id number] [address]  Date medication is needed: asap    Action Taken: Message routed to:  [facility] Rheumatology

## 2019-09-17 NOTE — TELEPHONE ENCOUNTER
Her chest CT shows only some minimal abnormalities that I can review with her and her follow-up appointment so I can show them to her.  Nothing requires any action right now.    As far as her neck pain is concerned, this is typically dealt with with conservative therapy.  We can either send her to physical therapy directly or have her go to the orthopedics/sports medicine clinic for initial evaluation and they will probably order physical therapy based on their exam as well.

## 2019-09-17 NOTE — TELEPHONE ENCOUNTER
"9/17/2019  3:26 PM            RN Care Coordinator Encounter  Attempted to connect with patient, no answer. LVM for patient to contact the clinic directly at [phone]; for further updates on provider recommendations regarding her CT results;       \"Her chest CT shows only some minimal abnormalities that I can review with her and her follow-up appointment so I can show them to her.  Nothing requires any action right now.     As far as her neck pain is concerned, this is typically dealt with with conservative therapy.  We can either send her to physical therapy directly or have her go to the orthopedics/sports medicine clinic for initial evaluation and they will probably order physical therapy based on their exam as well\".- Dr. Danyell Pop RN, BSN, PHN  [facility]  RN Care Coordinator Medicine Specialty Pool Nurse   (Nephrology, Rheumatology, Infectious Disease & Hepatology)  "

## 2019-09-17 NOTE — TELEPHONE ENCOUNTER
Willard called regarding her refill request for her Oxycodone and Percocet. I also let her know of her chest CT results and the recommendations for her neck pain which included PT. Willard said that she would try and go to the orthopedic clinic for her neck pain. She stated that she has been feeling awful with neck pain with swelling on the left side of her neck that hurts to touch and with movement. She states that her neck swelling is new to her. She also stated that her fatigue has been worse than usual and that she is frustrated.     Monica Shin, TIARRAN RN   Rheumatology Clinic Nurse   [facility]

## 2019-09-19 RX ORDER — OXYCODONE HYDROCHLORIDE 5 MG/1
5 TABLET ORAL EVERY 4 HOURS PRN
Qty: 180 TABLET | Refills: 0 | Status: SHIPPED | OUTPATIENT
Start: 2019-09-19 | End: 2019-10-18

## 2019-09-19 NOTE — TELEPHONE ENCOUNTER
[facility] Call Center    Phone Message    May a detailed message be left on voicemail: yes    Reason for Call: Other: Pt calling in and checking the status of this request. Please follow up wiht pt with any updates. Pt stated she will be out of medication tomorrow. Thank you     Action Taken: Message routed to:  [facility] Rheum

## 2019-09-19 NOTE — TELEPHONE ENCOUNTER
Patient is requesting to the script today. Sending to Dr. Child to e-scribe script. Patient is aware of this.    Last Seen: 8/15/2019  Next Appointment: 11/21/2019  Medication: oxycodone 5mg  Last Filled: 8/16/2019  Qty: #204     reviewed as follows:      Request sent to provider for review and signature.    Anh Armenta CMA   9/19/2019 3:37 PM

## 2019-09-20 DIAGNOSIS — M05.79 RHEUMATOID ARTHRITIS INVOLVING MULTIPLE SITES WITH POSITIVE RHEUMATOID FACTOR (H): ICD-10-CM

## 2019-09-20 DIAGNOSIS — M34.9 SYSTEMIC SCLEROSIS (H): ICD-10-CM

## 2019-09-20 DIAGNOSIS — M85.80 OSTEOPENIA, UNSPECIFIED LOCATION: ICD-10-CM

## 2019-09-20 DIAGNOSIS — M77.42 METATARSALGIA OF BOTH FEET: ICD-10-CM

## 2019-09-20 DIAGNOSIS — M77.40 METATARSALGIA, UNSPECIFIED LATERALITY: ICD-10-CM

## 2019-09-20 DIAGNOSIS — M77.41 METATARSALGIA OF BOTH FEET: ICD-10-CM

## 2019-09-20 DIAGNOSIS — I73.01 RAYNAUD'S DISEASE WITH GANGRENE (H): ICD-10-CM

## 2019-09-20 NOTE — TELEPHONE ENCOUNTER
[facility] Call Center    Phone Message    May a detailed message be left on voicemail: yes    Reason for Call: Other: Pharmacy called said they are waiting on the refill for the PERCOCET, please call asap because the pt need to start this in the next hour.     Action Taken: Other: [facility] rheumatology

## 2019-09-20 NOTE — TELEPHONE ENCOUNTER
[facility] Call [facility]    Phone Message    May a detailed message be left on voicemail: no    Reason for Call: Medication Refill Request    Has the patient contacted the pharmacy for the refill? Yes   Name of medication being requested: oxyCODONE-acetaminophen (PERCOCET) 5-325 MG tablet  Provider who prescribed the medication: Brennen Child  Pharmacy: [facility] [place]  Date medication is needed: 9/20/19.    Pharmacy called in and stated pt is waiting in pharmacy for medication and has been waiting since Monday. The pharmacist Charis will be calling back if Rx is not sent over today 9/20/19. Please advise     Action Taken: Message routed to:  [facility] Rheum

## 2019-09-20 NOTE — TELEPHONE ENCOUNTER
Patient is requesting that this be sent as soon as possible, Sending to Dr. Child to e-scribe script to [facility] in [place].      Last Seen: 8/15/2019   Next Appointment: 11/21/2019  Medication: percocet 5-325  Last Filled: 8/15/2019  Qty: #102     reviewed as follows:    Request sent to provider for review and signature.    Anh Armenta CMA   9/20/2019 3:58 PM

## 2019-09-21 RX ORDER — OXYCODONE AND ACETAMINOPHEN 5; 325 MG/1; MG/1
1 TABLET ORAL EVERY 4 HOURS PRN
Qty: 102 TABLET | Refills: 0 | Status: SHIPPED | OUTPATIENT
Start: 2019-09-21 | End: 2019-10-18

## 2019-10-16 DIAGNOSIS — I73.01 RAYNAUD'S DISEASE WITH GANGRENE (H): ICD-10-CM

## 2019-10-16 DIAGNOSIS — M77.41 METATARSALGIA OF BOTH FEET: ICD-10-CM

## 2019-10-16 DIAGNOSIS — M77.42 METATARSALGIA OF BOTH FEET: ICD-10-CM

## 2019-10-16 DIAGNOSIS — M77.40 METATARSALGIA, UNSPECIFIED LATERALITY: ICD-10-CM

## 2019-10-16 DIAGNOSIS — M05.79 RHEUMATOID ARTHRITIS INVOLVING MULTIPLE SITES WITH POSITIVE RHEUMATOID FACTOR (H): ICD-10-CM

## 2019-10-16 DIAGNOSIS — M34.9 SYSTEMIC SCLEROSIS (H): ICD-10-CM

## 2019-10-16 DIAGNOSIS — M85.80 OSTEOPENIA, UNSPECIFIED LOCATION: ICD-10-CM

## 2019-10-16 NOTE — TELEPHONE ENCOUNTER
Last Seen: 8/15/2019  Next Appointment: 11/21/2019  Medication: percocet  Last Filled: 9/21/2019  Qty: #102     reviewed as follows:      Request sent to provider for review and signature.    Anh Armenta CMA   10/16/2019 10:29 AM

## 2019-10-18 ENCOUNTER — TELEPHONE (OUTPATIENT)
Dept: RHEUMATOLOGY | Facility: CLINIC | Age: 54
End: 2019-10-18

## 2019-10-18 DIAGNOSIS — M05.79 RHEUMATOID ARTHRITIS INVOLVING MULTIPLE SITES WITH POSITIVE RHEUMATOID FACTOR (H): ICD-10-CM

## 2019-10-18 DIAGNOSIS — M34.9 SYSTEMIC SCLEROSIS (H): ICD-10-CM

## 2019-10-18 DIAGNOSIS — M77.40 METATARSALGIA, UNSPECIFIED LATERALITY: ICD-10-CM

## 2019-10-18 DIAGNOSIS — M85.80 OSTEOPENIA, UNSPECIFIED LOCATION: ICD-10-CM

## 2019-10-18 DIAGNOSIS — M77.42 METATARSALGIA OF BOTH FEET: ICD-10-CM

## 2019-10-18 DIAGNOSIS — M77.41 METATARSALGIA OF BOTH FEET: ICD-10-CM

## 2019-10-18 DIAGNOSIS — I73.01 RAYNAUD'S DISEASE WITH GANGRENE (H): ICD-10-CM

## 2019-10-18 RX ORDER — OXYCODONE AND ACETAMINOPHEN 5; 325 MG/1; MG/1
1 TABLET ORAL EVERY 4 HOURS PRN
Qty: 90 TABLET | Refills: 0 | Status: SHIPPED | OUTPATIENT
Start: 2019-10-18 | End: 2019-11-21

## 2019-10-18 RX ORDER — OXYCODONE HYDROCHLORIDE 5 MG/1
5 TABLET ORAL EVERY 4 HOURS PRN
Qty: 18 TABLET | Refills: 0 | Status: SHIPPED | OUTPATIENT
Start: 2019-10-18 | End: 2019-10-23

## 2019-10-18 RX ORDER — OXYCODONE AND ACETAMINOPHEN 5; 325 MG/1; MG/1
1 TABLET ORAL EVERY 4 HOURS PRN
Qty: 12 TABLET | Refills: 0 | Status: SHIPPED | OUTPATIENT
Start: 2019-10-18 | End: 2020-03-17

## 2019-10-18 NOTE — TELEPHONE ENCOUNTER
Called bradley back and let her know that she has to  the script at desk D on the 2nd floor of the [facility].     Monica Shin, TIARRAN RN   Rheumatology Clinic Nurse   [facility]

## 2019-10-18 NOTE — TELEPHONE ENCOUNTER
Tried to call patient with medication update, left a message to have her call back.    Monica Shin, BSN RN   Rheumatology Clinic Nurse   [facility]

## 2019-10-18 NOTE — TELEPHONE ENCOUNTER
Talked to Monica the nurse from the [facility] and she said the patient was willing to  Rx's here in [place]. I explained to her that it needs to be picked up by 5pm at desk D on 2nd floor. She stated she would contact and instruct patient.     Just verified patient was instructed by monica and is aware of where to  med. Rosa Gonzalez CMA

## 2019-10-18 NOTE — TELEPHONE ENCOUNTER
[facility] Call [facility]    Phone Message    May a detailed message be left on voicemail: yes    Reason for Call: Medication Refill Request    Has the patient contacted the pharmacy for the refill? Yes   Name of medication being requested:   oxyCODONE (ROXICODONE) 5 MG tablet  oxyCODONE-acetaminophen (PERCOCET) 5-325 MG tablet     Provider who prescribed the medication: Dr. Child  Pharmacy: [facility]  Date medication is needed: ASAP - this is a second request- she will be out over the weekend.        Action Taken: Message routed to:  [facility] Rhumetology

## 2019-10-18 NOTE — TELEPHONE ENCOUNTER
Spoke with Willard and she will be going to the [employer] [facility] [employer] pharmacy and  the prescription that was signed by Dr. Duque.    Monica hSin, TIARRAN RN   Rheumatology Clinic Nurse   [facility]

## 2019-10-23 ENCOUNTER — TELEPHONE (OUTPATIENT)
Dept: RHEUMATOLOGY | Facility: CLINIC | Age: 54
End: 2019-10-23

## 2019-10-23 DIAGNOSIS — M34.9 SYSTEMIC SCLEROSIS (H): ICD-10-CM

## 2019-10-23 DIAGNOSIS — M85.80 OSTEOPENIA, UNSPECIFIED LOCATION: ICD-10-CM

## 2019-10-23 DIAGNOSIS — M77.42 METATARSALGIA OF BOTH FEET: ICD-10-CM

## 2019-10-23 DIAGNOSIS — M05.79 RHEUMATOID ARTHRITIS INVOLVING MULTIPLE SITES WITH POSITIVE RHEUMATOID FACTOR (H): ICD-10-CM

## 2019-10-23 DIAGNOSIS — M77.41 METATARSALGIA OF BOTH FEET: ICD-10-CM

## 2019-10-23 DIAGNOSIS — M77.40 METATARSALGIA, UNSPECIFIED LATERALITY: ICD-10-CM

## 2019-10-23 NOTE — TELEPHONE ENCOUNTER
Willard is calling to say that she will be out of her oxycodone and she would like a refill.  Routed refill request to Dr. Child high priority.     Kristy Pastor MSN, RN  Rheumatology RN Care Coordinator   [facility]

## 2019-10-23 NOTE — TELEPHONE ENCOUNTER
Last Office Visit:  7/19/2018  Next Enc:  Visit date not found  Medication: Oxycodone  Last filled: 10/02564  Qty: 18-- lasted for three days            Kristy Pastor MSN, RN  Rheumatology RN Care Coordinator  [facility]

## 2019-10-24 DIAGNOSIS — R11.0 NAUSEA: ICD-10-CM

## 2019-10-24 RX ORDER — OXYCODONE HYDROCHLORIDE 5 MG/1
5 TABLET ORAL EVERY 4 HOURS PRN
Qty: 180 TABLET | Refills: 0 | Status: SHIPPED | OUTPATIENT
Start: 2019-10-24 | End: 2019-11-21

## 2019-10-24 NOTE — TELEPHONE ENCOUNTER
[facility] Call [facility]    Phone Message    May a detailed message be left on voicemail: yes    Reason for Call: Other: Pharmacy called said the pt need a refill on the OXYCODONE, they said its been requested over a week ago. Please send this script to [facility] in [place].     Action Taken: Other: ump rheumatology

## 2019-10-25 ENCOUNTER — CARE COORDINATION (OUTPATIENT)
Dept: RHEUMATOLOGY | Facility: CLINIC | Age: 54
End: 2019-10-25

## 2019-10-25 RX ORDER — ONDANSETRON 4 MG/1
TABLET, ORALLY DISINTEGRATING ORAL
Qty: 20 TABLET | Refills: 0 | Status: SHIPPED | OUTPATIENT
Start: 2019-10-25

## 2019-10-25 NOTE — PROGRESS NOTES
Received a Drug utilization document from [facility] for this patient. Upon review it appears that there are 3 current and signed prescriptions for narcotics to 2 different pharmacy's. Please review.     TIARRA WittN, RN   Rheumatology Care Coordinator   [facility]

## 2019-10-25 NOTE — TELEPHONE ENCOUNTER
ondansetron (ZOFRAN-ODT) 4 MG ODT    Last Written Prescription Date:  5/13/19  Last Fill Quantity: 20,   # refills: 0  Last Office Visit : 8/15/19  Future Office visit:  11/21/19    Routing refill request to provider for review/approval because:  Drug not on the refill protocol

## 2019-11-13 DIAGNOSIS — Z79.899 HIGH RISK MEDICATION USE: Primary | ICD-10-CM

## 2019-11-13 NOTE — TELEPHONE ENCOUNTER
Called [facility] pharmacy regarding a fax we received regarding narcan request. The patient is wanting to have narcan due to the amount of opioids she is on.     Monica Shin, TIARRAN RN   Rheumatology Clinic Nurse   [facility]

## 2019-11-15 ENCOUNTER — TELEPHONE (OUTPATIENT)
Dept: RHEUMATOLOGY | Facility: CLINIC | Age: 54
End: 2019-11-15

## 2019-11-15 NOTE — TELEPHONE ENCOUNTER
Prior Authorization Retail Medication Request    Medication/Dose: evzio 0.4mg inj  ICD code (if different than what is on RX):    Previously Tried and Failed:    Rationale:      Insurance Name:    Insurance ID:        Pharmacy Information (if different than what is on RX)  Name:    Phone:

## 2019-11-18 ENCOUNTER — TELEPHONE (OUTPATIENT)
Dept: RHEUMATOLOGY | Facility: CLINIC | Age: 54
End: 2019-11-18

## 2019-11-18 DIAGNOSIS — F11.90 CHRONIC NARCOTIC USE: ICD-10-CM

## 2019-11-18 DIAGNOSIS — M34.9 SYSTEMIC SCLEROSIS (H): Primary | ICD-10-CM

## 2019-11-18 NOTE — TELEPHONE ENCOUNTER
[facility]    Phone Message    May a detailed message be left on voicemail: yes    Reason for Call: Medication Question or concern regarding medication   Prescription Clarification  Name of Medication: naloxone (EVZIO) 0.4 MG/0.4ML auto-injector  Prescribing Provider: Dr. Child   Pharmacy: [id number] [address]   What on the order needs clarification? [place] calling to report that the injector isn't listed on the FDA website any longer and that the insurance will not cover it.  She is wanting to switch it to Narcan Nasal [place] instead.  Please call the pharmacy back to discuss          Action Taken: Message routed to:  [facility] Rheum

## 2019-11-19 NOTE — TELEPHONE ENCOUNTER
Central Prior Authorization Team   Phone: [phone]      PA Initiation via fax    Medication: evzio 0.4mg inj  Insurance Company: [employer] [place] - Phone [phone] Fax [phone]  Pharmacy Filling the Rx: [facility][id number] [place], [place] - [address]  Filling Pharmacy Phone: [phone]  Filling Pharmacy Fax:    Start Date: 11/19/2019

## 2019-11-20 NOTE — TELEPHONE ENCOUNTER
Called pharmacy back and let them know that the Naloxone nasal spray is fine with Dr. Child.    Monica Shin, BSN RN   Rheumatology Clinic Nurse   [facility]

## 2019-11-20 NOTE — TELEPHONE ENCOUNTER
PRIOR AUTHORIZATION DENIED    Medication: evzio 0.4mg inj-DENIED    Denial Date: 11/19/2019    Denial Rational:           Appeal Information:

## 2019-11-21 ENCOUNTER — OFFICE VISIT (OUTPATIENT)
Dept: PULMONOLOGY | Facility: CLINIC | Age: 54
End: 2019-11-21
Attending: INTERNAL MEDICINE
Payer: MEDICARE

## 2019-11-21 VITALS
WEIGHT: 140.9 LBS | OXYGEN SATURATION: 100 % | HEIGHT: 65 IN | BODY MASS INDEX: 23.47 KG/M2 | SYSTOLIC BLOOD PRESSURE: 122 MMHG | HEART RATE: 60 BPM | DIASTOLIC BLOOD PRESSURE: 78 MMHG

## 2019-11-21 DIAGNOSIS — K22.4 ESOPHAGEAL DYSMOTILITY: ICD-10-CM

## 2019-11-21 DIAGNOSIS — Z71.6 ENCOUNTER FOR SMOKING CESSATION COUNSELING: ICD-10-CM

## 2019-11-21 DIAGNOSIS — M34.9 SYSTEMIC SCLEROSIS (H): ICD-10-CM

## 2019-11-21 DIAGNOSIS — M35.00 SICCA SYNDROME (H): ICD-10-CM

## 2019-11-21 DIAGNOSIS — M05.79 RHEUMATOID ARTHRITIS INVOLVING MULTIPLE SITES WITH POSITIVE RHEUMATOID FACTOR (H): ICD-10-CM

## 2019-11-21 DIAGNOSIS — M05.89 OTHER RHEUMATOID ARTHRITIS WITH RHEUMATOID FACTOR OF MULTIPLE SITES (H): ICD-10-CM

## 2019-11-21 DIAGNOSIS — Z79.899 HIGH RISK MEDICATIONS (NOT ANTICOAGULANTS) LONG-TERM USE: ICD-10-CM

## 2019-11-21 DIAGNOSIS — I73.01 RAYNAUD'S DISEASE WITH GANGRENE (H): ICD-10-CM

## 2019-11-21 DIAGNOSIS — G89.29 OTHER CHRONIC PAIN: ICD-10-CM

## 2019-11-21 DIAGNOSIS — M77.40 METATARSALGIA, UNSPECIFIED LATERALITY: ICD-10-CM

## 2019-11-21 DIAGNOSIS — M77.41 METATARSALGIA OF BOTH FEET: ICD-10-CM

## 2019-11-21 DIAGNOSIS — K21.9 GASTROESOPHAGEAL REFLUX DISEASE, ESOPHAGITIS PRESENCE NOT SPECIFIED: ICD-10-CM

## 2019-11-21 DIAGNOSIS — M85.80 OSTEOPENIA, UNSPECIFIED LOCATION: ICD-10-CM

## 2019-11-21 DIAGNOSIS — F11.20 NARCOTIC DEPENDENCE (H): ICD-10-CM

## 2019-11-21 DIAGNOSIS — M34.9 SYSTEMIC SCLEROSIS (H): Primary | ICD-10-CM

## 2019-11-21 DIAGNOSIS — M77.42 METATARSALGIA OF BOTH FEET: ICD-10-CM

## 2019-11-21 LAB
ALBUMIN SERPL-MCNC: 4.2 G/DL (ref 3.4–5)
ALBUMIN UR-MCNC: NEGATIVE MG/DL
ALT SERPL W P-5'-P-CCNC: 19 U/L (ref 0–50)
APPEARANCE UR: CLEAR
AST SERPL W P-5'-P-CCNC: 15 U/L (ref 0–45)
BASOPHILS # BLD AUTO: 0 10E9/L (ref 0–0.2)
BASOPHILS NFR BLD AUTO: 0.4 %
BILIRUB UR QL STRIP: NEGATIVE
COLOR UR AUTO: YELLOW
CREAT SERPL-MCNC: 0.73 MG/DL (ref 0.52–1.04)
CRP SERPL-MCNC: <2.9 MG/L (ref 0–8)
DIFFERENTIAL METHOD BLD: ABNORMAL
EOSINOPHIL # BLD AUTO: 0.1 10E9/L (ref 0–0.7)
EOSINOPHIL NFR BLD AUTO: 1.2 %
ERYTHROCYTE [DISTWIDTH] IN BLOOD BY AUTOMATED COUNT: 12.7 % (ref 10–15)
ERYTHROCYTE [SEDIMENTATION RATE] IN BLOOD BY WESTERGREN METHOD: 16 MM/H (ref 0–30)
GFR SERPL CREATININE-BSD FRML MDRD: >90 ML/MIN/{1.73_M2}
GLUCOSE UR STRIP-MCNC: NEGATIVE MG/DL
HCT VFR BLD AUTO: 37.1 % (ref 35–47)
HGB BLD-MCNC: 11.6 G/DL (ref 11.7–15.7)
HGB UR QL STRIP: ABNORMAL
IMM GRANULOCYTES # BLD: 0 10E9/L (ref 0–0.4)
IMM GRANULOCYTES NFR BLD: 0.4 %
KETONES UR STRIP-MCNC: NEGATIVE MG/DL
LEUKOCYTE ESTERASE UR QL STRIP: NEGATIVE
LYMPHOCYTES # BLD AUTO: 1.8 10E9/L (ref 0.8–5.3)
LYMPHOCYTES NFR BLD AUTO: 36.3 %
MCH RBC QN AUTO: 30.9 PG (ref 26.5–33)
MCHC RBC AUTO-ENTMCNC: 31.3 G/DL (ref 31.5–36.5)
MCV RBC AUTO: 99 FL (ref 78–100)
MONOCYTES # BLD AUTO: 0.5 10E9/L (ref 0–1.3)
MONOCYTES NFR BLD AUTO: 10.6 %
NEUTROPHILS # BLD AUTO: 2.5 10E9/L (ref 1.6–8.3)
NEUTROPHILS NFR BLD AUTO: 51.1 %
NITRATE UR QL: NEGATIVE
NRBC # BLD AUTO: 0 10*3/UL
NRBC BLD AUTO-RTO: 0 /100
PH UR STRIP: 5 PH (ref 5–7)
PLATELET # BLD AUTO: 186 10E9/L (ref 150–450)
RBC # BLD AUTO: 3.75 10E12/L (ref 3.8–5.2)
RBC #/AREA URNS AUTO: 3 /HPF (ref 0–2)
SOURCE: ABNORMAL
SP GR UR STRIP: 1.01 (ref 1–1.03)
SQUAMOUS #/AREA URNS AUTO: <1 /HPF (ref 0–1)
UROBILINOGEN UR STRIP-MCNC: 0 MG/DL (ref 0–2)
VIT B12 SERPL-MCNC: 618 PG/ML (ref 193–986)
WBC # BLD AUTO: 4.9 10E9/L (ref 4–11)
WBC #/AREA URNS AUTO: 1 /HPF (ref 0–5)

## 2019-11-21 PROCEDURE — G0463 HOSPITAL OUTPT CLINIC VISIT: HCPCS | Mod: ZF

## 2019-11-21 PROCEDURE — 81001 URINALYSIS AUTO W/SCOPE: CPT

## 2019-11-21 PROCEDURE — 84460 ALANINE AMINO (ALT) (SGPT): CPT

## 2019-11-21 PROCEDURE — 82040 ASSAY OF SERUM ALBUMIN: CPT

## 2019-11-21 PROCEDURE — 82607 VITAMIN B-12: CPT

## 2019-11-21 PROCEDURE — 85025 COMPLETE CBC W/AUTO DIFF WBC: CPT

## 2019-11-21 PROCEDURE — 82565 ASSAY OF CREATININE: CPT

## 2019-11-21 PROCEDURE — 86140 C-REACTIVE PROTEIN: CPT

## 2019-11-21 PROCEDURE — 85652 RBC SED RATE AUTOMATED: CPT

## 2019-11-21 PROCEDURE — 84450 TRANSFERASE (AST) (SGOT): CPT

## 2019-11-21 RX ORDER — LEUCOVORIN CALCIUM 25 MG/1
25 TABLET ORAL DAILY
Qty: 12 TABLET | Refills: 3 | Status: SHIPPED | OUTPATIENT
Start: 2019-11-21

## 2019-11-21 RX ORDER — OXYCODONE AND ACETAMINOPHEN 5; 325 MG/1; MG/1
1 TABLET ORAL EVERY 4 HOURS PRN
Qty: 90 TABLET | Refills: 0 | Status: SHIPPED | OUTPATIENT
Start: 2019-11-21 | End: 2019-12-16

## 2019-11-21 RX ORDER — METHOTREXATE 25 MG/ML
10 INJECTION, SOLUTION INTRA-ARTERIAL; INTRAMUSCULAR; INTRAVENOUS
Qty: 4 ML | Refills: 3 | Status: SHIPPED | OUTPATIENT
Start: 2019-11-21 | End: 2021-01-14

## 2019-11-21 RX ORDER — OXYCODONE HYDROCHLORIDE 5 MG/1
5 TABLET ORAL EVERY 4 HOURS PRN
Qty: 180 TABLET | Refills: 0 | Status: SHIPPED | OUTPATIENT
Start: 2019-11-21 | End: 2019-12-16

## 2019-11-21 ASSESSMENT — ENCOUNTER SYMPTOMS
HEARTBURN: 1
SKIN CHANGES: 1
EYE PAIN: 0
NAUSEA: 0
SEIZURES: 0
NAIL CHANGES: 0
CONSTIPATION: 1
FATIGUE: 1
VOMITING: 0
MUSCLE CRAMPS: 0
ORTHOPNEA: 0
POLYDIPSIA: 1
SHORTNESS OF BREATH: 0
CHILLS: 0
DIARRHEA: 0
ABDOMINAL PAIN: 0
HALLUCINATIONS: 0
DEPRESSION: 1
WHEEZING: 0
NIGHT SWEATS: 1
ARTHRALGIAS: 1
SMELL DISTURBANCE: 0
WEIGHT GAIN: 1
LIGHT-HEADEDNESS: 0
SINUS PAIN: 0
PANIC: 0
MYALGIAS: 1
FEVER: 0
MEMORY LOSS: 0
SPEECH CHANGE: 0
JOINT SWELLING: 0
SLEEP DISTURBANCES DUE TO BREATHING: 0
EYE WATERING: 1
DECREASED APPETITE: 0
COUGH: 0
DECREASED CONCENTRATION: 0
SNORES LOUDLY: 0
DISTURBANCES IN COORDINATION: 0
HYPERTENSION: 1
BACK PAIN: 1
NECK PAIN: 1
PALPITATIONS: 0
HYPOTENSION: 0
NERVOUS/ANXIOUS: 0
DOUBLE VISION: 0
BLOATING: 1
DYSPNEA ON EXERTION: 1
SPUTUM PRODUCTION: 0
WEIGHT LOSS: 0
TASTE DISTURBANCE: 0
HEADACHES: 1
LOSS OF CONSCIOUSNESS: 0
LEG PAIN: 1
ALTERED TEMPERATURE REGULATION: 1
INCREASED ENERGY: 0
HEMOPTYSIS: 0
DIZZINESS: 0
SYNCOPE: 0
INSOMNIA: 0
SINUS CONGESTION: 0

## 2019-11-21 ASSESSMENT — PAIN SCALES - GENERAL: PAINLEVEL: MODERATE PAIN (5)

## 2019-11-21 ASSESSMENT — MIFFLIN-ST. JEOR: SCORE: 1222.06

## 2019-11-21 NOTE — PROGRESS NOTES
[facility]  Rheumatology Clinic  Brennen Child MD  2019     Name: Willard Grayson  MRN: [id number]  Age: 54 year old  : 1965  Referring provider: Referred Self    Problem List:  1. 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   2. Associated marked neuropathic skin pain markedly improved with Lyrica with prior medication therapy with gabapentin.  3. Diffuse musculoskeletal pain requiring methotrexate plus CellCept, and narcotic to control, but also improved. Now off methotrexate due to GI issues.  4. Initial clinical overlap with anti-CCP seropositive rheumatoid arthritis with continued anti-CCP seropositivity as of 2010.  5. Raynaud's phenomena with digital ulcers with easily cracked skin.   6. GI involvement consistent with bacterial small bowel overgrowth with marked improvement after a course of neomycin followed by Dr. Glez; positive hydrogen breath test in 3/2012 - treated with rifaximin through Dr. Jacob  7. No convincing evidence of lung involvement on serial pulmonary function tests or clinically.   8. History of heart palpitations prior to the onset of SSc with stable symptoms since. Holter 2018 with PACs.  9. History of medication failures with Remicade, methotrexate, methotrexate plus CellCept and with Orencia and with improvement since initiation of IVIG but with complicating headaches and overall sense of malaise with IVIG infusions.   10. History of silicone breast implants.   11. Incidental finding of calcified splenic artery aneurysm.  12. EGD in 3/2012 with hiatal hernia and gastral antral vascular ectasia  13. Recurrent right third finger contracture with extensor surface ulceration, healed   14. Trochanteric bursitis, R>L    Assessment and Plan:   #  Systemic sclerosis   # Gastroesophageal reflux disease, esophagitis presence not specified  # Esophageal dysmotility  # Raynaud's disease with gangrene  # High risk medications (not anticoagulants) long-term use  # Rheumatoid arthritis involving multiple sites with positive rheumatoid factor (H)  # Metatarsalgia, unspecified laterality  # Metatarsalgia of both feet  # Osteopenia, unspecified location  # Systemic sclerosis  # MCDANIEL (dyspnea on exertion)  # Chest wall pain  # Skin lesion of lower extremity  Willard Grayson is a 54 year old female with a history of diffuse cutaneous systemic sclerosis who presents for follow up. Her recent HRCT shows mild bilateral posterior subpleural reticular opacities, dependent atelectasis versus early ILD that could be atelectasis due  Narcotic use or low grade interstitial lung disease. Her PFTs have been relatively stable over the years, and my suspicion is that this is atelectasis, but given these findings, we will continue with PFTs at least once a year.      Patient reports pain in her feet, hands, back, neck and left leg. She is on a high dose of narcotic pain killers (oxycodone and percocet). Although I have prescribed these prn, she takes them around the clock. She also has anxiety, which I think contributes to a fear of attempting to decrease her doses. On the other hand, she may be safer using them this way, rather that waiting until she has more pain and taking too many at once. Given all this, I will refer her to the pain clinic to address the suitability of her current regimen, whether it can be simplified or a lowering of the use of her medications may be possible.     We will also restart methotrexate to help with the joint pain, especially given current findings of inflammatory tendon rubs. . She does acknowledge having had less pain when on this, but has had difficulty tolerating intermittently due to GI problems from her disease and exacerbation with the drug.  If  she does not improved joint pain in the hips, I will plan for an XRay to look for calcinosis of both hips.     I again counseled her on cessation of smoking.  Patient would like her B12 checked.     Orders:  - methotrexate 50 MG/2ML injection  Dispense: 4 mL; Refill: 3  - oxyCODONE-acetaminophen (PERCOCET) 5-325 MG tablet  Dispense: 90 tablet; Refill: 0  - oxyCODONE (ROXICODONE) 5 MG tablet  Dispense: 180 tablet; Refill: 0  - Vitamin B12       Follow-up:  3 months.     This visit was 40 mins in duration, the majority spent in counseling.     JANET Child MD, PhD    Rheumatology      HPI:   Willard Grayson is a 54 year old female with a history of diffuse cutaneous systemic sclerosis who presents for follow up. I last saw the patient on 08/15/2019, at which time she had a slow decline of PFTs, and an HRCT was obtained. She was counseled on smoking cessation. She had worsening eye dryness. She was on Patanol. She has numbness and tingling in all extremities which she was on Lyrica for. Referral was given to GI for dysphagia was provided. Her Raynaud's remained stable on Propranolol. She continues to have pain in the lateral aspects of both hips c/w trochanteric bursitis. I provided a dermatology referral for painful scabs on her left upper leg.       Today, she reports pain in her feet, hands, back, neck and left leg. She also has pain in her hips and stiffness and pain in her hands while she drives. She has a tendon rub in her left wrist. She takes percocet three times a day for her pain and oxycodone (at unknown frequency regularly whether or not she has pain). She is not taking oxycodone PRN.     She reports decreased hearing loss. She reports shortness of breath while she climbs up a flight of stairs. She is on folic acid for hair loss. She has trouble swallowing. She is still smoking cigarettes.      Review of Systems:   Pertinent items are noted in HPI or as below, remainder of complete ROS is  negative.      No recent problems with hearing or vision. No swallowing problems.   No breathing difficulty, shortness of breath, coughing, or wheezing  No chest pain or palpitations  No heart burn, indigestion, abdominal pain, nausea, vomiting, diarrhea  No urination problems, no bloody, cloudy urine, no dysuria  No numbing, tingling, weakness  No headaches or confusion  No rashes. No easy bleeding or bruising.   Answers for HPI/ROS submitted by the patient on 11/21/2019   General Symptoms: Yes  Skin Symptoms: Yes  HENT Symptoms: Yes  EYE SYMPTOMS: Yes  HEART SYMPTOMS: Yes  LUNG SYMPTOMS: Yes  INTESTINAL SYMPTOMS: Yes  URINARY SYMPTOMS: No  GYNECOLOGIC SYMPTOMS: No  BREAST SYMPTOMS: No  SKELETAL SYMPTOMS: Yes  BLOOD SYMPTOMS: No  NERVOUS SYSTEM SYMPTOMS: Yes  MENTAL HEALTH SYMPTOMS: Yes  Fever: No  Loss of appetite: No  Weight loss: No  Weight gain: Yes  Fatigue: Yes  Night sweats: Yes  Chills: No  Excessive thirst: Yes  Feeling hot or cold when others believe the temperature is normal: Yes  Loss of height: No  Post-operative complications: No  Surgical site pain: No  Hallucinations: No  Change in or Loss of Energy: No  Changes in hair: No  Changes in moles/birth marks: Yes  Itching: Yes  Rashes: No  Changes in nails: No  Acne: No  Hair in places you don't want it: No  Ear pain: No  Ear discharge: No  Hearing loss: Yes  Tinnitus: Yes  Nosebleeds: No  Congestion: No  Sinus pain: No  Tooth pain: No  Gum tenderness: No  Bleeding gums: No  Change in taste: No  Change in sense of smell: No  Dry mouth: Yes  Hearing aid used: No  Eye pain: No  Vision loss: Yes  Dry eyes: Yes  Watery eyes: Yes  Eye bulging: No  Double vision: No  Flashing of lights: No  Cough: No  Sputum or phlegm: No  Coughing up blood: No  Difficulty breating or shortness of breath: No  Snoring: No  Wheezing: No  Difficulty breathing on exertion: Yes  Chest pain or pressure: No  Fast or irregular heartbeat: No  Pain in legs with walking: Yes  Trouble  breathing while lying down: No  Fingers or toes appear blue: Yes  High blood pressure: Yes  Low blood pressure: No  Fainting: No  Murmurs: No  Pacemaker: No  Varicose veins: No  Edema or swelling: No  Wake up at night with shortness of breath: No  Light-headedness: No  Heart burn or indigestion: Yes  Nausea: No  Vomiting: No  Abdominal pain: No  Bloating: Yes  Constipation: Yes  Diarrhea: No  Back pain: Yes  Muscle aches: Yes  Neck pain: Yes  Swollen joints: No  Joint pain: Yes  Bone pain: No  Muscle cramps: No  Trouble with coordination: No  Dizziness or trouble with balance: No  Fainting or black-out spells: No  Memory loss: No  Headache: Yes  Seizures: No  Speech problems: No  Nervous or Anxious: No  Depression: Yes  Trouble sleeping: No  Trouble thinking or concentrating: No  Mood changes: No  Panic attacks: No      Active Medications:     Current Outpatient Medications:      aspirin-dipyridamole (AGGRENOX)  MG per 12 hr capsule, Take 1 capsule by mouth daily , Disp: , Rfl:      Blood Pressure Monitoring KIT, Check BP every 7 days., Disp: 1 kit, Rfl: 0     carboxymethylcellulose PF (REFRESH PLUS) 0.5 % ophthalmic solution, Place 1 drop into both eyes 4 times daily as needed for dry eyes, Disp: 120 mL, Rfl: 11     cevimeline (EVOXAC) 30 MG capsule, TAKE 1 CAPSULE (30 MG) BY MOUTH 3 TIMES DAILY AS NEEDED., Disp: 270 capsule, Rfl: 3     cyanocobalamin (VITAMIN B12) 1000 MCG/ML injection, Inject 1 mL into the muscle every 30 days, Disp: , Rfl:      esomeprazole (NEXIUM) 40 MG capsule, Take 1 capsule (40 mg) by mouth 2 times daily Take 30-60 minutes before eating., Disp: 180 capsule, Rfl: 3     folic acid (FOLVITE) 1 MG tablet, Take 1 tablet (1 mg) by mouth daily, Disp: 90 tablet, Rfl: 3     leucovorin (WELLCOVORIN) 25 MG tablet, Take 1 tablet (25 mg) by mouth daily Day after taking methotrexate, Disp: 12 tablet, Rfl: 3     LORazepam (ATIVAN) 0.5 MG tablet, Take 1 - 2 tablets by mouth three times daily as  needed., Disp: 60 tablet, Rfl: 0     methotrexate 50 MG/2ML injection, Inject 0.4 mLs (10 mg) Subcutaneous every 7 days Monitoring labs required every 8 weeks for medication refills., Disp: 4 mL, Rfl: 3     mycophenolate (GENERIC EQUIVALENT) 500 MG tablet, TAKE THREE TABLETS BY MOUTH DAILY, Disp: 270 tablet, Rfl: 0     naloxone (EVZIO) 0.4 MG/0.4ML auto-injector, Inject 0.4 mLs (0.4 mg) into the muscle as needed for opioid reversal every 2-3 minutes until assistance arrives, Disp: 0.8 mL, Rfl: 0     olopatadine (PATANOL) 0.1 % ophthalmic solution, Place 1 drop into both eyes 2 times daily, Disp: 5 mL, Rfl: 3     omeprazole (PRILOSEC) 40 MG DR capsule, TAKE 1 CAPSULE (40 MG) BY MOUTH 2 TIMES DAILY, Disp: 180 capsule, Rfl: 3     ondansetron (ZOFRAN-ODT) 4 MG ODT tab, DISSOLVE 1 TABLET IN MOUTH EVERY EIGHT HOURS AS NEEDED FOR NAUSEA, Disp: 20 tablet, Rfl: 0     oxyCODONE (ROXICODONE) 5 MG tablet, Take 1 tablet (5 mg) by mouth every 4 hours as needed for moderate to severe pain or pain maximum 6 tablet(s) per day, Disp: 180 tablet, Rfl: 0     oxyCODONE (ROXICODONE) 5 MG tablet, Take 1 tablet (5 mg) by mouth every 4 hours as needed for moderate to severe pain or pain maximum 6 tablet(s) per day, Disp: 204 tablet, Rfl: 0     oxyCODONE-acetaminophen (PERCOCET) 5-325 MG per tablet, Take 1 tablet by mouth every 4 hours as needed for pain or moderate to severe pain (maximum 3 tablets per day), Disp: 102 tablet, Rfl: 0     oxyCODONE-acetaminophen (PERCOCET) 5-325 MG tablet, Take 1 tablet by mouth every 4 hours as needed for moderate to severe pain (maximum 3 tablets per day), Disp: 90 tablet, Rfl: 0     oxyCODONE-acetaminophen (PERCOCET) 5-325 MG tablet, Take 1 tablet by mouth every 4 hours as needed for moderate to severe pain (maximum 3 tablets per day), Disp: 12 tablet, Rfl: 0     oxyCODONE-acetaminophen (PERCOCET) 5-325 MG tablet, Take 1 tablet by mouth every 4 hours as needed for moderate to severe pain (maximum 3 tablets  per day), Disp: 102 tablet, Rfl: 0     oxyCODONE-acetaminophen (PERCOCET) 5-325 MG tablet, Take 1 tablet by mouth every 4 hours as needed for pain or moderate to severe pain (maximum 3 tablets per day), Disp: 102 tablet, Rfl: 0     pravastatin (PRAVACHOL) 10 MG tablet, Take 1 tablet (10 mg) by mouth daily, Disp: 30 tablet, Rfl: 5     pregabalin (LYRICA) 75 MG capsule, Take 1 capsule (75 mg) by mouth 2 times daily, Disp: 180 capsule, Rfl: 1     valACYclovir (VALTREX) 500 MG tablet, Take 1 tablet (500 mg) by mouth 2 times daily (Patient taking differently: Take 500 mg by mouth daily ), Disp: 180 tablet, Rfl: 1     hydroxychloroquine (PLAQUENIL) 200 MG tablet, Take 1 tablet (200 mg) by mouth 2 times daily Annual Plaquenil toxicity eye screening required. (Patient not taking: Reported on 8/15/2019), Disp: 180 tablet, Rfl: 3     Vitamin D, Cholecalciferol, 1000 UNITS TABS, Take 2,000 Units by mouth daily , Disp: , Rfl:       Allergies:   Penicillin   Bactroban   Levaquin  Rifampin   Clarithromycin   Ofloxacin  Sertraline                                                  Past Medical History:  Rheumatoid arthritis/scleroderma  Gastric antral vascular ectasia  Gastroesophageal reflux disease  Iron deficiency anemia  Irregular heart beat  Osteomyelitis, right 3rd finger  Raynaud's syndrome with gangrene  Scleroderma  Sjogren's syndrome  Systemic sclerosis  Metatarsalgia  Dysuria  Ulcer of finger  Sicca syndrome  Palpitations  Ischemia of upper extremity  Intercostal pain  Anxiety  Breast implant rupture  Palpitations     Past Surgical History:  Appendectomy   Colonoscopy (08/04/2016)  EGD x 5 (08/22/14 - 08/02/2016)  Mammoplasty Augmentation Bilateral     Family History:    The patient's family history includes Cancer in an other family member; Respiratory in her father.     Social History:  The patient reports that she is a current cigarette smoker. She has been smoking about 1.00 pack per day. She has never used smokeless  "tobacco. She reports that she does not drink alcohol or use drugs. She states she is attempting to get into a research program for quitting smoking.   PCP: Lindy Carbone  Marital Status: Single     Physical Exam:   /78   Pulse 60   Ht 1.638 m (5' 4.5\")   Wt 63.9 kg (140 lb 14.4 oz)   SpO2 100%   BMI 23.81 kg/m     Wt Readings from Last 4 Encounters:   11/21/19 63.9 kg (140 lb 14.4 oz)   01/28/19 60.1 kg (132 lb 6.4 oz)   10/04/18 58.1 kg (128 lb)   07/19/18 58.1 kg (128 lb)     Constitutional: Well-developed, appearing stated age; cooperative  Eyes: Normal EOM, PERRLA, vision, conjunctiva, sclera  ENT: Normal external ears, nose, hearing, lips, teeth, gums, throat. No mucous membrane lesions, normal saliva pool  Neck: No mass or thyroid enlargement  Resp: Lungs clear to auscultation, nl to palpation  CV: RRR, no murmurs, rubs or gallops, no edema  GI: No ABD mass or tenderness, no HSM  : Not tested  Lymph: No cervical, supraclavicular, inguinal or epitrochlear nodes  MS: The TMJ, neck, shoulder, elbow, wrist, MCP/PIP/DIP, spine, hip, knee, ankle, and foot MTP/IP joints were examined and found normal. No active synovitis or altered joint anatomy. Full joint ROM. Normal  strength. No dactylitis,  tenosynovitis, enthesopathy.  Slight restriction in wrist range of motion but not significant. Proximally 15   contracture in PIPs and 5-10   in DIPs of the left hand. Right hand greater than 90   contractures in PIPs 3-5. Obvious calcinosis lesion over the lateral aspect of the right 4th PIP. Tenderness to palpation right > left lateral hip over greater trochanter.    Tendon rub present in the left wrist.   Skin: No nail pitting, alopecia, rash, nodules or lesions  Skin thickening in a number of places, though stable. Right fourth digit has a 1 mm ulcer over the lateral aspect of the PIP which is in about 110 degrees of flexion contracture and she is tender over this site and slightly red. "   Neuro: Normal cranial nerves, strength, sensation, DTRs.   Psych: Normal judgement, orientation, memory, affect.     Imaging:  HRCT without contrast (08/22/2019):  Impression:  1. Mild bilateral posterior subpleural reticular opacities, dependent atelectasis versus early ILD. Consider prone positioning on follow-up  study.  2. Trace mucous plugging in the right middle lobe.  3. Stable incidental calcified splenic artery aneurysm.  4. Collapsed left breast implant and calcified right implant.  Sinai Carter MD    PFT Results (07/22/2019):  The FVC and FEV1 and the FEV1/FVC ratio are within normal limits. The inspiratory flow rates are within normal limits. Lung volumes are within normal limits. The diffusing capacity is normal. However, the diffusing capacity was not corrected for the   patient's hemoglobin  Impression:  Normal spirometry, lung volumes and diffusing capacity.    Laboratory:   Component      Latest Ref Rng & Units 8/15/2019 8/15/2019 8/15/2019           3:18 PM  3:24 PM  3:24 PM   WBC      4.0 - 11.0 10e9/L  5.2    RBC Count      3.8 - 5.2 10e12/L  3.71 (L)    Hemoglobin      11.7 - 15.7 g/dL  11.4 (L)    Hematocrit      35.0 - 47.0 %  37.1    MCV      78 - 100 fl  100    MCH      26.5 - 33.0 pg  30.7    MCHC      31.5 - 36.5 g/dL  30.7 (L)    RDW      10.0 - 15.0 %  13.2    Platelet Count      150 - 450 10e9/L  212    Diff Method        Automated Method    % Neutrophils      %  48.8    % Lymphocytes      %  40.5    % Monocytes      %  9.0    % Eosinophils      %  1.3    % Basophils      %  0.2    % Immature Granulocytes      %  0.2    Nucleated RBCs      0 /100  0    Absolute Neutrophil      1.6 - 8.3 10e9/L  2.6    Absolute Lymphocytes      0.8 - 5.3 10e9/L  2.1    Absolute Monocytes      0.0 - 1.3 10e9/L  0.5    Absolute Eosinophils      0.0 - 0.7 10e9/L  0.1    Absolute Basophils      0.0 - 0.2 10e9/L  0.0    Abs Immature Granulocytes      0 - 0.4 10e9/L  0.0    Absolute Nucleated RBC         0.0    Color Urine       Colorless     Appearance Urine       Clear     Glucose Urine      NEG:Negative mg/dL Negative     Bilirubin Urine      NEG:Negative Negative     Ketones Urine      NEG:Negative mg/dL Negative     Specific Gravity Urine      1.003 - 1.035 1.002 (L)     Blood Urine      NEG:Negative Small (A)     pH Urine      5.0 - 7.0 pH 6.0     Protein Albumin Urine      NEG:Negative mg/dL Negative     Urobilinogen mg/dL      0.0 - 2.0 mg/dL 0.0     Nitrite Urine      NEG:Negative Negative     Leukocyte Esterase Urine      NEG:Negative Negative     Source       Midstream Urine     WBC Urine      0 - 5 /HPF 1     RBC Urine      0 - 2 /HPF 1     Squamous Epithelial /HPF Urine      0 - 1 /HPF <1     Albumin      3.4 - 5.0 g/dL   4.3     Component      Latest Ref Rng & Units 8/15/2019 8/15/2019 8/15/2019 8/15/2019           3:24 PM  3:24 PM  3:24 PM  3:24 PM   Creatinine      0.52 - 1.04 mg/dL  0.78     GFR Estimate      >60 mL/min/1.73:m2  85     GFR Estimate If Black      >60 mL/min/1.73:m2  >90     Sed Rate      0 - 30 mm/h       CRP Inflammation      0.0 - 8.0 mg/L <2.9      AST      0 - 45 U/L   15    ALT      0 - 50 U/L    18   Albumin      3.4 - 5.0 g/dL         Component      Latest Ref Rng & Units 8/15/2019           3:24 PM   Sed Rate      0 - 30 mm/h 16       Scribe Disclosure:  Sherry CLAYTON, am serving as a scribe to document services personally performed by Brennen Child MD at this visit, based upon the provider's statements to me. All documentation has been reviewed by the aforementioned provider prior to being entered into the official medical record.

## 2019-11-21 NOTE — LETTER
2019     RE: Willard Grayson  [address]  [address]  [facility] [place] [address]     Dear Colleague,    Thank you for referring your patient, Willard Grayson, to the [facility] LUNG [facility]ENCE [facility] at [facility]. Please see a copy of my visit note below.    [facility]  Rheumatology Clinic  Brennen Child MD  2019     Name: Willard Grayson  MRN: [id number]  Age: 54 year old  : 1965  Referring provider: Referred Self    Problem List:  1. 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   2. Associated marked neuropathic skin pain markedly improved with Lyrica with prior medication therapy with gabapentin.  3. Diffuse musculoskeletal pain requiring methotrexate plus CellCept, and narcotic to control, but also improved. Now off methotrexate due to GI issues.  4. Initial clinical overlap with anti-CCP seropositive rheumatoid arthritis with continued anti-CCP seropositivity as of 2010.  5. Raynaud's phenomena with digital ulcers with easily cracked skin.   6. GI involvement consistent with bacterial small bowel overgrowth with marked improvement after a course of neomycin followed by Dr. Glez; positive hydrogen breath test in 3/2012 - treated with rifaximin through Dr. Jacob  7. No convincing evidence of lung involvement on serial pulmonary function tests or clinically.   8. History of heart palpitations prior to the onset of SSc with stable symptoms since. Holter 2018 with PACs.  9. History of medication failures with Remicade, methotrexate, methotrexate plus CellCept and with Orencia and with improvement since initiation of IVIG but with complicating headaches and overall sense of malaise with IVIG infusions.   10.  History of silicone breast implants.   11. Incidental finding of calcified splenic artery aneurysm.  12. EGD in 3/2012 with hiatal hernia and gastral antral vascular ectasia  13. Recurrent right third finger contracture with extensor surface ulceration, healed   14. Trochanteric bursitis, R>L    Assessment and Plan:   # Systemic sclerosis   # Gastroesophageal reflux disease, esophagitis presence not specified  # Esophageal dysmotility  # Raynaud's disease with gangrene  # High risk medications (not anticoagulants) long-term use  # Rheumatoid arthritis involving multiple sites with positive rheumatoid factor (H)  # Metatarsalgia, unspecified laterality  # Metatarsalgia of both feet  # Osteopenia, unspecified location  # Systemic sclerosis  # MCDANIEL (dyspnea on exertion)  # Chest wall pain  # Skin lesion of lower extremity  Willard Grayson is a 54 year old female with a history of diffuse cutaneous systemic sclerosis who presents for follow up. Her recent HRCT shows mild bilateral posterior subpleural reticular opacities, dependent atelectasis versus early ILD that could be atelectasis due  Narcotic use or low grade interstitial lung disease. Her PFTs have been relatively stable over the years, and my suspicion is that this is atelectasis, but given these findings, we will continue with PFTs at least once a year.      Patient reports pain in her feet, hands, back, neck and left leg. She is on a high dose of narcotic pain killers (oxycodone and percocet). Although I have prescribed these prn, she takes them around the clock. She also has anxiety, which I think contributes to a fear of attempting to decrease her doses. On the other hand, she may be safer using them this way, rather that waiting until she has more pain and taking too many at once. Given all this, I will refer her to the pain clinic to address the suitability of her current regimen, whether it can be simplified or a lowering of the use of her medications may  be possible.     We will also restart methotrexate to help with the joint pain, especially given current findings of inflammatory tendon rubs. . She does acknowledge having had less pain when on this, but has had difficulty tolerating intermittently due to GI problems from her disease and exacerbation with the drug.  If she does not improved joint pain in the hips, I will plan for an XRay to look for calcinosis of both hips.     I again counseled her on cessation of smoking.  Patient would like her B12 checked.     Orders:  - methotrexate 50 MG/2ML injection  Dispense: 4 mL; Refill: 3  - oxyCODONE-acetaminophen (PERCOCET) 5-325 MG tablet  Dispense: 90 tablet; Refill: 0  - oxyCODONE (ROXICODONE) 5 MG tablet  Dispense: 180 tablet; Refill: 0  - Vitamin B12       Follow-up:  3 months.     This visit was 40 mins in duration, the majority spent in counseling.     JANET Child MD, PhD    Rheumatology      HPI:   Willard Grayson is a 54 year old female with a history of diffuse cutaneous systemic sclerosis who presents for follow up. I last saw the patient on 08/15/2019, at which time she had a slow decline of PFTs, and an HRCT was obtained. She was counseled on smoking cessation. She had worsening eye dryness. She was on Patanol. She has numbness and tingling in all extremities which she was on Lyrica for. Referral was given to GI for dysphagia was provided. Her Raynaud's remained stable on Propranolol. She continues to have pain in the lateral aspects of both hips c/w trochanteric bursitis. I provided a dermatology referral for painful scabs on her left upper leg.       Today, she reports pain in her feet, hands, back, neck and left leg. She also has pain in her hips and stiffness and pain in her hands while she drives. She has a tendon rub in her left wrist. She takes percocet three times a day for her pain and oxycodone (at unknown frequency regularly whether or not she has pain). She is not taking  oxycodone PRN.     She reports decreased hearing loss. She reports shortness of breath while she climbs up a flight of stairs. She is on folic acid for hair loss. She has trouble swallowing. She is still smoking cigarettes.      Review of Systems:   Pertinent items are noted in HPI or as below, remainder of complete ROS is negative.      No recent problems with hearing or vision. No swallowing problems.   No breathing difficulty, shortness of breath, coughing, or wheezing  No chest pain or palpitations  No heart burn, indigestion, abdominal pain, nausea, vomiting, diarrhea  No urination problems, no bloody, cloudy urine, no dysuria  No numbing, tingling, weakness  No headaches or confusion  No rashes. No easy bleeding or bruising.   Answers for HPI/ROS submitted by the patient on 11/21/2019   General Symptoms: Yes  Skin Symptoms: Yes  HENT Symptoms: Yes  EYE SYMPTOMS: Yes  HEART SYMPTOMS: Yes  LUNG SYMPTOMS: Yes  INTESTINAL SYMPTOMS: Yes  URINARY SYMPTOMS: No  GYNECOLOGIC SYMPTOMS: No  BREAST SYMPTOMS: No  SKELETAL SYMPTOMS: Yes  BLOOD SYMPTOMS: No  NERVOUS SYSTEM SYMPTOMS: Yes  MENTAL HEALTH SYMPTOMS: Yes  Fever: No  Loss of appetite: No  Weight loss: No  Weight gain: Yes  Fatigue: Yes  Night sweats: Yes  Chills: No  Excessive thirst: Yes  Feeling hot or cold when others believe the temperature is normal: Yes  Loss of height: No  Post-operative complications: No  Surgical site pain: No  Hallucinations: No  Change in or Loss of Energy: No  Changes in hair: No  Changes in moles/birth marks: Yes  Itching: Yes  Rashes: No  Changes in nails: No  Acne: No  Hair in places you don't want it: No  Ear pain: No  Ear discharge: No  Hearing loss: Yes  Tinnitus: Yes  Nosebleeds: No  Congestion: No  Sinus pain: No  Tooth pain: No  Gum tenderness: No  Bleeding gums: No  Change in taste: No  Change in sense of smell: No  Dry mouth: Yes  Hearing aid used: No  Eye pain: No  Vision loss: Yes  Dry eyes: Yes  Watery eyes: Yes  Eye  bulging: No  Double vision: No  Flashing of lights: No  Cough: No  Sputum or phlegm: No  Coughing up blood: No  Difficulty breating or shortness of breath: No  Snoring: No  Wheezing: No  Difficulty breathing on exertion: Yes  Chest pain or pressure: No  Fast or irregular heartbeat: No  Pain in legs with walking: Yes  Trouble breathing while lying down: No  Fingers or toes appear blue: Yes  High blood pressure: Yes  Low blood pressure: No  Fainting: No  Murmurs: No  Pacemaker: No  Varicose veins: No  Edema or swelling: No  Wake up at night with shortness of breath: No  Light-headedness: No  Heart burn or indigestion: Yes  Nausea: No  Vomiting: No  Abdominal pain: No  Bloating: Yes  Constipation: Yes  Diarrhea: No  Back pain: Yes  Muscle aches: Yes  Neck pain: Yes  Swollen joints: No  Joint pain: Yes  Bone pain: No  Muscle cramps: No  Trouble with coordination: No  Dizziness or trouble with balance: No  Fainting or black-out spells: No  Memory loss: No  Headache: Yes  Seizures: No  Speech problems: No  Nervous or Anxious: No  Depression: Yes  Trouble sleeping: No  Trouble thinking or concentrating: No  Mood changes: No  Panic attacks: No      Active Medications:     Current Outpatient Medications:      aspirin-dipyridamole (AGGRENOX)  MG per 12 hr capsule, Take 1 capsule by mouth daily , Disp: , Rfl:      Blood Pressure Monitoring KIT, Check BP every 7 days., Disp: 1 kit, Rfl: 0     carboxymethylcellulose PF (REFRESH PLUS) 0.5 % ophthalmic solution, Place 1 drop into both eyes 4 times daily as needed for dry eyes, Disp: 120 mL, Rfl: 11     cevimeline (EVOXAC) 30 MG capsule, TAKE 1 CAPSULE (30 MG) BY MOUTH 3 TIMES DAILY AS NEEDED., Disp: 270 capsule, Rfl: 3     cyanocobalamin (VITAMIN B12) 1000 MCG/ML injection, Inject 1 mL into the muscle every 30 days, Disp: , Rfl:      esomeprazole (NEXIUM) 40 MG capsule, Take 1 capsule (40 mg) by mouth 2 times daily Take 30-60 minutes before eating., Disp: 180 capsule, Rfl:  3     folic acid (FOLVITE) 1 MG tablet, Take 1 tablet (1 mg) by mouth daily, Disp: 90 tablet, Rfl: 3     leucovorin (WELLCOVORIN) 25 MG tablet, Take 1 tablet (25 mg) by mouth daily Day after taking methotrexate, Disp: 12 tablet, Rfl: 3     LORazepam (ATIVAN) 0.5 MG tablet, Take 1 - 2 tablets by mouth three times daily as needed., Disp: 60 tablet, Rfl: 0     methotrexate 50 MG/2ML injection, Inject 0.4 mLs (10 mg) Subcutaneous every 7 days Monitoring labs required every 8 weeks for medication refills., Disp: 4 mL, Rfl: 3     mycophenolate (GENERIC EQUIVALENT) 500 MG tablet, TAKE THREE TABLETS BY MOUTH DAILY, Disp: 270 tablet, Rfl: 0     naloxone (EVZIO) 0.4 MG/0.4ML auto-injector, Inject 0.4 mLs (0.4 mg) into the muscle as needed for opioid reversal every 2-3 minutes until assistance arrives, Disp: 0.8 mL, Rfl: 0     olopatadine (PATANOL) 0.1 % ophthalmic solution, Place 1 drop into both eyes 2 times daily, Disp: 5 mL, Rfl: 3     omeprazole (PRILOSEC) 40 MG DR capsule, TAKE 1 CAPSULE (40 MG) BY MOUTH 2 TIMES DAILY, Disp: 180 capsule, Rfl: 3     ondansetron (ZOFRAN-ODT) 4 MG ODT tab, DISSOLVE 1 TABLET IN MOUTH EVERY EIGHT HOURS AS NEEDED FOR NAUSEA, Disp: 20 tablet, Rfl: 0     oxyCODONE (ROXICODONE) 5 MG tablet, Take 1 tablet (5 mg) by mouth every 4 hours as needed for moderate to severe pain or pain maximum 6 tablet(s) per day, Disp: 180 tablet, Rfl: 0     oxyCODONE (ROXICODONE) 5 MG tablet, Take 1 tablet (5 mg) by mouth every 4 hours as needed for moderate to severe pain or pain maximum 6 tablet(s) per day, Disp: 204 tablet, Rfl: 0     oxyCODONE-acetaminophen (PERCOCET) 5-325 MG per tablet, Take 1 tablet by mouth every 4 hours as needed for pain or moderate to severe pain (maximum 3 tablets per day), Disp: 102 tablet, Rfl: 0     oxyCODONE-acetaminophen (PERCOCET) 5-325 MG tablet, Take 1 tablet by mouth every 4 hours as needed for moderate to severe pain (maximum 3 tablets per day), Disp: 90 tablet, Rfl: 0      oxyCODONE-acetaminophen (PERCOCET) 5-325 MG tablet, Take 1 tablet by mouth every 4 hours as needed for moderate to severe pain (maximum 3 tablets per day), Disp: 12 tablet, Rfl: 0     oxyCODONE-acetaminophen (PERCOCET) 5-325 MG tablet, Take 1 tablet by mouth every 4 hours as needed for moderate to severe pain (maximum 3 tablets per day), Disp: 102 tablet, Rfl: 0     oxyCODONE-acetaminophen (PERCOCET) 5-325 MG tablet, Take 1 tablet by mouth every 4 hours as needed for pain or moderate to severe pain (maximum 3 tablets per day), Disp: 102 tablet, Rfl: 0     pravastatin (PRAVACHOL) 10 MG tablet, Take 1 tablet (10 mg) by mouth daily, Disp: 30 tablet, Rfl: 5     pregabalin (LYRICA) 75 MG capsule, Take 1 capsule (75 mg) by mouth 2 times daily, Disp: 180 capsule, Rfl: 1     valACYclovir (VALTREX) 500 MG tablet, Take 1 tablet (500 mg) by mouth 2 times daily (Patient taking differently: Take 500 mg by mouth daily ), Disp: 180 tablet, Rfl: 1     hydroxychloroquine (PLAQUENIL) 200 MG tablet, Take 1 tablet (200 mg) by mouth 2 times daily Annual Plaquenil toxicity eye screening required. (Patient not taking: Reported on 8/15/2019), Disp: 180 tablet, Rfl: 3     Vitamin D, Cholecalciferol, 1000 UNITS TABS, Take 2,000 Units by mouth daily , Disp: , Rfl:       Allergies:   Penicillin   Bactroban   Levaquin  Rifampin   Clarithromycin   Ofloxacin  Sertraline                                                  Past Medical History:  Rheumatoid arthritis/scleroderma  Gastric antral vascular ectasia  Gastroesophageal reflux disease  Iron deficiency anemia  Irregular heart beat  Osteomyelitis, right 3rd finger  Raynaud's syndrome with gangrene  Scleroderma  Sjogren's syndrome  Systemic sclerosis  Metatarsalgia  Dysuria  Ulcer of finger  Sicca syndrome  Palpitations  Ischemia of upper extremity  Intercostal pain  Anxiety  Breast implant rupture  Palpitations     Past Surgical History:  Appendectomy   Colonoscopy (08/04/2016)  EGD x 5  "(08/22/14 - 08/02/2016)  Mammoplasty Augmentation Bilateral     Family History:    The patient's family history includes Cancer in an other family member; Respiratory in her father.     Social History:  The patient reports that she is a current cigarette smoker. She has been smoking about 1.00 pack per day. She has never used smokeless tobacco. She reports that she does not drink alcohol or use drugs. She states she is attempting to get into a research program for quitting smoking.   PCP: Lindy Carbone  Marital Status: Single     Physical Exam:   /78   Pulse 60   Ht 1.638 m (5' 4.5\")   Wt 63.9 kg (140 lb 14.4 oz)   SpO2 100%   BMI 23.81 kg/m      Wt Readings from Last 4 Encounters:   11/21/19 63.9 kg (140 lb 14.4 oz)   01/28/19 60.1 kg (132 lb 6.4 oz)   10/04/18 58.1 kg (128 lb)   07/19/18 58.1 kg (128 lb)     Constitutional: Well-developed, appearing stated age; cooperative  Eyes: Normal EOM, PERRLA, vision, conjunctiva, sclera  ENT: Normal external ears, nose, hearing, lips, teeth, gums, throat. No mucous membrane lesions, normal saliva pool  Neck: No mass or thyroid enlargement  Resp: Lungs clear to auscultation, nl to palpation  CV: RRR, no murmurs, rubs or gallops, no edema  GI: No ABD mass or tenderness, no HSM  : Not tested  Lymph: No cervical, supraclavicular, inguinal or epitrochlear nodes  MS: The TMJ, neck, shoulder, elbow, wrist, MCP/PIP/DIP, spine, hip, knee, ankle, and foot MTP/IP joints were examined and found normal. No active synovitis or altered joint anatomy. Full joint ROM. Normal  strength. No dactylitis,  tenosynovitis, enthesopathy.  Slight restriction in wrist range of motion but not significant. Proximally 15   contracture in PIPs and 5-10   in DIPs of the left hand. Right hand greater than 90   contractures in PIPs 3-5. Obvious calcinosis lesion over the lateral aspect of the right 4th PIP. Tenderness to palpation right > left lateral hip over greater " trochanter.    Tendon rub present in the left wrist.   Skin: No nail pitting, alopecia, rash, nodules or lesions  Skin thickening in a number of places, though stable. Right fourth digit has a 1 mm ulcer over the lateral aspect of the PIP which is in about 110 degrees of flexion contracture and she is tender over this site and slightly red.   Neuro: Normal cranial nerves, strength, sensation, DTRs.   Psych: Normal judgement, orientation, memory, affect.     Imaging:  HRCT without contrast (08/22/2019):  Impression:  1. Mild bilateral posterior subpleural reticular opacities, dependent atelectasis versus early ILD. Consider prone positioning on follow-up  study.  2. Trace mucous plugging in the right middle lobe.  3. Stable incidental calcified splenic artery aneurysm.  4. Collapsed left breast implant and calcified right implant.  Sinai Carter MD    PFT Results (07/22/2019):  The FVC and FEV1 and the FEV1/FVC ratio are within normal limits. The inspiratory flow rates are within normal limits. Lung volumes are within normal limits. The diffusing capacity is normal. However, the diffusing capacity was not corrected for the   patient's hemoglobin  Impression:  Normal spirometry, lung volumes and diffusing capacity.    Laboratory:   Component      Latest Ref Rng & Units 8/15/2019 8/15/2019 8/15/2019           3:18 PM  3:24 PM  3:24 PM   WBC      4.0 - 11.0 10e9/L  5.2    RBC Count      3.8 - 5.2 10e12/L  3.71 (L)    Hemoglobin      11.7 - 15.7 g/dL  11.4 (L)    Hematocrit      35.0 - 47.0 %  37.1    MCV      78 - 100 fl  100    MCH      26.5 - 33.0 pg  30.7    MCHC      31.5 - 36.5 g/dL  30.7 (L)    RDW      10.0 - 15.0 %  13.2    Platelet Count      150 - 450 10e9/L  212    Diff Method        Automated Method    % Neutrophils      %  48.8    % Lymphocytes      %  40.5    % Monocytes      %  9.0    % Eosinophils      %  1.3    % Basophils      %  0.2    % Immature Granulocytes      %  0.2    Nucleated RBCs      0 /100   0    Absolute Neutrophil      1.6 - 8.3 10e9/L  2.6    Absolute Lymphocytes      0.8 - 5.3 10e9/L  2.1    Absolute Monocytes      0.0 - 1.3 10e9/L  0.5    Absolute Eosinophils      0.0 - 0.7 10e9/L  0.1    Absolute Basophils      0.0 - 0.2 10e9/L  0.0    Abs Immature Granulocytes      0 - 0.4 10e9/L  0.0    Absolute Nucleated RBC        0.0    Color Urine       Colorless     Appearance Urine       Clear     Glucose Urine      NEG:Negative mg/dL Negative     Bilirubin Urine      NEG:Negative Negative     Ketones Urine      NEG:Negative mg/dL Negative     Specific Gravity Urine      1.003 - 1.035 1.002 (L)     Blood Urine      NEG:Negative Small (A)     pH Urine      5.0 - 7.0 pH 6.0     Protein Albumin Urine      NEG:Negative mg/dL Negative     Urobilinogen mg/dL      0.0 - 2.0 mg/dL 0.0     Nitrite Urine      NEG:Negative Negative     Leukocyte Esterase Urine      NEG:Negative Negative     Source       Midstream Urine     WBC Urine      0 - 5 /HPF 1     RBC Urine      0 - 2 /HPF 1     Squamous Epithelial /HPF Urine      0 - 1 /HPF <1     Albumin      3.4 - 5.0 g/dL   4.3     Component      Latest Ref Rng & Units 8/15/2019 8/15/2019 8/15/2019 8/15/2019           3:24 PM  3:24 PM  3:24 PM  3:24 PM   Creatinine      0.52 - 1.04 mg/dL  0.78     GFR Estimate      >60 mL/min/1.73:m2  85     GFR Estimate If Black      >60 mL/min/1.73:m2  >90     Sed Rate      0 - 30 mm/h       CRP Inflammation      0.0 - 8.0 mg/L <2.9      AST      0 - 45 U/L   15    ALT      0 - 50 U/L    18   Albumin      3.4 - 5.0 g/dL         Component      Latest Ref Rng & Units 8/15/2019           3:24 PM   Sed Rate      0 - 30 mm/h 16     Scribe Disclosure:  Sherry CLAYTON, am serving as a scribe to document services personally performed by Brennen Child MD at this visit, based upon the provider's statements to me. All documentation has been reviewed by the aforementioned provider prior to being entered into the official medical record.

## 2019-11-21 NOTE — NURSING NOTE
Chief Complaint   Patient presents with     Follow Up     3mo fu     Vitals were taken and medications were reconciled.     RENATA Crisostomo

## 2019-11-22 NOTE — PATIENT INSTRUCTIONS
- Schedule with pain clinic as soon as possible. Continue with current pain regimen till this visit.   - Attempt to quit smoking  - Start methotrexate at 50mg/2mL weekly

## 2019-11-23 RX ORDER — MYCOPHENOLATE MOFETIL 500 MG/1
1500 TABLET ORAL DAILY
Qty: 270 TABLET | Refills: 0 | Status: SHIPPED | OUTPATIENT
Start: 2019-11-23 | End: 2020-02-29

## 2019-11-23 NOTE — TELEPHONE ENCOUNTER
mycophenolate (GENERIC EQUIVALENT) 500 MG tablet  Last Written Prescription Date:  9/3/19  Last Fill Quantity: 270,   # refills: 0  Last Office Visit: 11/21/19  Future Office visit:  None      CBC RESULTS:   Recent Labs   Lab Test 11/21/19 1827   WBC 4.9   RBC 3.75*   HGB 11.6*   HCT 37.1   MCV 99   MCH 30.9   MCHC 31.3*   RDW 12.7          Creatinine   Date Value Ref Range Status   11/21/2019 0.73 0.52 - 1.04 mg/dL Final   ]    Liver Function Studies -   Recent Labs   Lab Test 11/21/19  1827  11/24/15  1848   PROTTOTAL  --   --  7.8   ALBUMIN 4.2   < > 4.1   BILITOTAL  --   --  0.4   ALKPHOS  --   --  49   AST 15   < > 12   ALT 19   < > 15    < > = values in this interval not displayed.       Routing refill request to provider for review/approval because: provider review required.  Dosing alert.

## 2019-12-05 ENCOUNTER — TELEPHONE (OUTPATIENT)
Dept: RHEUMATOLOGY | Facility: CLINIC | Age: 54
End: 2019-12-05

## 2019-12-05 DIAGNOSIS — M35.00 SICCA SYNDROME (H): ICD-10-CM

## 2019-12-05 NOTE — TELEPHONE ENCOUNTER
PA Initiation    Medication: methotrexate sodium 50mg/2ml solution  Insurance Company: [employer] - Phone [phone] Fax [phone]  Pharmacy Filling the Rx: [id number] [place], [place] - [address]  Filling Pharmacy Phone: [phone]  Filling Pharmacy Fax:    Start Date: 12/5/2019    Central Prior Authorization Team   Phone: [phone]

## 2019-12-05 NOTE — TELEPHONE ENCOUNTER
Prior Authorization Retail Medication Request    Medication/Dose: methotrexate sodium 50mg/2ml solution  ICD code (if different than what is on RX):  M34.9, M05.89, Z79.899  Previously Tried and Failed:    Rationale:      Insurance Name:    Insurance ID:        Pharmacy Information (if different than what is on RX)  Name:    Phone:

## 2019-12-06 RX ORDER — OLOPATADINE HYDROCHLORIDE 1 MG/ML
SOLUTION/ DROPS OPHTHALMIC
Qty: 5 ML | Refills: 2 | Status: SHIPPED | OUTPATIENT
Start: 2019-12-06 | End: 2020-02-28

## 2019-12-06 NOTE — TELEPHONE ENCOUNTER
Prior Authorization Approval    Authorization Effective Date: 9/6/2019  Authorization Expiration Date: 12/4/2020  Medication: methotrexate sodium 50mg/2ml solution  Approved Dose/Quantity: 4  Reference #: n/a   Insurance Company: [employer] - Phone [phone] Fax [phone]  Which Pharmacy is filling the prescription (Not needed for infusion/clinic administered): [id number] - [place], [place] - [address]  Pharmacy Notified: Yes  Patient Notified:  **Instructed pharmacy to notify patient when script is ready to /ship.**

## 2019-12-06 NOTE — TELEPHONE ENCOUNTER
olopatadine (PATANOL) 0.1 % ophthalmic solution      Last Written Prescription Date:  7/19/18  Last Fill Quantity: 5 ml,   # refills: 3  Last Office Visit : 11/21/19  Future Office visit:  none    Routing refill request to provider for review/approval because:  Drug not on the [facility], P or [facility] refill protocol or controlled substance

## 2019-12-09 DIAGNOSIS — M05.89 OTHER RHEUMATOID ARTHRITIS WITH RHEUMATOID FACTOR OF MULTIPLE SITES (H): ICD-10-CM

## 2019-12-09 DIAGNOSIS — M06.9 RHEUMATOID ARTHRITIS (H): ICD-10-CM

## 2019-12-09 DIAGNOSIS — Z79.899 ENCOUNTER FOR LONG-TERM CURRENT USE OF MEDICATION: ICD-10-CM

## 2019-12-09 DIAGNOSIS — M34.9 SYSTEMIC SCLEROSIS (H): Primary | ICD-10-CM

## 2019-12-10 NOTE — TELEPHONE ENCOUNTER
"Syringe/Needle, Disp, 27G X 1/2\" 1 ML MISC   Last Written Prescription Date:  10/31/2011  Last Fill Quantity: 12,   # refills: 3  Last Office Visit : 11/21/2019  Future Office visit:  None    Routing refill request to provider for review/approval because:  Medication Discontinued by Pt on 3/9/2013.      Referring to Provider for renewing the script for Pt care    Malaika Ulloa RN  Central Triage Red Flags/Med Refills        Syringe/Needle, Disp, 27G X 1/2\" 1 ML MISC (Discontinued) 12 each 3 10/31/2011 3/19/2013 --   Sig - Route: once a week. Use once weekly with methotrexate, or as directed. - Does not apply   Class: E-Prescribe   Reason for Discontinue: Stopped by Patient   Order: [id number]     Discontinued Stopped by Patient AramCorinnaen 3/19/13 1131       "

## 2019-12-12 ENCOUNTER — TELEPHONE (OUTPATIENT)
Dept: RHEUMATOLOGY | Facility: CLINIC | Age: 54
End: 2019-12-12

## 2019-12-16 DIAGNOSIS — I73.01 RAYNAUD'S DISEASE WITH GANGRENE (H): ICD-10-CM

## 2019-12-16 DIAGNOSIS — M77.41 METATARSALGIA OF BOTH FEET: ICD-10-CM

## 2019-12-16 DIAGNOSIS — M77.42 METATARSALGIA OF BOTH FEET: ICD-10-CM

## 2019-12-16 DIAGNOSIS — M34.9 SYSTEMIC SCLEROSIS (H): ICD-10-CM

## 2019-12-16 DIAGNOSIS — M77.40 METATARSALGIA, UNSPECIFIED LATERALITY: ICD-10-CM

## 2019-12-16 DIAGNOSIS — M05.79 RHEUMATOID ARTHRITIS INVOLVING MULTIPLE SITES WITH POSITIVE RHEUMATOID FACTOR (H): ICD-10-CM

## 2019-12-16 DIAGNOSIS — Z79.899 HIGH RISK MEDICATIONS (NOT ANTICOAGULANTS) LONG-TERM USE: ICD-10-CM

## 2019-12-16 DIAGNOSIS — M85.80 OSTEOPENIA, UNSPECIFIED LOCATION: ICD-10-CM

## 2019-12-16 NOTE — TELEPHONE ENCOUNTER
Last Seen: 11/21/2019  Next Appointment: no upcoming appointment scheduled  Medication: oxycodone Hcl 5mg tab  Last Filled: 11/29/2019  Qty: #[id number]    Medication: Roxicodone  Last Filled: 11/21/2019  Qty: #90       reviewed as follows:      Request sent to provider for review and signature.    Anh Armenta CMA   12/16/2019 4:12 PM

## 2019-12-16 NOTE — TELEPHONE ENCOUNTER
oxyCODONE (ROXICODONE) 5 MG tablet      Last Written Prescription Date:  11-21-19  Last Fill Quantity: 180,   # refills: 0  Last Office Visit : 11-21-19  Future Office visit:  none    Routing refill request to provider for review/approval because:  Controlled med    oxyCODONE-Acetaminophen Oral Tablet 5-325 MG      Last Written Prescription Date:  10-17-18  Last Fill Quantity: 102,   # refills: 0    Routing refill request to provider for review/approval because:  Controlled med

## 2019-12-19 RX ORDER — OXYCODONE AND ACETAMINOPHEN 5; 325 MG/1; MG/1
TABLET ORAL
Qty: 90 TABLET | Refills: 0 | Status: SHIPPED | OUTPATIENT
Start: 2019-12-19 | End: 2020-01-14

## 2019-12-19 RX ORDER — OXYCODONE HYDROCHLORIDE 5 MG/1
TABLET ORAL
Qty: 130 TABLET | Refills: 0 | Status: SHIPPED | OUTPATIENT
Start: 2019-12-19 | End: 2020-06-10

## 2019-12-30 ENCOUNTER — TELEPHONE (OUTPATIENT)
Dept: RHEUMATOLOGY | Facility: CLINIC | Age: 54
End: 2019-12-30

## 2019-12-30 NOTE — TELEPHONE ENCOUNTER
Central Prior Authorization Team   [phone]    PA Initiation    Medication: CEVIMELINE HCI 30MG CAPSULES  Insurance Company: [employer] - Phone [phone] Fax [phone]  Pharmacy Filling the Rx: [facility] #[id number] [place], [place] - [address] [place] [address]  Filling Pharmacy Phone: [phone]  Filling Pharmacy Fax: [phone]  Start Date: 12/30/2019

## 2019-12-30 NOTE — TELEPHONE ENCOUNTER
Prior Authorization Retail Medication Request    Medication/Dose: CEVIMELINE HCI 30MG CAPSULE  ICD code (if different than what is on RX):    Metatarsalgia, unspecified laterality [M77.40]       Systemic sclerosis (H) [M34.9]       Sicca syndrome (H) [M35.00]           Previously Tried and Failed:    Rationale:      Insurance Name:    Insurance ID:        Pharmacy Information (if different than what is on RX)  Name:  [facility] #[id number]  Phone:

## 2019-12-30 NOTE — TELEPHONE ENCOUNTER
Prior Authorization Retail Medication Request    Medication/Dose: CEVIMELINE HCI 30MG CAPSULES  ICD code (if different than what is on RX):    Metatarsalgia, unspecified laterality [M77.40]       Systemic sclerosis (H) [M34.9]       Sicca syndrome (H) [M35.00]           Previously Tried and Failed:    Rationale:      Insurance Name:    Insurance ID:        Pharmacy Information (if different than what is on RX)  Name:  [facility] #[id number]  Phone:

## 2019-12-31 NOTE — TELEPHONE ENCOUNTER
Prior Authorization Approval    Authorization Effective Date: 10/1/2019  Authorization Expiration Date: 12/29/2020  Medication: CEVIMELINE HCI 30MG CAPSULES-PA APPROVED   Approved Dose/Quantity:   Reference #:     Insurance Company: [employer] - Phone [phone] Fax [phone]  Expected CoPay:       CoPay Card Available:      Foundation Assistance Needed:    Which Pharmacy is filling the prescription (Not needed for infusion/clinic administered): [facility] #[id number] - [place], [place] - [address] [place] [address]  Pharmacy Notified: Yes- **Instructed pharmacy to notify patient when script is ready to /ship.**  Patient Notified: Yes

## 2020-01-02 ENCOUNTER — TELEPHONE (OUTPATIENT)
Dept: RHEUMATOLOGY | Facility: CLINIC | Age: 55
End: 2020-01-02

## 2020-01-02 DIAGNOSIS — I73.01 RAYNAUD'S DISEASE WITH GANGRENE (H): ICD-10-CM

## 2020-01-02 DIAGNOSIS — M77.41 METATARSALGIA OF BOTH FEET: Primary | ICD-10-CM

## 2020-01-02 DIAGNOSIS — G89.29 CHRONIC PAIN: ICD-10-CM

## 2020-01-02 DIAGNOSIS — M05.89 OTHER RHEUMATOID ARTHRITIS WITH RHEUMATOID FACTOR OF MULTIPLE SITES (H): ICD-10-CM

## 2020-01-02 DIAGNOSIS — M34.9 SYSTEMIC SCLEROSIS (H): ICD-10-CM

## 2020-01-02 DIAGNOSIS — M77.42 METATARSALGIA OF BOTH FEET: Primary | ICD-10-CM

## 2020-01-02 NOTE — TELEPHONE ENCOUNTER
[facility]    Phone Message    May a detailed message be left on voicemail: yes    Reason for Call: Medication Question or concern regarding medication   Prescription Clarification  Name of Medication: oxyCODONE (ROXICODONE) 5 MG tablet  Prescribing Provider: Danyell   Pharmacy: [facility] #[id number] [address]   What on the order needs clarification? The pt usually gets 180, but this time, the rx was 130 tablets. Please submit an RX for 50 to make up the difference. Please call the pt to confirm. Thanks.          Action Taken: Message routed to:  [facility]

## 2020-01-03 RX ORDER — OXYCODONE HYDROCHLORIDE 5 MG/1
5 TABLET ORAL EVERY 4 HOURS PRN
Qty: 12 TABLET | Refills: 0 | Status: SHIPPED | OUTPATIENT
Start: 2020-01-03 | End: 2020-01-10

## 2020-01-03 NOTE — TELEPHONE ENCOUNTER
Returned call to pharmacy. Informed pharmacist that Dr Child is out of the clinic until early next week and I wouldn't have an answer until then. Will set up prescription for 50 tabs as requested by pharmacy.  Pharmacy will destroy the script that they just received so when pt needs a full month, we need to give #180.    TIARRA FrankN RN  Scleroderma Care Coordinator  [facility]

## 2020-01-03 NOTE — TELEPHONE ENCOUNTER
Dr Child pt.  Last Office Visit:  12/16/19  Next Enc:  None scheduled  Medication: Roxicodone 5 mg   Last given: 12/19/19  Qty: 130  Lab:    :        Routed to Dr Child for review.    RAÚL Frank RN  Scleroderma Care Coordinator  [facility]

## 2020-01-06 NOTE — TELEPHONE ENCOUNTER
Left message on pt's identified voice mail that her Evoxac PA had been approved, and I was hoping she was able to . Asked pt to return call if she had any questions.    TIARRA FrankN RN  Scleroderma Care Coordinator  [facility]

## 2020-01-07 DIAGNOSIS — M05.89 OTHER RHEUMATOID ARTHRITIS WITH RHEUMATOID FACTOR OF MULTIPLE SITES (H): ICD-10-CM

## 2020-01-07 DIAGNOSIS — M34.9 SYSTEMIC SCLEROSIS (H): ICD-10-CM

## 2020-01-07 DIAGNOSIS — M77.41 METATARSALGIA OF BOTH FEET: ICD-10-CM

## 2020-01-07 DIAGNOSIS — I73.01 RAYNAUD'S DISEASE WITH GANGRENE (H): ICD-10-CM

## 2020-01-07 DIAGNOSIS — M77.42 METATARSALGIA OF BOTH FEET: ICD-10-CM

## 2020-01-07 NOTE — TELEPHONE ENCOUNTER
[facility]    Phone Message    May a detailed message be left on voicemail: yes    Reason for Call: Other: Pharmacy called in again and stated that they were wanting a Qty of 50 so that the pt would be caught up and could refill along with her other medications next time. Pharmacy reeived a Rx with a QTY of 12 (2 day supply). They would like a new Rx faxed for the total Qty of 50. Please advise  And follow up with Yandy.     Action Taken: Message routed to:  [facility] Rheum

## 2020-01-10 NOTE — TELEPHONE ENCOUNTER
Prescription set up for 180 tabs. Routed to Dr Child to review.     check below:        Previous to 11/1/19 pt was getting qty of 180 tabs. Sent to Dr Child.    TIARRA FrankN RN  Scleroderma Care Coordinator  [facility]

## 2020-01-10 NOTE — TELEPHONE ENCOUNTER
[facility] Call Center    Phone Message    May a detailed message be left on voicemail: yes    Reason for Call: Medication Question or concern regarding medication   Prescription Clarification  Name of Medication: Oxycodone  Prescribing Provider: Dr. Child   Pharmacy: [facility] #[id number]   What on the order needs clarification? Patient has not heard back regarding the error on the oxy script.  She was shorted 50 pills and the pharmacy has called in regard to this as well.  Patient will be out of med soon.  Please call patient ASAP.  Thank you!      Action Taken: Message routed to:  [facility]

## 2020-01-11 RX ORDER — OXYCODONE HYDROCHLORIDE 5 MG/1
5 TABLET ORAL EVERY 4 HOURS PRN
Qty: 180 TABLET | Refills: 0 | Status: SHIPPED | OUTPATIENT
Start: 2020-01-11 | End: 2020-02-05

## 2020-01-12 DIAGNOSIS — M77.41 METATARSALGIA OF BOTH FEET: ICD-10-CM

## 2020-01-12 DIAGNOSIS — M85.80 OSTEOPENIA, UNSPECIFIED LOCATION: ICD-10-CM

## 2020-01-12 DIAGNOSIS — Z79.899 HIGH RISK MEDICATIONS (NOT ANTICOAGULANTS) LONG-TERM USE: ICD-10-CM

## 2020-01-12 DIAGNOSIS — M34.9 SYSTEMIC SCLEROSIS (H): ICD-10-CM

## 2020-01-12 DIAGNOSIS — M05.79 RHEUMATOID ARTHRITIS INVOLVING MULTIPLE SITES WITH POSITIVE RHEUMATOID FACTOR (H): ICD-10-CM

## 2020-01-12 DIAGNOSIS — M77.40 METATARSALGIA, UNSPECIFIED LATERALITY: ICD-10-CM

## 2020-01-12 DIAGNOSIS — I73.01 RAYNAUD'S DISEASE WITH GANGRENE (H): ICD-10-CM

## 2020-01-12 DIAGNOSIS — M77.42 METATARSALGIA OF BOTH FEET: ICD-10-CM

## 2020-01-14 RX ORDER — OXYCODONE AND ACETAMINOPHEN 5; 325 MG/1; MG/1
TABLET ORAL
Qty: 90 TABLET | Refills: 0 | Status: SHIPPED | OUTPATIENT
Start: 2020-01-14 | End: 2020-02-13

## 2020-01-14 NOTE — TELEPHONE ENCOUNTER
Last Office Visit:  11/21/2019  Next Enc: None scheduled  Medication: Percocet   Last given: 12/19/19  Qty: 90  Lab:    :

## 2020-02-04 DIAGNOSIS — I73.01 RAYNAUD'S DISEASE WITH GANGRENE (H): ICD-10-CM

## 2020-02-04 DIAGNOSIS — M77.42 METATARSALGIA OF BOTH FEET: ICD-10-CM

## 2020-02-04 DIAGNOSIS — M77.41 METATARSALGIA OF BOTH FEET: ICD-10-CM

## 2020-02-04 DIAGNOSIS — M05.89 OTHER RHEUMATOID ARTHRITIS WITH RHEUMATOID FACTOR OF MULTIPLE SITES (H): ICD-10-CM

## 2020-02-04 DIAGNOSIS — M34.9 SYSTEMIC SCLEROSIS (H): ICD-10-CM

## 2020-02-05 NOTE — TELEPHONE ENCOUNTER
oxyCODONE (ROXICODONE) 5 MG tablet  Last Written Prescription Date:  1/11/20  Last Fill Quantity: 180,   # refills: 0  Last Office Visit :11/21/19  Future Office visit:  none    Routing refill request to provider for review/approval because:  controlled

## 2020-02-06 RX ORDER — OXYCODONE HYDROCHLORIDE 5 MG/1
TABLET ORAL
Qty: 180 TABLET | Refills: 0 | Status: SHIPPED | OUTPATIENT
Start: 2020-02-06 | End: 2020-03-06

## 2020-02-06 NOTE — TELEPHONE ENCOUNTER
Last Seen: 11/21/2019  Next Appointment: NONE  Medication: oxycodone 5mg  Last Filled: 1/12/2020  Qty: #180     reviewed as follows:      Request sent to provider for review and signature.    Anh Armenta CMA   2/6/2020 9:46 AM

## 2020-02-10 DIAGNOSIS — M85.80 OSTEOPENIA, UNSPECIFIED LOCATION: ICD-10-CM

## 2020-02-10 DIAGNOSIS — I73.01 RAYNAUD'S DISEASE WITH GANGRENE (H): ICD-10-CM

## 2020-02-10 DIAGNOSIS — M77.41 METATARSALGIA OF BOTH FEET: ICD-10-CM

## 2020-02-10 DIAGNOSIS — M77.40 METATARSALGIA, UNSPECIFIED LATERALITY: ICD-10-CM

## 2020-02-10 DIAGNOSIS — Z79.899 HIGH RISK MEDICATIONS (NOT ANTICOAGULANTS) LONG-TERM USE: ICD-10-CM

## 2020-02-10 DIAGNOSIS — M05.79 RHEUMATOID ARTHRITIS INVOLVING MULTIPLE SITES WITH POSITIVE RHEUMATOID FACTOR (H): ICD-10-CM

## 2020-02-10 DIAGNOSIS — M34.9 SYSTEMIC SCLEROSIS (H): ICD-10-CM

## 2020-02-10 DIAGNOSIS — M77.42 METATARSALGIA OF BOTH FEET: ICD-10-CM

## 2020-02-10 NOTE — TELEPHONE ENCOUNTER
oxyCODONE-acetaminophen (PERCOCET) 5-325 MG tablet      Last Written Prescription Date:  1-14-20  Last Fill Quantity: 90,   # refills: 0  Last Office Visit : 11-21-19 (RTC 3 M)  Future Office visit:  none    Routing refill request to provider for review/approval because:  Controlled med    Scheduling has been notified to contact the pt for appointment.

## 2020-02-13 RX ORDER — OXYCODONE AND ACETAMINOPHEN 5; 325 MG/1; MG/1
TABLET ORAL
Qty: 90 TABLET | Refills: 0 | Status: SHIPPED | OUTPATIENT
Start: 2020-02-13 | End: 2020-05-14

## 2020-02-13 NOTE — TELEPHONE ENCOUNTER
Last Seen: 11/21/2019  Next Appointment: 4/20/2020  Medication: Percocet  Last Filled: 1/16/2020  Qty: #90     reviewed as follows:      Request sent to provider for review and signature.    Anh Armenta CMA   2/13/2020 8:10 AM

## 2020-02-27 DIAGNOSIS — M35.00 SICCA SYNDROME (H): ICD-10-CM

## 2020-02-27 DIAGNOSIS — M34.9 SYSTEMIC SCLEROSIS (H): ICD-10-CM

## 2020-02-28 RX ORDER — OLOPATADINE HYDROCHLORIDE 1 MG/ML
1 SOLUTION/ DROPS OPHTHALMIC 2 TIMES DAILY
Qty: 5 ML | Refills: 1 | Status: SHIPPED | OUTPATIENT
Start: 2020-02-28 | End: 2020-04-30

## 2020-02-28 NOTE — TELEPHONE ENCOUNTER
Olopatadine HCl Ophthalmic Solution 0.1 %      Last Written Prescription Date:  12/6/19  Last Fill Quantity: 5 ml,   # refills: 2  Last Office Visit : 11/21/19  Future Office visit:  4/2/20    Routing refill request to provider for review/approval because:  Drug not on the [facility], [facility] or [facility] refill protocol or controlled substance

## 2020-02-29 RX ORDER — MYCOPHENOLATE MOFETIL 500 MG/1
1500 TABLET ORAL DAILY
Qty: 270 TABLET | Refills: 0 | Status: SHIPPED | OUTPATIENT
Start: 2020-02-29 | End: 2020-05-25

## 2020-02-29 NOTE — TELEPHONE ENCOUNTER
mycophenolate (GENERIC EQUIVALENT) 500 MG tablet  Last Written Prescription Date:  11/23/19  Last Fill Quantity: 270,   # refills: 0  Last Office Visit: 11/21/19  Future Office visit:  4/2/20    CBC RESULTS:   Recent Labs   Lab Test 11/21/19 1827   WBC 4.9   RBC 3.75*   HGB 11.6*   HCT 37.1   MCV 99   MCH 30.9   MCHC 31.3*   RDW 12.7          Creatinine   Date Value Ref Range Status   11/21/2019 0.73 0.52 - 1.04 mg/dL Final   ]    Liver Function Studies -   Recent Labs   Lab Test 11/21/19  1827  11/24/15  1848   PROTTOTAL  --   --  7.8   ALBUMIN 4.2   < > 4.1   BILITOTAL  --   --  0.4   ALKPHOS  --   --  49   AST 15   < > 12   ALT 19   < > 15    < > = values in this interval not displayed.       Routing refill request to provider for review/approval because: labs > 12 wks. Provider review required. Dosing alert.

## 2020-03-06 DIAGNOSIS — M77.41 METATARSALGIA OF BOTH FEET: ICD-10-CM

## 2020-03-06 DIAGNOSIS — I73.01 RAYNAUD'S DISEASE WITH GANGRENE (H): ICD-10-CM

## 2020-03-06 DIAGNOSIS — M34.9 SYSTEMIC SCLEROSIS (H): ICD-10-CM

## 2020-03-06 DIAGNOSIS — M05.89 OTHER RHEUMATOID ARTHRITIS WITH RHEUMATOID FACTOR OF MULTIPLE SITES (H): ICD-10-CM

## 2020-03-06 DIAGNOSIS — M77.42 METATARSALGIA OF BOTH FEET: ICD-10-CM

## 2020-03-06 RX ORDER — OXYCODONE HYDROCHLORIDE 5 MG/1
TABLET ORAL
Qty: 180 TABLET | Refills: 0 | Status: SHIPPED | OUTPATIENT
Start: 2020-03-06 | End: 2020-04-06

## 2020-03-06 NOTE — TELEPHONE ENCOUNTER
oxyCODONE HCl Oral Tablet 5 MG  Last Written Prescription Date:  2/6/2020  Last Fill Quantity: 180,   # refills: 0  Last Office Visit : 11/21/2019  Future Office visit:  4/2/2020    Routing refill request to provider for review/approval because:  Drug not on the [facility], [facility] or [facility] refill protocol or controlled substance      Malaika Ulloa RN  Central Triage Red Flags/Med Refills

## 2020-03-09 DIAGNOSIS — M05.79 RHEUMATOID ARTHRITIS INVOLVING MULTIPLE SITES WITH POSITIVE RHEUMATOID FACTOR (H): ICD-10-CM

## 2020-03-09 DIAGNOSIS — M34.9 SYSTEMIC SCLEROSIS (H): ICD-10-CM

## 2020-03-09 DIAGNOSIS — M77.41 METATARSALGIA OF BOTH FEET: ICD-10-CM

## 2020-03-09 DIAGNOSIS — Z79.899 HIGH RISK MEDICATIONS (NOT ANTICOAGULANTS) LONG-TERM USE: ICD-10-CM

## 2020-03-09 DIAGNOSIS — M85.80 OSTEOPENIA, UNSPECIFIED LOCATION: ICD-10-CM

## 2020-03-09 DIAGNOSIS — M77.42 METATARSALGIA OF BOTH FEET: ICD-10-CM

## 2020-03-09 DIAGNOSIS — M77.40 METATARSALGIA, UNSPECIFIED LATERALITY: ICD-10-CM

## 2020-03-09 DIAGNOSIS — I73.01 RAYNAUD'S DISEASE WITH GANGRENE (H): ICD-10-CM

## 2020-03-10 RX ORDER — OXYCODONE AND ACETAMINOPHEN 5; 325 MG/1; MG/1
TABLET ORAL
Qty: 90 TABLET | Refills: 0 | OUTPATIENT
Start: 2020-03-10

## 2020-03-10 NOTE — TELEPHONE ENCOUNTER
oxyCODONE-Acetaminophen Oral Tablet 5-325 MG      Medication sent to pharm   3/6/2020      oxyCODONE (ROXICODONE) 5 MG tablet  180 tablet  0  3/6/2020   No    Sig: Take 1 tablet (5 mg) by mouth every 4 hours as needed for pain or moderate to severe pain Max of 6 tabs per day    Sent to pharmacy as: oxyCODONE (ROXICODONE) 5 MG tablet    Class: E-Prescribe    Earliest Fill Date: 3/6/2020    Order: [id number]    E-Prescribing Status: Receipt confirmed by pharmacy (3/6/2020  1:40 PM CST)        Malaika Ulloa RN  Central Triage Red Flags/Med Refills

## 2020-03-16 DIAGNOSIS — M77.42 METATARSALGIA OF BOTH FEET: ICD-10-CM

## 2020-03-16 DIAGNOSIS — M05.89 OTHER RHEUMATOID ARTHRITIS WITH RHEUMATOID FACTOR OF MULTIPLE SITES (H): ICD-10-CM

## 2020-03-16 DIAGNOSIS — M77.41 METATARSALGIA OF BOTH FEET: ICD-10-CM

## 2020-03-16 DIAGNOSIS — I73.01 RAYNAUD'S DISEASE WITH GANGRENE (H): ICD-10-CM

## 2020-03-16 DIAGNOSIS — M34.9 SYSTEMIC SCLEROSIS (H): ICD-10-CM

## 2020-03-16 RX ORDER — OXYCODONE HYDROCHLORIDE 5 MG/1
TABLET ORAL
Qty: 180 TABLET | Refills: 0 | OUTPATIENT
Start: 2020-03-16

## 2020-03-16 NOTE — TELEPHONE ENCOUNTER
oxyCODONE HCl Oral Tablet 5 MG      Medication sent to pharm 3/6/2020      oxyCODONE (ROXICODONE) 5 MG tablet  180 tablet  0  3/6/2020   No    Sig: Take 1 tablet (5 mg) by mouth every 4 hours as needed for pain or moderate to severe pain Max of 6 tabs per day    Sent to pharmacy as: oxyCODONE (ROXICODONE) 5 MG tablet    Class: E-Prescribe    Earliest Fill Date: 3/6/2020    Order: [id number]    E-Prescribing Status: Receipt confirmed by pharmacy (3/6/2020  1:40 PM CST)      Malaika Ulloa RN  Central Triage Red Flags/Med Refills

## 2020-03-17 ENCOUNTER — TELEPHONE (OUTPATIENT)
Dept: RHEUMATOLOGY | Facility: CLINIC | Age: 55
End: 2020-03-17

## 2020-03-17 DIAGNOSIS — M77.41 METATARSALGIA OF BOTH FEET: ICD-10-CM

## 2020-03-17 DIAGNOSIS — M34.9 SYSTEMIC SCLEROSIS (H): ICD-10-CM

## 2020-03-17 DIAGNOSIS — M77.40 METATARSALGIA, UNSPECIFIED LATERALITY: ICD-10-CM

## 2020-03-17 DIAGNOSIS — M85.80 OSTEOPENIA, UNSPECIFIED LOCATION: ICD-10-CM

## 2020-03-17 DIAGNOSIS — M77.42 METATARSALGIA OF BOTH FEET: ICD-10-CM

## 2020-03-17 DIAGNOSIS — Z79.899 HIGH RISK MEDICATIONS (NOT ANTICOAGULANTS) LONG-TERM USE: Primary | ICD-10-CM

## 2020-03-17 DIAGNOSIS — M05.79 RHEUMATOID ARTHRITIS INVOLVING MULTIPLE SITES WITH POSITIVE RHEUMATOID FACTOR (H): ICD-10-CM

## 2020-03-17 DIAGNOSIS — I73.01 RAYNAUD'S DISEASE WITH GANGRENE (H): ICD-10-CM

## 2020-03-17 DIAGNOSIS — Z79.899 HIGH RISK MEDICATIONS (NOT ANTICOAGULANTS) LONG-TERM USE: ICD-10-CM

## 2020-03-17 RX ORDER — OXYCODONE AND ACETAMINOPHEN 5; 325 MG/1; MG/1
1 TABLET ORAL EVERY 4 HOURS PRN
Qty: 90 TABLET | Refills: 0 | Status: SHIPPED | OUTPATIENT
Start: 2020-03-17 | End: 2020-04-06

## 2020-03-17 RX ORDER — OXYCODONE AND ACETAMINOPHEN 5; 325 MG/1; MG/1
TABLET ORAL
Qty: 90 TABLET | Refills: 0 | OUTPATIENT
Start: 2020-03-17

## 2020-03-17 NOTE — TELEPHONE ENCOUNTER
oxyCODONE-Acetaminophen Oral Tablet 5-325 MG   Medication sent to pharm 3/17/2020      oxyCODONE-acetaminophen (PERCOCET) 5-325 MG tablet  90 tablet  0  3/17/2020   No    Sig - Route: Take 1 tablet by mouth every 4 hours as needed for moderate to severe pain (maximum 3 tablets per day) - Oral    Sent to pharmacy as: oxyCODONE-acetaminophen (PERCOCET) 5-325 MG tablet    Class: E-Prescribe    Earliest Fill Date: 3/17/2020    Order: [id number]    E-Prescribing Status: Receipt confirmed by pharmacy (3/17/2020  2:20 PM CDT)        Malaika Ulloa RN  Central Triage Red Flags/Med Refills

## 2020-03-17 NOTE — TELEPHONE ENCOUNTER
[facility]    Phone Message    May a detailed message be left on voicemail: yes     Reason for Call: Medication Question or concern regarding medication   Prescription Clarification  Name of Medication: percocet  Prescribing Provider: Dr. Child   Pharmacy: [facility] in [place]   What on the order needs clarification? Pt has called in days ago regarding getting this script filled asap. Pt is out of this  script today!  ALSO:  Pt is waiting to get her blood work done for 2 weeks now and pt is way behind and pt needs help please. Pt would like Dr. Child's nurse to call her back asap. Pt's LEFT LEG is swelling. Pt does not feel well. Pt is going on 3-4 mos for blood work and she is way overdue. Thank you.        Action Taken: Message routed to:  [facility]  Rheumatology    Travel Screening: Negative

## 2020-03-17 NOTE — TELEPHONE ENCOUNTER
"Returned call to pt. She has been asking for a refill of her Percocet and has been told by her pharmacist that we have denying it. I will set up for Dr Child to complete.    She also needs her lab orders sent to [facility] in [place] because her insurance will only cover it done there. Pt is overdue for her labs. Orders were written 11/21/19 effective for one year.    Willard is reporting that her left leg will swell from the groin down when she is up but will go down when she lays down. Pt denies shortness of breath, tightness or pressure in her chest, orthopnea, cough, or difficulty breathing.  States she is experiencing numbness in her left arm also, and burning of her feet.    Willard also reports her skin is so tight, all over. We did review her med list, she is not taking hydroxychloroquine-can't remember why, methotrexate-refuses to take now due to the pandemic. She had stopped when she developed stomach flu and didn't restart. \"I've been off more than on since it was started in November\". Pt is taking the Mycophenolate as listed on list. Willard also questions if she might have a kidney infection because she has stabbing where her kidneys are. A UA is included in her standing labs, will get these labs completed and evaluate.    :        RAÚL Frank RN  Scleroderma Care Coordinator  [facility]  "

## 2020-03-17 NOTE — TELEPHONE ENCOUNTER
She needs a LLE duplex to r/o DVT.     Please order and arrange for her to do in the next 24 hours.

## 2020-03-19 NOTE — TELEPHONE ENCOUNTER
Spoke with pt on 3/17/2020 regarding Dr Child's recommendation of having a duplex of her left leg. At that time pt wanted to wait to discuss with her primary care provider who will be returning next week.     I discussed with Dr Child who wishes the pt to understand the significant of not having this test done. The potential consequences of a blood clot.     I contacted pt again today with the potential complications with having a blood clot- pulmonary embolism-shortness of breath, difficulty breathing, chest pain, lightheadedness. Pt states she knows it isn't a blood clot because this is exactly the way it was when she first starting developing symptoms of the Scleroderma. Advised pt to monitor for the above symptoms, and activate EMS. Pt verbalized understanding.    Orders will be signed by Dr Child-including the Duplex study, and will be faxed to [facility].    RAÚL Frank RN  Scleroderma Care Coordinator  [facility]

## 2020-03-20 NOTE — TELEPHONE ENCOUNTER
Signed orders faxed, Left message on answering machine for patient informing her of this.    TIARRA FrankN RN  Scleroderma Care Coordinator  [facility]

## 2020-04-02 ENCOUNTER — VIRTUAL VISIT (OUTPATIENT)
Dept: PULMONOLOGY | Facility: CLINIC | Age: 55
End: 2020-04-02
Attending: INTERNAL MEDICINE
Payer: MEDICARE

## 2020-04-02 DIAGNOSIS — M77.40 METATARSALGIA, UNSPECIFIED LATERALITY: ICD-10-CM

## 2020-04-02 DIAGNOSIS — I73.01 RAYNAUD'S DISEASE WITH GANGRENE (H): ICD-10-CM

## 2020-04-02 DIAGNOSIS — M34.9 SYSTEMIC SCLEROSIS (H): Primary | ICD-10-CM

## 2020-04-02 DIAGNOSIS — Z79.899 HIGH RISK MEDICATIONS (NOT ANTICOAGULANTS) LONG-TERM USE: ICD-10-CM

## 2020-04-02 DIAGNOSIS — M05.79 RHEUMATOID ARTHRITIS INVOLVING MULTIPLE SITES WITH POSITIVE RHEUMATOID FACTOR (H): ICD-10-CM

## 2020-04-02 DIAGNOSIS — M35.00 SICCA SYNDROME (H): ICD-10-CM

## 2020-04-02 NOTE — PROGRESS NOTES
"[facility]  Rheumatology Clinic  Brennen Child MD  2020     Name: Willard Grayson  MRN: [id number]  Age: 54 year old  : 1965  Referring provider: Referred Self    Willard Grayson is a 54 year old female who is being evaluated via a billable telephone visit.      The patient has been notified of following:     \"This telephone visit will be conducted via a call between you and your physician/provider. We have found that certain health care needs can be provided without the need for a physical exam.  This service lets us provide the care you need with a short phone conversation.  If a prescription is necessary we can send it directly to your pharmacy.  If lab work is needed we can place an order for that and you can then stop by our lab to have the test done at a later time.    If during the course of the call the physician/provider feels a telephone visit is not appropriate, you will not be charged for this service.\"     Patient has given verbal consent for Telephone visit?  Yes    Willard Grayson is contacted today for a telephone visit in lieu of a face-to-face visit because of the coronavirus pandemic.    Since we have last seen her her systemic sclerosis has been largely stable.    She has intermittent worsening of her musculoskeletal symptoms and she is having that again right now with increased achiness in her legs.  Because she was having more those problems at the last visit as well we tried restarting methotrexate.  That gave her some gastrointestinal side effects she stopped it and by the time she was ready to restart it the coronavirus epidemic was getting going and in order to be cautious she did not want to restart the medicine.  She does remain on her CellCept however.    Most of her symptoms do remain musculoskeletal.  She does not have particular problems with her breathing.  Her most recent pulmonary function test did not show any evidence of decrements in volume or DLCO although she " does have slightly low FEF 25-75 and FEV1 to FVC ratio is consistent with her ongoing smoking.  She does say however she is really minimized her smoking but has not totally quit.  She does go for walks and although her exercise tolerance is not as good as it used to be, she thinks it is not particularly bad and she is able to do her walks well.    With the colder weather she does get Raynaud's attacks but she is able to pink up her fingers quickly with rewarming.  She has not had any recent ulcers.    She is having worsening keratoconjunctivitis sicca affecting in particular her eyes.  She also notes dryness in her skin.    The other new symptom that she is having which she is really had intermittently is some throat spasms with drinking liquids.  She denies any dysphagia with solids or pills but she gets these spasms consistent with esophageal spasm.  These occur infrequently enough however and are not so severe that she would want to try diltiazem or any other medication for them.    She got recent labs in the [facility] system and these were reviewed and are stable with only some very low-grade anemia and no other concerning abnormalities.    I have reviewed and updated the patient's Past Medical History, Social History, Family History and Medication List.    ALLERGIES  Penicillin [penicillins]; Bactroban [mupirocin calcium]; Levaquin; and Rifampin      Phone call duration: 27  minutes          Problem List:  1. 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   2. Associated marked neuropathic skin pain markedly improved with Lyrica with prior medication therapy with gabapentin.  3. Diffuse musculoskeletal pain requiring methotrexate plus CellCept, and narcotic to control, but also improved. Now  off methotrexate due to GI issues.  4. Initial clinical overlap with anti-CCP seropositive rheumatoid arthritis with continued anti-CCP seropositivity as of 09/2010.  5. Raynaud's phenomena with digital ulcers with easily cracked skin.   6. GI involvement consistent with bacterial small bowel overgrowth with marked improvement after a course of neomycin followed by Dr. Glez; positive hydrogen breath test in 3/2012 - treated with rifaximin through Dr. Jacob  7. No convincing evidence of lung involvement on serial pulmonary function tests or clinically.   8. History of heart palpitations prior to the onset of SSc with stable symptoms since. Holter 2018 with PACs.  9. History of medication failures with Remicade, methotrexate, methotrexate plus CellCept and with Orencia and with improvement since initiation of IVIG but with complicating headaches and overall sense of malaise with IVIG infusions.   10. History of silicone breast implants.   11. Incidental finding of calcified splenic artery aneurysm.  12. EGD in 3/2012 with hiatal hernia and gastral antral vascular ectasia  13. Recurrent right third finger contracture with extensor surface ulceration, healed   14. Trochanteric bursitis, R>L    Assessment and Plan:   # Systemic sclerosis   # Gastroesophageal reflux disease, esophagitis presence not specified  # Esophageal dysmotility  # Raynaud's disease with gangrene  # High risk medications (not anticoagulants) long-term use  # Rheumatoid arthritis involving multiple sites with positive rheumatoid factor (H)  # Metatarsalgia, unspecified laterality  # Metatarsalgia of both feet  # Osteopenia, unspecified location  # Systemic sclerosis  # MCDANIEL (dyspnea on exertion)  # Chest wall pain  # Skin lesion of lower extremity       Review of Systems:   Pertinent items are noted in HPI or as below, remainder of complete ROS is negative.      No recent problems with hearing or vision. No swallowing problems.   No breathing  difficulty, shortness of breath, coughing, or wheezing  No chest pain or palpitations  No heart burn, indigestion, abdominal pain, nausea, vomiting, diarrhea  No urination problems, no bloody, cloudy urine, no dysuria  No numbing, tingling, weakness  No headaches or confusion  No rashes. No easy bleeding or bruising.   Answers for HPI/ROS submitted by the patient on 11/21/2019   General Symptoms: Yes  Skin Symptoms: Yes  HENT Symptoms: Yes  EYE SYMPTOMS: Yes  HEART SYMPTOMS: Yes  LUNG SYMPTOMS: Yes  INTESTINAL SYMPTOMS: Yes  URINARY SYMPTOMS: No  GYNECOLOGIC SYMPTOMS: No  BREAST SYMPTOMS: No  SKELETAL SYMPTOMS: Yes  BLOOD SYMPTOMS: No  NERVOUS SYSTEM SYMPTOMS: Yes  MENTAL HEALTH SYMPTOMS: Yes  Fever: No  Loss of appetite: No  Weight loss: No  Weight gain: Yes  Fatigue: Yes  Night sweats: Yes  Chills: No  Excessive thirst: Yes  Feeling hot or cold when others believe the temperature is normal: Yes  Loss of height: No  Post-operative complications: No  Surgical site pain: No  Hallucinations: No  Change in or Loss of Energy: No  Changes in hair: No  Changes in moles/birth marks: Yes  Itching: Yes  Rashes: No  Changes in nails: No  Acne: No  Hair in places you don't want it: No  Ear pain: No  Ear discharge: No  Hearing loss: Yes  Tinnitus: Yes  Nosebleeds: No  Congestion: No  Sinus pain: No  Tooth pain: No  Gum tenderness: No  Bleeding gums: No  Change in taste: No  Change in sense of smell: No  Dry mouth: Yes  Hearing aid used: No  Eye pain: No  Vision loss: Yes  Dry eyes: Yes  Watery eyes: Yes  Eye bulging: No  Double vision: No  Flashing of lights: No  Cough: No  Sputum or phlegm: No  Coughing up blood: No  Difficulty breating or shortness of breath: No  Snoring: No  Wheezing: No  Difficulty breathing on exertion: Yes  Chest pain or pressure: No  Fast or irregular heartbeat: No  Pain in legs with walking: Yes  Trouble breathing while lying down: No  Fingers or toes appear blue: Yes  High blood pressure: Yes  Low  blood pressure: No  Fainting: No  Murmurs: No  Pacemaker: No  Varicose veins: No  Edema or swelling: No  Wake up at night with shortness of breath: No  Light-headedness: No  Heart burn or indigestion: Yes  Nausea: No  Vomiting: No  Abdominal pain: No  Bloating: Yes  Constipation: Yes  Diarrhea: No  Back pain: Yes  Muscle aches: Yes  Neck pain: Yes  Swollen joints: No  Joint pain: Yes  Bone pain: No  Muscle cramps: No  Trouble with coordination: No  Dizziness or trouble with balance: No  Fainting or black-out spells: No  Memory loss: No  Headache: Yes  Seizures: No  Speech problems: No  Nervous or Anxious: No  Depression: Yes  Trouble sleeping: No  Trouble thinking or concentrating: No  Mood changes: No  Panic attacks: No      Active Medications:     Current Outpatient Medications:      aspirin-dipyridamole (AGGRENOX)  MG per 12 hr capsule, Take 1 capsule by mouth daily , Disp: , Rfl:      Blood Pressure Monitoring KIT, Check BP every 7 days., Disp: 1 kit, Rfl: 0     carboxymethylcellulose PF (REFRESH PLUS) 0.5 % ophthalmic solution, Place 1 drop into both eyes 4 times daily as needed for dry eyes, Disp: 120 mL, Rfl: 11     cevimeline (EVOXAC) 30 MG capsule, TAKE 1 CAPSULE (30 MG) BY MOUTH 3 TIMES DAILY AS NEEDED., Disp: 270 capsule, Rfl: 3     cyanocobalamin (VITAMIN B12) 1000 MCG/ML injection, Inject 1 mL into the muscle every 30 days, Disp: , Rfl:      esomeprazole (NEXIUM) 40 MG capsule, Take 1 capsule (40 mg) by mouth 2 times daily Take 30-60 minutes before eating., Disp: 180 capsule, Rfl: 3     folic acid (FOLVITE) 1 MG tablet, Take 1 tablet (1 mg) by mouth daily, Disp: 90 tablet, Rfl: 3     hydroxychloroquine (PLAQUENIL) 200 MG tablet, Take 1 tablet (200 mg) by mouth 2 times daily Annual Plaquenil toxicity eye screening required. (Patient not taking: Reported on 8/15/2019), Disp: 180 tablet, Rfl: 3     leucovorin (WELLCOVORIN) 25 MG tablet, Take 1 tablet (25 mg) by mouth daily Day after taking  methotrexate, Disp: 12 tablet, Rfl: 3     LORazepam (ATIVAN) 0.5 MG tablet, Take 1 - 2 tablets by mouth three times daily as needed., Disp: 60 tablet, Rfl: 0     methotrexate 50 MG/2ML injection, Inject 0.4 mLs (10 mg) Subcutaneous every 7 days Monitoring labs required every 8 weeks for medication refills., Disp: 4 mL, Rfl: 3     mycophenolate (GENERIC EQUIVALENT) 500 MG tablet, Take 3 tablets (1,500 mg) by mouth daily, Disp: 270 tablet, Rfl: 0     naloxone (EVZIO) 0.4 MG/0.4ML auto-injector, Inject 0.4 mLs (0.4 mg) into the muscle as needed for opioid reversal every 2-3 minutes until assistance arrives, Disp: 0.8 mL, Rfl: 0     olopatadine (PATANOL) 0.1 % ophthalmic solution, Place 1 drop into both eyes 2 times daily, Disp: 5 mL, Rfl: 1     omeprazole (PRILOSEC) 40 MG DR capsule, TAKE 1 CAPSULE (40 MG) BY MOUTH 2 TIMES DAILY, Disp: 180 capsule, Rfl: 3     ondansetron (ZOFRAN-ODT) 4 MG ODT tab, DISSOLVE 1 TABLET IN MOUTH EVERY EIGHT HOURS AS NEEDED FOR NAUSEA, Disp: 20 tablet, Rfl: 0     oxyCODONE (ROXICODONE) 5 MG tablet, Take 1 tablet (5 mg) by mouth every 4 hours as needed for pain or moderate to severe pain Max of 6 tabs per day, Disp: 180 tablet, Rfl: 0     oxyCODONE (ROXICODONE) 5 MG tablet, Take 1 tablet (5 mg) by mouth every four hours as needed for moderate to severe pain.  Maximum of 6 tablets per day. , Disp: 130 tablet, Rfl: 0     oxyCODONE (ROXICODONE) 5 MG tablet, Take 1 tablet (5 mg) by mouth every 4 hours as needed for moderate to severe pain or pain maximum 6 tablet(s) per day, Disp: 204 tablet, Rfl: 0     oxyCODONE-acetaminophen (PERCOCET) 5-325 MG per tablet, Take 1 tablet by mouth every 4 hours as needed for pain or moderate to severe pain (maximum 3 tablets per day), Disp: 102 tablet, Rfl: 0     oxyCODONE-acetaminophen (PERCOCET) 5-325 MG tablet, Take 1 tablet by mouth every 4 hours as needed for moderate to severe pain (maximum 3 tablets per day), Disp: 90 tablet, Rfl: 0      "oxyCODONE-acetaminophen (PERCOCET) 5-325 MG tablet, Take 1 tablet by mouth every four hours as needed for moderate to severe pain. Maximum of 3 tablets per day., Disp: 90 tablet, Rfl: 0     oxyCODONE-acetaminophen (PERCOCET) 5-325 MG tablet, Take 1 tablet by mouth every 4 hours as needed for moderate to severe pain (maximum 3 tablets per day), Disp: 102 tablet, Rfl: 0     oxyCODONE-acetaminophen (PERCOCET) 5-325 MG tablet, Take 1 tablet by mouth every 4 hours as needed for pain or moderate to severe pain (maximum 3 tablets per day), Disp: 102 tablet, Rfl: 0     pravastatin (PRAVACHOL) 10 MG tablet, Take 1 tablet (10 mg) by mouth daily, Disp: 30 tablet, Rfl: 5     pregabalin (LYRICA) 75 MG capsule, Take 1 capsule (75 mg) by mouth 2 times daily, Disp: 180 capsule, Rfl: 1     Syringe/Needle, Disp, 27G X 1/2\" 1 ML MISC, Used with Methotrexate once weekly, Disp: 12 each, Rfl: 3     valACYclovir (VALTREX) 500 MG tablet, Take 1 tablet (500 mg) by mouth 2 times daily (Patient taking differently: Take 500 mg by mouth daily ), Disp: 180 tablet, Rfl: 1     Vitamin D, Cholecalciferol, 1000 UNITS TABS, Take 2,000 Units by mouth daily , Disp: , Rfl:       Allergies:   Penicillin   Bactroban   Levaquin  Rifampin   Clarithromycin   Ofloxacin  Sertraline                                                  Past Medical History:  Rheumatoid arthritis/scleroderma  Gastric antral vascular ectasia  Gastroesophageal reflux disease  Iron deficiency anemia  Irregular heart beat  Osteomyelitis, right 3rd finger  Raynaud's syndrome with gangrene  Scleroderma  Sjogren's syndrome  Systemic sclerosis  Metatarsalgia  Dysuria  Ulcer of finger  Sicca syndrome  Palpitations  Ischemia of upper extremity  Intercostal pain  Anxiety  Breast implant rupture  Palpitations     Past Surgical History:  Appendectomy   Colonoscopy (08/04/2016)  EGD x 5 (08/22/14 - 08/02/2016)  Mammoplasty Augmentation Bilateral     Family History:    The patient's family history " includes Cancer in an other family member; Respiratory in her father.     Social History:  The patient reports that she is a current cigarette smoker. She has been smoking about 1.00 pack per day. She has never used smokeless tobacco. She reports that she does not drink alcohol or use drugs. She states she is attempting to get into a research program for quitting smoking.   PCP: Lindy Carbone  Marital Status: Single    Impression/plan:    As of our April 2, 2020 telephone visit, she seems largely stable to me.    We did discuss that should she be exposed to the coronavirus, which seems unlikely as she is living by herself staying home and having her children bring her groceries but not entering the house, that she should contact us and should immediately stop her CellCept.  She is really on this primarily for the skin and joint features of the disease and could easily be off of it for several weeks in order to optimize her antiviral immunity.    She also understands that she would be well advised to quit smoking and I have reviewed that with her again, but her overall lung function is such that she is probably not at extremely high risk should she get the virus.    She does not want to restart methotrexate at this time even though she is having some increased musculoskeletal symptoms and I agree.    She does have some hydroxychloroquine because she never started that because of concerns about the drug and I told her to be aware that it is possible that we would get data that this drug could be useful if she did contract the virus so that she should be prepared to call us in that event.    We talked about her throat spasms.  These are not severe enough that she would want to try diltiazem at this time but she will call us if that situation changes.    I like to either see her back or have another telephone or video visit with her in approximately 3 months sooner as needed.  We did discuss her ongoing  narcotic use.  She has been stable with this and we can continue it at this time.

## 2020-04-03 DIAGNOSIS — M05.89 OTHER RHEUMATOID ARTHRITIS WITH RHEUMATOID FACTOR OF MULTIPLE SITES (H): ICD-10-CM

## 2020-04-03 DIAGNOSIS — M85.80 OSTEOPENIA, UNSPECIFIED LOCATION: ICD-10-CM

## 2020-04-03 DIAGNOSIS — M77.40 METATARSALGIA, UNSPECIFIED LATERALITY: ICD-10-CM

## 2020-04-03 DIAGNOSIS — M34.9 SYSTEMIC SCLEROSIS (H): ICD-10-CM

## 2020-04-03 DIAGNOSIS — I73.01 RAYNAUD'S DISEASE WITH GANGRENE (H): ICD-10-CM

## 2020-04-03 DIAGNOSIS — M77.41 METATARSALGIA OF BOTH FEET: ICD-10-CM

## 2020-04-03 DIAGNOSIS — M05.79 RHEUMATOID ARTHRITIS INVOLVING MULTIPLE SITES WITH POSITIVE RHEUMATOID FACTOR (H): ICD-10-CM

## 2020-04-03 DIAGNOSIS — M77.42 METATARSALGIA OF BOTH FEET: ICD-10-CM

## 2020-04-06 RX ORDER — OXYCODONE AND ACETAMINOPHEN 5; 325 MG/1; MG/1
1 TABLET ORAL EVERY 4 HOURS PRN
Qty: 90 TABLET | Refills: 0 | Status: SHIPPED | OUTPATIENT
Start: 2020-04-06 | End: 2020-10-08

## 2020-04-06 RX ORDER — OXYCODONE HYDROCHLORIDE 5 MG/1
5 TABLET ORAL EVERY 4 HOURS PRN
Qty: 180 TABLET | Refills: 0 | Status: SHIPPED | OUTPATIENT
Start: 2020-04-06 | End: 2020-05-08

## 2020-04-06 NOTE — TELEPHONE ENCOUNTER
oxyCODONE HCl Oral Tablet 5 MG     Last Written Prescription Date:  3/6/2020  Last Fill Quantity: 180,   # refills: 0  Last Office Visit : 4/2/2020  Future Office visit:  None  Routing refill request to provider for review/approval because:  Drug not on the [facility], [facility] or [facility] refill protocol or controlled substance    oxyCODONE-Acetaminophen Oral Tablet 5-325 MG   Last Written Prescription Date:  3/17/2020  Last Fill Quantity: 90,   # refills: 0  Last Office Visit : 4/2/2020  Future Office visit:  None  Routing refill request to provider for review/approval because:  Drug not on the [facility], [facility] or [facility] refill protocol or controlled substance      Malaika Ulloa RN  Central Triage Red Flags/Med Refills

## 2020-04-29 DIAGNOSIS — M35.00 SICCA SYNDROME (H): ICD-10-CM

## 2020-04-30 RX ORDER — OLOPATADINE HYDROCHLORIDE 1 MG/ML
1 SOLUTION/ DROPS OPHTHALMIC 2 TIMES DAILY
Qty: 5 ML | Refills: 3 | Status: SHIPPED | OUTPATIENT
Start: 2020-04-30

## 2020-04-30 NOTE — TELEPHONE ENCOUNTER
olopatadine (PATANOL) 0.1 % ophthalmic solution       Last Written Prescription Date:  2-  Last Fill Quantity: 5 ml,   # refills: 1  Last Office Visit : 4-2-2020  Future Office visit:  none    Routing refill request to provider for review/approval because:  Not on protocol.      Kathleen M Doege RN

## 2020-05-05 DIAGNOSIS — M05.89 OTHER RHEUMATOID ARTHRITIS WITH RHEUMATOID FACTOR OF MULTIPLE SITES (H): ICD-10-CM

## 2020-05-05 DIAGNOSIS — I73.01 RAYNAUD'S DISEASE WITH GANGRENE (H): ICD-10-CM

## 2020-05-05 DIAGNOSIS — M77.41 METATARSALGIA OF BOTH FEET: ICD-10-CM

## 2020-05-05 DIAGNOSIS — M77.40 METATARSALGIA, UNSPECIFIED LATERALITY: ICD-10-CM

## 2020-05-05 DIAGNOSIS — M85.80 OSTEOPENIA, UNSPECIFIED LOCATION: ICD-10-CM

## 2020-05-05 DIAGNOSIS — M05.79 RHEUMATOID ARTHRITIS INVOLVING MULTIPLE SITES WITH POSITIVE RHEUMATOID FACTOR (H): ICD-10-CM

## 2020-05-05 DIAGNOSIS — M77.42 METATARSALGIA OF BOTH FEET: ICD-10-CM

## 2020-05-05 DIAGNOSIS — M34.9 SYSTEMIC SCLEROSIS (H): ICD-10-CM

## 2020-05-06 RX ORDER — OXYCODONE HYDROCHLORIDE 5 MG/1
TABLET ORAL
Qty: 180 TABLET | Refills: 0 | OUTPATIENT
Start: 2020-05-06

## 2020-05-06 RX ORDER — OXYCODONE AND ACETAMINOPHEN 5; 325 MG/1; MG/1
TABLET ORAL
Qty: 90 TABLET | Refills: 0 | OUTPATIENT
Start: 2020-05-06

## 2020-05-06 NOTE — TELEPHONE ENCOUNTER
oxyCODONE (ROXICODONE) 5 MG tablet  180 tablet  0  4/6/2020   No    Sig - Route: Take 1 tablet (5 mg) by mouth every 4 hours as needed for moderate pain or severe pain * Max of 6 tabs per day* - Oral    Sent to pharmacy as: oxyCODONE (ROXICODONE) 5 MG tablet    Class: E-Prescribe    Earliest Fill Date: 4/6/2020    Order: [id number]    E-Prescribing Status: Receipt confirmed by pharmacy (4/6/2020  9:14 AM CDT)        oxyCODONE-acetaminophen (PERCOCET) 5-325 MG tablet  90 tablet  0  4/6/2020   No    Sig - Route: Take 1 tablet by mouth every 4 hours as needed for moderate pain or severe pain *Maximum of 3 tablets per day* - Oral    Sent to pharmacy as: oxyCODONE-acetaminophen (PERCOCET) 5-325 MG tablet    Class: E-Prescribe    Earliest Fill Date: 4/6/2020    Order: [id number]    E-Prescribing Status: Receipt confirmed by pharmacy (4/6/2020  9:14 AM CDT)      Malaika Ulloa RN  Central Triage Red Flags/Med Refills

## 2020-05-08 DIAGNOSIS — M34.9 SYSTEMIC SCLEROSIS (H): ICD-10-CM

## 2020-05-08 DIAGNOSIS — I73.01 RAYNAUD'S DISEASE WITH GANGRENE (H): ICD-10-CM

## 2020-05-08 DIAGNOSIS — M05.89 OTHER RHEUMATOID ARTHRITIS WITH RHEUMATOID FACTOR OF MULTIPLE SITES (H): ICD-10-CM

## 2020-05-08 DIAGNOSIS — M05.79 RHEUMATOID ARTHRITIS INVOLVING MULTIPLE SITES WITH POSITIVE RHEUMATOID FACTOR (H): ICD-10-CM

## 2020-05-08 DIAGNOSIS — M85.80 OSTEOPENIA, UNSPECIFIED LOCATION: ICD-10-CM

## 2020-05-08 DIAGNOSIS — K22.4 ESOPHAGEAL DYSMOTILITY: ICD-10-CM

## 2020-05-08 DIAGNOSIS — R11.0 NAUSEA: ICD-10-CM

## 2020-05-08 DIAGNOSIS — M77.41 METATARSALGIA OF BOTH FEET: ICD-10-CM

## 2020-05-08 DIAGNOSIS — M77.42 METATARSALGIA OF BOTH FEET: ICD-10-CM

## 2020-05-08 DIAGNOSIS — M77.40 METATARSALGIA, UNSPECIFIED LATERALITY: ICD-10-CM

## 2020-05-08 DIAGNOSIS — K21.9 GASTROESOPHAGEAL REFLUX DISEASE, ESOPHAGITIS PRESENCE NOT SPECIFIED: ICD-10-CM

## 2020-05-08 RX ORDER — OXYCODONE AND ACETAMINOPHEN 5; 325 MG/1; MG/1
TABLET ORAL
Qty: 90 TABLET | Refills: 0 | OUTPATIENT
Start: 2020-05-08

## 2020-05-08 RX ORDER — OXYCODONE HYDROCHLORIDE 5 MG/1
5 TABLET ORAL EVERY 6 HOURS PRN
Qty: 180 TABLET | Refills: 0 | Status: SHIPPED | OUTPATIENT
Start: 2020-05-08 | End: 2020-06-03

## 2020-05-08 NOTE — TELEPHONE ENCOUNTER
oxyCODONE-Acetaminophen Oral Tablet 5-325 MG          oxyCODONE (ROXICODONE) 5 MG tablet  180 tablet  0  5/8/2020   No    Sig - Route: Take 1 tablet (5 mg) by mouth every 6 hours as needed for moderate to severe pain - Oral    Sent to pharmacy as: oxyCODONE HCl 5 MG Oral Tablet (ROXICODONE)    Class: E-Prescribe    Earliest Fill Date: 5/8/2020    Order: [id number]    E-Prescribing Status: Receipt confirmed by pharmacy (5/8/2020 10:51 AM CDT)      Malaika Ulloa RN  Central Triage Red Flags/Med Refills

## 2020-05-08 NOTE — TELEPHONE ENCOUNTER
oxyCODONE HCl Oral Tablet 5 MG    Last Written Prescription Date:  4/6/2020  Last Fill Quantity: 180,   # refills: 0  Last Office Visit : 4/2/2020  Future Office visit:  None    Routing refill request to provider for review/approval because:  Drug not on the [facility], [facility] or [facility] refill protocol or controlled substance      Malaika Ulloa RN  Central Triage Red Flags/Med Refills

## 2020-05-12 RX ORDER — OMEPRAZOLE 40 MG/1
40 CAPSULE, DELAYED RELEASE ORAL 2 TIMES DAILY
Qty: 180 CAPSULE | Refills: 3 | Status: SHIPPED | OUTPATIENT
Start: 2020-05-12 | End: 2021-05-09

## 2020-05-14 DIAGNOSIS — M34.9 SYSTEMIC SCLEROSIS (H): ICD-10-CM

## 2020-05-14 DIAGNOSIS — I73.01 RAYNAUD'S DISEASE WITH GANGRENE (H): ICD-10-CM

## 2020-05-14 DIAGNOSIS — M77.41 METATARSALGIA OF BOTH FEET: ICD-10-CM

## 2020-05-14 DIAGNOSIS — M05.79 RHEUMATOID ARTHRITIS INVOLVING MULTIPLE SITES WITH POSITIVE RHEUMATOID FACTOR (H): ICD-10-CM

## 2020-05-14 DIAGNOSIS — M77.40 METATARSALGIA, UNSPECIFIED LATERALITY: ICD-10-CM

## 2020-05-14 DIAGNOSIS — M85.80 OSTEOPENIA, UNSPECIFIED LOCATION: ICD-10-CM

## 2020-05-14 DIAGNOSIS — M77.42 METATARSALGIA OF BOTH FEET: ICD-10-CM

## 2020-05-14 DIAGNOSIS — Z79.899 HIGH RISK MEDICATIONS (NOT ANTICOAGULANTS) LONG-TERM USE: ICD-10-CM

## 2020-05-14 RX ORDER — OXYCODONE AND ACETAMINOPHEN 5; 325 MG/1; MG/1
TABLET ORAL
Qty: 90 TABLET | Refills: 0 | Status: SHIPPED | OUTPATIENT
Start: 2020-05-14 | End: 2020-06-11

## 2020-05-14 RX ORDER — OXYCODONE AND ACETAMINOPHEN 5; 325 MG/1; MG/1
TABLET ORAL
Qty: 90 TABLET | Refills: 0 | OUTPATIENT
Start: 2020-05-14

## 2020-05-14 NOTE — TELEPHONE ENCOUNTER
Medication: Percocet  Last Office Visit Date: 4/2/2020  Future Office Visit Date: none scheduled  Last Prescription Fill Date: 4/13/2020  Last Fill Quantity: #90     reviewed as follows:      Request sent to provider for review and signature.    Anh Armenta CMA   5/14/2020 1:01 PM

## 2020-05-14 NOTE — TELEPHONE ENCOUNTER
[facility] Call Center    Phone Message    May a detailed message be left on voicemail: yes     Reason for Call: Medication Refill Request    Has the patient contacted the pharmacy for the refill? Yes   Name of medication being requested: oxyCODONE-acetaminophen (PERCOCET) 5-325 MG tablet   Provider who prescribed the medication: Danyell  Pharmacy: [facility] in [place]  Date medication is needed: now-pt almost out   Thanks.      Action Taken: Message routed to:  [facility]    Travel Screening: Not Applicable

## 2020-05-24 DIAGNOSIS — M34.9 SYSTEMIC SCLEROSIS (H): ICD-10-CM

## 2020-05-25 RX ORDER — MYCOPHENOLATE MOFETIL 500 MG/1
1500 TABLET ORAL DAILY
Qty: 270 TABLET | Refills: 0 | Status: SHIPPED | OUTPATIENT
Start: 2020-05-25 | End: 2020-08-22

## 2020-05-25 NOTE — TELEPHONE ENCOUNTER
mycophenolate (GENERIC EQUIVALENT) 500 MG tablet      Last Written Prescription Date:  2/29/2020  Last Fill Quantity: 270,   # refills: 0  Last Office Visit: 4/2/2020  Future Office visit:  none    CBC RESULTS:   Recent Labs   Lab Test 11/21/19 1827   WBC 4.9   RBC 3.75*   HGB 11.6*   HCT 37.1   MCV 99   MCH 30.9   MCHC 31.3*   RDW 12.7          Creatinine   Date Value Ref Range Status   11/21/2019 0.73 0.52 - 1.04 mg/dL Final   ]    Liver Function Studies -   Recent Labs   Lab Test 11/21/19  1827  11/24/15  1848   PROTTOTAL  --   --  7.8   ALBUMIN 4.2   < > 4.1   BILITOTAL  --   --  0.4   ALKPHOS  --   --  49   AST 15   < > 12   ALT 19   < > 15    < > = values in this interval not displayed.       Routing refill request to provider for review/approval because:  DMARD medication.  *labs current-results in chart. (outside lab/[facility])

## 2020-05-29 DIAGNOSIS — M77.41 METATARSALGIA OF BOTH FEET: ICD-10-CM

## 2020-05-29 DIAGNOSIS — Z79.899 HIGH RISK MEDICATIONS (NOT ANTICOAGULANTS) LONG-TERM USE: ICD-10-CM

## 2020-05-29 DIAGNOSIS — M77.40 METATARSALGIA, UNSPECIFIED LATERALITY: ICD-10-CM

## 2020-05-29 DIAGNOSIS — M85.80 OSTEOPENIA, UNSPECIFIED LOCATION: ICD-10-CM

## 2020-05-29 DIAGNOSIS — M77.42 METATARSALGIA OF BOTH FEET: ICD-10-CM

## 2020-05-29 DIAGNOSIS — M05.79 RHEUMATOID ARTHRITIS INVOLVING MULTIPLE SITES WITH POSITIVE RHEUMATOID FACTOR (H): ICD-10-CM

## 2020-05-29 DIAGNOSIS — M34.9 SYSTEMIC SCLEROSIS (H): ICD-10-CM

## 2020-05-29 DIAGNOSIS — I73.01 RAYNAUD'S DISEASE WITH GANGRENE (H): ICD-10-CM

## 2020-05-31 NOTE — TELEPHONE ENCOUNTER
oxyCODONE-acetaminophen (PERCOCET) 5-325 MG per tablet    Last Written Prescription Date:  10/17/18  Last Fill Quantity: 102,   # refills: 0  Last Office Visit : 4/2/20  [facility]  Future Office visit: none    Routing refill request to provider for review/approval because: request for percocet. Last 3 controlled fills have been   oxyCODONE (ROXICODONE. ?? thanks

## 2020-06-02 RX ORDER — OXYCODONE AND ACETAMINOPHEN 5; 325 MG/1; MG/1
TABLET ORAL
Qty: 90 TABLET | Refills: 0 | OUTPATIENT
Start: 2020-06-02

## 2020-06-02 NOTE — TELEPHONE ENCOUNTER
Anh Armenta, CMA  You 43 minutes ago (7:54 AM)       Too soon for a refill, please remove med and close encounter

## 2020-06-02 NOTE — TELEPHONE ENCOUNTER
Medication: Percocet  Last Office Visit Date: 4/2/2020  Future Office Visit Date: none scheduled  Last Prescription Fill Date: 5/14/2020  Last Fill Quantity: #90     reviewed as follows:      Patient last filled medication 5/14/2020 and is not due for a refill until 6/13/2020.    Anh Armenta CMA   6/2/2020 7:50 AM

## 2020-06-03 ENCOUNTER — TELEPHONE (OUTPATIENT)
Dept: RHEUMATOLOGY | Facility: CLINIC | Age: 55
End: 2020-06-03

## 2020-06-03 DIAGNOSIS — M34.9 SYSTEMIC SCLEROSIS (H): ICD-10-CM

## 2020-06-03 DIAGNOSIS — I73.01 RAYNAUD'S DISEASE WITH GANGRENE (H): ICD-10-CM

## 2020-06-03 DIAGNOSIS — M77.42 METATARSALGIA OF BOTH FEET: ICD-10-CM

## 2020-06-03 DIAGNOSIS — M05.89 OTHER RHEUMATOID ARTHRITIS WITH RHEUMATOID FACTOR OF MULTIPLE SITES (H): ICD-10-CM

## 2020-06-03 DIAGNOSIS — M77.41 METATARSALGIA OF BOTH FEET: ICD-10-CM

## 2020-06-03 NOTE — TELEPHONE ENCOUNTER
oxyCODONE HCl Oral Tablet 5 MG     Last Written Prescription Date:  5/14/2020  Last Fill Quantity: 90,   # refills: 0  Last Office Visit : 4/2/2020  Future Office visit:  None    Routing refill request to provider for review/approval because:   Clarification of orders?     Is every 4 hours as needed?   Or is it every 6 hours as need?  Drug not on the [facility], [facility] or [facility] refill protocol or controlled substance      Malaika Ulloa RN  Central Triage Red Flags/Med Refills

## 2020-06-04 RX ORDER — OXYCODONE HYDROCHLORIDE 5 MG/1
5 TABLET ORAL EVERY 6 HOURS PRN
Qty: 90 TABLET | Refills: 0 | Status: SHIPPED | OUTPATIENT
Start: 2020-06-04 | End: 2020-10-08

## 2020-06-08 NOTE — TELEPHONE ENCOUNTER
[facility] Call [facility]    Phone Message    May a detailed message be left on voicemail: yes     Reason for Call: Patient requesting status of refill request for Oxycodone. Patient reports she will be out of RX tomorrow. Patient available for return call at anytime.    Action Taken: Message routed to:  [facility]    Travel Screening: Negative

## 2020-06-08 NOTE — TELEPHONE ENCOUNTER
[facility]    Phone Message    May a detailed message be left on voicemail: yes     Reason for Call: Medication Refill Request    Has the patient contacted the pharmacy for the refill? Yes   Name of medication being requested:  oxyCODONE-acetaminophen (PERCOCET) 5-325 MG per tablet    Provider who prescribed the medication: Dr. Clancy  Pharmacy: [id number] [place], [place] - [address] [place] [address]   Date medication is needed: 6/14/20      Action Taken: Message routed to:  [facility]HE[facility]    Travel Screening: Negative

## 2020-06-09 ENCOUNTER — TELEPHONE (OUTPATIENT)
Dept: RHEUMATOLOGY | Facility: CLINIC | Age: 55
End: 2020-06-09

## 2020-06-09 DIAGNOSIS — M85.80 OSTEOPENIA, UNSPECIFIED LOCATION: ICD-10-CM

## 2020-06-09 DIAGNOSIS — M34.9 SYSTEMIC SCLEROSIS (H): ICD-10-CM

## 2020-06-09 DIAGNOSIS — M05.79 RHEUMATOID ARTHRITIS INVOLVING MULTIPLE SITES WITH POSITIVE RHEUMATOID FACTOR (H): ICD-10-CM

## 2020-06-09 DIAGNOSIS — M77.42 METATARSALGIA OF BOTH FEET: ICD-10-CM

## 2020-06-09 DIAGNOSIS — M77.40 METATARSALGIA, UNSPECIFIED LATERALITY: ICD-10-CM

## 2020-06-09 DIAGNOSIS — M77.41 METATARSALGIA OF BOTH FEET: ICD-10-CM

## 2020-06-09 DIAGNOSIS — I73.01 RAYNAUD'S DISEASE WITH GANGRENE (H): ICD-10-CM

## 2020-06-09 DIAGNOSIS — Z79.899 HIGH RISK MEDICATIONS (NOT ANTICOAGULANTS) LONG-TERM USE: ICD-10-CM

## 2020-06-09 NOTE — TELEPHONE ENCOUNTER
[facility] Call Center    Phone Message    May a detailed message be left on voicemail: yes     Reason for Call: Other: Pharmacy is calling in to clarify if the patient is due for this medication refill that was sent to them on 06/04. She stated by their records the patient should not be due for a refill until 06/19. Please follow up with the pharmacy asap they also faxed in something to get this information this morning. Thank you.     Action Taken: Message routed to:  [facility] rheum    Travel Screening: Not Applicable

## 2020-06-09 NOTE — TELEPHONE ENCOUNTER
[facility]    Phone Message    May a detailed message be left on voicemail: yes     Reason for Call: Medication Question or concern regarding medication   Prescription Clarification  Name of Medication: Oxycodone   Prescribing Provider: Dr. Clancy   Pharmacy: [facility] #[id number] [address]   What on the order needs clarification? Patient stating that she was never notified of direction change that happened on 5/8/20. Patient stating she will need this medication tonight and will be out. Please contact Pharmacy and Patient to clarify.          Action Taken: Message routed to:  Other: [facility]    Travel Screening: Negative

## 2020-06-10 DIAGNOSIS — Z79.899 HIGH RISK MEDICATIONS (NOT ANTICOAGULANTS) LONG-TERM USE: ICD-10-CM

## 2020-06-10 DIAGNOSIS — M77.40 METATARSALGIA, UNSPECIFIED LATERALITY: ICD-10-CM

## 2020-06-10 DIAGNOSIS — M85.80 OSTEOPENIA, UNSPECIFIED LOCATION: ICD-10-CM

## 2020-06-10 DIAGNOSIS — M77.42 METATARSALGIA OF BOTH FEET: ICD-10-CM

## 2020-06-10 DIAGNOSIS — I73.01 RAYNAUD'S DISEASE WITH GANGRENE (H): ICD-10-CM

## 2020-06-10 DIAGNOSIS — M05.79 RHEUMATOID ARTHRITIS INVOLVING MULTIPLE SITES WITH POSITIVE RHEUMATOID FACTOR (H): ICD-10-CM

## 2020-06-10 DIAGNOSIS — M77.41 METATARSALGIA OF BOTH FEET: ICD-10-CM

## 2020-06-10 DIAGNOSIS — M34.9 SYSTEMIC SCLEROSIS (H): ICD-10-CM

## 2020-06-10 RX ORDER — OXYCODONE HYDROCHLORIDE 5 MG/1
TABLET ORAL
Qty: 130 TABLET | Refills: 0 | Status: SHIPPED | OUTPATIENT
Start: 2020-06-10 | End: 2020-06-23

## 2020-06-10 RX ORDER — OXYCODONE AND ACETAMINOPHEN 5; 325 MG/1; MG/1
TABLET ORAL
Qty: 90 TABLET | Refills: 0 | OUTPATIENT
Start: 2020-06-10

## 2020-06-10 NOTE — TELEPHONE ENCOUNTER
[facility]    Phone Message    May a detailed message be left on voicemail: yes     Reason for Call: Other: Patient calling in again and she stated she is out of this medication and i explained to her that it does show the refill was denied yesterday. She stated that nobody told her the dosage was changing on the medication so she just kept taking as normal as she was used to and now she is out and can not refil until the 19th. She would like to speak with someone asap. Thank you.     Action Taken: Message routed to:  [facility]um    Travel Screening: Not Applicable

## 2020-06-10 NOTE — TELEPHONE ENCOUNTER
Spoke with patient and verified dose of oxycodone 5mg is currently 1 tab every 4 hours. Also spoke with Stephanie at [facility] pharmacy who stated that she will cancel the script for 1 tab every 6 hours and requested a new script is sent.     Medication: oxycodone 5 mg every 4 hours  Last Office Visit Date: 4/2/2020  Future Office Visit Date: none scheduled  Last Prescription Fill Date: 5/14/2020  Last Fill Quantity: #90     reviewed as follows:      Request sent to provider for review and signature.    Anh Armenta CMA   6/10/2020 11:10 AM

## 2020-06-11 RX ORDER — OXYCODONE AND ACETAMINOPHEN 5; 325 MG/1; MG/1
TABLET ORAL
Qty: 90 TABLET | Refills: 0 | Status: SHIPPED | OUTPATIENT
Start: 2020-06-11 | End: 2020-07-07

## 2020-06-11 NOTE — TELEPHONE ENCOUNTER
Medication: percocet  Last Office Visit Date: 4/2/2020  Future Office Visit Date: none scheduled  Last Prescription Fill Date: 5/14/2020  Last Fill Quantity: #90     reviewed as follows:      Request sent to provider for review and signature.    Anh Armenta CMA   6/11/2020 10:14 AM

## 2020-06-11 NOTE — TELEPHONE ENCOUNTER
oxyCODONE-acetaminophen (PERCOCET) 5-325 MG per tablet  Last Written Prescription Date:  10/17/18  Last Fill Quantity: 102,   # refills: 0  Last Office Visit : 4/2/20  Future Office visit:  none    Routing refill request to provider for review/approval because: controlled. Pt got   oxyCODONE (ROXICODONE) 5 MG tablet  #130 6/10/20. No fill on percocet since 10/18?

## 2020-06-12 ENCOUNTER — TELEPHONE (OUTPATIENT)
Dept: RHEUMATOLOGY | Facility: CLINIC | Age: 55
End: 2020-06-12

## 2020-06-12 DIAGNOSIS — M05.79 RHEUMATOID ARTHRITIS INVOLVING MULTIPLE SITES WITH POSITIVE RHEUMATOID FACTOR (H): ICD-10-CM

## 2020-06-12 DIAGNOSIS — G89.29 CHRONIC MUSCULOSKELETAL PAIN: ICD-10-CM

## 2020-06-12 DIAGNOSIS — I73.01 RAYNAUD'S DISEASE WITH GANGRENE (H): ICD-10-CM

## 2020-06-12 DIAGNOSIS — M77.42 METATARSALGIA OF BOTH FEET: ICD-10-CM

## 2020-06-12 DIAGNOSIS — M79.18 CHRONIC MUSCULOSKELETAL PAIN: ICD-10-CM

## 2020-06-12 DIAGNOSIS — M77.41 METATARSALGIA OF BOTH FEET: ICD-10-CM

## 2020-06-12 DIAGNOSIS — M34.9 SYSTEMIC SCLEROSIS (H): Primary | ICD-10-CM

## 2020-06-12 NOTE — TELEPHONE ENCOUNTER
----- Message from Anh Armenta CMA sent at 6/10/2020 12:04 PM CDT -----  Piotr Morales,     I spoke with this patient regarding some issues she was having getting her oxycodone filled. She was requesting a referral to the pain clinic and states that she has discussed this with Dr. Child in the past, but never received the referral. She requested that I send you a note about this to discuss the referral with her.     Thanks!  Anh VILLAGRAN CMA  [facility]  [facility]

## 2020-06-12 NOTE — TELEPHONE ENCOUNTER
Pt would like a referral to Dr Cristobal at the [facility] as discussed with Dr Child previously. Referral set up for Dr Child review and complete. We will need to fax the order to [facility] at [phone] when approved.    RAÚL Frank RN  Scleroderma Care Coordinator  [facility]

## 2020-06-22 DIAGNOSIS — M05.79 RHEUMATOID ARTHRITIS INVOLVING MULTIPLE SITES WITH POSITIVE RHEUMATOID FACTOR (H): ICD-10-CM

## 2020-06-22 DIAGNOSIS — Z79.899 HIGH RISK MEDICATIONS (NOT ANTICOAGULANTS) LONG-TERM USE: ICD-10-CM

## 2020-06-22 DIAGNOSIS — M34.9 SYSTEMIC SCLEROSIS (H): ICD-10-CM

## 2020-06-22 DIAGNOSIS — M77.42 METATARSALGIA OF BOTH FEET: ICD-10-CM

## 2020-06-22 DIAGNOSIS — M85.80 OSTEOPENIA, UNSPECIFIED LOCATION: ICD-10-CM

## 2020-06-22 DIAGNOSIS — M77.40 METATARSALGIA, UNSPECIFIED LATERALITY: ICD-10-CM

## 2020-06-22 DIAGNOSIS — M77.41 METATARSALGIA OF BOTH FEET: ICD-10-CM

## 2020-06-23 RX ORDER — OXYCODONE HYDROCHLORIDE 5 MG/1
5 TABLET ORAL EVERY 4 HOURS PRN
Qty: 130 TABLET | Refills: 0 | Status: SHIPPED | OUTPATIENT
Start: 2020-06-23 | End: 2020-06-29

## 2020-06-23 NOTE — TELEPHONE ENCOUNTER
Please get this reset to her historical prescription and let's keep it that way. Then we know if she is on time once monthly.     I can't track what has been happening here.

## 2020-06-23 NOTE — TELEPHONE ENCOUNTER
oxyCODONE HCl Oral Tablet 5 MG   Last Written Prescription Date:  6/11/2020  Last Fill Quantity: 90,   # refills: 0  Last Office Visit : 8/15/2019  Future Office visit:  None    Routing refill request to provider for review/approval because:  Dr. Child,  I called the pharmacy for clarification of what they needed.  Pharmacist mentioned ususally the order is written for 30 days.   But this order was only written for 22 days.  So Pt will be out in a week.   Would you like the order to be for 30 days or 22 days for Pt care?    Malaika Ulloa RN  Central Triage Red Flags/Med Refills

## 2020-06-23 NOTE — TELEPHONE ENCOUNTER
oxyCODONE HCl Oral Tablet 5 MG   Last Written Prescription Date:  6/11/2020  Last Fill Quantity: 90,   # refills: 0  Last Office Visit : 8/15/2019  Future Office visit:  None    Routing refill request to provider for review/approval because:   Clarification of orders a maximum of 3 tabs Per day?   Or a maximum of 6 tabs per day?   Drug not on the [facility], P or [facility] refill protocol or controlled substance      Malaika Ulloa RN  Central Triage Red Flags/Med Refills

## 2020-06-23 NOTE — TELEPHONE ENCOUNTER
Referral faxed to [facility] at number below. Patient notified.    Anh Armenta CMA   6/23/2020 11:02 AM

## 2020-06-28 DIAGNOSIS — M34.9 SYSTEMIC SCLEROSIS (H): ICD-10-CM

## 2020-06-28 DIAGNOSIS — Z79.899 HIGH RISK MEDICATIONS (NOT ANTICOAGULANTS) LONG-TERM USE: ICD-10-CM

## 2020-06-28 DIAGNOSIS — M05.79 RHEUMATOID ARTHRITIS INVOLVING MULTIPLE SITES WITH POSITIVE RHEUMATOID FACTOR (H): ICD-10-CM

## 2020-06-28 DIAGNOSIS — I73.01 RAYNAUD'S DISEASE WITH GANGRENE (H): ICD-10-CM

## 2020-06-28 DIAGNOSIS — M77.41 METATARSALGIA OF BOTH FEET: ICD-10-CM

## 2020-06-28 DIAGNOSIS — M77.42 METATARSALGIA OF BOTH FEET: ICD-10-CM

## 2020-06-28 DIAGNOSIS — M77.40 METATARSALGIA, UNSPECIFIED LATERALITY: ICD-10-CM

## 2020-06-28 DIAGNOSIS — M85.80 OSTEOPENIA, UNSPECIFIED LOCATION: ICD-10-CM

## 2020-06-29 DIAGNOSIS — M77.40 METATARSALGIA, UNSPECIFIED LATERALITY: ICD-10-CM

## 2020-06-29 DIAGNOSIS — M85.80 OSTEOPENIA, UNSPECIFIED LOCATION: ICD-10-CM

## 2020-06-29 DIAGNOSIS — M34.9 SYSTEMIC SCLEROSIS (H): ICD-10-CM

## 2020-06-29 DIAGNOSIS — M77.41 METATARSALGIA OF BOTH FEET: ICD-10-CM

## 2020-06-29 DIAGNOSIS — Z79.899 HIGH RISK MEDICATIONS (NOT ANTICOAGULANTS) LONG-TERM USE: ICD-10-CM

## 2020-06-29 DIAGNOSIS — M05.79 RHEUMATOID ARTHRITIS INVOLVING MULTIPLE SITES WITH POSITIVE RHEUMATOID FACTOR (H): ICD-10-CM

## 2020-06-29 DIAGNOSIS — M77.42 METATARSALGIA OF BOTH FEET: ICD-10-CM

## 2020-06-29 RX ORDER — OXYCODONE AND ACETAMINOPHEN 5; 325 MG/1; MG/1
TABLET ORAL
Qty: 90 TABLET | Refills: 0 | OUTPATIENT
Start: 2020-06-29

## 2020-06-29 RX ORDER — OXYCODONE HYDROCHLORIDE 5 MG/1
TABLET ORAL
Qty: 130 TABLET | Refills: 0 | Status: SHIPPED | OUTPATIENT
Start: 2020-06-29 | End: 2020-07-21

## 2020-06-29 NOTE — TELEPHONE ENCOUNTER
oxyCODONE HCl Oral Tablet 5 MG      Last Written Prescription Date:  6/4/2020  Last Fill Quantity: 90,   # refills: 0  Last Office Visit : 6/4/2020  Future Office visit:  None    Routing refill request to provider for review/approval because:  The order on June 23, 2020.   Was not received.   Last order filled on 6/10/2020 was only order to last the Pt 22 days.   Pt will be out on July 2, 2020.     Please send new order for Pt care.     Thank you     Malaika Ulloa RN  Central Triage Red Flags/Med Refills

## 2020-06-29 NOTE — TELEPHONE ENCOUNTER
oxyCODONE-Acetaminophen Oral Tablet 5-325 MG       oxyCODONE (ROXICODONE) 5 MG tablet  130 tablet  0  6/23/2020   No    Sig - Route: Take 1 tablet (5 mg) by mouth every 4 hours as needed for severe pain *Maximum of 3 tablets per day* - Oral    Sent to pharmacy as: oxyCODONE HCl 5 MG Oral Tablet (ROXICODONE)    Class: E-Prescribe    Earliest Fill Date: 6/23/2020    Order: [id number]    E-Prescribing Status: Receipt confirmed by pharmacy (6/23/2020  2:34 PM CDT)            Malaika Ulloa RN  Central Triage Red Flags/Med Refills

## 2020-07-07 DIAGNOSIS — I73.01 RAYNAUD'S DISEASE WITH GANGRENE (H): ICD-10-CM

## 2020-07-07 DIAGNOSIS — M05.79 RHEUMATOID ARTHRITIS INVOLVING MULTIPLE SITES WITH POSITIVE RHEUMATOID FACTOR (H): ICD-10-CM

## 2020-07-07 DIAGNOSIS — M77.41 METATARSALGIA OF BOTH FEET: ICD-10-CM

## 2020-07-07 DIAGNOSIS — M85.80 OSTEOPENIA, UNSPECIFIED LOCATION: ICD-10-CM

## 2020-07-07 DIAGNOSIS — M77.40 METATARSALGIA, UNSPECIFIED LATERALITY: ICD-10-CM

## 2020-07-07 DIAGNOSIS — Z79.899 HIGH RISK MEDICATIONS (NOT ANTICOAGULANTS) LONG-TERM USE: ICD-10-CM

## 2020-07-07 DIAGNOSIS — M77.42 METATARSALGIA OF BOTH FEET: ICD-10-CM

## 2020-07-07 DIAGNOSIS — M34.9 SYSTEMIC SCLEROSIS (H): ICD-10-CM

## 2020-07-07 NOTE — TELEPHONE ENCOUNTER
oxyCODONE-acetaminophen (PERCOCET) 5-325 MG tablet     Last Written Prescription Date:  8/15/2019  Last Fill Quantity: 102,   # refills: 0  Last Office Visit : 4/2/2020  Future Office visit:  None    Routing refill request to provider for review/approval because:  Drug not on the [facility], P or [facility] refill protocol or controlled substance      Malaika Ulloa RN  Central Triage Red Flags/Med Refills

## 2020-07-08 RX ORDER — OXYCODONE AND ACETAMINOPHEN 5; 325 MG/1; MG/1
TABLET ORAL
Qty: 90 TABLET | Refills: 0 | Status: SHIPPED | OUTPATIENT
Start: 2020-07-08 | End: 2020-08-11

## 2020-07-16 NOTE — TELEPHONE ENCOUNTER
Central Prior Authorization Team   [phone]    PA Initiation    Medication: olopatadine HCL 0.1% solution  Insurance Company: [employer] - Phone [phone] Fax [phone]  Pharmacy Filling the Rx: [facility] #[id number] [address]  Filling Pharmacy Phone: [phone]  Filling Pharmacy Fax: [phone]  Start Date: 12/13/2019          
Prior Authorization Approval    Authorization Effective Date: 9/14/2019  Authorization Expiration Date: 12/12/2020  Medication: olopatadine HCL 0.1% solution-PA APPROVED   Approved Dose/Quantity:   Reference #:     Insurance Company: Karime Hunter - Phone [phone] Fax [phone]  Expected CoPay:       CoPay Card Available:      Foundation Assistance Needed:    Which Pharmacy is filling the prescription (Not needed for infusion/clinic administered): [facility] #[id number] - [place], [place] - [address] [place] [address]  Pharmacy Notified: Yes- **Instructed pharmacy to notify patient when script is ready to /ship.**   Patient Notified: Yes        
Prior Authorization Retail Medication Request    Medication/Dose: olopatadine HCL 0.1% solution  ICD code (if different than what is on RX):  M35.00  Previously Tried and Failed:    Rationale:      Insurance Name:    Insurance ID:        Pharmacy Information (if different than what is on RX)  Name:    Phone:     
1F bib parents from home with c/o fever for few days now, no PMHx, was seen yesterday at [facility] for the same problem and was dc, came back today and parents requesting covid swab. Was given Tylenol by parents round the clock for fever. Patient afebrile in the ED, no sob or respiratory distress noted.   Seen and evaluated by MD.  COVID 19 swab and flu swab done pending results. Meds given, re-vitaled and MD made aware.

## 2020-07-19 DIAGNOSIS — Z79.899 HIGH RISK MEDICATIONS (NOT ANTICOAGULANTS) LONG-TERM USE: ICD-10-CM

## 2020-07-19 DIAGNOSIS — M34.9 SYSTEMIC SCLEROSIS (H): ICD-10-CM

## 2020-07-19 DIAGNOSIS — M85.80 OSTEOPENIA, UNSPECIFIED LOCATION: ICD-10-CM

## 2020-07-19 DIAGNOSIS — M77.42 METATARSALGIA OF BOTH FEET: ICD-10-CM

## 2020-07-19 DIAGNOSIS — M77.40 METATARSALGIA, UNSPECIFIED LATERALITY: ICD-10-CM

## 2020-07-19 DIAGNOSIS — M05.79 RHEUMATOID ARTHRITIS INVOLVING MULTIPLE SITES WITH POSITIVE RHEUMATOID FACTOR (H): ICD-10-CM

## 2020-07-19 DIAGNOSIS — M77.41 METATARSALGIA OF BOTH FEET: ICD-10-CM

## 2020-07-21 RX ORDER — OXYCODONE HYDROCHLORIDE 5 MG/1
TABLET ORAL
Qty: 130 TABLET | Refills: 0 | Status: SHIPPED | OUTPATIENT
Start: 2020-07-21 | End: 2020-08-11

## 2020-07-21 NOTE — TELEPHONE ENCOUNTER
oxyCODONE (ROXICODONE) 5 MG tablet   Take 1 tablet (5 mg) by mouth every four hours as needed for moderate to severe pain.  Maximum of 6 tablets per day.       Last Written Prescription Date:  6/29/20  Last Fill Quantity: 130,   # refills: 0  Last Office Visit : 4/2/20 Rheum/ LS virtual visit   Future Office visit: none     Routing refill request to provider for review/approval because:  Drug not on the refill protocol or controlled substance

## 2020-07-22 DIAGNOSIS — M34.9 SYSTEMIC SCLEROSIS (H): ICD-10-CM

## 2020-07-27 RX ORDER — MYCOPHENOLATE MOFETIL 500 MG/1
1500 TABLET ORAL DAILY
Qty: 270 TABLET | Refills: 0 | OUTPATIENT
Start: 2020-07-27

## 2020-07-27 NOTE — TELEPHONE ENCOUNTER
Mycophenolate Mofetil Oral Tablet 500 MG   Take 3 tablets (1,500 mg) by mouth daily   Last Written Prescription Date:  5/25/20  Sent to [facility] #[id number] - [facility] [address] [facility]   Last Fill Quantity: 270,   # refills: 0  Last Office Visit : 4/2/20   Future Office visit:  None, recommended 3 months     Care Everywhere:     CBCP, CR, Albumin, AST/ALT done 3/20/20  WHITE BLOOD COUNT  4.9 4.5 - 11.0 thou/cu mm [facility]     RED BLOOD COUNT  3.78 (L) 4.00 - 5.20 mil/cu mm [facility]     HEMOGLOBIN  11.4 (L) 12.0 - 16.0 g/dL [facility]     HEMATOCRIT  36.6 33.0 - 51.0 % [facility]     MCV  97 80 - 100 fL [facility]     MCH  30.2 26.0 - 34.0 pg [facility]     MCHC  31.1 (L) 32.0 - 36.0 g/dL [facility]     RDW  13.3 11.5 - 15.5 % [facility]     PLATELET COUNT  226 140 - 440 thou/cu mm [facility]     MPV  10.5 6.5 - 11.0 fL [facility]     NEUTROPHILS  52.7 % [facility]     LYMPHOCYTES  38.3 % [facility]     MONOCYTES  7.4 % [facility]     EOSINOPHILS  1.4 % [facility]     BASOPHILS  0.2 % [facility]     ABSOLUTE NEUTROPHILS  2.6 1.7 - 7.0 thou/cu mm [facility]     ABSOLUTE LYMPHOCYTES  1.9 0.9 - 2.9 thou/cu mm [facility]     ABSOLUTE MONOCYTES  0.4 <0.9 thou/cu mm [facility]     ABSOLUTE EOSINOPHILS  0.1 <0.5 thou/cu mm [facility]     ABSOLUTE BASOPHILS  0.0        CREATININE 0.85     AST (SGOT) 19     ALT (SGPT) 10     ALBUMIN 4.6               Routing refill request to provider for review/approval because:  DMARD, Last labs done 3/20/20 Care Everywhere and appt is due. Scheduling has been  notified to contact the pt for appointment.

## 2020-07-28 ENCOUNTER — TELEPHONE (OUTPATIENT)
Dept: PULMONOLOGY | Facility: CLINIC | Age: 55
End: 2020-07-28

## 2020-07-28 ENCOUNTER — TELEPHONE (OUTPATIENT)
Dept: RHEUMATOLOGY | Facility: CLINIC | Age: 55
End: 2020-07-28

## 2020-07-31 ENCOUNTER — MYC MEDICAL ADVICE (OUTPATIENT)
Dept: RHEUMATOLOGY | Facility: CLINIC | Age: 55
End: 2020-07-31

## 2020-07-31 ENCOUNTER — TELEPHONE (OUTPATIENT)
Dept: RHEUMATOLOGY | Facility: CLINIC | Age: 55
End: 2020-07-31

## 2020-07-31 DIAGNOSIS — M85.80 OSTEOPENIA, UNSPECIFIED LOCATION: ICD-10-CM

## 2020-07-31 DIAGNOSIS — M77.40 METATARSALGIA, UNSPECIFIED LATERALITY: ICD-10-CM

## 2020-07-31 DIAGNOSIS — I73.01 RAYNAUD'S DISEASE WITH GANGRENE (H): ICD-10-CM

## 2020-07-31 DIAGNOSIS — M34.9 SYSTEMIC SCLEROSIS (H): ICD-10-CM

## 2020-07-31 DIAGNOSIS — M77.41 METATARSALGIA OF BOTH FEET: ICD-10-CM

## 2020-07-31 DIAGNOSIS — M05.79 RHEUMATOID ARTHRITIS INVOLVING MULTIPLE SITES WITH POSITIVE RHEUMATOID FACTOR (H): ICD-10-CM

## 2020-07-31 DIAGNOSIS — G89.29 CHRONIC MUSCULOSKELETAL PAIN: Primary | ICD-10-CM

## 2020-07-31 DIAGNOSIS — M77.42 METATARSALGIA OF BOTH FEET: ICD-10-CM

## 2020-07-31 DIAGNOSIS — M05.89 OTHER RHEUMATOID ARTHRITIS WITH RHEUMATOID FACTOR OF MULTIPLE SITES (H): ICD-10-CM

## 2020-07-31 DIAGNOSIS — M79.18 CHRONIC MUSCULOSKELETAL PAIN: Primary | ICD-10-CM

## 2020-07-31 NOTE — TELEPHONE ENCOUNTER
[facility]    Phone Message    May a detailed message be left on voicemail: yes     Reason for Call: Order(s): Other:   Reason for requested: labs for Dr Child  Date needed: now, please fax asap   Provider name: JANET Child  The pt is asking the lab orders to be faxed to [facility], Attn: Dr Lindy Plasencia  Please send the pt a my chart mssg when this has been done so she can get them done asap. Thanks.      Action Taken: Message routed to:  [facility]    Travel Screening: Not Applicable

## 2020-07-31 NOTE — TELEPHONE ENCOUNTER
Lab orders faxed to [facility] in [place] at [phone]. Patient norified via [employer] message.    Anh Armenta CMA   7/31/2020 1:59 PM

## 2020-08-10 DIAGNOSIS — I73.01 RAYNAUD'S DISEASE WITH GANGRENE (H): ICD-10-CM

## 2020-08-10 DIAGNOSIS — M77.42 METATARSALGIA OF BOTH FEET: ICD-10-CM

## 2020-08-10 DIAGNOSIS — M05.79 RHEUMATOID ARTHRITIS INVOLVING MULTIPLE SITES WITH POSITIVE RHEUMATOID FACTOR (H): ICD-10-CM

## 2020-08-10 DIAGNOSIS — Z79.899 HIGH RISK MEDICATIONS (NOT ANTICOAGULANTS) LONG-TERM USE: ICD-10-CM

## 2020-08-10 DIAGNOSIS — M34.9 SYSTEMIC SCLEROSIS (H): ICD-10-CM

## 2020-08-10 DIAGNOSIS — M77.40 METATARSALGIA, UNSPECIFIED LATERALITY: ICD-10-CM

## 2020-08-10 DIAGNOSIS — M85.80 OSTEOPENIA, UNSPECIFIED LOCATION: ICD-10-CM

## 2020-08-10 DIAGNOSIS — M77.41 METATARSALGIA OF BOTH FEET: ICD-10-CM

## 2020-08-11 RX ORDER — OXYCODONE AND ACETAMINOPHEN 5; 325 MG/1; MG/1
TABLET ORAL
Qty: 90 TABLET | Refills: 0 | Status: SHIPPED | OUTPATIENT
Start: 2020-08-11 | End: 2020-09-02

## 2020-08-11 RX ORDER — OXYCODONE HYDROCHLORIDE 5 MG/1
TABLET ORAL
Qty: 180 TABLET | Refills: 0 | Status: SHIPPED | OUTPATIENT
Start: 2020-08-11 | End: 2020-08-12

## 2020-08-11 NOTE — TELEPHONE ENCOUNTER
check:    Fill Date ID Written Drug Qty Days Prescriber Rx # Pharmacy Refill Daily Dose *    07/21/2020  1   07/21/2020  Oxycodone Hcl 5 Mg Tablet  130.00 22 Je Mol  [id number]  Sup (4867)  0/0 44.32 [facility] [place]   07/20/2020  1   07/20/2020  Lorazepam 1 Mg Tablet  60.00 30 Vi Wir  [id number]  Sup (4867)  0/0 2.00 [facility] [place]   07/08/2020  1   07/08/2020  Oxycodone-Acetaminophen 5-325  90.00 30 Je Mol  [id number]  Sup (4867)  0/0 22.50 [facility] [place]   06/29/2020  1   06/29/2020  Oxycodone Hcl 5 Mg Tablet  130.00 22 Je Mol  [id number]  Sup (4867)  0/0 44.32 [facility] [place]   06/15/2020  1   06/15/2020  Lorazepam 1 Mg Tablet  60.00 30 Vi Wir  [id number]  Sup (4867)  0/0 2.00 [facility] [place]   06/11/2020  1   06/11/2020  Oxycodone-Acetaminophen 5-325  90.00 30 Je Mol  [id number]  Sup (4867)  0/0 22.50 [facility] [place]   06/10/2020  1   06/10/2020  Oxycodone Hcl 5 Mg Tablet  130.00 22 Je Mol  [id number]  Sup (4867)  0/0 44.32 [facility] [place]   05/14/2020  1   05/14/2020  Oxycodone-Acetaminophen 5-325  90.00 30 Je Mol  [id number]  Sup (4867)  0/0 22.50 [facility] [place]   05/09/2020  1   05/08/2020  Lorazepam 1 Mg Tablet  60.00 30 Vi Wir  [id number]  Sup (4867)  0/0 2.00 [facility] [place]   05/08/2020  1   03/11/2020  Pregabalin 75 Mg Capsule  60.00 30 Vi Wir  [id number]  Sup (4867)  0/1 1.00 [facility] [place]   05/08/2020  1   05/08/2020  Oxycodone Hcl 5 Mg Tablet  180.00 45 Je Mol  [id number]  Sup (4867)  0/0 30.00 [facility] [place]   04/13/2020  1   04/06/2020  Oxycodone-Acetaminophen 5-325  90.00 30 Je Mol  [id number]  Sup (4867)  0/0 22.50 [facility] [place]   04/06/2020  1   04/06/2020  Oxycodone Hcl 5 Mg Tablet  180.00 30 Je Mol  [id number]  Sup (4867)  0/0 45.00 [facility] [place]     Pt was prescribed 130 tabs of the 5 mg Oxycodone, which has a max of 6 tabs per day, is only 22 days. Spoke with pt. She is taking 6 tabs per day, and actually ran out a couple of days ago, today is 22 days from her last fill date. she was previously given 180 tabs, but appears to have changed with the 6/10/2020 prescription.     The Percocet is almost at 5 weeks since last filled.  She will run out today.    Prescriptions set up to reflect 1 month supplies and routed to Dr Child to complete.    TIARRA FrankN RN  Scleroderma Care Coordinator  [facility]

## 2020-08-11 NOTE — TELEPHONE ENCOUNTER
oxyCODONE-Acetaminophen Oral Tablet 5-325 MG     Last Written Prescription Date:  10/17/2018  Last Fill Quantity: 102,   # refills: 0  Last Office Visit : 4/2/2020  Future Office visit:  10/8/2020  Routing refill request to provider for review/approval because:  Drug not on the [facility], [facility] or [facility] refill protocol or controlled substance      oxyCODONE HCl Oral Tablet 5 MG     Last Written Prescription Date:  7/21/2020  Last Fill Quantity: 130,   # refills: 0  Last Office Visit : 4/2/2020  Future Office visit:  10/8/2020  Routing refill request to provider for review/approval because:  Drug not on the [facility], [facility] or [facility] refill protocol or controlled substance      Malaika Ulloa RN  Central Triage Red Flags/Med Refills

## 2020-08-12 ENCOUNTER — TELEPHONE (OUTPATIENT)
Dept: RHEUMATOLOGY | Facility: CLINIC | Age: 55
End: 2020-08-12

## 2020-08-12 DIAGNOSIS — M77.40 METATARSALGIA, UNSPECIFIED LATERALITY: ICD-10-CM

## 2020-08-12 DIAGNOSIS — M34.9 SYSTEMIC SCLEROSIS (H): ICD-10-CM

## 2020-08-12 DIAGNOSIS — M77.41 METATARSALGIA OF BOTH FEET: ICD-10-CM

## 2020-08-12 DIAGNOSIS — Z79.899 HIGH RISK MEDICATIONS (NOT ANTICOAGULANTS) LONG-TERM USE: ICD-10-CM

## 2020-08-12 DIAGNOSIS — M05.79 RHEUMATOID ARTHRITIS INVOLVING MULTIPLE SITES WITH POSITIVE RHEUMATOID FACTOR (H): ICD-10-CM

## 2020-08-12 DIAGNOSIS — M85.80 OSTEOPENIA, UNSPECIFIED LOCATION: ICD-10-CM

## 2020-08-12 DIAGNOSIS — M77.42 METATARSALGIA OF BOTH FEET: ICD-10-CM

## 2020-08-12 RX ORDER — OXYCODONE HYDROCHLORIDE 5 MG/1
TABLET ORAL
Qty: 180 TABLET | Refills: 0 | Status: CANCELLED | OUTPATIENT
Start: 2020-08-12

## 2020-08-12 RX ORDER — OXYCODONE HYDROCHLORIDE 5 MG/1
5 TABLET ORAL EVERY 4 HOURS PRN
Qty: 180 TABLET | Refills: 0 | Status: SHIPPED | OUTPATIENT
Start: 2020-08-12 | End: 2020-10-01

## 2020-08-12 NOTE — TELEPHONE ENCOUNTER
Spoke with patient who stated that the [facility] pharmacy is out of the oxycodone 5mg and is requesting that the script is sent to [facility] pharmacy at the [facility]. Patient is completely out of medication and is requesting this today. I spoke with our pharmacy here at the [facility] who stated that they do have this medication.     Medication has been pended and sent to Dr. Child high priority. I let patient know that I will call her when the script is signed.     Anh Armenta CMA   8/12/2020 2:16 PM

## 2020-08-12 NOTE — TELEPHONE ENCOUNTER
Patient called and left a message on my phone stating that she needed a refill of her percocet and oxycodone and she needs her lab orders faxed to [facility] in [place].    I returned the patient's call and left a message letting her know that the medications have been pended and sent to Dr. Child to sign. I also let the patient know that I had faxed the lab orders to [facility] in [place] on 7/31/2020. I have re-faxed those orders and let her know to give me a call if she has further questions.     Anh Armenta, GEOVANY   8/12/2020 12:51 PM

## 2020-08-18 DIAGNOSIS — M34.9 SYSTEMIC SCLEROSIS (H): ICD-10-CM

## 2020-08-20 ENCOUNTER — OFFICE VISIT (OUTPATIENT)
Dept: PULMONOLOGY | Facility: CLINIC | Age: 55
End: 2020-08-20
Attending: INTERNAL MEDICINE
Payer: MEDICARE

## 2020-08-20 VITALS
DIASTOLIC BLOOD PRESSURE: 61 MMHG | OXYGEN SATURATION: 99 % | HEART RATE: 72 BPM | SYSTOLIC BLOOD PRESSURE: 103 MMHG | BODY MASS INDEX: 24.2 KG/M2 | WEIGHT: 143.2 LBS

## 2020-08-20 DIAGNOSIS — K22.4 ESOPHAGEAL DYSMOTILITY: ICD-10-CM

## 2020-08-20 DIAGNOSIS — M05.79 RHEUMATOID ARTHRITIS INVOLVING MULTIPLE SITES WITH POSITIVE RHEUMATOID FACTOR (H): ICD-10-CM

## 2020-08-20 DIAGNOSIS — K21.9 GASTROESOPHAGEAL REFLUX DISEASE, ESOPHAGITIS PRESENCE NOT SPECIFIED: ICD-10-CM

## 2020-08-20 DIAGNOSIS — I73.01 RAYNAUD'S DISEASE WITH GANGRENE (H): ICD-10-CM

## 2020-08-20 DIAGNOSIS — M34.9 SYSTEMIC SCLEROSIS (H): Primary | ICD-10-CM

## 2020-08-20 DIAGNOSIS — Z79.899 HIGH RISK MEDICATIONS (NOT ANTICOAGULANTS) LONG-TERM USE: ICD-10-CM

## 2020-08-20 PROCEDURE — G0463 HOSPITAL OUTPT CLINIC VISIT: HCPCS | Mod: ZF

## 2020-08-20 NOTE — LETTER
2020         RE: Willard Grayson  [address]  [address]  [facility] [address]        Dear Colleague,    Thank you for referring your patient, Willard Grayson, to the [facility]. Please see a copy of my visit note below.    [facility]  Rheumatology Clinic  Brennen Child MD  2020     Name: Willard Grayson  MRN: [id number]  Age: 54 year old  : 1965  Referring provider: Referred Self    Problem List:  1. 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   2. Associated marked neuropathic skin pain markedly improved with Lyrica with prior medication therapy with gabapentin.  3. Diffuse musculoskeletal pain requiring methotrexate plus CellCept, and narcotic to control, but also improved. Now off methotrexate due to GI issues.  4. Initial clinical overlap with anti-CCP seropositive rheumatoid arthritis with continued anti-CCP seropositivity as of 2010.  5. Raynaud's phenomena with digital ulcers with easily cracked skin.   6. GI involvement consistent with bacterial small bowel overgrowth with marked improvement after a course of neomycin followed by Dr. Glez; positive hydrogen breath test in 3/2012 - treated with rifaximin through Dr. Jacob  7. No convincing evidence of lung involvement on serial pulmonary function tests or clinically.   8. History of heart palpitations prior to the onset of SSc with stable symptoms since. Holter 2018 with PACs.  9. History of medication failures with Remicade, methotrexate, methotrexate plus CellCept and with Orencia and with improvement since initiation of IVIG but with complicating headaches and overall sense of malaise with IVIG infusions.   10. History of silicone breast implants.   11.  Incidental finding of calcified splenic artery aneurysm.  12. EGD in 3/2012 with hiatal hernia and gastral antral vascular ectasia  13. Recurrent right third finger contracture with extensor surface ulceration, healed   14. Trochanteric bursitis, R>L    Assessment and Plan:   # Systemic sclerosis   # Gastroesophageal reflux disease, esophagitis presence not specified  # Esophageal dysmotility  # Raynaud's disease with gangrene  # High risk medications (not anticoagulants) long-term use  # Rheumatoid arthritis involving multiple sites with positive rheumatoid factor (H)  # Metatarsalgia, unspecified laterality  # Metatarsalgia of both feet  # Osteopenia, unspecified location  # Systemic sclerosis  # MCDANIEL (dyspnea on exertion)  # Chest wall pain  # Skin lesion of lower extremity    At this point I think her worst problem remains her right hand function and the tendency to get ulcers over those knuckles.  We have repeatedly tried giving her methotrexate and she has repeatedly discontinued it because of side effects or concerns over the medicine.    I am uncertain whether the MMF is still helping her at this point although I would discontinue it only with caution and careful monitoring.    On the other hand her skin examination is showing what I believed to be a likely BCC.  She fortunately already has a Derm appointment scheduled.  She will also make sure that they take a close look at the lesion over her left thigh and give her a good total body skin examination given her fair complexion and longstanding use of MMF.    If the dermatologist would like her to try to go off of MMF I think that could be reasonable.  In that event I would like to try placing her on Arava to see if that would help her hand function.  She is willing to try it and we have never done so.  Recall that she does actually have CCP antibodies and although she does not routinely show us MCP or wrist synovitis she may yet have some of that biology  going on in addition to her more typical systemic sclerosis biology.    She will contact us with the recommendations of the dermatologist and will proceed from there.    Her C-spine results are reviewed with her and those are indicative only of some low-grade DJD there.  I think she can exercise her neck and hopefully get some improvement without knots discussed with her.    Smoking cessation is once again discussed.    Follow-up:  3 months.     This visit was 25 mins in duration, the majority spent in counseling.     JANET Child MD, PhD    Rheumatology      August 20, 2020 interval history:    Patient comes in today with him main concern being some new skin lesions that she has noted over her left leg and left arm over the last few months.  She does have a dermatology appointment within the [facility] system where she gets most of her care.  That is not however until October.    The other new issue this been bothering her a fair amount is neck pain and headaches.  She did get a cervical spine film just recently at [facility] and has not yet reviewed that with anyone there.    In terms of her scleroderma skin involvement she really feels that has been quite stable.  We have repeatedly tried to get her on methotrexate but she is tolerated that poorly but she has tolerated MMF better and remains on it.  The main problem for her has been the contractures in her hands and those have gradually worsened particularly in her right hand.    She is largely sheltering in place because of coronavirus, but she is still smoking cigarettes.  If anything she has increased doing so because of feeling under stress.     Review of Systems:   Pertinent items are noted in HPI or as below, remainder of complete ROS is negative.      No recent problems with hearing or vision. No swallowing problems.   No breathing difficulty, shortness of breath, coughing, or wheezing  No chest pain or palpitations  No heart burn, indigestion,  abdominal pain, nausea, vomiting, diarrhea  No urination problems, no bloody, cloudy urine, no dysuria  No numbing, tingling, weakness  No headaches or confusion  No rashes. No easy bleeding or bruising.   Answers for HPI/ROS submitted by the patient on 11/21/2019   General Symptoms: Yes  Skin Symptoms: Yes  HENT Symptoms: Yes  EYE SYMPTOMS: Yes  HEART SYMPTOMS: Yes  LUNG SYMPTOMS: Yes  INTESTINAL SYMPTOMS: Yes  URINARY SYMPTOMS: No  GYNECOLOGIC SYMPTOMS: No  BREAST SYMPTOMS: No  SKELETAL SYMPTOMS: Yes  BLOOD SYMPTOMS: No  NERVOUS SYSTEM SYMPTOMS: Yes  MENTAL HEALTH SYMPTOMS: Yes  Fever: No  Loss of appetite: No  Weight loss: No  Weight gain: Yes  Fatigue: Yes  Night sweats: Yes  Chills: No  Excessive thirst: Yes  Feeling hot or cold when others believe the temperature is normal: Yes  Loss of height: No  Post-operative complications: No  Surgical site pain: No  Hallucinations: No  Change in or Loss of Energy: No  Changes in hair: No  Changes in moles/birth marks: Yes  Itching: Yes  Rashes: No  Changes in nails: No  Acne: No  Hair in places you don't want it: No  Ear pain: No  Ear discharge: No  Hearing loss: Yes  Tinnitus: Yes  Nosebleeds: No  Congestion: No  Sinus pain: No  Tooth pain: No  Gum tenderness: No  Bleeding gums: No  Change in taste: No  Change in sense of smell: No  Dry mouth: Yes  Hearing aid used: No  Eye pain: No  Vision loss: Yes  Dry eyes: Yes  Watery eyes: Yes  Eye bulging: No  Double vision: No  Flashing of lights: No  Cough: No  Sputum or phlegm: No  Coughing up blood: No  Difficulty breating or shortness of breath: No  Snoring: No  Wheezing: No  Difficulty breathing on exertion: Yes  Chest pain or pressure: No  Fast or irregular heartbeat: No  Pain in legs with walking: Yes  Trouble breathing while lying down: No  Fingers or toes appear blue: Yes  High blood pressure: Yes  Low blood pressure: No  Fainting: No  Murmurs: No  Pacemaker: No  Varicose veins: No  Edema or swelling: No  Wake up at  night with shortness of breath: No  Light-headedness: No  Heart burn or indigestion: Yes  Nausea: No  Vomiting: No  Abdominal pain: No  Bloating: Yes  Constipation: Yes  Diarrhea: No  Back pain: Yes  Muscle aches: Yes  Neck pain: Yes  Swollen joints: No  Joint pain: Yes  Bone pain: No  Muscle cramps: No  Trouble with coordination: No  Dizziness or trouble with balance: No  Fainting or black-out spells: No  Memory loss: No  Headache: Yes  Seizures: No  Speech problems: No  Nervous or Anxious: No  Depression: Yes  Trouble sleeping: No  Trouble thinking or concentrating: No  Mood changes: No  Panic attacks: No      Active Medications:     Current Outpatient Medications:      aspirin-dipyridamole (AGGRENOX)  MG per 12 hr capsule, Take 1 capsule by mouth daily , Disp: , Rfl:      Blood Pressure Monitoring KIT, Check BP every 7 days., Disp: 1 kit, Rfl: 0     carboxymethylcellulose PF (REFRESH PLUS) 0.5 % ophthalmic solution, Place 1 drop into both eyes 4 times daily as needed for dry eyes, Disp: 120 mL, Rfl: 11     cevimeline (EVOXAC) 30 MG capsule, TAKE 1 CAPSULE (30 MG) BY MOUTH 3 TIMES DAILY AS NEEDED., Disp: 270 capsule, Rfl: 3     cyanocobalamin (VITAMIN B12) 1000 MCG/ML injection, Inject 1 mL into the muscle every 30 days, Disp: , Rfl:      esomeprazole (NEXIUM) 40 MG capsule, Take 1 capsule (40 mg) by mouth 2 times daily Take 30-60 minutes before eating., Disp: 180 capsule, Rfl: 3     folic acid (FOLVITE) 1 MG tablet, Take 1 tablet (1 mg) by mouth daily, Disp: 90 tablet, Rfl: 3     leucovorin (WELLCOVORIN) 25 MG tablet, Take 1 tablet (25 mg) by mouth daily Day after taking methotrexate, Disp: 12 tablet, Rfl: 3     LORazepam (ATIVAN) 0.5 MG tablet, Take 1 - 2 tablets by mouth three times daily as needed., Disp: 60 tablet, Rfl: 0     methotrexate 50 MG/2ML injection, Inject 0.4 mLs (10 mg) Subcutaneous every 7 days Monitoring labs required every 8 weeks for medication refills., Disp: 4 mL, Rfl: 3      mycophenolate (GENERIC EQUIVALENT) 500 MG tablet, Take 3 tablets (1,500 mg) by mouth daily, Disp: 270 tablet, Rfl: 0     naloxone (EVZIO) 0.4 MG/0.4ML auto-injector, Inject 0.4 mLs (0.4 mg) into the muscle as needed for opioid reversal every 2-3 minutes until assistance arrives, Disp: 0.8 mL, Rfl: 0     olopatadine (PATANOL) 0.1 % ophthalmic solution, Place 1 drop into both eyes 2 times daily, Disp: 5 mL, Rfl: 3     omeprazole (PRILOSEC) 40 MG DR capsule, Take 1 capsule (40 mg) by mouth 2 times daily, Disp: 180 capsule, Rfl: 3     ondansetron (ZOFRAN-ODT) 4 MG ODT tab, DISSOLVE 1 TABLET IN MOUTH EVERY EIGHT HOURS AS NEEDED FOR NAUSEA, Disp: 20 tablet, Rfl: 0     oxyCODONE (ROXICODONE) 5 MG tablet, Take 1 tablet (5 mg) by mouth every 4 hours as needed for moderate to severe pain MAX 6 tablet(s) per day, Disp: 180 tablet, Rfl: 0     oxyCODONE (ROXICODONE) 5 MG tablet, Take 1 tablet (5 mg) by mouth every 6 hours as needed for severe pain, Disp: 90 tablet, Rfl: 0     oxyCODONE-acetaminophen (PERCOCET) 5-325 MG per tablet, Take 1 tablet by mouth every 4 hours as needed for pain or moderate to severe pain (maximum 3 tablets per day), Disp: 102 tablet, Rfl: 0     oxyCODONE-acetaminophen (PERCOCET) 5-325 MG tablet, Take 1 tablet by mouth every four hours as needed for moderate to severe pain. Maximum of 3 tablets per day., Disp: 90 tablet, Rfl: 0     oxyCODONE-acetaminophen (PERCOCET) 5-325 MG tablet, Take 1 tablet by mouth every 4 hours as needed for moderate pain or severe pain *Maximum of 3 tablets per day*, Disp: 90 tablet, Rfl: 0     oxyCODONE-acetaminophen (PERCOCET) 5-325 MG tablet, Take 1 tablet by mouth every 4 hours as needed for moderate to severe pain (maximum 3 tablets per day), Disp: 102 tablet, Rfl: 0     oxyCODONE-acetaminophen (PERCOCET) 5-325 MG tablet, Take 1 tablet by mouth every 4 hours as needed for pain or moderate to severe pain (maximum 3 tablets per day), Disp: 102 tablet, Rfl: 0     pravastatin  "(PRAVACHOL) 10 MG tablet, Take 1 tablet (10 mg) by mouth daily, Disp: 30 tablet, Rfl: 5     pregabalin (LYRICA) 75 MG capsule, Take 1 capsule (75 mg) by mouth 2 times daily, Disp: 180 capsule, Rfl: 1     Syringe/Needle, Disp, 27G X 1/2\" 1 ML MISC, Used with Methotrexate once weekly, Disp: 12 each, Rfl: 3     valACYclovir (VALTREX) 500 MG tablet, Take 1 tablet (500 mg) by mouth 2 times daily (Patient taking differently: Take 500 mg by mouth daily ), Disp: 180 tablet, Rfl: 1     Vitamin D, Cholecalciferol, 1000 UNITS TABS, Take 2,000 Units by mouth daily , Disp: , Rfl:      hydroxychloroquine (PLAQUENIL) 200 MG tablet, Take 1 tablet (200 mg) by mouth 2 times daily Annual Plaquenil toxicity eye screening required. (Patient not taking: Reported on 8/15/2019), Disp: 180 tablet, Rfl: 3      Allergies:   Penicillin   Bactroban   Levaquin  Rifampin   Clarithromycin   Ofloxacin  Sertraline                                                  Past Medical History:  Rheumatoid arthritis/scleroderma  Gastric antral vascular ectasia  Gastroesophageal reflux disease  Iron deficiency anemia  Irregular heart beat  Osteomyelitis, right 3rd finger  Raynaud's syndrome with gangrene  Scleroderma  Sjogren's syndrome  Systemic sclerosis  Metatarsalgia  Dysuria  Ulcer of finger  Sicca syndrome  Palpitations  Ischemia of upper extremity  Intercostal pain  Anxiety  Breast implant rupture  Palpitations     Past Surgical History:  Appendectomy   Colonoscopy (08/04/2016)  EGD x 5 (08/22/14 - 08/02/2016)  Mammoplasty Augmentation Bilateral     Family History:    The patient's family history includes Cancer in an other family member; Respiratory in her father.     Social History:  The patient reports that she is a current cigarette smoker. She has been smoking about 1.00 pack per day. She has never used smokeless tobacco. She reports that she does not drink alcohol or use drugs. She states she is attempting to get into a research program for " quitting smoking.   PCP: Lindy Carbone  Marital Status: Single     Physical Exam:   /61   Pulse 72   Wt 65 kg (143 lb 3.2 oz)   SpO2 99%   BMI 24.20 kg/m     Wt Readings from Last 4 Encounters:   08/20/20 65 kg (143 lb 3.2 oz)   11/21/19 63.9 kg (140 lb 14.4 oz)   01/28/19 60.1 kg (132 lb 6.4 oz)   10/04/18 58.1 kg (128 lb)     Constitutional: Well-developed, appearing stated age; cooperative  Eyes: Normal EOM, PERRLA, vision, conjunctiva, sclera  ENT: Normal external ears, nose, hearing, lips, teeth, gums, throat. No mucous membrane lesions, normal saliva pool  Neck: No mass or thyroid enlargement  Resp: Lungs clear to auscultation, nl to palpation  CV: RRR, no murmurs, rubs or gallops, no edema  GI: No ABD mass or tenderness, no HSM  : Not tested  Lymph: No cervical, supraclavicular, inguinal or epitrochlear nodes  MS: The TMJ, neck, shoulder, elbow, wrist, MCP/PIP/DIP, spine, hip, knee, ankle, and foot MTP/IP joints were examined and found normal. No active synovitis or altered joint anatomy. Full joint ROM. Normal  strength. No dactylitis,  tenosynovitis, enthesopathy.  Slight restriction in wrist range of motion but not significant. Proximally 15   contracture in PIPs and 5-10   in DIPs of the left hand. Right hand greater than 90   contractures in PIPs 3-5. Obvious calcinosis lesion over the lateral aspect of the right 4th PIP. Tenderness to palpation right > left lateral hip over greater trochanter.    Tendon rub present in the left wrist.   Skin: No nail pitting, alopecia, rash, nodules or lesions  Skin thickening in hands and distal forearms is 1+ to no more than 2+ thickened mostly atrophic and is even less involved in the lower legs and face.    She does have a probable BCC in the left upper arm just proximal to the elbow.  She also has a lesion that we have looked at before over the left thigh.  She says this is been tender.  It has some slight pigmentation but she says it  is not changed.  It is not raised.    . Right fourth digit has a 1 mm ulcer over the lateral aspect of the PIP which is in about 110 degrees of flexion contracture and she is tender over this site and slightly red.   Neuro: Normal cranial nerves, strength, sensation, DTRs.   Psych: Normal judgement, orientation, memory, affect.     Imaging:  HRCT without contrast (08/22/2019):  Impression:  1. Mild bilateral posterior subpleural reticular opacities, dependent atelectasis versus early ILD. Consider prone positioning on follow-up  study.  2. Trace mucous plugging in the right middle lobe.  3. Stable incidental calcified splenic artery aneurysm.  4. Collapsed left breast implant and calcified right implant.  Sinai Carter MD    PFT Results (07/22/2019):  The FVC and FEV1 and the FEV1/FVC ratio are within normal limits. The inspiratory flow rates are within normal limits. Lung volumes are within normal limits. The diffusing capacity is normal. However, the diffusing capacity was not corrected for the   patient's hemoglobin  Impression:  Normal spirometry, lung volumes and diffusing capacity.    Laboratory:   Component      Latest Ref Rng & Units 8/15/2019 8/15/2019 8/15/2019           3:18 PM  3:24 PM  3:24 PM   WBC      4.0 - 11.0 10e9/L  5.2    RBC Count      3.8 - 5.2 10e12/L  3.71 (L)    Hemoglobin      11.7 - 15.7 g/dL  11.4 (L)    Hematocrit      35.0 - 47.0 %  37.1    MCV      78 - 100 fl  100    MCH      26.5 - 33.0 pg  30.7    MCHC      31.5 - 36.5 g/dL  30.7 (L)    RDW      10.0 - 15.0 %  13.2    Platelet Count      150 - 450 10e9/L  212    Diff Method        Automated Method    % Neutrophils      %  48.8    % Lymphocytes      %  40.5    % Monocytes      %  9.0    % Eosinophils      %  1.3    % Basophils      %  0.2    % Immature Granulocytes      %  0.2    Nucleated RBCs      0 /100  0    Absolute Neutrophil      1.6 - 8.3 10e9/L  2.6    Absolute Lymphocytes      0.8 - 5.3 10e9/L  2.1    Absolute Monocytes       0.0 - 1.3 10e9/L  0.5    Absolute Eosinophils      0.0 - 0.7 10e9/L  0.1    Absolute Basophils      0.0 - 0.2 10e9/L  0.0    Abs Immature Granulocytes      0 - 0.4 10e9/L  0.0    Absolute Nucleated RBC        0.0    Color Urine       Colorless     Appearance Urine       Clear     Glucose Urine      NEG:Negative mg/dL Negative     Bilirubin Urine      NEG:Negative Negative     Ketones Urine      NEG:Negative mg/dL Negative     Specific Gravity Urine      1.003 - 1.035 1.002 (L)     Blood Urine      NEG:Negative Small (A)     pH Urine      5.0 - 7.0 pH 6.0     Protein Albumin Urine      NEG:Negative mg/dL Negative     Urobilinogen mg/dL      0.0 - 2.0 mg/dL 0.0     Nitrite Urine      NEG:Negative Negative     Leukocyte Esterase Urine      NEG:Negative Negative     Source       Midstream Urine     WBC Urine      0 - 5 /HPF 1     RBC Urine      0 - 2 /HPF 1     Squamous Epithelial /HPF Urine      0 - 1 /HPF <1     Albumin      3.4 - 5.0 g/dL   4.3     Component      Latest Ref Rng & Units 8/15/2019 8/15/2019 8/15/2019 8/15/2019           3:24 PM  3:24 PM  3:24 PM  3:24 PM   Creatinine      0.52 - 1.04 mg/dL  0.78     GFR Estimate      >60 mL/min/1.73:m2  85     GFR Estimate If Black      >60 mL/min/1.73:m2  >90     Sed Rate      0 - 30 mm/h       CRP Inflammation      0.0 - 8.0 mg/L <2.9      AST      0 - 45 U/L   15    ALT      0 - 50 U/L    18   Albumin      3.4 - 5.0 g/dL         Component      Latest Ref Rng & Units 8/15/2019           3:24 PM   Sed Rate      0 - 30 mm/h 16           Again, thank you for allowing me to participate in the care of your patient.        Sincerely,        Brennen Child MD

## 2020-08-20 NOTE — NURSING NOTE
Chief Complaint   Patient presents with     Follow Up     scleroderma     Vitals were taken and medications were reconciled.     RENATA Crisostomo

## 2020-08-21 NOTE — PROGRESS NOTES
[facility]  Rheumatology Clinic  Brennen Child MD  2020     Name: Willard Grayson  MRN: [id number]  Age: 54 year old  : 1965  Referring provider: Referred Self    Problem List:  1. 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   2. Associated marked neuropathic skin pain markedly improved with Lyrica with prior medication therapy with gabapentin.  3. Diffuse musculoskeletal pain requiring methotrexate plus CellCept, and narcotic to control, but also improved. Now off methotrexate due to GI issues.  4. Initial clinical overlap with anti-CCP seropositive rheumatoid arthritis with continued anti-CCP seropositivity as of 2010.  5. Raynaud's phenomena with digital ulcers with easily cracked skin.   6. GI involvement consistent with bacterial small bowel overgrowth with marked improvement after a course of neomycin followed by Dr. Glez; positive hydrogen breath test in 3/2012 - treated with rifaximin through Dr. Jacob  7. No convincing evidence of lung involvement on serial pulmonary function tests or clinically.   8. History of heart palpitations prior to the onset of SSc with stable symptoms since. Holter 2018 with PACs.  9. History of medication failures with Remicade, methotrexate, methotrexate plus CellCept and with Orencia and with improvement since initiation of IVIG but with complicating headaches and overall sense of malaise with IVIG infusions.   10. History of silicone breast implants.   11. Incidental finding of calcified splenic artery aneurysm.  12. EGD in 3/2012 with hiatal hernia and gastral antral vascular ectasia  13. Recurrent right third finger contracture with extensor surface ulceration, healed   14. Trochanteric bursitis, R>L    Assessment and Plan:   #  Systemic sclerosis   # Gastroesophageal reflux disease, esophagitis presence not specified  # Esophageal dysmotility  # Raynaud's disease with gangrene  # High risk medications (not anticoagulants) long-term use  # Rheumatoid arthritis involving multiple sites with positive rheumatoid factor (H)  # Metatarsalgia, unspecified laterality  # Metatarsalgia of both feet  # Osteopenia, unspecified location  # Systemic sclerosis  # MCDANIEL (dyspnea on exertion)  # Chest wall pain  # Skin lesion of lower extremity    At this point I think her worst problem remains her right hand function and the tendency to get ulcers over those knuckles.  We have repeatedly tried giving her methotrexate and she has repeatedly discontinued it because of side effects or concerns over the medicine.    I am uncertain whether the MMF is still helping her at this point although I would discontinue it only with caution and careful monitoring.    On the other hand her skin examination is showing what I believed to be a likely BCC.  She fortunately already has a Derm appointment scheduled.  She will also make sure that they take a close look at the lesion over her left thigh and give her a good total body skin examination given her fair complexion and longstanding use of MMF.    If the dermatologist would like her to try to go off of MMF I think that could be reasonable.  In that event I would like to try placing her on Arava to see if that would help her hand function.  She is willing to try it and we have never done so.  Recall that she does actually have CCP antibodies and although she does not routinely show us MCP or wrist synovitis she may yet have some of that biology going on in addition to her more typical systemic sclerosis biology.    She will contact us with the recommendations of the dermatologist and will proceed from there.    Her C-spine results are reviewed with her and those are indicative only of some low-grade DJD there.  I think  she can exercise her neck and hopefully get some improvement without knots discussed with her.    Smoking cessation is once again discussed.    Follow-up:  3 months.     This visit was 25 mins in duration, the majority spent in counseling.     JANET Child MD, PhD    Rheumatology      August 20, 2020 interval history:    Patient comes in today with him main concern being some new skin lesions that she has noted over her left leg and left arm over the last few months.  She does have a dermatology appointment within the [facility] system where she gets most of her care.  That is not however until October.    The other new issue this been bothering her a fair amount is neck pain and headaches.  She did get a cervical spine film just recently at [facility] and has not yet reviewed that with anyone there.    In terms of her scleroderma skin involvement she really feels that has been quite stable.  We have repeatedly tried to get her on methotrexate but she is tolerated that poorly but she has tolerated MMF better and remains on it.  The main problem for her has been the contractures in her hands and those have gradually worsened particularly in her right hand.    She is largely sheltering in place because of coronavirus, but she is still smoking cigarettes.  If anything she has increased doing so because of feeling under stress.     Review of Systems:   Pertinent items are noted in HPI or as below, remainder of complete ROS is negative.      No recent problems with hearing or vision. No swallowing problems.   No breathing difficulty, shortness of breath, coughing, or wheezing  No chest pain or palpitations  No heart burn, indigestion, abdominal pain, nausea, vomiting, diarrhea  No urination problems, no bloody, cloudy urine, no dysuria  No numbing, tingling, weakness  No headaches or confusion  No rashes. No easy bleeding or bruising.   Answers for HPI/ROS submitted by the patient on 11/21/2019   General  Symptoms: Yes  Skin Symptoms: Yes  HENT Symptoms: Yes  EYE SYMPTOMS: Yes  HEART SYMPTOMS: Yes  LUNG SYMPTOMS: Yes  INTESTINAL SYMPTOMS: Yes  URINARY SYMPTOMS: No  GYNECOLOGIC SYMPTOMS: No  BREAST SYMPTOMS: No  SKELETAL SYMPTOMS: Yes  BLOOD SYMPTOMS: No  NERVOUS SYSTEM SYMPTOMS: Yes  MENTAL HEALTH SYMPTOMS: Yes  Fever: No  Loss of appetite: No  Weight loss: No  Weight gain: Yes  Fatigue: Yes  Night sweats: Yes  Chills: No  Excessive thirst: Yes  Feeling hot or cold when others believe the temperature is normal: Yes  Loss of height: No  Post-operative complications: No  Surgical site pain: No  Hallucinations: No  Change in or Loss of Energy: No  Changes in hair: No  Changes in moles/birth marks: Yes  Itching: Yes  Rashes: No  Changes in nails: No  Acne: No  Hair in places you don't want it: No  Ear pain: No  Ear discharge: No  Hearing loss: Yes  Tinnitus: Yes  Nosebleeds: No  Congestion: No  Sinus pain: No  Tooth pain: No  Gum tenderness: No  Bleeding gums: No  Change in taste: No  Change in sense of smell: No  Dry mouth: Yes  Hearing aid used: No  Eye pain: No  Vision loss: Yes  Dry eyes: Yes  Watery eyes: Yes  Eye bulging: No  Double vision: No  Flashing of lights: No  Cough: No  Sputum or phlegm: No  Coughing up blood: No  Difficulty breating or shortness of breath: No  Snoring: No  Wheezing: No  Difficulty breathing on exertion: Yes  Chest pain or pressure: No  Fast or irregular heartbeat: No  Pain in legs with walking: Yes  Trouble breathing while lying down: No  Fingers or toes appear blue: Yes  High blood pressure: Yes  Low blood pressure: No  Fainting: No  Murmurs: No  Pacemaker: No  Varicose veins: No  Edema or swelling: No  Wake up at night with shortness of breath: No  Light-headedness: No  Heart burn or indigestion: Yes  Nausea: No  Vomiting: No  Abdominal pain: No  Bloating: Yes  Constipation: Yes  Diarrhea: No  Back pain: Yes  Muscle aches: Yes  Neck pain: Yes  Swollen joints: No  Joint pain: Yes  Bone  pain: No  Muscle cramps: No  Trouble with coordination: No  Dizziness or trouble with balance: No  Fainting or black-out spells: No  Memory loss: No  Headache: Yes  Seizures: No  Speech problems: No  Nervous or Anxious: No  Depression: Yes  Trouble sleeping: No  Trouble thinking or concentrating: No  Mood changes: No  Panic attacks: No      Active Medications:     Current Outpatient Medications:      aspirin-dipyridamole (AGGRENOX)  MG per 12 hr capsule, Take 1 capsule by mouth daily , Disp: , Rfl:      Blood Pressure Monitoring KIT, Check BP every 7 days., Disp: 1 kit, Rfl: 0     carboxymethylcellulose PF (REFRESH PLUS) 0.5 % ophthalmic solution, Place 1 drop into both eyes 4 times daily as needed for dry eyes, Disp: 120 mL, Rfl: 11     cevimeline (EVOXAC) 30 MG capsule, TAKE 1 CAPSULE (30 MG) BY MOUTH 3 TIMES DAILY AS NEEDED., Disp: 270 capsule, Rfl: 3     cyanocobalamin (VITAMIN B12) 1000 MCG/ML injection, Inject 1 mL into the muscle every 30 days, Disp: , Rfl:      esomeprazole (NEXIUM) 40 MG capsule, Take 1 capsule (40 mg) by mouth 2 times daily Take 30-60 minutes before eating., Disp: 180 capsule, Rfl: 3     folic acid (FOLVITE) 1 MG tablet, Take 1 tablet (1 mg) by mouth daily, Disp: 90 tablet, Rfl: 3     leucovorin (WELLCOVORIN) 25 MG tablet, Take 1 tablet (25 mg) by mouth daily Day after taking methotrexate, Disp: 12 tablet, Rfl: 3     LORazepam (ATIVAN) 0.5 MG tablet, Take 1 - 2 tablets by mouth three times daily as needed., Disp: 60 tablet, Rfl: 0     methotrexate 50 MG/2ML injection, Inject 0.4 mLs (10 mg) Subcutaneous every 7 days Monitoring labs required every 8 weeks for medication refills., Disp: 4 mL, Rfl: 3     mycophenolate (GENERIC EQUIVALENT) 500 MG tablet, Take 3 tablets (1,500 mg) by mouth daily, Disp: 270 tablet, Rfl: 0     naloxone (EVZIO) 0.4 MG/0.4ML auto-injector, Inject 0.4 mLs (0.4 mg) into the muscle as needed for opioid reversal every 2-3 minutes until assistance arrives, Disp:  "0.8 mL, Rfl: 0     olopatadine (PATANOL) 0.1 % ophthalmic solution, Place 1 drop into both eyes 2 times daily, Disp: 5 mL, Rfl: 3     omeprazole (PRILOSEC) 40 MG DR capsule, Take 1 capsule (40 mg) by mouth 2 times daily, Disp: 180 capsule, Rfl: 3     ondansetron (ZOFRAN-ODT) 4 MG ODT tab, DISSOLVE 1 TABLET IN MOUTH EVERY EIGHT HOURS AS NEEDED FOR NAUSEA, Disp: 20 tablet, Rfl: 0     oxyCODONE (ROXICODONE) 5 MG tablet, Take 1 tablet (5 mg) by mouth every 4 hours as needed for moderate to severe pain MAX 6 tablet(s) per day, Disp: 180 tablet, Rfl: 0     oxyCODONE (ROXICODONE) 5 MG tablet, Take 1 tablet (5 mg) by mouth every 6 hours as needed for severe pain, Disp: 90 tablet, Rfl: 0     oxyCODONE-acetaminophen (PERCOCET) 5-325 MG per tablet, Take 1 tablet by mouth every 4 hours as needed for pain or moderate to severe pain (maximum 3 tablets per day), Disp: 102 tablet, Rfl: 0     oxyCODONE-acetaminophen (PERCOCET) 5-325 MG tablet, Take 1 tablet by mouth every four hours as needed for moderate to severe pain. Maximum of 3 tablets per day., Disp: 90 tablet, Rfl: 0     oxyCODONE-acetaminophen (PERCOCET) 5-325 MG tablet, Take 1 tablet by mouth every 4 hours as needed for moderate pain or severe pain *Maximum of 3 tablets per day*, Disp: 90 tablet, Rfl: 0     oxyCODONE-acetaminophen (PERCOCET) 5-325 MG tablet, Take 1 tablet by mouth every 4 hours as needed for moderate to severe pain (maximum 3 tablets per day), Disp: 102 tablet, Rfl: 0     oxyCODONE-acetaminophen (PERCOCET) 5-325 MG tablet, Take 1 tablet by mouth every 4 hours as needed for pain or moderate to severe pain (maximum 3 tablets per day), Disp: 102 tablet, Rfl: 0     pravastatin (PRAVACHOL) 10 MG tablet, Take 1 tablet (10 mg) by mouth daily, Disp: 30 tablet, Rfl: 5     pregabalin (LYRICA) 75 MG capsule, Take 1 capsule (75 mg) by mouth 2 times daily, Disp: 180 capsule, Rfl: 1     Syringe/Needle, Disp, 27G X 1/2\" 1 ML MISC, Used with Methotrexate once weekly, " Disp: 12 each, Rfl: 3     valACYclovir (VALTREX) 500 MG tablet, Take 1 tablet (500 mg) by mouth 2 times daily (Patient taking differently: Take 500 mg by mouth daily ), Disp: 180 tablet, Rfl: 1     Vitamin D, Cholecalciferol, 1000 UNITS TABS, Take 2,000 Units by mouth daily , Disp: , Rfl:      hydroxychloroquine (PLAQUENIL) 200 MG tablet, Take 1 tablet (200 mg) by mouth 2 times daily Annual Plaquenil toxicity eye screening required. (Patient not taking: Reported on 8/15/2019), Disp: 180 tablet, Rfl: 3      Allergies:   Penicillin   Bactroban   Levaquin  Rifampin   Clarithromycin   Ofloxacin  Sertraline                                                  Past Medical History:  Rheumatoid arthritis/scleroderma  Gastric antral vascular ectasia  Gastroesophageal reflux disease  Iron deficiency anemia  Irregular heart beat  Osteomyelitis, right 3rd finger  Raynaud's syndrome with gangrene  Scleroderma  Sjogren's syndrome  Systemic sclerosis  Metatarsalgia  Dysuria  Ulcer of finger  Sicca syndrome  Palpitations  Ischemia of upper extremity  Intercostal pain  Anxiety  Breast implant rupture  Palpitations     Past Surgical History:  Appendectomy   Colonoscopy (08/04/2016)  EGD x 5 (08/22/14 - 08/02/2016)  Mammoplasty Augmentation Bilateral     Family History:    The patient's family history includes Cancer in an other family member; Respiratory in her father.     Social History:  The patient reports that she is a current cigarette smoker. She has been smoking about 1.00 pack per day. She has never used smokeless tobacco. She reports that she does not drink alcohol or use drugs. She states she is attempting to get into a research program for quitting smoking.   PCP: Lindy Carbone  Marital Status: Single     Physical Exam:   /61   Pulse 72   Wt 65 kg (143 lb 3.2 oz)   SpO2 99%   BMI 24.20 kg/m     Wt Readings from Last 4 Encounters:   08/20/20 65 kg (143 lb 3.2 oz)   11/21/19 63.9 kg (140 lb 14.4 oz)    01/28/19 60.1 kg (132 lb 6.4 oz)   10/04/18 58.1 kg (128 lb)     Constitutional: Well-developed, appearing stated age; cooperative  Eyes: Normal EOM, PERRLA, vision, conjunctiva, sclera  ENT: Normal external ears, nose, hearing, lips, teeth, gums, throat. No mucous membrane lesions, normal saliva pool  Neck: No mass or thyroid enlargement  Resp: Lungs clear to auscultation, nl to palpation  CV: RRR, no murmurs, rubs or gallops, no edema  GI: No ABD mass or tenderness, no HSM  : Not tested  Lymph: No cervical, supraclavicular, inguinal or epitrochlear nodes  MS: The TMJ, neck, shoulder, elbow, wrist, MCP/PIP/DIP, spine, hip, knee, ankle, and foot MTP/IP joints were examined and found normal. No active synovitis or altered joint anatomy. Full joint ROM. Normal  strength. No dactylitis,  tenosynovitis, enthesopathy.  Slight restriction in wrist range of motion but not significant. Proximally 15   contracture in PIPs and 5-10   in DIPs of the left hand. Right hand greater than 90   contractures in PIPs 3-5. Obvious calcinosis lesion over the lateral aspect of the right 4th PIP. Tenderness to palpation right > left lateral hip over greater trochanter.    Tendon rub present in the left wrist.   Skin: No nail pitting, alopecia, rash, nodules or lesions  Skin thickening in hands and distal forearms is 1+ to no more than 2+ thickened mostly atrophic and is even less involved in the lower legs and face.    She does have a probable BCC in the left upper arm just proximal to the elbow.  She also has a lesion that we have looked at before over the left thigh.  She says this is been tender.  It has some slight pigmentation but she says it is not changed.  It is not raised.    . Right fourth digit has a 1 mm ulcer over the lateral aspect of the PIP which is in about 110 degrees of flexion contracture and she is tender over this site and slightly red.   Neuro: Normal cranial nerves, strength, sensation, DTRs.   Psych:  Normal judgement, orientation, memory, affect.     Imaging:  HRCT without contrast (08/22/2019):  Impression:  1. Mild bilateral posterior subpleural reticular opacities, dependent atelectasis versus early ILD. Consider prone positioning on follow-up  study.  2. Trace mucous plugging in the right middle lobe.  3. Stable incidental calcified splenic artery aneurysm.  4. Collapsed left breast implant and calcified right implant.  Sinai Carter MD    PFT Results (07/22/2019):  The FVC and FEV1 and the FEV1/FVC ratio are within normal limits. The inspiratory flow rates are within normal limits. Lung volumes are within normal limits. The diffusing capacity is normal. However, the diffusing capacity was not corrected for the   patient's hemoglobin  Impression:  Normal spirometry, lung volumes and diffusing capacity.    Laboratory:   Component      Latest Ref Rng & Units 8/15/2019 8/15/2019 8/15/2019           3:18 PM  3:24 PM  3:24 PM   WBC      4.0 - 11.0 10e9/L  5.2    RBC Count      3.8 - 5.2 10e12/L  3.71 (L)    Hemoglobin      11.7 - 15.7 g/dL  11.4 (L)    Hematocrit      35.0 - 47.0 %  37.1    MCV      78 - 100 fl  100    MCH      26.5 - 33.0 pg  30.7    MCHC      31.5 - 36.5 g/dL  30.7 (L)    RDW      10.0 - 15.0 %  13.2    Platelet Count      150 - 450 10e9/L  212    Diff Method        Automated Method    % Neutrophils      %  48.8    % Lymphocytes      %  40.5    % Monocytes      %  9.0    % Eosinophils      %  1.3    % Basophils      %  0.2    % Immature Granulocytes      %  0.2    Nucleated RBCs      0 /100  0    Absolute Neutrophil      1.6 - 8.3 10e9/L  2.6    Absolute Lymphocytes      0.8 - 5.3 10e9/L  2.1    Absolute Monocytes      0.0 - 1.3 10e9/L  0.5    Absolute Eosinophils      0.0 - 0.7 10e9/L  0.1    Absolute Basophils      0.0 - 0.2 10e9/L  0.0    Abs Immature Granulocytes      0 - 0.4 10e9/L  0.0    Absolute Nucleated RBC        0.0    Color Urine       Colorless     Appearance Urine       Clear      Glucose Urine      NEG:Negative mg/dL Negative     Bilirubin Urine      NEG:Negative Negative     Ketones Urine      NEG:Negative mg/dL Negative     Specific Gravity Urine      1.003 - 1.035 1.002 (L)     Blood Urine      NEG:Negative Small (A)     pH Urine      5.0 - 7.0 pH 6.0     Protein Albumin Urine      NEG:Negative mg/dL Negative     Urobilinogen mg/dL      0.0 - 2.0 mg/dL 0.0     Nitrite Urine      NEG:Negative Negative     Leukocyte Esterase Urine      NEG:Negative Negative     Source       Midstream Urine     WBC Urine      0 - 5 /HPF 1     RBC Urine      0 - 2 /HPF 1     Squamous Epithelial /HPF Urine      0 - 1 /HPF <1     Albumin      3.4 - 5.0 g/dL   4.3     Component      Latest Ref Rng & Units 8/15/2019 8/15/2019 8/15/2019 8/15/2019           3:24 PM  3:24 PM  3:24 PM  3:24 PM   Creatinine      0.52 - 1.04 mg/dL  0.78     GFR Estimate      >60 mL/min/1.73:m2  85     GFR Estimate If Black      >60 mL/min/1.73:m2  >90     Sed Rate      0 - 30 mm/h       CRP Inflammation      0.0 - 8.0 mg/L <2.9      AST      0 - 45 U/L   15    ALT      0 - 50 U/L    18   Albumin      3.4 - 5.0 g/dL         Component      Latest Ref Rng & Units 8/15/2019           3:24 PM   Sed Rate      0 - 30 mm/h 16

## 2020-08-22 RX ORDER — MYCOPHENOLATE MOFETIL 500 MG/1
1500 TABLET ORAL DAILY
Qty: 270 TABLET | Refills: 0 | Status: SHIPPED | OUTPATIENT
Start: 2020-08-22 | End: 2021-01-14

## 2020-08-22 NOTE — TELEPHONE ENCOUNTER
Monitoring labs required every 8-12 weeks for medication refills.  Mycophenolate Mofetil Oral Tablet 500 MG       Last Written Prescription Date:  5/25/20  Last Fill Quantity: 270,   # refills: 0  Last Virtual Visit: 4/2/20  Future Office visit:  12/3/20    CBC RESULTS:   Recent Labs   Lab Test 11/21/19 1827   WBC 4.9   RBC 3.75*   HGB 11.6*   HCT 37.1   MCV 99   MCH 30.9   MCHC 31.3*   RDW 12.7          Creatinine   Date Value Ref Range Status   11/21/2019 0.73 0.52 - 1.04 mg/dL Final   ]    Liver Function Studies -   Recent Labs   Lab Test 11/21/19  1827  11/24/15  1848   PROTTOTAL  --   --  7.8   ALBUMIN 4.2   < > 4.1   BILITOTAL  --   --  0.4   ALKPHOS  --   --  49   AST 15   < > 12   ALT 19   < > 15    < > = values in this interval not displayed.       Routing refill request to provider for review/approval because:  Drug not on the [facility], [facility] or [facility] refill protocol   All labs in care everywhere from 8/13/20

## 2020-09-01 DIAGNOSIS — M77.41 METATARSALGIA OF BOTH FEET: ICD-10-CM

## 2020-09-01 DIAGNOSIS — I73.01 RAYNAUD'S DISEASE WITH GANGRENE (H): ICD-10-CM

## 2020-09-01 DIAGNOSIS — M77.42 METATARSALGIA OF BOTH FEET: ICD-10-CM

## 2020-09-01 DIAGNOSIS — M05.79 RHEUMATOID ARTHRITIS INVOLVING MULTIPLE SITES WITH POSITIVE RHEUMATOID FACTOR (H): ICD-10-CM

## 2020-09-01 DIAGNOSIS — Z79.899 HIGH RISK MEDICATIONS (NOT ANTICOAGULANTS) LONG-TERM USE: ICD-10-CM

## 2020-09-01 DIAGNOSIS — M77.40 METATARSALGIA, UNSPECIFIED LATERALITY: ICD-10-CM

## 2020-09-01 DIAGNOSIS — M85.80 OSTEOPENIA, UNSPECIFIED LOCATION: ICD-10-CM

## 2020-09-01 DIAGNOSIS — M34.9 SYSTEMIC SCLEROSIS (H): ICD-10-CM

## 2020-09-02 RX ORDER — OXYCODONE AND ACETAMINOPHEN 5; 325 MG/1; MG/1
TABLET ORAL
Qty: 90 TABLET | Refills: 0 | Status: SHIPPED | OUTPATIENT
Start: 2020-09-02 | End: 2020-10-01

## 2020-09-02 NOTE — TELEPHONE ENCOUNTER
oxyCODONE-Acetaminophen Oral Tablet 5-325 MG   Last Written Prescription Date:  8/12/2020  Last Fill Quantity: 180,   # refills: 0  Last Office Visit : 8/20/2020  Future Office visit:  12/3/2020    Routing refill request to provider for review/approval because:  Drug not on the [facility], [facility] or [facility] refill protocol or controlled substance      Malaika Ulloa RN  Central Triage Red Flags/Med Refills

## 2020-09-30 DIAGNOSIS — Z79.899 HIGH RISK MEDICATIONS (NOT ANTICOAGULANTS) LONG-TERM USE: ICD-10-CM

## 2020-09-30 DIAGNOSIS — M34.9 SYSTEMIC SCLEROSIS (H): ICD-10-CM

## 2020-09-30 DIAGNOSIS — M85.80 OSTEOPENIA, UNSPECIFIED LOCATION: ICD-10-CM

## 2020-09-30 DIAGNOSIS — M79.18 CHRONIC MUSCULOSKELETAL PAIN: ICD-10-CM

## 2020-09-30 DIAGNOSIS — G89.29 CHRONIC MUSCULOSKELETAL PAIN: ICD-10-CM

## 2020-09-30 DIAGNOSIS — M77.42 METATARSALGIA OF BOTH FEET: ICD-10-CM

## 2020-09-30 DIAGNOSIS — M77.41 METATARSALGIA OF BOTH FEET: ICD-10-CM

## 2020-09-30 DIAGNOSIS — M05.79 RHEUMATOID ARTHRITIS INVOLVING MULTIPLE SITES WITH POSITIVE RHEUMATOID FACTOR (H): ICD-10-CM

## 2020-09-30 DIAGNOSIS — I73.01 RAYNAUD'S DISEASE WITH GANGRENE (H): ICD-10-CM

## 2020-09-30 DIAGNOSIS — M77.40 METATARSALGIA, UNSPECIFIED LATERALITY: ICD-10-CM

## 2020-10-01 NOTE — TELEPHONE ENCOUNTER
oxyCODONE HCl Oral Tablet 5 MG    Last Written Prescription Date:  8/12/2020  Last Fill Quantity: 180,   # refills: 0  Last Office Visit : 8/20/2020  Future Office visit:  12/3/2020  Routing refill request to provider for review/approval because:  Drug not on the [facility], [facility] or [facility] refill protocol or controlled substance      oxyCODONE-Acetaminophen Oral Tablet 5-325 MG  Last Written Prescription Date:  9/2/2020  Last Fill Quantity: 90   # refills: 0  Last Office Visit :  8/20/2020  Future Office visit:  12/3/2020  Routing refill request to provider for review/approval because:  Drug not on the [facility], [facility] or [facility] refill protocol or controlled substance      Malaika Ulloa RN  Central Triage Red Flags/Med Refills

## 2020-10-02 DIAGNOSIS — M85.80 OSTEOPENIA, UNSPECIFIED LOCATION: ICD-10-CM

## 2020-10-02 DIAGNOSIS — M05.79 RHEUMATOID ARTHRITIS INVOLVING MULTIPLE SITES WITH POSITIVE RHEUMATOID FACTOR (H): ICD-10-CM

## 2020-10-02 DIAGNOSIS — M77.41 METATARSALGIA OF BOTH FEET: ICD-10-CM

## 2020-10-02 DIAGNOSIS — M77.42 METATARSALGIA OF BOTH FEET: ICD-10-CM

## 2020-10-02 DIAGNOSIS — Z79.899 HIGH RISK MEDICATIONS (NOT ANTICOAGULANTS) LONG-TERM USE: ICD-10-CM

## 2020-10-02 DIAGNOSIS — G89.29 CHRONIC MUSCULOSKELETAL PAIN: ICD-10-CM

## 2020-10-02 DIAGNOSIS — M34.9 SYSTEMIC SCLEROSIS (H): ICD-10-CM

## 2020-10-02 DIAGNOSIS — M77.40 METATARSALGIA, UNSPECIFIED LATERALITY: ICD-10-CM

## 2020-10-02 DIAGNOSIS — M79.18 CHRONIC MUSCULOSKELETAL PAIN: ICD-10-CM

## 2020-10-02 DIAGNOSIS — I73.01 RAYNAUD'S DISEASE WITH GANGRENE (H): ICD-10-CM

## 2020-10-02 RX ORDER — OXYCODONE AND ACETAMINOPHEN 5; 325 MG/1; MG/1
TABLET ORAL
Qty: 90 TABLET | Refills: 0 | Status: SHIPPED | OUTPATIENT
Start: 2020-10-02 | End: 2020-10-08

## 2020-10-02 RX ORDER — OXYCODONE HYDROCHLORIDE 5 MG/1
TABLET ORAL
Qty: 180 TABLET | Refills: 0 | Status: SHIPPED | OUTPATIENT
Start: 2020-10-02 | End: 2020-10-05

## 2020-10-05 ENCOUNTER — TELEPHONE (OUTPATIENT)
Dept: RHEUMATOLOGY | Facility: CLINIC | Age: 55
End: 2020-10-05

## 2020-10-05 DIAGNOSIS — M77.41 METATARSALGIA OF BOTH FEET: ICD-10-CM

## 2020-10-05 DIAGNOSIS — M77.40 METATARSALGIA, UNSPECIFIED LATERALITY: ICD-10-CM

## 2020-10-05 DIAGNOSIS — G89.29 CHRONIC MUSCULOSKELETAL PAIN: ICD-10-CM

## 2020-10-05 DIAGNOSIS — M85.80 OSTEOPENIA, UNSPECIFIED LOCATION: ICD-10-CM

## 2020-10-05 DIAGNOSIS — M05.79 RHEUMATOID ARTHRITIS INVOLVING MULTIPLE SITES WITH POSITIVE RHEUMATOID FACTOR (H): ICD-10-CM

## 2020-10-05 DIAGNOSIS — M77.42 METATARSALGIA OF BOTH FEET: ICD-10-CM

## 2020-10-05 DIAGNOSIS — I73.01 RAYNAUD'S DISEASE WITH GANGRENE (H): ICD-10-CM

## 2020-10-05 DIAGNOSIS — Z79.899 HIGH RISK MEDICATIONS (NOT ANTICOAGULANTS) LONG-TERM USE: ICD-10-CM

## 2020-10-05 DIAGNOSIS — M79.18 CHRONIC MUSCULOSKELETAL PAIN: ICD-10-CM

## 2020-10-05 DIAGNOSIS — M34.9 SYSTEMIC SCLEROSIS (H): ICD-10-CM

## 2020-10-05 RX ORDER — OXYCODONE AND ACETAMINOPHEN 5; 325 MG/1; MG/1
TABLET ORAL
Qty: 90 TABLET | Refills: 0 | OUTPATIENT
Start: 2020-10-05

## 2020-10-05 RX ORDER — OXYCODONE HYDROCHLORIDE 5 MG/1
TABLET ORAL
Qty: 180 TABLET | Refills: 0 | OUTPATIENT
Start: 2020-10-05

## 2020-10-05 NOTE — TELEPHONE ENCOUNTER
Received message on my voice mail from pt reporting her refill requests for the Oxy and Percocet have been denied.     Contacted pt's [facility] pharmacy because it appears that pt did get a refill on 10/2/2020, but under further review the rxs failed to go to pharmacy. This was verified by the pharmacy.        Set up prescription and routed to Dr Child.    TIARRA FrankN RN  Scleroderma Care Coordinator  [facility]

## 2020-10-06 DIAGNOSIS — I73.01 RAYNAUD'S DISEASE WITH GANGRENE (H): ICD-10-CM

## 2020-10-06 DIAGNOSIS — M05.79 RHEUMATOID ARTHRITIS INVOLVING MULTIPLE SITES WITH POSITIVE RHEUMATOID FACTOR (H): ICD-10-CM

## 2020-10-06 DIAGNOSIS — M85.80 OSTEOPENIA, UNSPECIFIED LOCATION: ICD-10-CM

## 2020-10-06 DIAGNOSIS — M79.18 CHRONIC MUSCULOSKELETAL PAIN: ICD-10-CM

## 2020-10-06 DIAGNOSIS — M77.41 METATARSALGIA OF BOTH FEET: ICD-10-CM

## 2020-10-06 DIAGNOSIS — Z79.899 HIGH RISK MEDICATIONS (NOT ANTICOAGULANTS) LONG-TERM USE: ICD-10-CM

## 2020-10-06 DIAGNOSIS — M77.42 METATARSALGIA OF BOTH FEET: ICD-10-CM

## 2020-10-06 DIAGNOSIS — M34.9 SYSTEMIC SCLEROSIS (H): ICD-10-CM

## 2020-10-06 DIAGNOSIS — G89.29 CHRONIC MUSCULOSKELETAL PAIN: ICD-10-CM

## 2020-10-06 DIAGNOSIS — M77.40 METATARSALGIA, UNSPECIFIED LATERALITY: ICD-10-CM

## 2020-10-08 RX ORDER — OXYCODONE HYDROCHLORIDE 5 MG/1
TABLET ORAL
Qty: 180 TABLET | Refills: 0 | Status: SHIPPED | OUTPATIENT
Start: 2020-10-08 | End: 2020-10-31

## 2020-10-08 RX ORDER — OXYCODONE AND ACETAMINOPHEN 5; 325 MG/1; MG/1
1 TABLET ORAL EVERY 4 HOURS PRN
Qty: 90 TABLET | Refills: 0 | Status: SHIPPED | OUTPATIENT
Start: 2020-10-08 | End: 2020-11-03

## 2020-10-08 RX ORDER — OXYCODONE AND ACETAMINOPHEN 5; 325 MG/1; MG/1
TABLET ORAL
Qty: 90 TABLET | Refills: 0 | OUTPATIENT
Start: 2020-10-08

## 2020-10-08 RX ORDER — OXYCODONE HYDROCHLORIDE 5 MG/1
TABLET ORAL
Qty: 180 TABLET | Refills: 0 | OUTPATIENT
Start: 2020-10-08

## 2020-10-08 NOTE — TELEPHONE ENCOUNTER
[facility]    Phone Message    May a detailed message be left on voicemail: yes     Reason for Call: Other: Patient should  her 2 RX at [facility] pharmacy.  She will be out today.  Can you verify the 2 RX are ready at [facility] for her today?  She works with [employer].      Action Taken: Message routed to:  [facility] ([facility]): Rheum    Travel Screening: Not Applicable

## 2020-10-20 ENCOUNTER — TELEPHONE (OUTPATIENT)
Dept: RHEUMATOLOGY | Facility: CLINIC | Age: 55
End: 2020-10-20

## 2020-10-20 NOTE — TELEPHONE ENCOUNTER
[facility] Call Center    Phone Message    May a detailed message be left on voicemail: yes     Reason for Call: Other: New spots on arm, original spot is SCC. Please check derm report from [facility]. Please call pt to discuss treatment plan.      Action Taken: Message routed to:  [facility] ([facility] Rheum    Travel Screening: Not Applicable
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
## 2020-10-21 DIAGNOSIS — K21.9 GASTROESOPHAGEAL REFLUX DISEASE: ICD-10-CM

## 2020-10-21 DIAGNOSIS — M35.00 SICCA SYNDROME (H): Primary | ICD-10-CM

## 2020-10-21 DIAGNOSIS — K22.4 ESOPHAGEAL DYSMOTILITY: ICD-10-CM

## 2020-10-21 DIAGNOSIS — R11.0 NAUSEA: ICD-10-CM

## 2020-10-25 NOTE — TELEPHONE ENCOUNTER
Agree with having her try to come off MMF> If she thinks her skin worsens a lot then, will have to reconsider.

## 2020-10-26 RX ORDER — OMEPRAZOLE 40 MG/1
40 CAPSULE, DELAYED RELEASE ORAL 2 TIMES DAILY
Qty: 180 CAPSULE | Refills: 0 | OUTPATIENT
Start: 2020-10-26

## 2020-10-26 NOTE — TELEPHONE ENCOUNTER
olopatadine (PATANOL) 0.1 % ophthalmic solution      Last Written Prescription Date:  4/30/2020  Last Fill Quantity: 5 ml,   # refills: 3  Last Office Visit : 8/20/2020  Future Office visit:  12/3/2020    Routing refill request to provider for review/approval because:  Medication not on the Rheumatology refill protocol.

## 2020-10-26 NOTE — TELEPHONE ENCOUNTER
Called and spoke with pt, she is fine with stopping MMF.  She would like to schedule an appt to see Dr. Child due to her finger ulcers.      She would like someone to give her a call tomorrow to schedule.    Anna Joseph RN  Rheumatology Clinic

## 2020-10-27 DIAGNOSIS — M35.00 SICCA SYNDROME (H): Primary | ICD-10-CM

## 2020-10-27 RX ORDER — OLOPATADINE HYDROCHLORIDE 2 MG/ML
1 SOLUTION/ DROPS OPHTHALMIC DAILY
Qty: 5 ML | Refills: 11 | OUTPATIENT
Start: 2020-10-27

## 2020-10-27 NOTE — TELEPHONE ENCOUNTER
Returned call to pt so that I can schedule an appointment. Pt said she already has one scheduled on 12/3/2020 and doesn't need one sooner. Explained to pt that I can get her scheduled this Thursday if she wished, but pt declined, saying the one she has scheduled isn't that far away.    Asked pt to call if she changes her mind and to let me know. Pt was agreeable.    RAÚL Frank RN  Scleroderma Care Coordinator  [facility]

## 2020-10-28 RX ORDER — OLOPATADINE HYDROCHLORIDE 2 MG/ML
1 SOLUTION/ DROPS OPHTHALMIC DAILY
Qty: 1 BOTTLE | Refills: 11 | Status: SHIPPED | OUTPATIENT
Start: 2020-10-28

## 2020-10-28 NOTE — TELEPHONE ENCOUNTER
Refill sent to [facility] pharmacy as they were requesting the prescription.    TIARRA FrankN RN  Scleroderma Care Coordinator  [facility]

## 2020-10-30 DIAGNOSIS — M77.42 METATARSALGIA OF BOTH FEET: ICD-10-CM

## 2020-10-30 DIAGNOSIS — I73.01 RAYNAUD'S DISEASE WITH GANGRENE (H): ICD-10-CM

## 2020-10-30 DIAGNOSIS — M79.18 CHRONIC MUSCULOSKELETAL PAIN: ICD-10-CM

## 2020-10-30 DIAGNOSIS — M77.40 METATARSALGIA, UNSPECIFIED LATERALITY: ICD-10-CM

## 2020-10-30 DIAGNOSIS — M85.80 OSTEOPENIA, UNSPECIFIED LOCATION: ICD-10-CM

## 2020-10-30 DIAGNOSIS — M77.41 METATARSALGIA OF BOTH FEET: ICD-10-CM

## 2020-10-30 DIAGNOSIS — G89.29 CHRONIC MUSCULOSKELETAL PAIN: ICD-10-CM

## 2020-10-30 DIAGNOSIS — M05.79 RHEUMATOID ARTHRITIS INVOLVING MULTIPLE SITES WITH POSITIVE RHEUMATOID FACTOR (H): ICD-10-CM

## 2020-10-30 DIAGNOSIS — M34.9 SYSTEMIC SCLEROSIS (H): ICD-10-CM

## 2020-10-31 NOTE — TELEPHONE ENCOUNTER
oxyCODONE-acetaminophen (PERCOCET) 5-325 MG tablet   Last Written Prescription Date:  10/8/20  Last Fill Quantity: 90,   # refills: 0  Last Office Visit : 8/20/20  Future Office visit:  12/3/20       oxyCODONE (ROXICODONE) 5 MG tablet  Last Written Prescription Date:  10/8/20  Last Fill Quantity: 180,   # refills: 0      Routing refill request to provider for review/approval because: controlled

## 2020-11-03 NOTE — TELEPHONE ENCOUNTER
Medication: percocet 5-325  Last Office Visit Date: 8/20/2020  Future Office Visit Date: 12/3/2020  Last Prescription Fill Date: 10/8/2020  Last Fill Quantity: #90    Medication: oxycodone 5 mg  Last Prescription Fill Date: 10/8/2020  Last Fill Quantity: #180    Patient is not due for a refill until 11/7/2020.     reviewed as follows:      Request sent to provider for review and signature.    Anh Armenta CMA   11/3/2020 2:57 PM

## 2020-11-06 RX ORDER — OXYCODONE AND ACETAMINOPHEN 5; 325 MG/1; MG/1
TABLET ORAL
Qty: 90 TABLET | Refills: 0 | Status: SHIPPED | OUTPATIENT
Start: 2020-11-06 | End: 2020-11-27

## 2020-11-06 RX ORDER — OXYCODONE HYDROCHLORIDE 5 MG/1
TABLET ORAL
Qty: 180 TABLET | Refills: 0 | Status: SHIPPED | OUTPATIENT
Start: 2020-11-06 | End: 2020-11-27

## 2020-11-06 NOTE — TELEPHONE ENCOUNTER
[facility] Call Center    Phone Message    May a detailed message be left on voicemail: yes     Reason for Call: Other: Please submit the Rx so pt can fill. Please call pt when sent.     Action Taken: Message routed to:  [facility] ([facility] Rheum    Travel Screening: Not Applicable

## 2020-11-27 DIAGNOSIS — M77.42 METATARSALGIA OF BOTH FEET: ICD-10-CM

## 2020-11-27 DIAGNOSIS — I73.01 RAYNAUD'S DISEASE WITH GANGRENE (H): ICD-10-CM

## 2020-11-27 DIAGNOSIS — M85.80 OSTEOPENIA, UNSPECIFIED LOCATION: ICD-10-CM

## 2020-11-27 DIAGNOSIS — M79.18 CHRONIC MUSCULOSKELETAL PAIN: ICD-10-CM

## 2020-11-27 DIAGNOSIS — M34.9 SYSTEMIC SCLEROSIS (H): ICD-10-CM

## 2020-11-27 DIAGNOSIS — M05.79 RHEUMATOID ARTHRITIS INVOLVING MULTIPLE SITES WITH POSITIVE RHEUMATOID FACTOR (H): ICD-10-CM

## 2020-11-27 DIAGNOSIS — M77.41 METATARSALGIA OF BOTH FEET: ICD-10-CM

## 2020-11-27 DIAGNOSIS — M77.40 METATARSALGIA, UNSPECIFIED LATERALITY: ICD-10-CM

## 2020-11-27 DIAGNOSIS — G89.29 CHRONIC MUSCULOSKELETAL PAIN: ICD-10-CM

## 2020-11-27 NOTE — TELEPHONE ENCOUNTER
oxyCODONE-Acetaminophen Oral Tablet 5-325 MG   Last Written Prescription Date:  11/6/2020  Last Fill Quantity: 90,   # refills: 0  Last Office Visit : 8/20/2020  Future Office visit:  None  Routing refill request to provider for review/approval because:  Drug not on the [facility], [facility] or [facility] refill protocol or controlled substance    oxyCODONE HCl Oral Tablet 5 MG  Last Written Prescription Date:  11/6/2020  Last Fill Quantity: 180,   # refills: 0  Last Office Visit : 8/20/2020  Future Office visit:  None    Routing refill request to provider for review/approval because:  Drug not on the [facility], [facility] or [facility] refill protocol or controlled substance      Malaika Ulloa RN  Central Triage Red Flags/Med Refills

## 2020-11-30 RX ORDER — OXYCODONE AND ACETAMINOPHEN 5; 325 MG/1; MG/1
TABLET ORAL
Qty: 90 TABLET | Refills: 0 | Status: SHIPPED | OUTPATIENT
Start: 2020-11-30 | End: 2020-12-31

## 2020-11-30 RX ORDER — OXYCODONE HYDROCHLORIDE 5 MG/1
TABLET ORAL
Qty: 180 TABLET | Refills: 0 | Status: SHIPPED | OUTPATIENT
Start: 2020-11-30 | End: 2020-12-31

## 2020-12-01 ENCOUNTER — TELEPHONE (OUTPATIENT)
Dept: RHEUMATOLOGY | Facility: CLINIC | Age: 55
End: 2020-12-01

## 2020-12-28 DIAGNOSIS — M77.40 METATARSALGIA, UNSPECIFIED LATERALITY: ICD-10-CM

## 2020-12-28 DIAGNOSIS — I73.01 RAYNAUD'S DISEASE WITH GANGRENE (H): ICD-10-CM

## 2020-12-28 DIAGNOSIS — M77.42 METATARSALGIA OF BOTH FEET: ICD-10-CM

## 2020-12-28 DIAGNOSIS — M79.18 CHRONIC MUSCULOSKELETAL PAIN: ICD-10-CM

## 2020-12-28 DIAGNOSIS — G89.29 CHRONIC MUSCULOSKELETAL PAIN: ICD-10-CM

## 2020-12-28 DIAGNOSIS — M77.41 METATARSALGIA OF BOTH FEET: ICD-10-CM

## 2020-12-28 DIAGNOSIS — M85.80 OSTEOPENIA, UNSPECIFIED LOCATION: ICD-10-CM

## 2020-12-28 DIAGNOSIS — M34.9 SYSTEMIC SCLEROSIS (H): ICD-10-CM

## 2020-12-28 DIAGNOSIS — M05.79 RHEUMATOID ARTHRITIS INVOLVING MULTIPLE SITES WITH POSITIVE RHEUMATOID FACTOR (H): ICD-10-CM

## 2020-12-31 RX ORDER — OXYCODONE HYDROCHLORIDE 5 MG/1
TABLET ORAL
Qty: 180 TABLET | Refills: 0 | Status: SHIPPED | OUTPATIENT
Start: 2020-12-31

## 2020-12-31 RX ORDER — OXYCODONE AND ACETAMINOPHEN 5; 325 MG/1; MG/1
TABLET ORAL
Qty: 90 TABLET | Refills: 0 | Status: SHIPPED | OUTPATIENT
Start: 2020-12-31

## 2020-12-31 NOTE — TELEPHONE ENCOUNTER
Medication: oxycodone hcl 5 mg  Last Office Visit Date: 12/17/2020  Future Office Visit Date: 1/14/2021  Last Prescription Fill Date: 12/4/2020  Last Fill Quantity: #180    Medication: percocet  Last Prescription Fill Date: 12/4/2020  Last Fill Quantity: #90       reviewed as follows:      Request sent to provider for review and signature.    Anh Armenta CMA   12/31/2020 10:51 AM

## 2020-12-31 NOTE — TELEPHONE ENCOUNTER
Pt called and is worried about getting this medication filled. She said she is going to run out on Sunday. But she just wanted to make sure can get this filled before the long weekend.

## 2021-01-14 ENCOUNTER — VIRTUAL VISIT (OUTPATIENT)
Dept: PULMONOLOGY | Facility: CLINIC | Age: 56
End: 2021-01-14
Attending: INTERNAL MEDICINE
Payer: MEDICARE

## 2021-01-14 DIAGNOSIS — M79.18 CHRONIC MUSCULOSKELETAL PAIN: ICD-10-CM

## 2021-01-14 DIAGNOSIS — K22.4 ESOPHAGEAL DYSMOTILITY: ICD-10-CM

## 2021-01-14 DIAGNOSIS — I73.01 RAYNAUD'S DISEASE WITH GANGRENE (H): ICD-10-CM

## 2021-01-14 DIAGNOSIS — G89.29 CHRONIC MUSCULOSKELETAL PAIN: ICD-10-CM

## 2021-01-14 DIAGNOSIS — M34.9 SYSTEMIC SCLEROSIS (H): Primary | ICD-10-CM

## 2021-01-14 PROCEDURE — 99443 PR PHYSICIAN TELEPHONE EVALUATION 21-30 MIN: CPT | Mod: 95 | Performed by: INTERNAL MEDICINE

## 2021-01-14 NOTE — PROGRESS NOTES
Willard is a 55 year old who is being evaluated via a billable telephone visit.      What phone number would you like to be contacted at? [phone]    How would you like to obtain your AVS? On license of [facility]  Rheumatology Clinic  Brennen Child MD  2021     Name: Willard Grayson  MRN: [id number]  Age: 54 year old  : 1965  Referring provider: Referred Self    Problem List:  1. 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   2. Associated marked neuropathic skin pain markedly improved with Lyrica with prior medication therapy with gabapentin.  3. Diffuse musculoskeletal pain requiring methotrexate plus CellCept, and narcotic to control, but also improved. Now off methotrexate due to GI issues.  4. Initial clinical overlap with anti-CCP seropositive rheumatoid arthritis with continued anti-CCP seropositivity as of 2010.  5. Raynaud's phenomena with digital ulcers with easily cracked skin.   6. GI involvement consistent with bacterial small bowel overgrowth with marked improvement after a course of neomycin followed by Dr. Glez; positive hydrogen breath test in 3/2012 - treated with rifaximin through Dr. Jacob  7. No convincing evidence of lung involvement on serial pulmonary function tests or clinically.   8. History of heart palpitations prior to the onset of SSc with stable symptoms since. Holter 2018 with PACs.  9. History of medication failures with Remicade, methotrexate, methotrexate plus CellCept and with Orencia and with improvement since initiation of IVIG but with complicating headaches and overall sense of malaise with IVIG infusions.   10. History of silicone breast implants.   11. Incidental finding of calcified splenic artery aneurysm.  12. EGD in 3/2012  with hiatal hernia and gastral antral vascular ectasia  13. Recurrent right third finger contracture with extensor surface ulceration, healed   14. Trochanteric bursitis, R>L    Assessment:   # Systemic sclerosis   # Gastroesophageal reflux disease, esophagitis presence not specified  # Esophageal dysmotility  # Raynaud's disease with gangrene  # High risk medications (not anticoagulants) long-term use  # Rheumatoid arthritis involving multiple sites with positive rheumatoid factor (H)  # Metatarsalgia, unspecified laterality  # Metatarsalgia of both feet  # Osteopenia, unspecified location  # Systemic sclerosis  # MCDANIEL (dyspnea on exertion)  # Chest wall pain  # Skin lesion of lower extremity    Plan/recommendation:    Most of her issues seem to be under pretty good control.  She sounds as though she is having significantly less trouble with her Raynaud's and with digital ulcers which been a big problem at times.  Her hand function however is poor and I again emphasized the possible value of doing stretching exercises there.    She otherwise seems pretty happy and to be doing pretty well.  She is off of immunosuppression at this time which is probably good.  She is far into her disease process and I am not sure the value of it at this point anyway.    My only concern would be the development of disease features that had been occult blood under control because of MMF.  We did briefly discuss that particular with respect to pulmonary function which is never been an issue in her despite her smoking.  We discussed though that if she think she is having any more difficulty breathing she should contact us so that we could get pulmonary function tests on her and see her sooner.    I think otherwise we can see her in 6 months, sooner as needed.  This telephone visit commenced at 5:03 PM was completed at 5:33 PM, 30 minutes in duration.    JANET Child MD, PhD    Rheumatology      August 20, 2020 interval  history:    Patient comes in today with him main concern being some new skin lesions that she has noted over her left leg and left arm over the last few months.  She does have a dermatology appointment within the [facility] system where she gets most of her care.  That is not however until October.    The other new issue this been bothering her a fair amount is neck pain and headaches.  She did get a cervical spine film just recently at [facility] and has not yet reviewed that with anyone there.    In terms of her scleroderma skin involvement she really feels that has been quite stable.  We have repeatedly tried to get her on methotrexate but she is tolerated that poorly but she has tolerated MMF better and remains on it.  The main problem for her has been the contractures in her hands and those have gradually worsened particularly in her right hand.    She is largely sheltering in place because of coronavirus, but she is still smoking cigarettes.  If anything she has increased doing so because of feeling under stress.    January 14, 2021 interval history:    Since we last seen the patient she feels she is really been doing pretty stably.  She went off of MMF because of development of a squamous cell carcinoma in the understanding that MMF could have been contributing to impaired tumor Amino-Cerv balance.  She really does not think she is gotten worse that since going off of it.  That was back on November 12.  The SCCA was on her forearm and is healed nicely.  She thinks that perhaps the skin in the area was a little more shiny than surrounding skin but there were no other difficulties with the healing.    She does acknowledge that over the last year or 2 her fingers to develop worsening contractures and she has difficulty writing.  Nonetheless she has tolerated methotrexate poorly and its not clear how much it helped her when she was on it.  She does acknowledge that she does not do stretching exercises as reliably as she  might.    She has had a few digital ulcers but they have been healing more quickly than usual.  She is not certain why but she does note that she is no longer taking care of her grandchildren so she is able to rest her hands and protect them better.    She has established with the pain clinic at [facility] and that they have taken over the prescribing of her narcotics and are working with her on a more comprehensive pain program.  She is really actually quite happy about that.    She continues to get some esophageal spasms at times but oddly this seems only to happen with the drinking of liquids.  Its not bad enough that she wants to do anything more about it.  We did talk about the possible value of all toyed minutes however.    The other new thing that she has had is some dizziness and tinnitus developing in her left ear much greater than right.  She is seeking care with an otolaryngologist at [facility] where most of her care is delivered however.    She is otherwise been pretty good.  She had a little bit of a flareup last week where she felt weaker and more achy and more sluggish.  She has not had a clear-cut coronavirus exposure to her knowledge.         Review of Systems:   Pertinent items are noted in HPI or as below, remainder of complete ROS is negative.      No recent problems with hearing or vision. No swallowing problems.   No breathing difficulty, shortness of breath, coughing, or wheezing  No chest pain or palpitations  No heart burn, indigestion, abdominal pain, nausea, vomiting, diarrhea  No urination problems, no bloody, cloudy urine, no dysuria  No numbing, tingling, weakness  No headaches or confusion  No rashes. No easy bleeding or bruising.   Answers for HPI/ROS submitted by the patient on 11/21/2019   General Symptoms: Yes  Skin Symptoms: Yes  HENT Symptoms: Yes  EYE SYMPTOMS: Yes  HEART SYMPTOMS: Yes  LUNG SYMPTOMS: Yes  INTESTINAL SYMPTOMS: Yes  URINARY SYMPTOMS: No  GYNECOLOGIC SYMPTOMS: No  BREAST  SYMPTOMS: No  SKELETAL SYMPTOMS: Yes  BLOOD SYMPTOMS: No  NERVOUS SYSTEM SYMPTOMS: Yes  MENTAL HEALTH SYMPTOMS: Yes  Fever: No  Loss of appetite: No  Weight loss: No  Weight gain: Yes  Fatigue: Yes  Night sweats: Yes  Chills: No  Excessive thirst: Yes  Feeling hot or cold when others believe the temperature is normal: Yes  Loss of height: No  Post-operative complications: No  Surgical site pain: No  Hallucinations: No  Change in or Loss of Energy: No  Changes in hair: No  Changes in moles/birth marks: Yes  Itching: Yes  Rashes: No  Changes in nails: No  Acne: No  Hair in places you don't want it: No  Ear pain: No  Ear discharge: No  Hearing loss: Yes  Tinnitus: Yes  Nosebleeds: No  Congestion: No  Sinus pain: No  Tooth pain: No  Gum tenderness: No  Bleeding gums: No  Change in taste: No  Change in sense of smell: No  Dry mouth: Yes  Hearing aid used: No  Eye pain: No  Vision loss: Yes  Dry eyes: Yes  Watery eyes: Yes  Eye bulging: No  Double vision: No  Flashing of lights: No  Cough: No  Sputum or phlegm: No  Coughing up blood: No  Difficulty breating or shortness of breath: No  Snoring: No  Wheezing: No  Difficulty breathing on exertion: Yes  Chest pain or pressure: No  Fast or irregular heartbeat: No  Pain in legs with walking: Yes  Trouble breathing while lying down: No  Fingers or toes appear blue: Yes  High blood pressure: Yes  Low blood pressure: No  Fainting: No  Murmurs: No  Pacemaker: No  Varicose veins: No  Edema or swelling: No  Wake up at night with shortness of breath: No  Light-headedness: No  Heart burn or indigestion: Yes  Nausea: No  Vomiting: No  Abdominal pain: No  Bloating: Yes  Constipation: Yes  Diarrhea: No  Back pain: Yes  Muscle aches: Yes  Neck pain: Yes  Swollen joints: No  Joint pain: Yes  Bone pain: No  Muscle cramps: No  Trouble with coordination: No  Dizziness or trouble with balance: No  Fainting or black-out spells: No  Memory loss: No  Headache: Yes  Seizures: No  Speech problems:  No  Nervous or Anxious: No  Depression: Yes  Trouble sleeping: No  Trouble thinking or concentrating: No  Mood changes: No  Panic attacks: No      Active Medications:     Current Outpatient Medications:      aspirin-dipyridamole (AGGRENOX)  MG per 12 hr capsule, Take 1 capsule by mouth daily , Disp: , Rfl:      Blood Pressure Monitoring KIT, Check BP every 7 days., Disp: 1 kit, Rfl: 0     carboxymethylcellulose PF (REFRESH PLUS) 0.5 % ophthalmic solution, Place 1 drop into both eyes 4 times daily as needed for dry eyes, Disp: 120 mL, Rfl: 11     cevimeline (EVOXAC) 30 MG capsule, TAKE 1 CAPSULE (30 MG) BY MOUTH 3 TIMES DAILY AS NEEDED., Disp: 270 capsule, Rfl: 3     cyanocobalamin (VITAMIN B12) 1000 MCG/ML injection, Inject 1 mL into the muscle every 30 days, Disp: , Rfl:      esomeprazole (NEXIUM) 40 MG capsule, Take 1 capsule (40 mg) by mouth 2 times daily Take 30-60 minutes before eating., Disp: 180 capsule, Rfl: 3     folic acid (FOLVITE) 1 MG tablet, Take 1 tablet (1 mg) by mouth daily, Disp: 90 tablet, Rfl: 3     hydroxychloroquine (PLAQUENIL) 200 MG tablet, Take 1 tablet (200 mg) by mouth 2 times daily Annual Plaquenil toxicity eye screening required., Disp: 180 tablet, Rfl: 3     leucovorin (WELLCOVORIN) 25 MG tablet, Take 1 tablet (25 mg) by mouth daily Day after taking methotrexate, Disp: 12 tablet, Rfl: 3     LORazepam (ATIVAN) 0.5 MG tablet, Take 1 - 2 tablets by mouth three times daily as needed., Disp: 60 tablet, Rfl: 0     naloxone (EVZIO) 0.4 MG/0.4ML auto-injector, Inject 0.4 mLs (0.4 mg) into the muscle as needed for opioid reversal every 2-3 minutes until assistance arrives, Disp: 0.8 mL, Rfl: 0     olopatadine (PATADAY) 0.2 % ophthalmic solution, Place 1 drop into both eyes daily, Disp: 1 Bottle, Rfl: 11     olopatadine (PATANOL) 0.1 % ophthalmic solution, Place 1 drop into both eyes 2 times daily, Disp: 5 mL, Rfl: 3     omeprazole (PRILOSEC) 40 MG DR capsule, Take 1 capsule (40 mg) by  "mouth 2 times daily, Disp: 180 capsule, Rfl: 3     ondansetron (ZOFRAN-ODT) 4 MG ODT tab, DISSOLVE 1 TABLET IN MOUTH EVERY EIGHT HOURS AS NEEDED FOR NAUSEA, Disp: 20 tablet, Rfl: 0     oxyCODONE (ROXICODONE) 5 MG tablet, Take 1 tablet (5 mg) by mouth every four hours as needed for moderate to severe pain.  Maximum of 6 tablets per day. , Disp: 180 tablet, Rfl: 0     oxyCODONE-acetaminophen (PERCOCET) 5-325 MG tablet, Take 1 tablet by mouth every 4 hours as needed for moderate pain or severe pain. Maximum of 3 tablets per day., Disp: 90 tablet, Rfl: 0     pravastatin (PRAVACHOL) 10 MG tablet, Take 1 tablet (10 mg) by mouth daily, Disp: 30 tablet, Rfl: 5     pregabalin (LYRICA) 75 MG capsule, Take 1 capsule (75 mg) by mouth 2 times daily, Disp: 180 capsule, Rfl: 1     Syringe/Needle, Disp, 27G X 1/2\" 1 ML MISC, Used with Methotrexate once weekly, Disp: 12 each, Rfl: 3     valACYclovir (VALTREX) 500 MG tablet, Take 1 tablet (500 mg) by mouth 2 times daily (Patient taking differently: Take 500 mg by mouth daily ), Disp: 180 tablet, Rfl: 1     Vitamin D, Cholecalciferol, 1000 UNITS TABS, Take 2,000 Units by mouth daily , Disp: , Rfl:       Allergies:   Penicillin   Bactroban   Levaquin  Rifampin   Clarithromycin   Ofloxacin  Sertraline                                                  Past Medical History:  Rheumatoid arthritis/scleroderma  Gastric antral vascular ectasia  Gastroesophageal reflux disease  Iron deficiency anemia  Irregular heart beat  Osteomyelitis, right 3rd finger  Raynaud's syndrome with gangrene  Scleroderma  Sjogren's syndrome  Systemic sclerosis  Metatarsalgia  Dysuria  Ulcer of finger  Sicca syndrome  Palpitations  Ischemia of upper extremity  Intercostal pain  Anxiety  Breast implant rupture  Palpitations     Past Surgical History:  Appendectomy   Colonoscopy (08/04/2016)  EGD x 5 (08/22/14 - 08/02/2016)  Mammoplasty Augmentation Bilateral     Family History:    The patient's family history " includes Cancer in an other family member; Respiratory in her father.     Social History:  The patient reports that she is a current cigarette smoker. She has been smoking about 1.00 pack per day. She has never used smokeless tobacco. She reports that she does not drink alcohol or use drugs. She states she is attempting to get into a research program for quitting smoking.   PCP: Lindy Carbone  Marital Status: Single     Physical Exam:   There were no vitals taken for this visit.   Wt Readings from Last 4 Encounters:   08/20/20 65 kg (143 lb 3.2 oz)   11/21/19 63.9 kg (140 lb 14.4 oz)   01/28/19 60.1 kg (132 lb 6.4 oz)   10/04/18 58.1 kg (128 lb)     Deferred on today's January 14, 2021 visit because this was a telephone visit    Imaging:  HRCT without contrast (08/22/2019):  Impression:  1. Mild bilateral posterior subpleural reticular opacities, dependent atelectasis versus early ILD. Consider prone positioning on follow-up  study.  2. Trace mucous plugging in the right middle lobe.  3. Stable incidental calcified splenic artery aneurysm.  4. Collapsed left breast implant and calcified right implant.  Sinai Carter MD    PFT Results (07/22/2019):  The FVC and FEV1 and the FEV1/FVC ratio are within normal limits. The inspiratory flow rates are within normal limits. Lung volumes are within normal limits. The diffusing capacity is normal. However, the diffusing capacity was not corrected for the   patient's hemoglobin  Impression:  Normal spirometry, lung volumes and diffusing capacity.    Laboratory:   Component      Latest Ref Rng & Units 8/15/2019 8/15/2019 8/15/2019           3:18 PM  3:24 PM  3:24 PM   WBC      4.0 - 11.0 10e9/L  5.2    RBC Count      3.8 - 5.2 10e12/L  3.71 (L)    Hemoglobin      11.7 - 15.7 g/dL  11.4 (L)    Hematocrit      35.0 - 47.0 %  37.1    MCV      78 - 100 fl  100    MCH      26.5 - 33.0 pg  30.7    MCHC      31.5 - 36.5 g/dL  30.7 (L)    RDW      10.0 - 15.0 %  13.2     Platelet Count      150 - 450 10e9/L  212    Diff Method        Automated Method    % Neutrophils      %  48.8    % Lymphocytes      %  40.5    % Monocytes      %  9.0    % Eosinophils      %  1.3    % Basophils      %  0.2    % Immature Granulocytes      %  0.2    Nucleated RBCs      0 /100  0    Absolute Neutrophil      1.6 - 8.3 10e9/L  2.6    Absolute Lymphocytes      0.8 - 5.3 10e9/L  2.1    Absolute Monocytes      0.0 - 1.3 10e9/L  0.5    Absolute Eosinophils      0.0 - 0.7 10e9/L  0.1    Absolute Basophils      0.0 - 0.2 10e9/L  0.0    Abs Immature Granulocytes      0 - 0.4 10e9/L  0.0    Absolute Nucleated RBC        0.0    Color Urine       Colorless     Appearance Urine       Clear     Glucose Urine      NEG:Negative mg/dL Negative     Bilirubin Urine      NEG:Negative Negative     Ketones Urine      NEG:Negative mg/dL Negative     Specific Gravity Urine      1.003 - 1.035 1.002 (L)     Blood Urine      NEG:Negative Small (A)     pH Urine      5.0 - 7.0 pH 6.0     Protein Albumin Urine      NEG:Negative mg/dL Negative     Urobilinogen mg/dL      0.0 - 2.0 mg/dL 0.0     Nitrite Urine      NEG:Negative Negative     Leukocyte Esterase Urine      NEG:Negative Negative     Source       Midstream Urine     WBC Urine      0 - 5 /HPF 1     RBC Urine      0 - 2 /HPF 1     Squamous Epithelial /HPF Urine      0 - 1 /HPF <1     Albumin      3.4 - 5.0 g/dL   4.3     Component      Latest Ref Rng & Units 8/15/2019 8/15/2019 8/15/2019 8/15/2019           3:24 PM  3:24 PM  3:24 PM  3:24 PM   Creatinine      0.52 - 1.04 mg/dL  0.78     GFR Estimate      >60 mL/min/1.73:m2  85     GFR Estimate If Black      >60 mL/min/1.73:m2  >90     Sed Rate      0 - 30 mm/h       CRP Inflammation      0.0 - 8.0 mg/L <2.9      AST      0 - 45 U/L   15    ALT      0 - 50 U/L    18   Albumin      3.4 - 5.0 g/dL         Component      Latest Ref Rng & Units 8/15/2019           3:24 PM   Sed Rate      0 - 30 mm/h 16         Phone call  duration: 30 minutes    JANET Child MD, PhD    Rheumatology

## 2021-01-14 NOTE — LETTER
2021         RE: Willard Grayson  [address]  [facility] [address]        Dear Colleague,    Thank you for referring your patient, Willard Grayson, to the [facility] [place]. Please see a copy of my visit note below.    Willard is a 55 year old who is being evaluated via a billable telephone visit.      What phone number would you like to be contacted at? [phone]    How would you like to obtain your AVS? [facility]  Rheumatology Clinic  Brennen Child MD  2021     Name: Willard Grayson  MRN: [id number]  Age: 54 year old  : 1965  Referring provider: Referred Self    Problem List:  1. 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   2. Associated marked neuropathic skin pain markedly improved with Lyrica with prior medication therapy with gabapentin.  3. Diffuse musculoskeletal pain requiring methotrexate plus CellCept, and narcotic to control, but also improved. Now off methotrexate due to GI issues.  4. Initial clinical overlap with anti-CCP seropositive rheumatoid arthritis with continued anti-CCP seropositivity as of 2010.  5. Raynaud's phenomena with digital ulcers with easily cracked skin.   6. GI involvement consistent with bacterial small bowel overgrowth with marked improvement after a course of neomycin followed by Dr. Glez; positive hydrogen breath test in 3/2012 - treated with rifaximin through Dr. Jacob  7. No convincing evidence of lung involvement on serial pulmonary function tests or clinically.   8. History of heart palpitations prior to the onset of SSc with stable symptoms since. Holter 2018 with PACs.  9. History of medication failures with Remicade,  methotrexate, methotrexate plus CellCept and with Orencia and with improvement since initiation of IVIG but with complicating headaches and overall sense of malaise with IVIG infusions.   10. History of silicone breast implants.   11. Incidental finding of calcified splenic artery aneurysm.  12. EGD in 3/2012 with hiatal hernia and gastral antral vascular ectasia  13. Recurrent right third finger contracture with extensor surface ulceration, healed   14. Trochanteric bursitis, R>L    Assessment:   # Systemic sclerosis   # Gastroesophageal reflux disease, esophagitis presence not specified  # Esophageal dysmotility  # Raynaud's disease with gangrene  # High risk medications (not anticoagulants) long-term use  # Rheumatoid arthritis involving multiple sites with positive rheumatoid factor (H)  # Metatarsalgia, unspecified laterality  # Metatarsalgia of both feet  # Osteopenia, unspecified location  # Systemic sclerosis  # MCDANIEL (dyspnea on exertion)  # Chest wall pain  # Skin lesion of lower extremity    Plan/recommendation:    Most of her issues seem to be under pretty good control.  She sounds as though she is having significantly less trouble with her Raynaud's and with digital ulcers which been a big problem at times.  Her hand function however is poor and I again emphasized the possible value of doing stretching exercises there.    She otherwise seems pretty happy and to be doing pretty well.  She is off of immunosuppression at this time which is probably good.  She is far into her disease process and I am not sure the value of it at this point anyway.    My only concern would be the development of disease features that had been occult blood under control because of MMF.  We did briefly discuss that particular with respect to pulmonary function which is never been an issue in her despite her smoking.  We discussed though that if she think she is having any more difficulty breathing she should contact us so that we  could get pulmonary function tests on her and see her sooner.    I think otherwise we can see her in 6 months, sooner as needed.  This telephone visit commenced at 5:03 PM was completed at 5:33 PM, 30 minutes in duration.    JANET Child MD, PhD    Rheumatology      August 20, 2020 interval history:    Patient comes in today with him main concern being some new skin lesions that she has noted over her left leg and left arm over the last few months.  She does have a dermatology appointment within the [facility] system where she gets most of her care.  That is not however until October.    The other new issue this been bothering her a fair amount is neck pain and headaches.  She did get a cervical spine film just recently at [facility] and has not yet reviewed that with anyone there.    In terms of her scleroderma skin involvement she really feels that has been quite stable.  We have repeatedly tried to get her on methotrexate but she is tolerated that poorly but she has tolerated MMF better and remains on it.  The main problem for her has been the contractures in her hands and those have gradually worsened particularly in her right hand.    She is largely sheltering in place because of coronavirus, but she is still smoking cigarettes.  If anything she has increased doing so because of feeling under stress.    January 14, 2021 interval history:    Since we last seen the patient she feels she is really been doing pretty stably.  She went off of MMF because of development of a squamous cell carcinoma in the understanding that MMF could have been contributing to impaired tumor Amino-Cerv balance.  She really does not think she is gotten worse that since going off of it.  That was back on November 12.  The SCCA was on her forearm and is healed nicely.  She thinks that perhaps the skin in the area was a little more shiny than surrounding skin but there were no other difficulties with the healing.    She does  acknowledge that over the last year or 2 her fingers to develop worsening contractures and she has difficulty writing.  Nonetheless she has tolerated methotrexate poorly and its not clear how much it helped her when she was on it.  She does acknowledge that she does not do stretching exercises as reliably as she might.    She has had a few digital ulcers but they have been healing more quickly than usual.  She is not certain why but she does note that she is no longer taking care of her grandchildren so she is able to rest her hands and protect them better.    She has established with the pain clinic at [facility] and that they have taken over the prescribing of her narcotics and are working with her on a more comprehensive pain program.  She is really actually quite happy about that.    She continues to get some esophageal spasms at times but oddly this seems only to happen with the drinking of liquids.  Its not bad enough that she wants to do anything more about it.  We did talk about the possible value of all toyed minutes however.    The other new thing that she has had is some dizziness and tinnitus developing in her left ear much greater than right.  She is seeking care with an otolaryngologist at [facility] where most of her care is delivered however.    She is otherwise been pretty good.  She had a little bit of a flareup last week where she felt weaker and more achy and more sluggish.  She has not had a clear-cut coronavirus exposure to her knowledge.         Review of Systems:   Pertinent items are noted in HPI or as below, remainder of complete ROS is negative.      No recent problems with hearing or vision. No swallowing problems.   No breathing difficulty, shortness of breath, coughing, or wheezing  No chest pain or palpitations  No heart burn, indigestion, abdominal pain, nausea, vomiting, diarrhea  No urination problems, no bloody, cloudy urine, no dysuria  No numbing, tingling, weakness  No headaches or  confusion  No rashes. No easy bleeding or bruising.   Answers for HPI/ROS submitted by the patient on 11/21/2019   General Symptoms: Yes  Skin Symptoms: Yes  HENT Symptoms: Yes  EYE SYMPTOMS: Yes  HEART SYMPTOMS: Yes  LUNG SYMPTOMS: Yes  INTESTINAL SYMPTOMS: Yes  URINARY SYMPTOMS: No  GYNECOLOGIC SYMPTOMS: No  BREAST SYMPTOMS: No  SKELETAL SYMPTOMS: Yes  BLOOD SYMPTOMS: No  NERVOUS SYSTEM SYMPTOMS: Yes  MENTAL HEALTH SYMPTOMS: Yes  Fever: No  Loss of appetite: No  Weight loss: No  Weight gain: Yes  Fatigue: Yes  Night sweats: Yes  Chills: No  Excessive thirst: Yes  Feeling hot or cold when others believe the temperature is normal: Yes  Loss of height: No  Post-operative complications: No  Surgical site pain: No  Hallucinations: No  Change in or Loss of Energy: No  Changes in hair: No  Changes in moles/birth marks: Yes  Itching: Yes  Rashes: No  Changes in nails: No  Acne: No  Hair in places you don't want it: No  Ear pain: No  Ear discharge: No  Hearing loss: Yes  Tinnitus: Yes  Nosebleeds: No  Congestion: No  Sinus pain: No  Tooth pain: No  Gum tenderness: No  Bleeding gums: No  Change in taste: No  Change in sense of smell: No  Dry mouth: Yes  Hearing aid used: No  Eye pain: No  Vision loss: Yes  Dry eyes: Yes  Watery eyes: Yes  Eye bulging: No  Double vision: No  Flashing of lights: No  Cough: No  Sputum or phlegm: No  Coughing up blood: No  Difficulty breating or shortness of breath: No  Snoring: No  Wheezing: No  Difficulty breathing on exertion: Yes  Chest pain or pressure: No  Fast or irregular heartbeat: No  Pain in legs with walking: Yes  Trouble breathing while lying down: No  Fingers or toes appear blue: Yes  High blood pressure: Yes  Low blood pressure: No  Fainting: No  Murmurs: No  Pacemaker: No  Varicose veins: No  Edema or swelling: No  Wake up at night with shortness of breath: No  Light-headedness: No  Heart burn or indigestion: Yes  Nausea: No  Vomiting: No  Abdominal pain: No  Bloating:  Yes  Constipation: Yes  Diarrhea: No  Back pain: Yes  Muscle aches: Yes  Neck pain: Yes  Swollen joints: No  Joint pain: Yes  Bone pain: No  Muscle cramps: No  Trouble with coordination: No  Dizziness or trouble with balance: No  Fainting or black-out spells: No  Memory loss: No  Headache: Yes  Seizures: No  Speech problems: No  Nervous or Anxious: No  Depression: Yes  Trouble sleeping: No  Trouble thinking or concentrating: No  Mood changes: No  Panic attacks: No      Active Medications:     Current Outpatient Medications:      aspirin-dipyridamole (AGGRENOX)  MG per 12 hr capsule, Take 1 capsule by mouth daily , Disp: , Rfl:      Blood Pressure Monitoring KIT, Check BP every 7 days., Disp: 1 kit, Rfl: 0     carboxymethylcellulose PF (REFRESH PLUS) 0.5 % ophthalmic solution, Place 1 drop into both eyes 4 times daily as needed for dry eyes, Disp: 120 mL, Rfl: 11     cevimeline (EVOXAC) 30 MG capsule, TAKE 1 CAPSULE (30 MG) BY MOUTH 3 TIMES DAILY AS NEEDED., Disp: 270 capsule, Rfl: 3     cyanocobalamin (VITAMIN B12) 1000 MCG/ML injection, Inject 1 mL into the muscle every 30 days, Disp: , Rfl:      esomeprazole (NEXIUM) 40 MG capsule, Take 1 capsule (40 mg) by mouth 2 times daily Take 30-60 minutes before eating., Disp: 180 capsule, Rfl: 3     folic acid (FOLVITE) 1 MG tablet, Take 1 tablet (1 mg) by mouth daily, Disp: 90 tablet, Rfl: 3     hydroxychloroquine (PLAQUENIL) 200 MG tablet, Take 1 tablet (200 mg) by mouth 2 times daily Annual Plaquenil toxicity eye screening required., Disp: 180 tablet, Rfl: 3     leucovorin (WELLCOVORIN) 25 MG tablet, Take 1 tablet (25 mg) by mouth daily Day after taking methotrexate, Disp: 12 tablet, Rfl: 3     LORazepam (ATIVAN) 0.5 MG tablet, Take 1 - 2 tablets by mouth three times daily as needed., Disp: 60 tablet, Rfl: 0     naloxone (EVZIO) 0.4 MG/0.4ML auto-injector, Inject 0.4 mLs (0.4 mg) into the muscle as needed for opioid reversal every 2-3 minutes until assistance  "arrives, Disp: 0.8 mL, Rfl: 0     olopatadine (PATADAY) 0.2 % ophthalmic solution, Place 1 drop into both eyes daily, Disp: 1 Bottle, Rfl: 11     olopatadine (PATANOL) 0.1 % ophthalmic solution, Place 1 drop into both eyes 2 times daily, Disp: 5 mL, Rfl: 3     omeprazole (PRILOSEC) 40 MG DR capsule, Take 1 capsule (40 mg) by mouth 2 times daily, Disp: 180 capsule, Rfl: 3     ondansetron (ZOFRAN-ODT) 4 MG ODT tab, DISSOLVE 1 TABLET IN MOUTH EVERY EIGHT HOURS AS NEEDED FOR NAUSEA, Disp: 20 tablet, Rfl: 0     oxyCODONE (ROXICODONE) 5 MG tablet, Take 1 tablet (5 mg) by mouth every four hours as needed for moderate to severe pain.  Maximum of 6 tablets per day. , Disp: 180 tablet, Rfl: 0     oxyCODONE-acetaminophen (PERCOCET) 5-325 MG tablet, Take 1 tablet by mouth every 4 hours as needed for moderate pain or severe pain. Maximum of 3 tablets per day., Disp: 90 tablet, Rfl: 0     pravastatin (PRAVACHOL) 10 MG tablet, Take 1 tablet (10 mg) by mouth daily, Disp: 30 tablet, Rfl: 5     pregabalin (LYRICA) 75 MG capsule, Take 1 capsule (75 mg) by mouth 2 times daily, Disp: 180 capsule, Rfl: 1     Syringe/Needle, Disp, 27G X 1/2\" 1 ML MISC, Used with Methotrexate once weekly, Disp: 12 each, Rfl: 3     valACYclovir (VALTREX) 500 MG tablet, Take 1 tablet (500 mg) by mouth 2 times daily (Patient taking differently: Take 500 mg by mouth daily ), Disp: 180 tablet, Rfl: 1     Vitamin D, Cholecalciferol, 1000 UNITS TABS, Take 2,000 Units by mouth daily , Disp: , Rfl:       Allergies:   Penicillin   Bactroban   Levaquin  Rifampin   Clarithromycin   Ofloxacin  Sertraline                                                  Past Medical History:  Rheumatoid arthritis/scleroderma  Gastric antral vascular ectasia  Gastroesophageal reflux disease  Iron deficiency anemia  Irregular heart beat  Osteomyelitis, right 3rd finger  Raynaud's syndrome with gangrene  Scleroderma  Sjogren's syndrome  Systemic sclerosis  Metatarsalgia  Dysuria  Ulcer of " finger  Sicca syndrome  Palpitations  Ischemia of upper extremity  Intercostal pain  Anxiety  Breast implant rupture  Palpitations     Past Surgical History:  Appendectomy   Colonoscopy (08/04/2016)  EGD x 5 (08/22/14 - 08/02/2016)  Mammoplasty Augmentation Bilateral     Family History:    The patient's family history includes Cancer in an other family member; Respiratory in her father.     Social History:  The patient reports that she is a current cigarette smoker. She has been smoking about 1.00 pack per day. She has never used smokeless tobacco. She reports that she does not drink alcohol or use drugs. She states she is attempting to get into a research program for quitting smoking.   PCP: Lindy Carbone  Marital Status: Single     Physical Exam:   There were no vitals taken for this visit.   Wt Readings from Last 4 Encounters:   08/20/20 65 kg (143 lb 3.2 oz)   11/21/19 63.9 kg (140 lb 14.4 oz)   01/28/19 60.1 kg (132 lb 6.4 oz)   10/04/18 58.1 kg (128 lb)     Deferred on today's January 14, 2021 visit because this was a telephone visit    Imaging:  HRCT without contrast (08/22/2019):  Impression:  1. Mild bilateral posterior subpleural reticular opacities, dependent atelectasis versus early ILD. Consider prone positioning on follow-up  study.  2. Trace mucous plugging in the right middle lobe.  3. Stable incidental calcified splenic artery aneurysm.  4. Collapsed left breast implant and calcified right implant.  Sinai Carter MD    PFT Results (07/22/2019):  The FVC and FEV1 and the FEV1/FVC ratio are within normal limits. The inspiratory flow rates are within normal limits. Lung volumes are within normal limits. The diffusing capacity is normal. However, the diffusing capacity was not corrected for the   patient's hemoglobin  Impression:  Normal spirometry, lung volumes and diffusing capacity.    Laboratory:   Component      Latest Ref Rng & Units 8/15/2019 8/15/2019 8/15/2019           3:18 PM   3:24 PM  3:24 PM   WBC      4.0 - 11.0 10e9/L  5.2    RBC Count      3.8 - 5.2 10e12/L  3.71 (L)    Hemoglobin      11.7 - 15.7 g/dL  11.4 (L)    Hematocrit      35.0 - 47.0 %  37.1    MCV      78 - 100 fl  100    MCH      26.5 - 33.0 pg  30.7    MCHC      31.5 - 36.5 g/dL  30.7 (L)    RDW      10.0 - 15.0 %  13.2    Platelet Count      150 - 450 10e9/L  212    Diff Method        Automated Method    % Neutrophils      %  48.8    % Lymphocytes      %  40.5    % Monocytes      %  9.0    % Eosinophils      %  1.3    % Basophils      %  0.2    % Immature Granulocytes      %  0.2    Nucleated RBCs      0 /100  0    Absolute Neutrophil      1.6 - 8.3 10e9/L  2.6    Absolute Lymphocytes      0.8 - 5.3 10e9/L  2.1    Absolute Monocytes      0.0 - 1.3 10e9/L  0.5    Absolute Eosinophils      0.0 - 0.7 10e9/L  0.1    Absolute Basophils      0.0 - 0.2 10e9/L  0.0    Abs Immature Granulocytes      0 - 0.4 10e9/L  0.0    Absolute Nucleated RBC        0.0    Color Urine       Colorless     Appearance Urine       Clear     Glucose Urine      NEG:Negative mg/dL Negative     Bilirubin Urine      NEG:Negative Negative     Ketones Urine      NEG:Negative mg/dL Negative     Specific Gravity Urine      1.003 - 1.035 1.002 (L)     Blood Urine      NEG:Negative Small (A)     pH Urine      5.0 - 7.0 pH 6.0     Protein Albumin Urine      NEG:Negative mg/dL Negative     Urobilinogen mg/dL      0.0 - 2.0 mg/dL 0.0     Nitrite Urine      NEG:Negative Negative     Leukocyte Esterase Urine      NEG:Negative Negative     Source       Midstream Urine     WBC Urine      0 - 5 /HPF 1     RBC Urine      0 - 2 /HPF 1     Squamous Epithelial /HPF Urine      0 - 1 /HPF <1     Albumin      3.4 - 5.0 g/dL   4.3     Component      Latest Ref Rng & Units 8/15/2019 8/15/2019 8/15/2019 8/15/2019           3:24 PM  3:24 PM  3:24 PM  3:24 PM   Creatinine      0.52 - 1.04 mg/dL  0.78     GFR Estimate      >60 mL/min/1.73:m2  85     GFR Estimate If Black      >60  mL/min/1.73:m2  >90     Sed Rate      0 - 30 mm/h       CRP Inflammation      0.0 - 8.0 mg/L <2.9      AST      0 - 45 U/L   15    ALT      0 - 50 U/L    18   Albumin      3.4 - 5.0 g/dL         Component      Latest Ref Rng & Units 8/15/2019           3:24 PM   Sed Rate      0 - 30 mm/h 16         Phone call duration: 30 minutes    JANET Child MD, PhD    Rheumatology

## 2021-01-15 ENCOUNTER — HEALTH MAINTENANCE LETTER (OUTPATIENT)
Age: 56
End: 2021-01-15

## 2021-01-26 ENCOUNTER — HOSPITAL ENCOUNTER (OUTPATIENT)
Dept: MAMMOGRAPHY | Facility: CLINIC | Age: 56
Discharge: HOME | End: 2021-01-26
Attending: FAMILY MEDICINE | Admitting: FAMILY MEDICINE
Payer: MEDICARE

## 2021-01-26 DIAGNOSIS — Z12.31 VISIT FOR SCREENING MAMMOGRAM: ICD-10-CM

## 2021-01-26 PROCEDURE — 77067 SCR MAMMO BI INCL CAD: CPT

## 2021-03-25 ENCOUNTER — TRANSFERRED RECORDS (OUTPATIENT)
Dept: HEALTH INFORMATION MANAGEMENT | Facility: CLINIC | Age: 56
End: 2021-03-25

## 2021-05-05 DIAGNOSIS — K22.4 ESOPHAGEAL DYSMOTILITY: ICD-10-CM

## 2021-05-05 DIAGNOSIS — K21.9 GASTROESOPHAGEAL REFLUX DISEASE: ICD-10-CM

## 2021-05-05 DIAGNOSIS — R11.0 NAUSEA: ICD-10-CM

## 2021-05-09 RX ORDER — OMEPRAZOLE 40 MG/1
40 CAPSULE, DELAYED RELEASE ORAL 2 TIMES DAILY
Qty: 180 CAPSULE | Refills: 2 | Status: SHIPPED | OUTPATIENT
Start: 2021-05-09 | End: 2022-02-02

## 2021-05-09 NOTE — TELEPHONE ENCOUNTER
1/14/2021  [facility]   Brennen Child MD  Rheumatology    omeprazole (PRILOSEC) 40 MG DR capsule

## 2021-05-18 ENCOUNTER — TELEPHONE (OUTPATIENT)
Dept: RHEUMATOLOGY | Facility: CLINIC | Age: 56
End: 2021-05-18

## 2021-05-18 NOTE — TELEPHONE ENCOUNTER
Health Call Center    Phone Message    May a detailed message be left on voicemail: yes     Reason for Call: Symptoms or Concerns     If patient has red-flag symptoms, warm transfer to triage line    Current symptom or concern: Pt was taken off of her medication due to Dx of skin cancer.  Pt thinks she might need to be put back on medications.  Pt was recently Dx with Graves;  Pt was in the ER for breast pain/pain in tissues.  Pt is going to the breast clinic on 5/20.  Scleraderma symptoms is starting to occur again; skin is shiny and body aches, Pt does not feel good.    Symptoms have been present for: grave's symptoms about a month ago; and breast symptoms started 5 days ago.    Has patient previously been seen for this? Yes    By  Dr. Child    Are there any new or worsening symptoms? Yes: worsening    Pt is scheduled for an in person visit for August but Pt is wondering if Dr. Child is able to see her sooner or start her on some medications to help her until she can be seen.    Action Taken: Message routed to:  [facility] Adult [facility]um    Travel Screening: Negative

## 2021-05-18 NOTE — TELEPHONE ENCOUNTER
Attempted to call pt, but connection disconnected before reaching her.    TIARRA FrankN, RN  Scleroderma Care Coordinator  [facility]

## 2021-05-21 NOTE — TELEPHONE ENCOUNTER
Called Willard on 05/21/21 5:41 PM and left a message asking that pt return call. If patient calls back please, I ask that the Call center contact one of the RN Care Coordinators so that they can connect with pt.    Following up on symptom call from 5/18/21, I have not been able to reach her.    RAÚL Frank, RN  Scleroderma Care Coordinator  [facility]

## 2021-05-31 ENCOUNTER — RECORDS - HEALTHEAST (OUTPATIENT)
Dept: ADMINISTRATIVE | Facility: CLINIC | Age: 56
End: 2021-05-31

## 2021-06-01 ENCOUNTER — RECORDS - HEALTHEAST (OUTPATIENT)
Dept: ADMINISTRATIVE | Facility: CLINIC | Age: 56
End: 2021-06-01

## 2021-06-06 DIAGNOSIS — M35.00 SICCA SYNDROME (H): ICD-10-CM

## 2021-06-06 DIAGNOSIS — M34.9 SYSTEMIC SCLEROSIS (H): ICD-10-CM

## 2021-06-06 DIAGNOSIS — M77.40 METATARSALGIA, UNSPECIFIED LATERALITY: ICD-10-CM

## 2021-06-08 RX ORDER — CEVIMELINE HYDROCHLORIDE 30 MG/1
30 CAPSULE ORAL 3 TIMES DAILY
Qty: 270 CAPSULE | Refills: 2 | Status: SHIPPED | OUTPATIENT
Start: 2021-06-08 | End: 2022-06-27

## 2021-06-08 NOTE — TELEPHONE ENCOUNTER
Last Clinic Visit: 1/14/2021  [facility]

## 2021-06-16 ENCOUNTER — TELEPHONE (OUTPATIENT)
Dept: PULMONOLOGY | Facility: CLINIC | Age: 56
End: 2021-06-16

## 2021-06-16 NOTE — TELEPHONE ENCOUNTER
Prior Authorization Approval    Authorization Effective Date: 3/18/2021  Authorization Expiration Date: 6/16/2022  Medication: cevimeline (EVOXAC) 30 MG capsule  Approved Dose/Quantity:  Reference #:     Insurance Company: [employer] - Phone [phone] Fax [phone]  Expected CoPay:       Which Pharmacy is filling the prescription (Not needed for infusion/clinic administered): [id number] - [place], [place] - [facility]  Pharmacy Notified: Yes  Patient Notified: No

## 2021-06-16 NOTE — TELEPHONE ENCOUNTER
Central Prior Authorization Team   Phone: [phone]      PA Initiation    Medication: cevimeline (EVOXAC) 30 MG capsule  Insurance Company: [employer] [facility] - Phone [phone] Fax [phone]  Pharmacy Filling the Rx: [id number] [place], [place] - [address]  Filling Pharmacy Phone:  [phone]  Filling Pharmacy Fax:    Start Date: 6/16/2021

## 2021-08-12 ENCOUNTER — OFFICE VISIT (OUTPATIENT)
Dept: PULMONOLOGY | Facility: CLINIC | Age: 56
End: 2021-08-12
Attending: INTERNAL MEDICINE
Payer: MEDICARE

## 2021-08-12 VITALS
BODY MASS INDEX: 24.99 KG/M2 | DIASTOLIC BLOOD PRESSURE: 74 MMHG | WEIGHT: 150 LBS | OXYGEN SATURATION: 99 % | HEART RATE: 68 BPM | HEIGHT: 65 IN | SYSTOLIC BLOOD PRESSURE: 122 MMHG

## 2021-08-12 DIAGNOSIS — K22.4 ESOPHAGEAL DYSMOTILITY: ICD-10-CM

## 2021-08-12 DIAGNOSIS — Z79.899 LONG-TERM USE OF PLAQUENIL: ICD-10-CM

## 2021-08-12 DIAGNOSIS — G89.29 CHRONIC MUSCULOSKELETAL PAIN: ICD-10-CM

## 2021-08-12 DIAGNOSIS — M05.79 RHEUMATOID ARTHRITIS INVOLVING MULTIPLE SITES WITH POSITIVE RHEUMATOID FACTOR (H): ICD-10-CM

## 2021-08-12 DIAGNOSIS — I73.01 RAYNAUD'S DISEASE WITH GANGRENE (H): ICD-10-CM

## 2021-08-12 DIAGNOSIS — E05.00 GRAVES DISEASE: ICD-10-CM

## 2021-08-12 DIAGNOSIS — Z79.899 ENCOUNTER FOR LONG-TERM CURRENT USE OF MEDICATION: ICD-10-CM

## 2021-08-12 DIAGNOSIS — M34.9 SYSTEMIC SCLEROSIS (H): Primary | ICD-10-CM

## 2021-08-12 DIAGNOSIS — M79.18 CHRONIC MUSCULOSKELETAL PAIN: ICD-10-CM

## 2021-08-12 DIAGNOSIS — M35.00 SICCA SYNDROME (H): ICD-10-CM

## 2021-08-12 PROCEDURE — 99214 OFFICE O/P EST MOD 30 MIN: CPT | Performed by: INTERNAL MEDICINE

## 2021-08-12 ASSESSMENT — MIFFLIN-ST. JEOR: SCORE: 1261.28

## 2021-08-12 NOTE — LETTER
2021         RE: Willard Grayson  [address]  [facility] [address]        Dear Colleague,    Thank you for referring your patient, Willard Grayson, to the [facility] [place]. Please see a copy of my visit note below.    [facility]  Rheumatology Clinic  Brennen Child MD  2021     Name: Willard Grayson  MRN: [id number]  Age: 54 year old  : 1965  Referring provider: Referred Self    Problem List:  1. 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   2. Associated marked neuropathic skin pain markedly improved with Lyrica with prior medication therapy with gabapentin.  3. Diffuse musculoskeletal pain requiring methotrexate plus CellCept, and narcotic to control, but also improved. Now off methotrexate due to GI issues.  4. Initial clinical overlap with anti-CCP seropositive rheumatoid arthritis with continued anti-CCP seropositivity as of 2010.  5. Raynaud's phenomena with digital ulcers with easily cracked skin.   6. GI involvement consistent with bacterial small bowel overgrowth with marked improvement after a course of neomycin followed by Dr. Glez; positive hydrogen breath test in 3/2012 - treated with rifaximin through Dr. Jacob  7. No convincing evidence of lung involvement on serial pulmonary function tests or clinically.   8. History of heart palpitations prior to the onset of SSc with stable symptoms since. Holter 2018 with PACs.  9. History of medication failures with Remicade, methotrexate, methotrexate plus CellCept and with Orencia and with improvement since initiation of IVIG but with complicating headaches and overall sense of malaise with IVIG infusions.   10. History of  silicone breast implants.   11. Incidental finding of calcified splenic artery aneurysm.  12. EGD in 3/2012 with hiatal hernia and gastral antral vascular ectasia  13. Recurrent right third finger contracture with extensor surface ulceration, healed   14. Trochanteric bursitis, R>L    Assessment and Plan:   # Systemic sclerosis   # Gastroesophageal reflux disease, esophagitis presence not specified  # Esophageal dysmotility  # Raynaud's disease with gangrene  # High risk medications (not anticoagulants) long-term use  # Rheumatoid arthritis involving multiple sites with positive rheumatoid factor (H)  # Metatarsalgia, unspecified laterality  # Metatarsalgia of both feet  # Osteopenia, unspecified location  # Systemic sclerosis  # MCDANIEL (dyspnea on exertion)  # Chest wall pain  # Skin lesion of lower extremity    She reports feeling the best she has felt in the entire time of her disease process.  Based on her exam and her report to me I would agree.    She is no longer on MMF for methotrexate and her skin does not seem to be worsening, suggesting that she is in long-term remission in this regard.    Her hands are also better with respect and not having ulcers.  Although some of this may be due to the Botox injections I suspect a lot of it is that she is not smoking and I applauded her on stopping and urged her to continue to try to stay off of the cigarettes.    Since we do not have medication to monitor on her actively other than annual Plaquenil eye monitoring, I think we can see her back in 8 to 9 months since she seems so stable.  I do want her to do pulmonary function test at that time.      JANET Child MD, PhD    Rheumatology      August 20, 2020 interval history:    Patient comes in today with him main concern being some new skin lesions that she has noted over her left leg and left arm over the last few months.  She does have a dermatology appointment within the [facility] system where she gets  most of her care.  That is not however until October.    The other new issue this been bothering her a fair amount is neck pain and headaches.  She did get a cervical spine film just recently at [facility] and has not yet reviewed that with anyone there.    In terms of her scleroderma skin involvement she really feels that has been quite stable.  We have repeatedly tried to get her on methotrexate but she is tolerated that poorly but she has tolerated MMF better and remains on it.  The main problem for her has been the contractures in her hands and those have gradually worsened particularly in her right hand.    She is largely sheltering in place because of coronavirus, but she is still smoking cigarettes.  If anything she has increased doing so because of feeling under stress.      January 14, 2021 interval history:    Since we last seen the patient she feels she is really been doing pretty stably.  She went off of MMF because of development of a squamous cell carcinoma in the understanding that MMF could have been contributing to impaired tumor Amino-Cerv balance.  She really does not think she is gotten worse that since going off of it.  That was back on November 12.  The SCCA was on her forearm and is healed nicely.  She thinks that perhaps the skin in the area was a little more shiny than surrounding skin but there were no other difficulties with the healing.    She does acknowledge that over the last year or 2 her fingers to develop worsening contractures and she has difficulty writing.  Nonetheless she has tolerated methotrexate poorly and its not clear how much it helped her when she was on it.  She does acknowledge that she does not do stretching exercises as reliably as she might.    She has had a few digital ulcers but they have been healing more quickly than usual.  She is not certain why but she does note that she is no longer taking care of her grandchildren so she is able to rest her hands and protect them  better.    She has established with the pain clinic at [facility] and that they have taken over the prescribing of her narcotics and are working with her on a more comprehensive pain program.  She is really actually quite happy about that.    She continues to get some esophageal spasms at times but oddly this seems only to happen with the drinking of liquids.  Its not bad enough that she wants to do anything more about it.  We did talk about the possible value of all toyed minutes however.    The other new thing that she has had is some dizziness and tinnitus developing in her left ear much greater than right.  She is seeking care with an otolaryngologist at [facility] where most of her care is delivered however.    She is otherwise been pretty good.  She had a little bit of a flareup last week where she felt weaker and more achy and more sluggish.  She has not had a clear-cut coronavirus exposure to her knowledge.          August 12, 2021 interval history:    Since we have last seen the patient she feels she is really been doing very well.  She has been seeing the [facility] pain clinic and was having some difficulties that seemed unusual to her.  Ultimately after a lot of jitters and changes in her pain situation she was worked up and diagnosed with Graves' disease and is now being treated with methimazole.    She also was seen in the breast clinic where there was some evidence of fluid around her implants raising the question of whether there had been a rupture of them.  There was associated pain.  The fluid was removed and she has been feeling better since then.    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  That was removed and the wound has healed up very well.    Interestingly, since she has had Botox injections she has not had any digital ulcers including over her right contractured PIP joints.  Although she really did not like the amount of pain she got with the Botox she is pretty happy with  not having had any ulcers.    Potentially contributing to that improvement however is that she is not been smoking at all for the last 4 months.  Her breathing feels better to her as well and overall her breathing feels stable to her.  She has not had pulmonary function test for quite some time and is a bit overdue for those, but she is never had evidence of significant lung involvement.    Although she thinks her skin is somewhat sensitive she does not believe it is worsening at all and she believes is completely stable and her joints feel stable to her as well and her not bothering her significantly.         Review of Systems:   Pertinent items are noted in HPI or as below, remainder of complete ROS is negative.      No recent problems with hearing or vision. No swallowing problems.   No breathing difficulty, shortness of breath, coughing, or wheezing  No chest pain or palpitations  No heart burn, indigestion, abdominal pain, nausea, vomiting, diarrhea  No urination problems, no bloody, cloudy urine, no dysuria  No numbing, tingling, weakness  No headaches or confusion  No rashes. No easy bleeding or bruising.   Answers for HPI/ROS submitted by the patient on 11/21/2019   General Symptoms: Yes  Skin Symptoms: Yes  HENT Symptoms: Yes  EYE SYMPTOMS: Yes  HEART SYMPTOMS: Yes  LUNG SYMPTOMS: Yes  INTESTINAL SYMPTOMS: Yes  URINARY SYMPTOMS: No  GYNECOLOGIC SYMPTOMS: No  BREAST SYMPTOMS: No  SKELETAL SYMPTOMS: Yes  BLOOD SYMPTOMS: No  NERVOUS SYSTEM SYMPTOMS: Yes  MENTAL HEALTH SYMPTOMS: Yes  Fever: No  Loss of appetite: No  Weight loss: No  Weight gain: Yes  Fatigue: Yes  Night sweats: Yes  Chills: No  Excessive thirst: Yes  Feeling hot or cold when others believe the temperature is normal: Yes  Loss of height: No  Post-operative complications: No  Surgical site pain: No  Hallucinations: No  Change in or Loss of Energy: No  Changes in hair: No  Changes in moles/birth marks: Yes  Itching: Yes  Rashes: No  Changes in  nails: No  Acne: No  Hair in places you don't want it: No  Ear pain: No  Ear discharge: No  Hearing loss: Yes  Tinnitus: Yes  Nosebleeds: No  Congestion: No  Sinus pain: No  Tooth pain: No  Gum tenderness: No  Bleeding gums: No  Change in taste: No  Change in sense of smell: No  Dry mouth: Yes  Hearing aid used: No  Eye pain: No  Vision loss: Yes  Dry eyes: Yes  Watery eyes: Yes  Eye bulging: No  Double vision: No  Flashing of lights: No  Cough: No  Sputum or phlegm: No  Coughing up blood: No  Difficulty breating or shortness of breath: No  Snoring: No  Wheezing: No  Difficulty breathing on exertion: Yes  Chest pain or pressure: No  Fast or irregular heartbeat: No  Pain in legs with walking: Yes  Trouble breathing while lying down: No  Fingers or toes appear blue: Yes  High blood pressure: Yes  Low blood pressure: No  Fainting: No  Murmurs: No  Pacemaker: No  Varicose veins: No  Edema or swelling: No  Wake up at night with shortness of breath: No  Light-headedness: No  Heart burn or indigestion: Yes  Nausea: No  Vomiting: No  Abdominal pain: No  Bloating: Yes  Constipation: Yes  Diarrhea: No  Back pain: Yes  Muscle aches: Yes  Neck pain: Yes  Swollen joints: No  Joint pain: Yes  Bone pain: No  Muscle cramps: No  Trouble with coordination: No  Dizziness or trouble with balance: No  Fainting or black-out spells: No  Memory loss: No  Headache: Yes  Seizures: No  Speech problems: No  Nervous or Anxious: No  Depression: Yes  Trouble sleeping: No  Trouble thinking or concentrating: No  Mood changes: No  Panic attacks: No      Active Medications:     Current Outpatient Medications:      aspirin-dipyridamole (AGGRENOX)  MG per 12 hr capsule, Take 1 capsule by mouth daily , Disp: , Rfl:      Blood Pressure Monitoring KIT, Check BP every 7 days., Disp: 1 kit, Rfl: 0     cevimeline (EVOXAC) 30 MG capsule, Take 1 capsule (30 mg) by mouth 3 times daily, Disp: 270 capsule, Rfl: 2     cyanocobalamin (VITAMIN B12) 1000 MCG/ML  injection, Inject 1 mL into the muscle every 30 days, Disp: , Rfl:      folic acid (FOLVITE) 1 MG tablet, Take 1 tablet (1 mg) by mouth daily, Disp: 90 tablet, Rfl: 3     LORazepam (ATIVAN) 0.5 MG tablet, Take 1 - 2 tablets by mouth three times daily as needed., Disp: 60 tablet, Rfl: 0     olopatadine (PATADAY) 0.2 % ophthalmic solution, Place 1 drop into both eyes daily, Disp: 1 Bottle, Rfl: 11     omeprazole (PRILOSEC) 40 MG DR capsule, Take 1 capsule (40 mg) by mouth 2 times daily, Disp: 180 capsule, Rfl: 2     ondansetron (ZOFRAN-ODT) 4 MG ODT tab, DISSOLVE 1 TABLET IN MOUTH EVERY EIGHT HOURS AS NEEDED FOR NAUSEA, Disp: 20 tablet, Rfl: 0     oxyCODONE-acetaminophen (PERCOCET) 5-325 MG tablet, Take 1 tablet by mouth every 4 hours as needed for moderate pain or severe pain. Maximum of 3 tablets per day., Disp: 90 tablet, Rfl: 0     pravastatin (PRAVACHOL) 10 MG tablet, Take 1 tablet (10 mg) by mouth daily, Disp: 30 tablet, Rfl: 5     pregabalin (LYRICA) 75 MG capsule, Take 1 capsule (75 mg) by mouth 2 times daily, Disp: 180 capsule, Rfl: 1     valACYclovir (VALTREX) 500 MG tablet, Take 1 tablet (500 mg) by mouth 2 times daily (Patient taking differently: Take 500 mg by mouth daily ), Disp: 180 tablet, Rfl: 1     Vitamin D, Cholecalciferol, 1000 UNITS TABS, Take 2,000 Units by mouth daily , Disp: , Rfl:      carboxymethylcellulose PF (REFRESH PLUS) 0.5 % ophthalmic solution, Place 1 drop into both eyes 4 times daily as needed for dry eyes (Patient not taking: Reported on 8/12/2021), Disp: 120 mL, Rfl: 11     esomeprazole (NEXIUM) 40 MG capsule, Take 1 capsule (40 mg) by mouth 2 times daily Take 30-60 minutes before eating. (Patient not taking: Reported on 8/12/2021), Disp: 180 capsule, Rfl: 3     hydroxychloroquine (PLAQUENIL) 200 MG tablet, Take 1 tablet (200 mg) by mouth 2 times daily Annual Plaquenil toxicity eye screening required. (Patient not taking: Reported on 8/12/2021), Disp: 180 tablet, Rfl: 3      "leucovorin (WELLCOVORIN) 25 MG tablet, Take 1 tablet (25 mg) by mouth daily Day after taking methotrexate (Patient not taking: Reported on 8/12/2021), Disp: 12 tablet, Rfl: 3     naloxone (EVZIO) 0.4 MG/0.4ML auto-injector, Inject 0.4 mLs (0.4 mg) into the muscle as needed for opioid reversal every 2-3 minutes until assistance arrives (Patient not taking: Reported on 8/12/2021), Disp: 0.8 mL, Rfl: 0     olopatadine (PATANOL) 0.1 % ophthalmic solution, Place 1 drop into both eyes 2 times daily (Patient not taking: Reported on 8/12/2021), Disp: 5 mL, Rfl: 3     oxyCODONE (ROXICODONE) 5 MG tablet, Take 1 tablet (5 mg) by mouth every four hours as needed for moderate to severe pain.  Maximum of 6 tablets per day.  (Patient not taking: Reported on 8/12/2021), Disp: 180 tablet, Rfl: 0     Syringe/Needle, Disp, 27G X 1/2\" 1 ML MISC, Used with Methotrexate once weekly (Patient not taking: Reported on 8/12/2021), Disp: 12 each, Rfl: 3      Allergies:   Penicillin   Bactroban   Levaquin  Rifampin   Clarithromycin   Ofloxacin  Sertraline                                                  Past Medical History:  Rheumatoid arthritis/scleroderma  Gastric antral vascular ectasia  Gastroesophageal reflux disease  Iron deficiency anemia  Irregular heart beat  Osteomyelitis, right 3rd finger  Raynaud's syndrome with gangrene  Scleroderma  Sjogren's syndrome  Systemic sclerosis  Metatarsalgia  Dysuria  Ulcer of finger  Sicca syndrome  Palpitations  Ischemia of upper extremity  Intercostal pain  Anxiety  Breast implant rupture  Palpitations     Past Surgical History:  Appendectomy   Colonoscopy (08/04/2016)  EGD x 5 (08/22/14 - 08/02/2016)  Mammoplasty Augmentation Bilateral     Family History:    The patient's family history includes Cancer in an other family member; Respiratory in her father.     Social History:  The patient reports that she is a current cigarette smoker. She has been smoking about 1.00 pack per day. She has never used " "smokeless tobacco. She reports that she does not drink alcohol or use drugs. She states she is attempting to get into a research program for quitting smoking.   PCP: Lindy Carbone  Marital Status: Single     Physical Exam:   /74   Pulse 68   Ht 1.651 m (5' 5\")   Wt 68 kg (150 lb)   SpO2 99%   BMI 24.96 kg/m     Wt Readings from Last 4 Encounters:   08/12/21 68 kg (150 lb)   08/20/20 65 kg (143 lb 3.2 oz)   11/21/19 63.9 kg (140 lb 14.4 oz)   01/28/19 60.1 kg (132 lb 6.4 oz)     Constitutional: Well-developed, appearing stated age; cooperative  Eyes: Normal EOM, PERRLA, vision, conjunctiva, sclera  ENT: Normal external ears, nose, hearing, lips, teeth, gums, throat. No mucous membrane lesions, normal saliva pool  Neck: No mass or thyroid enlargement  Resp: Lungs clear to auscultation, nl to palpation  CV: RRR, no murmurs, rubs or gallops, no edema  GI: No ABD mass or tenderness, no HSM  : Not tested  Lymph: No cervical, supraclavicular, inguinal or epitrochlear nodes  MS: The TMJ, neck, shoulder, elbow, wrist, MCP/PIP/DIP, spine, hip, knee, ankle, and foot MTP/IP joints were examined and found normal. No active synovitis or altered joint anatomy. Full joint ROM. Normal  strength. No dactylitis,  tenosynovitis, enthesopathy.  Slight restriction in wrist range of motion but not significant. Proximally 15   contracture in PIPs and 5-10   in DIPs of the left hand. Right hand greater than 90   contractures in PIPs 3-5. Obvious calcinosis lesion over the lateral aspect of the right 4th PIP.  All of her contractures appears stable to me.  Skin: No nail pitting, alopecia, rash, nodules or lesions  Skin thickening in hands and distal forearms is 1+ thickened mostly atrophic and is even less involved in the lower legs and face.    She has no digital ulcers now.  Neuro: Normal cranial nerves, strength, sensation, DTRs.   Psych: Normal judgement, orientation, memory, affect.     Imaging:  HRCT " without contrast (08/22/2019):  Impression:  1. Mild bilateral posterior subpleural reticular opacities, dependent atelectasis versus early ILD. Consider prone positioning on follow-up  study.  2. Trace mucous plugging in the right middle lobe.  3. Stable incidental calcified splenic artery aneurysm.  4. Collapsed left breast implant and calcified right implant.  Sinai Carter MD    PFT Results (07/22/2019):  The FVC and FEV1 and the FEV1/FVC ratio are within normal limits. The inspiratory flow rates are within normal limits. Lung volumes are within normal limits. The diffusing capacity is normal. However, the diffusing capacity was not corrected for the   patient's hemoglobin  Impression:  Normal spirometry, lung volumes and diffusing capacity.    Laboratory:   Component      Latest Ref Rng & Units 8/15/2019 8/15/2019 8/15/2019           3:18 PM  3:24 PM  3:24 PM   WBC      4.0 - 11.0 10e9/L  5.2    RBC Count      3.8 - 5.2 10e12/L  3.71 (L)    Hemoglobin      11.7 - 15.7 g/dL  11.4 (L)    Hematocrit      35.0 - 47.0 %  37.1    MCV      78 - 100 fl  100    MCH      26.5 - 33.0 pg  30.7    MCHC      31.5 - 36.5 g/dL  30.7 (L)    RDW      10.0 - 15.0 %  13.2    Platelet Count      150 - 450 10e9/L  212    Diff Method        Automated Method    % Neutrophils      %  48.8    % Lymphocytes      %  40.5    % Monocytes      %  9.0    % Eosinophils      %  1.3    % Basophils      %  0.2    % Immature Granulocytes      %  0.2    Nucleated RBCs      0 /100  0    Absolute Neutrophil      1.6 - 8.3 10e9/L  2.6    Absolute Lymphocytes      0.8 - 5.3 10e9/L  2.1    Absolute Monocytes      0.0 - 1.3 10e9/L  0.5    Absolute Eosinophils      0.0 - 0.7 10e9/L  0.1    Absolute Basophils      0.0 - 0.2 10e9/L  0.0    Abs Immature Granulocytes      0 - 0.4 10e9/L  0.0    Absolute Nucleated RBC        0.0    Color Urine       Colorless     Appearance Urine       Clear     Glucose Urine      NEG:Negative mg/dL Negative     Bilirubin  Urine      NEG:Negative Negative     Ketones Urine      NEG:Negative mg/dL Negative     Specific Gravity Urine      1.003 - 1.035 1.002 (L)     Blood Urine      NEG:Negative Small (A)     pH Urine      5.0 - 7.0 pH 6.0     Protein Albumin Urine      NEG:Negative mg/dL Negative     Urobilinogen mg/dL      0.0 - 2.0 mg/dL 0.0     Nitrite Urine      NEG:Negative Negative     Leukocyte Esterase Urine      NEG:Negative Negative     Source       Midstream Urine     WBC Urine      0 - 5 /HPF 1     RBC Urine      0 - 2 /HPF 1     Squamous Epithelial /HPF Urine      0 - 1 /HPF <1     Albumin      3.4 - 5.0 g/dL   4.3     Component      Latest Ref Rng & Units 8/15/2019 8/15/2019 8/15/2019 8/15/2019           3:24 PM  3:24 PM  3:24 PM  3:24 PM   Creatinine      0.52 - 1.04 mg/dL  0.78     GFR Estimate      >60 mL/min/1.73:m2  85     GFR Estimate If Black      >60 mL/min/1.73:m2  >90     Sed Rate      0 - 30 mm/h       CRP Inflammation      0.0 - 8.0 mg/L <2.9      AST      0 - 45 U/L   15    ALT      0 - 50 U/L    18   Albumin      3.4 - 5.0 g/dL         Component      Latest Ref Rng & Units 8/15/2019           3:24 PM   Sed Rate      0 - 30 mm/h 16       Again, thank you for allowing me to participate in the care of your patient.        Sincerely,        Brennen Child MD

## 2021-08-12 NOTE — NURSING NOTE
Chief Complaint   Patient presents with     Follow Up     sceroderma      Vitals were taken and medications were reconciled.     Bhumika Tapia RMA  4:59 PM

## 2021-08-13 NOTE — PROGRESS NOTES
[facility]  Rheumatology Clinic  Brennen Child MD  2021     Name: Willard Grayson  MRN: [id number]  Age: 54 year old  : 1965  Referring provider: Referred Self    Problem List:  1. 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   2. Associated marked neuropathic skin pain markedly improved with Lyrica with prior medication therapy with gabapentin.  3. Diffuse musculoskeletal pain requiring methotrexate plus CellCept, and narcotic to control, but also improved. Now off methotrexate due to GI issues.  4. Initial clinical overlap with anti-CCP seropositive rheumatoid arthritis with continued anti-CCP seropositivity as of 2010.  5. Raynaud's phenomena with digital ulcers with easily cracked skin.   6. GI involvement consistent with bacterial small bowel overgrowth with marked improvement after a course of neomycin followed by Dr. Glez; positive hydrogen breath test in 3/2012 - treated with rifaximin through Dr. Jacob  7. No convincing evidence of lung involvement on serial pulmonary function tests or clinically.   8. History of heart palpitations prior to the onset of SSc with stable symptoms since. Holter 2018 with PACs.  9. History of medication failures with Remicade, methotrexate, methotrexate plus CellCept and with Orencia and with improvement since initiation of IVIG but with complicating headaches and overall sense of malaise with IVIG infusions.   10. History of silicone breast implants.   11. Incidental finding of calcified splenic artery aneurysm.  12. EGD in 3/2012 with hiatal hernia and gastral antral vascular ectasia  13. Recurrent right third finger contracture with extensor surface ulceration, healed   14. Trochanteric bursitis, R>L    Assessment and Plan:   #  Systemic sclerosis   # Gastroesophageal reflux disease, esophagitis presence not specified  # Esophageal dysmotility  # Raynaud's disease with gangrene  # High risk medications (not anticoagulants) long-term use  # Rheumatoid arthritis involving multiple sites with positive rheumatoid factor (H)  # Metatarsalgia, unspecified laterality  # Metatarsalgia of both feet  # Osteopenia, unspecified location  # Systemic sclerosis  # MCDANIEL (dyspnea on exertion)  # Chest wall pain  # Skin lesion of lower extremity    She reports feeling the best she has felt in the entire time of her disease process.  Based on her exam and her report to me I would agree.    She is no longer on MMF for methotrexate and her skin does not seem to be worsening, suggesting that she is in long-term remission in this regard.    Her hands are also better with respect and not having ulcers.  Although some of this may be due to the Botox injections I suspect a lot of it is that she is not smoking and I applauded her on stopping and urged her to continue to try to stay off of the cigarettes.    Since we do not have medication to monitor on her actively other than annual Plaquenil eye monitoring, I think we can see her back in 8 to 9 months since she seems so stable.  I do want her to do pulmonary function test at that time.      JANET Child MD, PhD    Rheumatology      August 20, 2020 interval history:    Patient comes in today with him main concern being some new skin lesions that she has noted over her left leg and left arm over the last few months.  She does have a dermatology appointment within the [facility] system where she gets most of her care.  That is not however until October.    The other new issue this been bothering her a fair amount is neck pain and headaches.  She did get a cervical spine film just recently at [facility] and has not yet reviewed that with anyone there.    In terms of her scleroderma skin involvement she really  feels that has been quite stable.  We have repeatedly tried to get her on methotrexate but she is tolerated that poorly but she has tolerated MMF better and remains on it.  The main problem for her has been the contractures in her hands and those have gradually worsened particularly in her right hand.    She is largely sheltering in place because of coronavirus, but she is still smoking cigarettes.  If anything she has increased doing so because of feeling under stress.      January 14, 2021 interval history:    Since we last seen the patient she feels she is really been doing pretty stably.  She went off of MMF because of development of a squamous cell carcinoma in the understanding that MMF could have been contributing to impaired tumor Amino-Cerv balance.  She really does not think she is gotten worse that since going off of it.  That was back on November 12.  The SCCA was on her forearm and is healed nicely.  She thinks that perhaps the skin in the area was a little more shiny than surrounding skin but there were no other difficulties with the healing.    She does acknowledge that over the last year or 2 her fingers to develop worsening contractures and she has difficulty writing.  Nonetheless she has tolerated methotrexate poorly and its not clear how much it helped her when she was on it.  She does acknowledge that she does not do stretching exercises as reliably as she might.    She has had a few digital ulcers but they have been healing more quickly than usual.  She is not certain why but she does note that she is no longer taking care of her grandchildren so she is able to rest her hands and protect them better.    She has established with the pain clinic at [facility] and that they have taken over the prescribing of her narcotics and are working with her on a more comprehensive pain program.  She is really actually quite happy about that.    She continues to get some esophageal spasms at times but oddly this  seems only to happen with the drinking of liquids.  Its not bad enough that she wants to do anything more about it.  We did talk about the possible value of all toyed minutes however.    The other new thing that she has had is some dizziness and tinnitus developing in her left ear much greater than right.  She is seeking care with an otolaryngologist at [facility] where most of her care is delivered however.    She is otherwise been pretty good.  She had a little bit of a flareup last week where she felt weaker and more achy and more sluggish.  She has not had a clear-cut coronavirus exposure to her knowledge.          August 12, 2021 interval history:    Since we have last seen the patient she feels she is really been doing very well.  She has been seeing the [facility] pain clinic and was having some difficulties that seemed unusual to her.  Ultimately after a lot of jitters and changes in her pain situation she was worked up and diagnosed with Graves' disease and is now being treated with methimazole.    She also was seen in the breast clinic where there was some evidence of fluid around her implants raising the question of whether there had been a rupture of them.  There was associated pain.  The fluid was removed and she has been feeling better since then.    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  That was removed and the wound has healed up very well.    Interestingly, since she has had Botox injections she has not had any digital ulcers including over her right contractured PIP joints.  Although she really did not like the amount of pain she got with the Botox she is pretty happy with not having had any ulcers.    Potentially contributing to that improvement however is that she is not been smoking at all for the last 4 months.  Her breathing feels better to her as well and overall her breathing feels stable to her.  She has not had pulmonary function test for quite some time and is a  bit overdue for those, but she is never had evidence of significant lung involvement.    Although she thinks her skin is somewhat sensitive she does not believe it is worsening at all and she believes is completely stable and her joints feel stable to her as well and her not bothering her significantly.         Review of Systems:   Pertinent items are noted in HPI or as below, remainder of complete ROS is negative.      No recent problems with hearing or vision. No swallowing problems.   No breathing difficulty, shortness of breath, coughing, or wheezing  No chest pain or palpitations  No heart burn, indigestion, abdominal pain, nausea, vomiting, diarrhea  No urination problems, no bloody, cloudy urine, no dysuria  No numbing, tingling, weakness  No headaches or confusion  No rashes. No easy bleeding or bruising.   Answers for HPI/ROS submitted by the patient on 11/21/2019   General Symptoms: Yes  Skin Symptoms: Yes  HENT Symptoms: Yes  EYE SYMPTOMS: Yes  HEART SYMPTOMS: Yes  LUNG SYMPTOMS: Yes  INTESTINAL SYMPTOMS: Yes  URINARY SYMPTOMS: No  GYNECOLOGIC SYMPTOMS: No  BREAST SYMPTOMS: No  SKELETAL SYMPTOMS: Yes  BLOOD SYMPTOMS: No  NERVOUS SYSTEM SYMPTOMS: Yes  MENTAL HEALTH SYMPTOMS: Yes  Fever: No  Loss of appetite: No  Weight loss: No  Weight gain: Yes  Fatigue: Yes  Night sweats: Yes  Chills: No  Excessive thirst: Yes  Feeling hot or cold when others believe the temperature is normal: Yes  Loss of height: No  Post-operative complications: No  Surgical site pain: No  Hallucinations: No  Change in or Loss of Energy: No  Changes in hair: No  Changes in moles/birth marks: Yes  Itching: Yes  Rashes: No  Changes in nails: No  Acne: No  Hair in places you don't want it: No  Ear pain: No  Ear discharge: No  Hearing loss: Yes  Tinnitus: Yes  Nosebleeds: No  Congestion: No  Sinus pain: No  Tooth pain: No  Gum tenderness: No  Bleeding gums: No  Change in taste: No  Change in sense of smell: No  Dry mouth: Yes  Hearing aid  used: No  Eye pain: No  Vision loss: Yes  Dry eyes: Yes  Watery eyes: Yes  Eye bulging: No  Double vision: No  Flashing of lights: No  Cough: No  Sputum or phlegm: No  Coughing up blood: No  Difficulty breating or shortness of breath: No  Snoring: No  Wheezing: No  Difficulty breathing on exertion: Yes  Chest pain or pressure: No  Fast or irregular heartbeat: No  Pain in legs with walking: Yes  Trouble breathing while lying down: No  Fingers or toes appear blue: Yes  High blood pressure: Yes  Low blood pressure: No  Fainting: No  Murmurs: No  Pacemaker: No  Varicose veins: No  Edema or swelling: No  Wake up at night with shortness of breath: No  Light-headedness: No  Heart burn or indigestion: Yes  Nausea: No  Vomiting: No  Abdominal pain: No  Bloating: Yes  Constipation: Yes  Diarrhea: No  Back pain: Yes  Muscle aches: Yes  Neck pain: Yes  Swollen joints: No  Joint pain: Yes  Bone pain: No  Muscle cramps: No  Trouble with coordination: No  Dizziness or trouble with balance: No  Fainting or black-out spells: No  Memory loss: No  Headache: Yes  Seizures: No  Speech problems: No  Nervous or Anxious: No  Depression: Yes  Trouble sleeping: No  Trouble thinking or concentrating: No  Mood changes: No  Panic attacks: No      Active Medications:     Current Outpatient Medications:      aspirin-dipyridamole (AGGRENOX)  MG per 12 hr capsule, Take 1 capsule by mouth daily , Disp: , Rfl:      Blood Pressure Monitoring KIT, Check BP every 7 days., Disp: 1 kit, Rfl: 0     cevimeline (EVOXAC) 30 MG capsule, Take 1 capsule (30 mg) by mouth 3 times daily, Disp: 270 capsule, Rfl: 2     cyanocobalamin (VITAMIN B12) 1000 MCG/ML injection, Inject 1 mL into the muscle every 30 days, Disp: , Rfl:      folic acid (FOLVITE) 1 MG tablet, Take 1 tablet (1 mg) by mouth daily, Disp: 90 tablet, Rfl: 3     LORazepam (ATIVAN) 0.5 MG tablet, Take 1 - 2 tablets by mouth three times daily as needed., Disp: 60 tablet, Rfl: 0     olopatadine  (PATADAY) 0.2 % ophthalmic solution, Place 1 drop into both eyes daily, Disp: 1 Bottle, Rfl: 11     omeprazole (PRILOSEC) 40 MG DR capsule, Take 1 capsule (40 mg) by mouth 2 times daily, Disp: 180 capsule, Rfl: 2     ondansetron (ZOFRAN-ODT) 4 MG ODT tab, DISSOLVE 1 TABLET IN MOUTH EVERY EIGHT HOURS AS NEEDED FOR NAUSEA, Disp: 20 tablet, Rfl: 0     oxyCODONE-acetaminophen (PERCOCET) 5-325 MG tablet, Take 1 tablet by mouth every 4 hours as needed for moderate pain or severe pain. Maximum of 3 tablets per day., Disp: 90 tablet, Rfl: 0     pravastatin (PRAVACHOL) 10 MG tablet, Take 1 tablet (10 mg) by mouth daily, Disp: 30 tablet, Rfl: 5     pregabalin (LYRICA) 75 MG capsule, Take 1 capsule (75 mg) by mouth 2 times daily, Disp: 180 capsule, Rfl: 1     valACYclovir (VALTREX) 500 MG tablet, Take 1 tablet (500 mg) by mouth 2 times daily (Patient taking differently: Take 500 mg by mouth daily ), Disp: 180 tablet, Rfl: 1     Vitamin D, Cholecalciferol, 1000 UNITS TABS, Take 2,000 Units by mouth daily , Disp: , Rfl:      carboxymethylcellulose PF (REFRESH PLUS) 0.5 % ophthalmic solution, Place 1 drop into both eyes 4 times daily as needed for dry eyes (Patient not taking: Reported on 8/12/2021), Disp: 120 mL, Rfl: 11     esomeprazole (NEXIUM) 40 MG capsule, Take 1 capsule (40 mg) by mouth 2 times daily Take 30-60 minutes before eating. (Patient not taking: Reported on 8/12/2021), Disp: 180 capsule, Rfl: 3     hydroxychloroquine (PLAQUENIL) 200 MG tablet, Take 1 tablet (200 mg) by mouth 2 times daily Annual Plaquenil toxicity eye screening required. (Patient not taking: Reported on 8/12/2021), Disp: 180 tablet, Rfl: 3     leucovorin (WELLCOVORIN) 25 MG tablet, Take 1 tablet (25 mg) by mouth daily Day after taking methotrexate (Patient not taking: Reported on 8/12/2021), Disp: 12 tablet, Rfl: 3     naloxone (EVZIO) 0.4 MG/0.4ML auto-injector, Inject 0.4 mLs (0.4 mg) into the muscle as needed for opioid reversal every 2-3  "minutes until assistance arrives (Patient not taking: Reported on 8/12/2021), Disp: 0.8 mL, Rfl: 0     olopatadine (PATANOL) 0.1 % ophthalmic solution, Place 1 drop into both eyes 2 times daily (Patient not taking: Reported on 8/12/2021), Disp: 5 mL, Rfl: 3     oxyCODONE (ROXICODONE) 5 MG tablet, Take 1 tablet (5 mg) by mouth every four hours as needed for moderate to severe pain.  Maximum of 6 tablets per day.  (Patient not taking: Reported on 8/12/2021), Disp: 180 tablet, Rfl: 0     Syringe/Needle, Disp, 27G X 1/2\" 1 ML MISC, Used with Methotrexate once weekly (Patient not taking: Reported on 8/12/2021), Disp: 12 each, Rfl: 3      Allergies:   Penicillin   Bactroban   Levaquin  Rifampin   Clarithromycin   Ofloxacin  Sertraline                                                  Past Medical History:  Rheumatoid arthritis/scleroderma  Gastric antral vascular ectasia  Gastroesophageal reflux disease  Iron deficiency anemia  Irregular heart beat  Osteomyelitis, right 3rd finger  Raynaud's syndrome with gangrene  Scleroderma  Sjogren's syndrome  Systemic sclerosis  Metatarsalgia  Dysuria  Ulcer of finger  Sicca syndrome  Palpitations  Ischemia of upper extremity  Intercostal pain  Anxiety  Breast implant rupture  Palpitations     Past Surgical History:  Appendectomy   Colonoscopy (08/04/2016)  EGD x 5 (08/22/14 - 08/02/2016)  Mammoplasty Augmentation Bilateral     Family History:    The patient's family history includes Cancer in an other family member; Respiratory in her father.     Social History:  The patient reports that she is a current cigarette smoker. She has been smoking about 1.00 pack per day. She has never used smokeless tobacco. She reports that she does not drink alcohol or use drugs. She states she is attempting to get into a research program for quitting smoking.   PCP: Lindy Carbone  Marital Status: Single     Physical Exam:   /74   Pulse 68   Ht 1.651 m (5' 5\")   Wt 68 kg (150 lb) "   SpO2 99%   BMI 24.96 kg/m     Wt Readings from Last 4 Encounters:   08/12/21 68 kg (150 lb)   08/20/20 65 kg (143 lb 3.2 oz)   11/21/19 63.9 kg (140 lb 14.4 oz)   01/28/19 60.1 kg (132 lb 6.4 oz)     Constitutional: Well-developed, appearing stated age; cooperative  Eyes: Normal EOM, PERRLA, vision, conjunctiva, sclera  ENT: Normal external ears, nose, hearing, lips, teeth, gums, throat. No mucous membrane lesions, normal saliva pool  Neck: No mass or thyroid enlargement  Resp: Lungs clear to auscultation, nl to palpation  CV: RRR, no murmurs, rubs or gallops, no edema  GI: No ABD mass or tenderness, no HSM  : Not tested  Lymph: No cervical, supraclavicular, inguinal or epitrochlear nodes  MS: The TMJ, neck, shoulder, elbow, wrist, MCP/PIP/DIP, spine, hip, knee, ankle, and foot MTP/IP joints were examined and found normal. No active synovitis or altered joint anatomy. Full joint ROM. Normal  strength. No dactylitis,  tenosynovitis, enthesopathy.  Slight restriction in wrist range of motion but not significant. Proximally 15   contracture in PIPs and 5-10   in DIPs of the left hand. Right hand greater than 90   contractures in PIPs 3-5. Obvious calcinosis lesion over the lateral aspect of the right 4th PIP.  All of her contractures appears stable to me.  Skin: No nail pitting, alopecia, rash, nodules or lesions  Skin thickening in hands and distal forearms is 1+ thickened mostly atrophic and is even less involved in the lower legs and face.    She has no digital ulcers now.  Neuro: Normal cranial nerves, strength, sensation, DTRs.   Psych: Normal judgement, orientation, memory, affect.     Imaging:  HRCT without contrast (08/22/2019):  Impression:  1. Mild bilateral posterior subpleural reticular opacities, dependent atelectasis versus early ILD. Consider prone positioning on follow-up  study.  2. Trace mucous plugging in the right middle lobe.  3. Stable incidental calcified splenic artery aneurysm.  4.  Collapsed left breast implant and calcified right implant.  Sinai Carter MD    PFT Results (07/22/2019):  The FVC and FEV1 and the FEV1/FVC ratio are within normal limits. The inspiratory flow rates are within normal limits. Lung volumes are within normal limits. The diffusing capacity is normal. However, the diffusing capacity was not corrected for the   patient's hemoglobin  Impression:  Normal spirometry, lung volumes and diffusing capacity.    Laboratory:   Component      Latest Ref Rng & Units 8/15/2019 8/15/2019 8/15/2019           3:18 PM  3:24 PM  3:24 PM   WBC      4.0 - 11.0 10e9/L  5.2    RBC Count      3.8 - 5.2 10e12/L  3.71 (L)    Hemoglobin      11.7 - 15.7 g/dL  11.4 (L)    Hematocrit      35.0 - 47.0 %  37.1    MCV      78 - 100 fl  100    MCH      26.5 - 33.0 pg  30.7    MCHC      31.5 - 36.5 g/dL  30.7 (L)    RDW      10.0 - 15.0 %  13.2    Platelet Count      150 - 450 10e9/L  212    Diff Method        Automated Method    % Neutrophils      %  48.8    % Lymphocytes      %  40.5    % Monocytes      %  9.0    % Eosinophils      %  1.3    % Basophils      %  0.2    % Immature Granulocytes      %  0.2    Nucleated RBCs      0 /100  0    Absolute Neutrophil      1.6 - 8.3 10e9/L  2.6    Absolute Lymphocytes      0.8 - 5.3 10e9/L  2.1    Absolute Monocytes      0.0 - 1.3 10e9/L  0.5    Absolute Eosinophils      0.0 - 0.7 10e9/L  0.1    Absolute Basophils      0.0 - 0.2 10e9/L  0.0    Abs Immature Granulocytes      0 - 0.4 10e9/L  0.0    Absolute Nucleated RBC        0.0    Color Urine       Colorless     Appearance Urine       Clear     Glucose Urine      NEG:Negative mg/dL Negative     Bilirubin Urine      NEG:Negative Negative     Ketones Urine      NEG:Negative mg/dL Negative     Specific Gravity Urine      1.003 - 1.035 1.002 (L)     Blood Urine      NEG:Negative Small (A)     pH Urine      5.0 - 7.0 pH 6.0     Protein Albumin Urine      NEG:Negative mg/dL Negative     Urobilinogen mg/dL      0.0  - 2.0 mg/dL 0.0     Nitrite Urine      NEG:Negative Negative     Leukocyte Esterase Urine      NEG:Negative Negative     Source       Midstream Urine     WBC Urine      0 - 5 /HPF 1     RBC Urine      0 - 2 /HPF 1     Squamous Epithelial /HPF Urine      0 - 1 /HPF <1     Albumin      3.4 - 5.0 g/dL   4.3     Component      Latest Ref Rng & Units 8/15/2019 8/15/2019 8/15/2019 8/15/2019           3:24 PM  3:24 PM  3:24 PM  3:24 PM   Creatinine      0.52 - 1.04 mg/dL  0.78     GFR Estimate      >60 mL/min/1.73:m2  85     GFR Estimate If Black      >60 mL/min/1.73:m2  >90     Sed Rate      0 - 30 mm/h       CRP Inflammation      0.0 - 8.0 mg/L <2.9      AST      0 - 45 U/L   15    ALT      0 - 50 U/L    18   Albumin      3.4 - 5.0 g/dL         Component      Latest Ref Rng & Units 8/15/2019           3:24 PM   Sed Rate      0 - 30 mm/h 16

## 2021-09-04 ENCOUNTER — HEALTH MAINTENANCE LETTER (OUTPATIENT)
Age: 56
End: 2021-09-04

## 2021-09-21 ENCOUNTER — MYC MEDICAL ADVICE (OUTPATIENT)
Dept: PULMONOLOGY | Facility: CLINIC | Age: 56
End: 2021-09-21

## 2021-09-22 NOTE — TELEPHONE ENCOUNTER
Handicap parking form printed and placed in Dr. Child's signature folder. Provider will return to clinic 9/30/2021. Patient notified.    Anh Armenta CMA   9/22/2021 2:56 PM

## 2021-09-24 NOTE — TELEPHONE ENCOUNTER
Call placed to patient regarding handicap parking form without success. Left a message stating that the form was placed in provider's signature folder and provider will be in clinic 9/30/2021 to review form.    Anh Armenta CMA   9/24/2021 9:16 AM

## 2021-10-25 ENCOUNTER — MYC MEDICAL ADVICE (OUTPATIENT)
Dept: PULMONOLOGY | Facility: CLINIC | Age: 56
End: 2021-10-25

## 2021-10-28 NOTE — TELEPHONE ENCOUNTER
Consulted with Dr Child. Ok for pt to try the Biotin, relayed to pt via [employer].    RAÚL Frank, RN  Scleroderma Care Coordinator  [facility]

## 2021-11-11 ENCOUNTER — MYC MEDICAL ADVICE (OUTPATIENT)
Dept: PULMONOLOGY | Facility: CLINIC | Age: 56
End: 2021-11-11
Payer: MEDICARE

## 2022-01-31 DIAGNOSIS — K22.4 ESOPHAGEAL DYSMOTILITY: ICD-10-CM

## 2022-01-31 DIAGNOSIS — R11.0 NAUSEA: ICD-10-CM

## 2022-01-31 DIAGNOSIS — K21.9 GASTROESOPHAGEAL REFLUX DISEASE: ICD-10-CM

## 2022-02-02 RX ORDER — OMEPRAZOLE 40 MG/1
40 CAPSULE, DELAYED RELEASE ORAL 2 TIMES DAILY
Qty: 180 CAPSULE | Refills: 3 | Status: SHIPPED | OUTPATIENT
Start: 2022-02-02

## 2022-02-02 NOTE — TELEPHONE ENCOUNTER
Omeprazole Oral Capsule Delayed Release 40 MG  Last Written Prescription Date:  5/9/2021  Last Fill Quantity: 180,   # refills: 2  Last Office Visit :  8/12/2021  Future Office visit:   2/9/2022  180 Caps, 3 Refills sent to pharm 2/2/2022      Malaika Ulloa RN  Central Triage Red Flags/Med Refills

## 2022-02-09 ENCOUNTER — OFFICE VISIT (OUTPATIENT)
Dept: RHEUMATOLOGY | Facility: CLINIC | Age: 57
End: 2022-02-09
Payer: MEDICARE

## 2022-02-09 VITALS
DIASTOLIC BLOOD PRESSURE: 56 MMHG | WEIGHT: 153.4 LBS | HEART RATE: 71 BPM | SYSTOLIC BLOOD PRESSURE: 124 MMHG | TEMPERATURE: 97.9 F | OXYGEN SATURATION: 98 % | BODY MASS INDEX: 25.53 KG/M2

## 2022-02-09 DIAGNOSIS — K31.819 GAVE (GASTRIC ANTRAL VASCULAR ECTASIA): ICD-10-CM

## 2022-02-09 DIAGNOSIS — I73.01 RAYNAUD'S DISEASE WITH GANGRENE (H): ICD-10-CM

## 2022-02-09 DIAGNOSIS — D50.8 IRON DEFICIENCY ANEMIA SECONDARY TO INADEQUATE DIETARY IRON INTAKE: ICD-10-CM

## 2022-02-09 DIAGNOSIS — R06.09 DOE (DYSPNEA ON EXERTION): ICD-10-CM

## 2022-02-09 DIAGNOSIS — M34.9 SYSTEMIC SCLEROSIS (H): Primary | ICD-10-CM

## 2022-02-09 DIAGNOSIS — M79.18 CHRONIC MUSCULOSKELETAL PAIN: ICD-10-CM

## 2022-02-09 DIAGNOSIS — K21.01 GASTROESOPHAGEAL REFLUX DISEASE WITH ESOPHAGITIS AND HEMORRHAGE: ICD-10-CM

## 2022-02-09 DIAGNOSIS — G89.29 CHRONIC MUSCULOSKELETAL PAIN: ICD-10-CM

## 2022-02-09 LAB
BASOPHILS # BLD AUTO: 0 10E3/UL (ref 0–0.2)
BASOPHILS NFR BLD AUTO: 0 %
CK SERPL-CCNC: 47 U/L (ref 30–225)
CRP SERPL-MCNC: 6.4 MG/L (ref 0–8)
EOSINOPHIL # BLD AUTO: 0.1 10E3/UL (ref 0–0.7)
EOSINOPHIL NFR BLD AUTO: 1 %
ERYTHROCYTE [DISTWIDTH] IN BLOOD BY AUTOMATED COUNT: 13.4 % (ref 10–15)
ERYTHROCYTE [SEDIMENTATION RATE] IN BLOOD BY WESTERGREN METHOD: 46 MM/HR (ref 0–30)
HCT VFR BLD AUTO: 34 % (ref 35–47)
HGB BLD-MCNC: 10.5 G/DL (ref 11.7–15.7)
IMM GRANULOCYTES # BLD: 0 10E3/UL
IMM GRANULOCYTES NFR BLD: 0 %
IRON SATN MFR SERPL: 14 % (ref 15–46)
IRON SERPL-MCNC: 60 UG/DL (ref 35–180)
LYMPHOCYTES # BLD AUTO: 2.1 10E3/UL (ref 0.8–5.3)
LYMPHOCYTES NFR BLD AUTO: 35 %
MCH RBC QN AUTO: 28 PG (ref 26.5–33)
MCHC RBC AUTO-ENTMCNC: 30.9 G/DL (ref 31.5–36.5)
MCV RBC AUTO: 91 FL (ref 78–100)
MONOCYTES # BLD AUTO: 0.4 10E3/UL (ref 0–1.3)
MONOCYTES NFR BLD AUTO: 7 %
NEUTROPHILS # BLD AUTO: 3.3 10E3/UL (ref 1.6–8.3)
NEUTROPHILS NFR BLD AUTO: 57 %
NRBC # BLD AUTO: 0 10E3/UL
NRBC BLD AUTO-RTO: 0 /100
PLATELET # BLD AUTO: 261 10E3/UL (ref 150–450)
RBC # BLD AUTO: 3.75 10E6/UL (ref 3.8–5.2)
TIBC SERPL-MCNC: 414 UG/DL (ref 240–430)
WBC # BLD AUTO: 5.9 10E3/UL (ref 4–11)

## 2022-02-09 PROCEDURE — 86200 CCP ANTIBODY: CPT | Performed by: INTERNAL MEDICINE

## 2022-02-09 PROCEDURE — 86334 IMMUNOFIX E-PHORESIS SERUM: CPT

## 2022-02-09 PROCEDURE — 86160 COMPLEMENT ANTIGEN: CPT | Performed by: INTERNAL MEDICINE

## 2022-02-09 PROCEDURE — 84155 ASSAY OF PROTEIN SERUM: CPT | Performed by: INTERNAL MEDICINE

## 2022-02-09 PROCEDURE — 86235 NUCLEAR ANTIGEN ANTIBODY: CPT | Performed by: INTERNAL MEDICINE

## 2022-02-09 PROCEDURE — 86038 ANTINUCLEAR ANTIBODIES: CPT | Performed by: INTERNAL MEDICINE

## 2022-02-09 PROCEDURE — 86039 ANTINUCLEAR ANTIBODIES (ANA): CPT | Performed by: INTERNAL MEDICINE

## 2022-02-09 PROCEDURE — 84165 PROTEIN E-PHORESIS SERUM: CPT | Performed by: INTERNAL MEDICINE

## 2022-02-09 PROCEDURE — 86140 C-REACTIVE PROTEIN: CPT | Performed by: INTERNAL MEDICINE

## 2022-02-09 PROCEDURE — 86431 RHEUMATOID FACTOR QUANT: CPT | Performed by: INTERNAL MEDICINE

## 2022-02-09 PROCEDURE — 85652 RBC SED RATE AUTOMATED: CPT | Performed by: INTERNAL MEDICINE

## 2022-02-09 PROCEDURE — 83550 IRON BINDING TEST: CPT | Performed by: INTERNAL MEDICINE

## 2022-02-09 PROCEDURE — 85025 COMPLETE CBC W/AUTO DIFF WBC: CPT | Performed by: INTERNAL MEDICINE

## 2022-02-09 PROCEDURE — 86225 DNA ANTIBODY NATIVE: CPT | Performed by: INTERNAL MEDICINE

## 2022-02-09 PROCEDURE — 36415 COLL VENOUS BLD VENIPUNCTURE: CPT | Performed by: INTERNAL MEDICINE

## 2022-02-09 PROCEDURE — 99215 OFFICE O/P EST HI 40 MIN: CPT | Performed by: INTERNAL MEDICINE

## 2022-02-09 PROCEDURE — 82550 ASSAY OF CK (CPK): CPT | Performed by: INTERNAL MEDICINE

## 2022-02-09 NOTE — NURSING NOTE
"Willard Grayson's goals for this visit include:   Chief Complaint   Patient presents with     RECHECK     follow-up, last office visit 2018, discuss medications       PCP: Lindy Carbone    Referring Provider:  No referring provider defined for this encounter.      Initial /56 (BP Location: Right arm, Patient Position: Sitting)   Pulse 71   Temp 97.9  F (36.6  C) (Oral)   Wt 69.6 kg (153 lb 6.4 oz)   SpO2 98%   BMI 25.53 kg/m   Estimated body mass index is 25.53 kg/m  as calculated from the following:    Height as of 8/12/21: 1.651 m (5' 5\").    Weight as of this encounter: 69.6 kg (153 lb 6.4 oz).    Medication Reconciliation: complete    Do you need any medication refills at today's visit? none      RENATA Valencia [facility]   "

## 2022-02-10 ENCOUNTER — CARE COORDINATION (OUTPATIENT)
Dept: RHEUMATOLOGY | Facility: CLINIC | Age: 57
End: 2022-02-10
Payer: MEDICARE

## 2022-02-10 DIAGNOSIS — M34.9 SYSTEMIC SCLEROSIS (H): Primary | ICD-10-CM

## 2022-02-10 LAB
ANA PAT SER IF-IMP: ABNORMAL
ANA SER QL IF: POSITIVE
ANA TITR SER IF: ABNORMAL {TITER}
C3 SERPL-MCNC: 156 MG/DL (ref 81–157)
C4 SERPL-MCNC: 38 MG/DL (ref 13–39)
RHEUMATOID FACT SER NEPH-ACNC: 24 IU/ML
TOTAL PROTEIN SERUM FOR ELP: 7.9 G/DL (ref 6.8–8.8)

## 2022-02-10 NOTE — PROGRESS NOTES
[facility]  Rheumatology Clinic  Brennen Child MD  2022     Name: Willard Grayson  MRN: [id number]  Age: 54 year old  : 1965  Referring provider: Referred Self    Problem List:  1. 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   2. Associated marked neuropathic skin pain markedly improved with Lyrica with prior medication therapy with gabapentin.  3. Diffuse musculoskeletal pain requiring methotrexate plus CellCept, and narcotic to control, but also improved. Now off methotrexate due to GI issues.  4. Initial clinical overlap with anti-CCP seropositive rheumatoid arthritis with continued anti-CCP seropositivity as of 2010.  5. Raynaud's phenomena with digital ulcers with easily cracked skin.   6. GI involvement consistent with bacterial small bowel overgrowth with marked improvement after a course of neomycin followed by Dr. Glez; positive hydrogen breath test in 3/2012 - treated with rifaximin through Dr. Jacob  7. No convincing evidence of lung involvement on serial pulmonary function tests or clinically.   8. History of heart palpitations prior to the onset of SSc with stable symptoms since. Holter 2018 with PACs.  9. History of medication failures with Remicade, methotrexate, methotrexate plus CellCept and with Orencia and with improvement since initiation of IVIG but with complicating headaches and overall sense of malaise with IVIG infusions.   10. History of silicone breast implants.   11. Incidental finding of calcified splenic artery aneurysm.  12. EGD in 3/2012 with hiatal hernia and gastral antral vascular ectasia  13. Recurrent right third finger contracture with extensor surface ulceration, healed   14. Trochanteric bursitis, R>L    Assessment and Plan:   #  Systemic sclerosis   # Gastroesophageal reflux disease, esophagitis presence not specified  # Esophageal dysmotility  # Raynaud's disease with gangrene  # High risk medications (not anticoagulants) long-term use  # Rheumatoid arthritis involving multiple sites with positive rheumatoid factor (H)  # Metatarsalgia, unspecified laterality  # Metatarsalgia of both feet  # Osteopenia, unspecified location  # Systemic sclerosis  # MCDANIEL (dyspnea on exertion)  # Chest wall pain  # Skin lesion of lower extremity    Plan/recommendation:    It is hard to sort out why she may be feeling worse.  She is a little bit anemic and a little low on iron.  The Covid vaccination certainly can trigger some inflammation and we have seen that in some patients but it is usually pretty mild and pretty transient.    Methimazole should not be causing problems but there are a few case reports of it inducing an autoimmune insulin syndrome.  Increased arthralgias have also been noted with the drug.    Given all these uncertainties I would like to repeat her autoantibody work-up which we have not done for the better part of a decade.  Note that she was CCP positive in the first place which was confusing to her physicians initially who felt she probably had rheumatoid arthritis based on her initial hand complaints.  Based on today's examination she clearly does not have rheumatoid arthritis that she has no synovitis, but it would be of interest to know whether her autoantibody picture has changed.    Certainly if she has a lot of autoantibodies on our repeat work-up today, given her symptoms, I would have a very low threshold to put her back on Plaquenil at least.  I not sure she need anything more aggressive.    However, we should see her back in about 6 months, and should probably repeat pulmonary function tests at that time as it will have been well over 2 years since she has had them done.  We will order those.    In terms of her GERD it is hard  to know what is going on there as well.  It may be that with her weight gain that that is the primary reason.  However if this persists it is probably worth trying her on an alternative PPI and perhaps adding an H2 blocker as well to get control of the symptoms.    At the time of completion of this note, her sedimentation rate and CRP are both back and the sedimentation rate is significantly elevated at 46 though the CRP is within the normal range.  This could suggest resolving infection, but given her anemia with only minimally low iron raises other concerns.  We will proceed with SPEP, UPEP, and immunofixation while we await her other labs.    Today's visit was 23 minutes in face-to-face time, with an additional 18 minutes spent in chart review, , and messaging to the nurse as well as documentation completed on the date of service.    JANET Child MD, PhD    Rheumatology          August 20, 2020 interval history:    Patient comes in today with him main concern being some new skin lesions that she has noted over her left leg and left arm over the last few months.  She does have a dermatology appointment within the [facility] system where she gets most of her care.  That is not however until October.    The other new issue this been bothering her a fair amount is neck pain and headaches.  She did get a cervical spine film just recently at [facility] and has not yet reviewed that with anyone there.    In terms of her scleroderma skin involvement she really feels that has been quite stable.  We have repeatedly tried to get her on methotrexate but she is tolerated that poorly but she has tolerated MMF better and remains on it.  The main problem for her has been the contractures in her hands and those have gradually worsened particularly in her right hand.    She is largely sheltering in place because of coronavirus, but she is still smoking cigarettes.  If anything she has increased doing so because  of feeling under stress.      January 14, 2021 interval history:    Since we last seen the patient she feels she is really been doing pretty stably.  She went off of MMF because of development of a squamous cell carcinoma in the understanding that MMF could have been contributing to impaired tumor Amino-Cerv balance.  She really does not think she is gotten worse that since going off of it.  That was back on November 12.  The SCCA was on her forearm and is healed nicely.  She thinks that perhaps the skin in the area was a little more shiny than surrounding skin but there were no other difficulties with the healing.    She does acknowledge that over the last year or 2 her fingers to develop worsening contractures and she has difficulty writing.  Nonetheless she has tolerated methotrexate poorly and its not clear how much it helped her when she was on it.  She does acknowledge that she does not do stretching exercises as reliably as she might.    She has had a few digital ulcers but they have been healing more quickly than usual.  She is not certain why but she does note that she is no longer taking care of her grandchildren so she is able to rest her hands and protect them better.    She has established with the pain clinic at [facility] and that they have taken over the prescribing of her narcotics and are working with her on a more comprehensive pain program.  She is really actually quite happy about that.    She continues to get some esophageal spasms at times but oddly this seems only to happen with the drinking of liquids.  Its not bad enough that she wants to do anything more about it.  We did talk about the possible value of all toyed minutes however.    The other new thing that she has had is some dizziness and tinnitus developing in her left ear much greater than right.  She is seeking care with an otolaryngologist at [facility] where most of her care is delivered however.    She is otherwise been pretty good.  She  had a little bit of a flareup last week where she felt weaker and more achy and more sluggish.  She has not had a clear-cut coronavirus exposure to her knowledge.          August 12, 2021 interval history:    Since we have last seen the patient she feels she is really been doing very well.  She has been seeing the [facility] pain clinic and was having some difficulties that seemed unusual to her.  Ultimately after a lot of jitters and changes in her pain situation she was worked up and diagnosed with Graves' disease and is now being treated with methimazole.    She also was seen in the breast clinic where there was some evidence of fluid around her implants raising the question of whether there had been a rupture of them.  There was associated pain.  The fluid was removed and she has been feeling better since then.    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  That was removed and the wound has healed up very well.    Interestingly, since she has had Botox injections she has not had any digital ulcers including over her right contractured PIP joints.  Although she really did not like the amount of pain she got with the Botox she is pretty happy with not having had any ulcers.    Potentially contributing to that improvement however is that she is not been smoking at all for the last 4 months.  Her breathing feels better to her as well and overall her breathing feels stable to her.  She has not had pulmonary function test for quite some time and is a bit overdue for those, but she is never had evidence of significant lung involvement.    Although she thinks her skin is somewhat sensitive she does not believe it is worsening at all and she believes is completely stable and her joints feel stable to her as well and her not bothering her significantly.    February 9, 2022 interval history:    Since we have last seen the patient she really has not felt well.  She thinks this is primarily since she  is received her second coronavirus shot in the fall.  She however also had autoimmune thyroiditis diagnosed in April and has been placed on methimazole and does not think she is felt quite as well since then either.  She of course became euthyroid after being hyperthyroid and has gained weight with it.    Among the thing she has been noticing more recently is virtually constant heartburn despite being on omeprazole 40 mg twice daily.    She however also notices more widespread pain.  This includes the small joints of her hands and feet but she denies significant morning stiffness there.    She has not been on any disease modifying drugs now for quite some time including Plaquenil.  She does believe that her skin has been stable throughout all this and not worsening.    She does have a lot of fatigue, and as part of recent laboratory work-up she had iron levels done that were on the low side of normal but she was anemic and her ferritin was low.  There were no other inflammatory markers so the ferritin could not be put fully in context.  She has done well in the past after being given IV iron.    She is not exercising regularly but does not think she is fundamentally more short of breath.  Her last PFTs were 2 years ago but these were completely stable with forced vital capacity 102% of predicted.  DLCO on those studies was at 84% of predicted also stable.  She does continue to smoke though she says very little.             Review of Systems:   Pertinent items are noted in HPI or as below, remainder of complete ROS is negative.      No recent problems with hearing or vision. No swallowing problems.   No breathing difficulty, shortness of breath, coughing, or wheezing  No chest pain or palpitations  No heart burn, indigestion, abdominal pain, nausea, vomiting, diarrhea  No urination problems, no bloody, cloudy urine, no dysuria  No numbing, tingling, weakness  No headaches or confusion  No rashes. No easy bleeding or  bruising.   Answers for HPI/ROS submitted by the patient on 11/21/2019   General Symptoms: Yes  Skin Symptoms: Yes  HENT Symptoms: Yes  EYE SYMPTOMS: Yes  HEART SYMPTOMS: Yes  LUNG SYMPTOMS: Yes  INTESTINAL SYMPTOMS: Yes  URINARY SYMPTOMS: No  GYNECOLOGIC SYMPTOMS: No  BREAST SYMPTOMS: No  SKELETAL SYMPTOMS: Yes  BLOOD SYMPTOMS: No  NERVOUS SYSTEM SYMPTOMS: Yes  MENTAL HEALTH SYMPTOMS: Yes  Fever: No  Loss of appetite: No  Weight loss: No  Weight gain: Yes  Fatigue: Yes  Night sweats: Yes  Chills: No  Excessive thirst: Yes  Feeling hot or cold when others believe the temperature is normal: Yes  Loss of height: No  Post-operative complications: No  Surgical site pain: No  Hallucinations: No  Change in or Loss of Energy: No  Changes in hair: No  Changes in moles/birth marks: Yes  Itching: Yes  Rashes: No  Changes in nails: No  Acne: No  Hair in places you don't want it: No  Ear pain: No  Ear discharge: No  Hearing loss: Yes  Tinnitus: Yes  Nosebleeds: No  Congestion: No  Sinus pain: No  Tooth pain: No  Gum tenderness: No  Bleeding gums: No  Change in taste: No  Change in sense of smell: No  Dry mouth: Yes  Hearing aid used: No  Eye pain: No  Vision loss: Yes  Dry eyes: Yes  Watery eyes: Yes  Eye bulging: No  Double vision: No  Flashing of lights: No  Cough: No  Sputum or phlegm: No  Coughing up blood: No  Difficulty breating or shortness of breath: No  Snoring: No  Wheezing: No  Difficulty breathing on exertion: Yes  Chest pain or pressure: No  Fast or irregular heartbeat: No  Pain in legs with walking: Yes  Trouble breathing while lying down: No  Fingers or toes appear blue: Yes  High blood pressure: Yes  Low blood pressure: No  Fainting: No  Murmurs: No  Pacemaker: No  Varicose veins: No  Edema or swelling: No  Wake up at night with shortness of breath: No  Light-headedness: No  Heart burn or indigestion: Yes  Nausea: No  Vomiting: No  Abdominal pain: No  Bloating: Yes  Constipation: Yes  Diarrhea: No  Back pain:  Yes  Muscle aches: Yes  Neck pain: Yes  Swollen joints: No  Joint pain: Yes  Bone pain: No  Muscle cramps: No  Trouble with coordination: No  Dizziness or trouble with balance: No  Fainting or black-out spells: No  Memory loss: No  Headache: Yes  Seizures: No  Speech problems: No  Nervous or Anxious: No  Depression: Yes  Trouble sleeping: No  Trouble thinking or concentrating: No  Mood changes: No  Panic attacks: No      Active Medications:     Current Outpatient Medications:      aspirin-dipyridamole (AGGRENOX)  MG per 12 hr capsule, Take 1 capsule by mouth daily , Disp: , Rfl:      BIOTIN PO, 1 tablet daily, Disp: , Rfl:      Blood Pressure Monitoring KIT, Check BP every 7 days., Disp: 1 kit, Rfl: 0     carboxymethylcellulose PF (REFRESH PLUS) 0.5 % ophthalmic solution, Place 1 drop into both eyes 4 times daily as needed for dry eyes, Disp: 120 mL, Rfl: 11     cevimeline (EVOXAC) 30 MG capsule, Take 1 capsule (30 mg) by mouth 3 times daily, Disp: 270 capsule, Rfl: 2     cyanocobalamin (VITAMIN B12) 1000 MCG/ML injection, Inject 1 mL into the muscle every 30 days, Disp: , Rfl:      folic acid (FOLVITE) 1 MG tablet, Take 1 tablet (1 mg) by mouth daily, Disp: 90 tablet, Rfl: 3     LORazepam (ATIVAN) 0.5 MG tablet, Take 1 - 2 tablets by mouth three times daily as needed., Disp: 60 tablet, Rfl: 0     omeprazole (PRILOSEC) 40 MG DR capsule, Take 1 capsule (40 mg) by mouth 2 times daily, Disp: 180 capsule, Rfl: 3     ondansetron (ZOFRAN-ODT) 4 MG ODT tab, DISSOLVE 1 TABLET IN MOUTH EVERY EIGHT HOURS AS NEEDED FOR NAUSEA, Disp: 20 tablet, Rfl: 0     oxyCODONE-acetaminophen (PERCOCET) 5-325 MG tablet, Take 1 tablet by mouth every 4 hours as needed for moderate pain or severe pain. Maximum of 3 tablets per day., Disp: 90 tablet, Rfl: 0     pravastatin (PRAVACHOL) 10 MG tablet, Take 1 tablet (10 mg) by mouth daily, Disp: 30 tablet, Rfl: 5     pregabalin (LYRICA) 75 MG capsule, Take 1 capsule (75 mg) by mouth 2 times  "daily, Disp: 180 capsule, Rfl: 1     valACYclovir (VALTREX) 500 MG tablet, Take 1 tablet (500 mg) by mouth 2 times daily (Patient taking differently: Take 500 mg by mouth daily ), Disp: 180 tablet, Rfl: 1     Vitamin D, Cholecalciferol, 1000 UNITS TABS, Take 2,000 Units by mouth daily , Disp: , Rfl:      esomeprazole (NEXIUM) 40 MG capsule, Take 1 capsule (40 mg) by mouth 2 times daily Take 30-60 minutes before eating. (Patient not taking: Reported on 8/12/2021), Disp: 180 capsule, Rfl: 3     hydroxychloroquine (PLAQUENIL) 200 MG tablet, Take 1 tablet (200 mg) by mouth 2 times daily Annual Plaquenil toxicity eye screening required. (Patient not taking: Reported on 8/12/2021), Disp: 180 tablet, Rfl: 3     leucovorin (WELLCOVORIN) 25 MG tablet, Take 1 tablet (25 mg) by mouth daily Day after taking methotrexate (Patient not taking: Reported on 8/12/2021), Disp: 12 tablet, Rfl: 3     naloxone (EVZIO) 0.4 MG/0.4ML auto-injector, Inject 0.4 mLs (0.4 mg) into the muscle as needed for opioid reversal every 2-3 minutes until assistance arrives (Patient not taking: Reported on 8/12/2021), Disp: 0.8 mL, Rfl: 0     olopatadine (PATADAY) 0.2 % ophthalmic solution, Place 1 drop into both eyes daily (Patient not taking: Reported on 2/9/2022), Disp: 1 Bottle, Rfl: 11     olopatadine (PATANOL) 0.1 % ophthalmic solution, Place 1 drop into both eyes 2 times daily (Patient not taking: Reported on 8/12/2021), Disp: 5 mL, Rfl: 3     oxyCODONE (ROXICODONE) 5 MG tablet, Take 1 tablet (5 mg) by mouth every four hours as needed for moderate to severe pain.  Maximum of 6 tablets per day.  (Patient not taking: Reported on 8/12/2021), Disp: 180 tablet, Rfl: 0     Syringe/Needle, Disp, 27G X 1/2\" 1 ML MISC, Used with Methotrexate once weekly (Patient not taking: Reported on 8/12/2021), Disp: 12 each, Rfl: 3      Allergies:   Penicillin   Bactroban   Levaquin  Rifampin   Clarithromycin   Ofloxacin  Sertraline                                       "            Past Medical History:  Rheumatoid arthritis/scleroderma  Gastric antral vascular ectasia  Gastroesophageal reflux disease  Iron deficiency anemia  Irregular heart beat  Osteomyelitis, right 3rd finger  Raynaud's syndrome with gangrene  Scleroderma  Sjogren's syndrome  Systemic sclerosis  Metatarsalgia  Dysuria  Ulcer of finger  Sicca syndrome  Palpitations  Ischemia of upper extremity  Intercostal pain  Anxiety  Breast implant rupture  Palpitations     Past Surgical History:  Appendectomy   Colonoscopy (08/04/2016)  EGD x 5 (08/22/14 - 08/02/2016)  Mammoplasty Augmentation Bilateral     Family History:    The patient's family history includes Cancer in an other family member; Respiratory in her father.     Social History:  The patient reports that she is a current cigarette smoker. She has been smoking about 1.00 pack per day. She has never used smokeless tobacco. She reports that she does not drink alcohol or use drugs. She states she is attempting to get into a research program for quitting smoking.   PCP: Lindy Carbone  Marital Status: Single     Physical Exam:   /56 (BP Location: Right arm, Patient Position: Sitting)   Pulse 71   Temp 97.9  F (36.6  C) (Oral)   Wt 69.6 kg (153 lb 6.4 oz)   SpO2 98%   BMI 25.53 kg/m     Wt Readings from Last 4 Encounters:   02/09/22 69.6 kg (153 lb 6.4 oz)   08/12/21 68 kg (150 lb)   08/20/20 65 kg (143 lb 3.2 oz)   11/21/19 63.9 kg (140 lb 14.4 oz)     Constitutional: Well-developed, appearing stated age; cooperative  Eyes: Normal EOM, PERRLA, vision, conjunctiva, sclera  ENT: Normal external ears, nose, hearing, lips, teeth, gums, throat. No mucous membrane lesions, normal saliva pool  Neck: No mass or thyroid enlargement  Resp: Lungs clear to auscultation, nl to palpation  CV: RRR, no murmurs, rubs or gallops, no edema  GI: No ABD mass or tenderness, no HSM  : Not tested  Lymph: No cervical, supraclavicular, inguinal or epitrochlear  nodes  MS: The TMJ, neck, shoulder, elbow, wrist, MCP/PIP/DIP, spine, hip, knee, ankle, and foot MTP/IP joints were examined and found normal. No active synovitis or altered joint anatomy. Full joint ROM. Normal  strength. No dactylitis,  tenosynovitis, enthesopathy.  Slight restriction in wrist range of motion but not significant. Proximally 15   contracture in PIPs and 5-10   in DIPs of the left hand. Right hand greater than 90   contractures in PIPs 3-5. Obvious calcinosis lesion over the lateral aspect of the right 4th PIP.  All of her contractures appears stable to me.  Skin: No nail pitting, alopecia, rash, nodules or lesions  Skin thickening in hands and distal forearms is 1+ thickened mostly atrophic and is even less involved in the lower legs and face.    She has no digital ulcers now.  Neuro: Normal cranial nerves, strength, sensation, DTRs.   Psych: Normal judgement, orientation, memory, affect.     Imaging:  HRCT without contrast (08/22/2019):  Impression:  1. Mild bilateral posterior subpleural reticular opacities, dependent atelectasis versus early ILD. Consider prone positioning on follow-up  study.  2. Trace mucous plugging in the right middle lobe.  3. Stable incidental calcified splenic artery aneurysm.  4. Collapsed left breast implant and calcified right implant.  Sinai Carter MD    PFT Results (07/22/2019):  The FVC and FEV1 and the FEV1/FVC ratio are within normal limits. The inspiratory flow rates are within normal limits. Lung volumes are within normal limits. The diffusing capacity is normal. However, the diffusing capacity was not corrected for the   patient's hemoglobin  Impression:  Normal spirometry, lung volumes and diffusing capacity.    Laboratory:   Component      Latest Ref Rng & Units 8/15/2019 8/15/2019 8/15/2019           3:18 PM  3:24 PM  3:24 PM   WBC      4.0 - 11.0 10e9/L  5.2    RBC Count      3.8 - 5.2 10e12/L  3.71 (L)    Hemoglobin      11.7 - 15.7 g/dL  11.4 (L)     Hematocrit      35.0 - 47.0 %  37.1    MCV      78 - 100 fl  100    MCH      26.5 - 33.0 pg  30.7    MCHC      31.5 - 36.5 g/dL  30.7 (L)    RDW      10.0 - 15.0 %  13.2    Platelet Count      150 - 450 10e9/L  212    Diff Method        Automated Method    % Neutrophils      %  48.8    % Lymphocytes      %  40.5    % Monocytes      %  9.0    % Eosinophils      %  1.3    % Basophils      %  0.2    % Immature Granulocytes      %  0.2    Nucleated RBCs      0 /100  0    Absolute Neutrophil      1.6 - 8.3 10e9/L  2.6    Absolute Lymphocytes      0.8 - 5.3 10e9/L  2.1    Absolute Monocytes      0.0 - 1.3 10e9/L  0.5    Absolute Eosinophils      0.0 - 0.7 10e9/L  0.1    Absolute Basophils      0.0 - 0.2 10e9/L  0.0    Abs Immature Granulocytes      0 - 0.4 10e9/L  0.0    Absolute Nucleated RBC        0.0    Color Urine       Colorless     Appearance Urine       Clear     Glucose Urine      NEG:Negative mg/dL Negative     Bilirubin Urine      NEG:Negative Negative     Ketones Urine      NEG:Negative mg/dL Negative     Specific Gravity Urine      1.003 - 1.035 1.002 (L)     Blood Urine      NEG:Negative Small (A)     pH Urine      5.0 - 7.0 pH 6.0     Protein Albumin Urine      NEG:Negative mg/dL Negative     Urobilinogen mg/dL      0.0 - 2.0 mg/dL 0.0     Nitrite Urine      NEG:Negative Negative     Leukocyte Esterase Urine      NEG:Negative Negative     Source       Midstream Urine     WBC Urine      0 - 5 /HPF 1     RBC Urine      0 - 2 /HPF 1     Squamous Epithelial /HPF Urine      0 - 1 /HPF <1     Albumin      3.4 - 5.0 g/dL   4.3     Component      Latest Ref Rng & Units 8/15/2019 8/15/2019 8/15/2019 8/15/2019           3:24 PM  3:24 PM  3:24 PM  3:24 PM   Creatinine      0.52 - 1.04 mg/dL  0.78     GFR Estimate      >60 mL/min/1.73:m2  85     GFR Estimate If Black      >60 mL/min/1.73:m2  >90     Sed Rate      0 - 30 mm/h       CRP Inflammation      0.0 - 8.0 mg/L <2.9      AST      0 - 45 U/L   15    ALT      0 -  50 U/L    18   Albumin      3.4 - 5.0 g/dL         Component      Latest Ref Rng & Units 8/15/2019           3:24 PM   Sed Rate      0 - 30 mm/h 16

## 2022-02-11 ENCOUNTER — LAB (OUTPATIENT)
Dept: LAB | Facility: CLINIC | Age: 57
End: 2022-02-11
Payer: MEDICARE

## 2022-02-11 DIAGNOSIS — K31.819 GAVE (GASTRIC ANTRAL VASCULAR ECTASIA): ICD-10-CM

## 2022-02-11 DIAGNOSIS — R06.09 DOE (DYSPNEA ON EXERTION): ICD-10-CM

## 2022-02-11 DIAGNOSIS — M34.9 SYSTEMIC SCLEROSIS (H): ICD-10-CM

## 2022-02-11 DIAGNOSIS — D50.8 IRON DEFICIENCY ANEMIA SECONDARY TO INADEQUATE DIETARY IRON INTAKE: ICD-10-CM

## 2022-02-11 DIAGNOSIS — M79.18 CHRONIC MUSCULOSKELETAL PAIN: ICD-10-CM

## 2022-02-11 DIAGNOSIS — G89.29 CHRONIC MUSCULOSKELETAL PAIN: ICD-10-CM

## 2022-02-11 DIAGNOSIS — I73.01 RAYNAUD'S DISEASE WITH GANGRENE (H): ICD-10-CM

## 2022-02-11 DIAGNOSIS — K21.01 GASTROESOPHAGEAL REFLUX DISEASE WITH ESOPHAGITIS AND HEMORRHAGE: ICD-10-CM

## 2022-02-11 LAB
ALBUMIN SERPL ELPH-MCNC: 4.3 G/DL (ref 3.7–5.1)
ALBUMIN UR-MCNC: NEGATIVE MG/DL
ALPHA1 GLOB SERPL ELPH-MCNC: 0.3 G/DL (ref 0.2–0.4)
ALPHA2 GLOB SERPL ELPH-MCNC: 1 G/DL (ref 0.5–0.9)
APPEARANCE UR: CLEAR
B-GLOBULIN SERPL ELPH-MCNC: 1 G/DL (ref 0.6–1)
BACTERIA #/AREA URNS HPF: ABNORMAL /HPF
BILIRUB UR QL STRIP: NEGATIVE
CCP AB SER IA-ACNC: >340 U/ML
COLOR UR AUTO: YELLOW
DSDNA AB SER-ACNC: 3.1 IU/ML
ENA SM IGG SER IA-ACNC: 6.3 U/ML
ENA SM IGG SER IA-ACNC: NEGATIVE
ENA SS-A AB SER IA-ACNC: 130 U/ML
ENA SS-A AB SER IA-ACNC: POSITIVE
ENA SS-B IGG SER IA-ACNC: 0.9 U/ML
ENA SS-B IGG SER IA-ACNC: NEGATIVE
GAMMA GLOB SERPL ELPH-MCNC: 1.3 G/DL (ref 0.7–1.6)
GLUCOSE UR STRIP-MCNC: NEGATIVE MG/DL
HGB UR QL STRIP: ABNORMAL
KETONES UR STRIP-MCNC: NEGATIVE MG/DL
LEUKOCYTE ESTERASE UR QL STRIP: NEGATIVE
M PROTEIN SERPL ELPH-MCNC: 0 G/DL
NITRATE UR QL: NEGATIVE
PH UR STRIP: 5.5 [PH] (ref 5–8)
PROT PATTERN SERPL ELPH-IMP: ABNORMAL
PROT PATTERN SERPL IFE-IMP: NORMAL
RBC #/AREA URNS AUTO: ABNORMAL /HPF
SP GR UR STRIP: 1.02 (ref 1–1.03)
SQUAMOUS #/AREA URNS AUTO: ABNORMAL /LPF
U1 SNRNP IGG SER IA-ACNC: 2.1 U/ML
U1 SNRNP IGG SER IA-ACNC: NEGATIVE
UROBILINOGEN UR STRIP-ACNC: 0.2 E.U./DL
WBC #/AREA URNS AUTO: ABNORMAL /HPF

## 2022-02-11 PROCEDURE — 81001 URINALYSIS AUTO W/SCOPE: CPT

## 2022-02-11 PROCEDURE — 86335 IMMUNFIX E-PHORSIS/URINE/CSF: CPT | Performed by: PATHOLOGY

## 2022-02-11 PROCEDURE — 84166 PROTEIN E-PHORESIS/URINE/CSF: CPT | Performed by: PATHOLOGY

## 2022-02-16 LAB
PATH ICD:: NORMAL
PATH ICD:: NORMAL
PROT ELPH PNL UR ELPH: NORMAL
PROT PATTERN UR ELPH-IMP: NORMAL
REVIEWING PATHOLOGIST: NORMAL
REVIEWING PATHOLOGIST: NORMAL
TOTAL PROTEIN RANDOM URINE MG/DL: 17 MG/DL

## 2022-02-19 ENCOUNTER — HEALTH MAINTENANCE LETTER (OUTPATIENT)
Age: 57
End: 2022-02-19

## 2022-03-02 ENCOUNTER — MYC MEDICAL ADVICE (OUTPATIENT)
Dept: RHEUMATOLOGY | Facility: CLINIC | Age: 57
End: 2022-03-02
Payer: MEDICARE

## 2022-03-02 DIAGNOSIS — K21.01 GASTROESOPHAGEAL REFLUX DISEASE WITH ESOPHAGITIS AND HEMORRHAGE: Primary | ICD-10-CM

## 2022-03-03 RX ORDER — ESOMEPRAZOLE MAGNESIUM 40 MG/1
40 CAPSULE, DELAYED RELEASE ORAL DAILY
Qty: 60 CAPSULE | Refills: 3 | Status: SHIPPED | OUTPATIENT
Start: 2022-03-03 | End: 2022-04-15

## 2022-03-03 RX ORDER — FAMOTIDINE 20 MG/1
20 TABLET, FILM COATED ORAL 2 TIMES DAILY
Qty: 60 TABLET | Refills: 3 | Status: SHIPPED | OUTPATIENT
Start: 2022-03-03 | End: 2022-07-13

## 2022-03-03 NOTE — TELEPHONE ENCOUNTER
"Returned call to pt. She is wanting to know what her labs mean. I reviewed them with her, stated the iron could be low due to her issues with eating-not doing. The eating has improved    She has been \"urping\" all the time,  reflux has been very bad. She is using baking soda, tums, omeprazole 80 mg but the reflux is the same.      Consulted with Dr Child. Her labs are unchanged no changes are needed. He advises that pt stop the omeprazole, and start Nexium 40 mg twice daily. Also advises that pt start famotidine 20 mg twice daily, and to minimize the baking soda for the GERD.    Called pt with the above recommendations. She verbalized understanding that she is to stop the omeprazole, start the Nexium 40 mg twice daily and Famotidine 20 mg twice daily.     TIARRA FrankN, RN  Scleroderma Care Coordinator  [facility]  "

## 2022-03-04 ENCOUNTER — TELEPHONE (OUTPATIENT)
Dept: RHEUMATOLOGY | Facility: CLINIC | Age: 57
End: 2022-03-04
Payer: MEDICARE

## 2022-03-04 NOTE — TELEPHONE ENCOUNTER
Prior Authorization Retail Medication Request    Medication/Dose: esomeprazole (NEXIUM) 40 MG DR capsule  ICD code (if different than what is on RX):  Gastroesophageal reflux disease with esophagitis and hemorrhage [K21.01]  Previously Tried and Failed:    Rationale:      Insurance Name: [employer]  Insurance ID: [id number]    Pharmacy Information (if different than what is on RX)  Name:  [facility][id number] [place], [place] - [address]  Phone:  [phone]

## 2022-03-07 NOTE — TELEPHONE ENCOUNTER
Prior Authorization Approval    Authorization Effective Date: 1/1/2022  Authorization Expiration Date: 3/7/2023  Medication: esomeprazole (NEXIUM) 40 MG DR capsule--APPROVED  Approved Dose/Quantity:   Reference #:     Insurance Company: [employer] D - Phone [phone] Fax [phone]  Expected CoPay:       CoPay Card Available:      Foundation Assistance Needed:    Which Pharmacy is filling the prescription (Not needed for infusion/clinic administered): [facility] #[id number] - [place], [place] - [facility]  Pharmacy Notified: Yes  Patient Notified: Yes **Instructed pharmacy to notify patient when script is ready to /ship.**

## 2022-03-07 NOTE — TELEPHONE ENCOUNTER
PA Initiation    Medication: esomeprazole (NEXIUM) 40 MG DR capsule   Insurance Company: [employer] Part D - Phone [phone] Fax [phone]  Pharmacy Filling the Rx: [facility] #[id number] - [place], [place] - [address]  Filling Pharmacy Phone: [phone]  Filling Pharmacy Fax: [phone]  Start Date: 3/6/2022

## 2022-04-05 DIAGNOSIS — R06.09 DOE (DYSPNEA ON EXERTION): ICD-10-CM

## 2022-04-05 DIAGNOSIS — E05.00 GRAVES DISEASE: ICD-10-CM

## 2022-04-05 DIAGNOSIS — M34.9 SYSTEMIC SCLEROSIS (H): ICD-10-CM

## 2022-04-05 DIAGNOSIS — M79.18 CHRONIC MUSCULOSKELETAL PAIN: ICD-10-CM

## 2022-04-05 DIAGNOSIS — D50.8 IRON DEFICIENCY ANEMIA SECONDARY TO INADEQUATE DIETARY IRON INTAKE: ICD-10-CM

## 2022-04-05 DIAGNOSIS — G89.29 CHRONIC MUSCULOSKELETAL PAIN: ICD-10-CM

## 2022-04-05 DIAGNOSIS — M05.79 RHEUMATOID ARTHRITIS INVOLVING MULTIPLE SITES WITH POSITIVE RHEUMATOID FACTOR (H): ICD-10-CM

## 2022-04-05 DIAGNOSIS — M35.00 SICCA SYNDROME (H): ICD-10-CM

## 2022-04-05 DIAGNOSIS — K22.4 ESOPHAGEAL DYSMOTILITY: ICD-10-CM

## 2022-04-05 PROCEDURE — 94375 RESPIRATORY FLOW VOLUME LOOP: CPT | Performed by: INTERNAL MEDICINE

## 2022-04-05 PROCEDURE — 94729 DIFFUSING CAPACITY: CPT | Performed by: INTERNAL MEDICINE

## 2022-04-07 LAB
DLCOUNC-%PRED-PRE: 74 %
DLCOUNC-PRE: 15.51 ML/MIN/MMHG
DLCOUNC-PRED: 20.73 ML/MIN/MMHG
ERV-%PRED-PRE: 109 %
ERV-PRE: 0.93 L
ERV-PRED: 0.85 L
EXPTIME-PRE: 6.54 SEC
FEF2575-%PRED-PRE: 93 %
FEF2575-PRE: 2.11 L/SEC
FEF2575-PRED: 2.26 L/SEC
FEFMAX-%PRED-PRE: 97 %
FEFMAX-PRE: 6.34 L/SEC
FEFMAX-PRED: 6.48 L/SEC
FEV1-%PRED-PRE: 84 %
FEV1-PRE: 2.04 L
FEV1FEV6-PRE: 80 %
FEV1FEV6-PRED: 81 %
FEV1FVC-PRE: 79 %
FEV1FVC-PRED: 80 %
FEV1SVC-PRE: 74 %
FEV1SVC-PRED: 72 %
FIFMAX-PRE: 5.71 L/SEC
FVC-%PRED-PRE: 85 %
FVC-PRE: 2.57 L
FVC-PRED: 3.02 L
IC-%PRED-PRE: 72 %
IC-PRE: 1.82 L
IC-PRED: 2.53 L
VA-%PRED-PRE: 86 %
VA-PRE: 4.28 L
VC-%PRED-PRE: 81 %
VC-PRE: 2.76 L
VC-PRED: 3.38 L

## 2022-04-12 ENCOUNTER — TELEPHONE (OUTPATIENT)
Dept: RHEUMATOLOGY | Facility: CLINIC | Age: 57
End: 2022-04-12
Payer: MEDICARE

## 2022-04-12 NOTE — TELEPHONE ENCOUNTER
Hospital Medicine History & Physical      PCP: Charisma Bueno MD    Date of Admission: 5/25/2019    Date of Service: Pt seen/examined on 5/25/2019 and Admitted to Inpatient with expected LOS greater than two midnights due to medical therapy. Chief Complaint:  Right facial droop, speech problem      History Of Present Illness:      [de-identified] y.o. female who presented to [facility] with above complaint  Patient was apparently normal earlier today when she had finished working outside [employer], took a nap at around 4:15 PM and woke up again at 5:15 PM.  She was talking to her  at that time when he noticed she was slurring her speech and noticed some right-sided facial droop. He immediately recognized this was probably having a TIA like her earlier episode when she was in [place] back in March. She denied any numbness tingling or weakness in the extremities, denied any palpitations lightheadedness or syncopal.  Denied any chest pain, abdominal pain, nausea vomiting or diarrhea. Regarding her TIA admission back in March in [place], the following was obtained from outpatient records from her cardiology office. \" She was in [place] in March and was hospitalized at [facility] from March 1st to the 5th. She presented with word finding difficulty and a right facial droop. She was noted to have a possible perfusion abnormality in the posterior small left frontal lobe on 2T perfusion. An MRI showed no evidence of stroke. CT angiography revealed possible high grade stenosis in the left M2 segment but only mild carotid plaque and patent vertebrals bilaterally. An echocardiogram revealed normal LV systolic function with left ventricular hypertrophy and normal atrial sizes. There was no evidence of shunt. I do not see that a bubble study was done.   Patient was also noted to have multiple episodes of paroxysmal A. fib \"  When she saw her cardiologist Dr. Carlos Dasilva on May Placed call to patient with Dr. Child's request that she repeat PFTs in 3-4 months prior to next schedule appointment with him on 8/1022. Detailed voice message left with instructions and explained that a [employer] App message was being sent with the same information.  Stephanie Urbano RN  [facility]          6, he discussed with her anticoagulation for her paroxysmal A. Fib,  she is on eliquis      Past Medical History:          Diagnosis Date    Arthritis     all over osteo-jasen. spine    Blood transfusion age 22    lead poisoning complication    Cancer ([facility]     skin-all over -multiple sites    Depression     Diabetes mellitus ([facility]     pre-diabetic    Fallen bladder     has pessary    Generalized headaches     Hyperlipidemia     Hypertension     Joint pain     Joint swelling     Meningitis spinal age 13    Migraines     Nausea & vomiting     Orbital fracture ([facility]     Colin Manzo

## 2022-04-14 DIAGNOSIS — K21.01 GASTROESOPHAGEAL REFLUX DISEASE WITH ESOPHAGITIS AND HEMORRHAGE: ICD-10-CM

## 2022-04-15 RX ORDER — ESOMEPRAZOLE MAGNESIUM 40 MG/1
40 CAPSULE, DELAYED RELEASE ORAL DAILY
Qty: 60 CAPSULE | Refills: 3 | Status: SHIPPED | OUTPATIENT
Start: 2022-04-15 | End: 2022-11-22

## 2022-04-15 NOTE — TELEPHONE ENCOUNTER
Call received from [facility], said that they had faxed a message to us on 4/11/22 requesting a refill, and letting us know that pt had been taking two pills her day of her nexium, and wanted to check on the status of this request.     Per pharmacy, pt states that she was told by another nurse at some point that she should be taking this two times per day.     Informed them that we did not receive any earlier requests, but we did receive the fax from them this morning, it just has not been processed yet. The pharmacy tech seemed very upset, wanted to know what he should tell the patient because she is completely out of her medication and was hoping to have this refilled before the weekend.     Informed pharmacy tech that patient can call our office to discuss.     Routed to [facility] MED REFILLS TEAM, [facility] RHEUMATOLOGY ADULT [facility].

## 2022-04-15 NOTE — TELEPHONE ENCOUNTER
esomeprazole (NEXIUM) 40 MG DR capsule      Last Written Prescription Date:  3/3/22  Last Fill Quantity: 60,   # refills: 2  Last Office Visit : 2/9/22  Future Office visit:  8/10/22    Routing refill request to provider for review/approval because:  Patient reports is taking 1 twice daily,sig is for daily use  Last dictation note:  In terms of her GERD it is hard to know what is going on there as well.  It may be that with her weight gain that that is the primary reason.  However if this persists it is probably worth trying her on an alternative PPI and perhaps adding an H2 blocker as well to get control of the symptoms.  Please see 3/2/22 mychart regarding change in use of Nexium.  No changes made to requested refill

## 2022-04-19 DIAGNOSIS — K21.01 GASTROESOPHAGEAL REFLUX DISEASE WITH ESOPHAGITIS AND HEMORRHAGE: ICD-10-CM

## 2022-04-19 RX ORDER — ESOMEPRAZOLE MAGNESIUM 40 MG/1
40 CAPSULE, DELAYED RELEASE ORAL 2 TIMES DAILY
Qty: 180 CAPSULE | Refills: 3 | OUTPATIENT
Start: 2022-04-19

## 2022-06-26 DIAGNOSIS — M35.00 SICCA SYNDROME (H): ICD-10-CM

## 2022-06-26 DIAGNOSIS — M34.9 SYSTEMIC SCLEROSIS (H): ICD-10-CM

## 2022-06-26 DIAGNOSIS — M77.40 METATARSALGIA, UNSPECIFIED LATERALITY: ICD-10-CM

## 2022-06-27 RX ORDER — CEVIMELINE HYDROCHLORIDE 30 MG/1
30 CAPSULE ORAL 3 TIMES DAILY
Qty: 270 CAPSULE | Refills: 2 | Status: SHIPPED | OUTPATIENT
Start: 2022-06-27 | End: 2023-09-05

## 2022-06-27 NOTE — TELEPHONE ENCOUNTER
Last Office Visit:  8/12/2021  Next Enc:  8/10/22  Medication: cevimeline (EVOXAC) 30 MG capsule   Last given: 6/27/22  Qty: 270 with 0 refill       [facility] refill protocol, approved.    Andrew Chiu, TIARRAN, RN

## 2022-07-10 DIAGNOSIS — K21.01 GASTROESOPHAGEAL REFLUX DISEASE WITH ESOPHAGITIS AND HEMORRHAGE: ICD-10-CM

## 2022-07-13 RX ORDER — FAMOTIDINE 20 MG/1
20 TABLET, FILM COATED ORAL 2 TIMES DAILY
Qty: 60 TABLET | Refills: 6 | Status: SHIPPED | OUTPATIENT
Start: 2022-07-13

## 2022-07-18 ENCOUNTER — MYC MEDICAL ADVICE (OUTPATIENT)
Dept: GASTROENTEROLOGY | Facility: CLINIC | Age: 57
End: 2022-07-18

## 2022-07-18 ENCOUNTER — TELEPHONE (OUTPATIENT)
Dept: RHEUMATOLOGY | Facility: CLINIC | Age: 57
End: 2022-07-18

## 2022-07-18 NOTE — TELEPHONE ENCOUNTER
Prior Authorization Approval    Authorization Effective Date: 1/1/2022  Authorization Expiration Date: 7/18/2023  Medication: cevimeline (EVOXAC) 30 MG capsule  Approved Dose/Quantity: 270 caps per 90 days  Reference #:     Insurance Company: [employer] - Phone [phone] Fax [phone]  Expected CoPay:       Which Pharmacy is filling the prescription (Not needed for infusion/clinic administered): [facility] #[id number] - [place], [place] - [facility] [address]  Pharmacy Notified: Yes  Patient Notified: No

## 2022-07-18 NOTE — TELEPHONE ENCOUNTER
Central Prior Authorization Team   Phone: [phone]      PA Initiation    Medication: cevimeline (EVOXAC) 30 MG capsule  Insurance Company: [employer] [facility] - Phone [phone] Fax [phone]  Pharmacy Filling the Rx: [facility] #[id number] [place], [place] - [address]  Filling Pharmacy Phone: [phone]  Filling Pharmacy Fax:    Start Date: 7/18/2022

## 2022-08-18 ENCOUNTER — OFFICE VISIT (OUTPATIENT)
Dept: PULMONOLOGY | Facility: CLINIC | Age: 57
End: 2022-08-18
Attending: INTERNAL MEDICINE
Payer: MEDICARE

## 2022-08-18 VITALS
SYSTOLIC BLOOD PRESSURE: 123 MMHG | WEIGHT: 155.3 LBS | HEART RATE: 65 BPM | OXYGEN SATURATION: 100 % | BODY MASS INDEX: 25.84 KG/M2 | DIASTOLIC BLOOD PRESSURE: 71 MMHG

## 2022-08-18 DIAGNOSIS — M34.9 SYSTEMIC SCLEROSIS (H): Primary | ICD-10-CM

## 2022-08-18 DIAGNOSIS — E05.00 GRAVES DISEASE: ICD-10-CM

## 2022-08-18 DIAGNOSIS — R06.09 DOE (DYSPNEA ON EXERTION): ICD-10-CM

## 2022-08-18 DIAGNOSIS — I73.01 RAYNAUD'S DISEASE WITH GANGRENE (H): ICD-10-CM

## 2022-08-18 DIAGNOSIS — K22.4 ESOPHAGEAL DYSMOTILITY: ICD-10-CM

## 2022-08-18 PROCEDURE — G0463 HOSPITAL OUTPT CLINIC VISIT: HCPCS

## 2022-08-18 PROCEDURE — 99214 OFFICE O/P EST MOD 30 MIN: CPT | Mod: GC | Performed by: INTERNAL MEDICINE

## 2022-08-18 NOTE — NURSING NOTE
Chief Complaint   Patient presents with     Follow Up     F/u     Vitals were taken and medications were reconciled.     RENATA Crisostomo

## 2022-08-18 NOTE — LETTER
2022         RE: Willard Grayson  [address]  [facility] [address]        Dear Colleague,    Thank you for referring your patient, Willard Grayson, to the [facility] [place]. Please see a copy of my visit note below.    [facility]  Rheumatology Clinic  Brennen Child MD  2022     Name: Willard Grayson  MRN: [id number]  Age: 54 year old  : 1965  Referring provider: Referred Self    Problem List:  1. 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   2. Associated marked neuropathic skin pain markedly improved with Lyrica with prior medication therapy with gabapentin.  3. Diffuse musculoskeletal pain requiring methotrexate plus CellCept, and narcotic to control, but also improved. Now off methotrexate due to GI issues.  4. Initial clinical overlap with anti-CCP seropositive rheumatoid arthritis with continued anti-CCP seropositivity as of 2010.  5. Raynaud's phenomena with digital ulcers with easily cracked skin.   6. GI involvement consistent with bacterial small bowel overgrowth with marked improvement after a course of neomycin followed by Dr. Glez; positive hydrogen breath test in 3/2012 - treated with rifaximin through Dr. Jacob  7. No convincing evidence of lung involvement on serial pulmonary function tests or clinically.   8. History of heart palpitations prior to the onset of SSc with stable symptoms since. Holter 2018 with PACs.  9. History of medication failures with Remicade, methotrexate, methotrexate plus CellCept and with Orencia and with improvement since initiation of IVIG but with complicating headaches and overall sense of malaise with IVIG infusions.   10. History of  silicone breast implants.   11. Incidental finding of calcified splenic artery aneurysm.  12. EGD in 3/2012 with hiatal hernia and gastral antral vascular ectasia  13. Recurrent right third finger contracture with extensor surface ulceration, healed   14. Trochanteric bursitis, R>L    Assessment and Plan:   # Systemic sclerosis   # Gastroesophageal reflux disease, esophagitis presence not specified  # Esophageal dysmotility  # Raynaud's disease with gangrene  # High risk medications (not anticoagulants) long-term use  # Rheumatoid arthritis involving multiple sites with positive rheumatoid factor (H)  # Metatarsalgia, unspecified laterality  # Metatarsalgia of both feet  # Osteopenia, unspecified location  # Systemic sclerosis  # MCDANIEL (dyspnea on exertion)  # Chest wall pain  # Skin lesion of lower extremity    Plan/recommendation:    -Patient is currently not on any systemic medications for scleroderma.  -Her last PFT done which showed worsening of FVC and DLCO is concerning in the setting of worsening shortness of breath.  We will repeat another PFT and a 6-minute walk test for further evaluation.  If worsening PFTs, will consider starting on CellCept.  -Discussed quitting smoking.  -Her symptomsgetting stuck in the back of the throat is likely due to oral dryness.  Recommended drinking lemon juice and water prior to taking medications.  Also recommended using cevilimine 1 hour prior to taking food.  -Discussed regarding her ongoing management of Graves' disease.  She is okay to receive radioactive ablation therapy from rheumatology standpoint.    Bereket Peterson,  Rheumatology Fellow,  Pager: [phone].    I saw the patient with the fellow.  My exam and recomendations are as described.      Brennen Child MD        August 20, 2020 interval history:    Patient comes in today with him main concern being some new skin lesions that she has noted over her left leg and left arm over the last few months.  She  does have a dermatology appointment within the [facility] system where she gets most of her care.  That is not however until October.    The other new issue this been bothering her a fair amount is neck pain and headaches.  She did get a cervical spine film just recently at [facility] and has not yet reviewed that with anyone there.    In terms of her scleroderma skin involvement she really feels that has been quite stable.  We have repeatedly tried to get her on methotrexate but she is tolerated that poorly but she has tolerated MMF better and remains on it.  The main problem for her has been the contractures in her hands and those have gradually worsened particularly in her right hand.    She is largely sheltering in place because of coronavirus, but she is still smoking cigarettes.  If anything she has increased doing so because of feeling under stress.      January 14, 2021 interval history:    Since we last seen the patient she feels she is really been doing pretty stably.  She went off of MMF because of development of a squamous cell carcinoma in the understanding that MMF could have been contributing to impaired tumor Amino-Cerv balance.  She really does not think she is gotten worse that since going off of it.  That was back on November 12.  The SCCA was on her forearm and is healed nicely.  She thinks that perhaps the skin in the area was a little more shiny than surrounding skin but there were no other difficulties with the healing.    She does acknowledge that over the last year or 2 her fingers to develop worsening contractures and she has difficulty writing.  Nonetheless she has tolerated methotrexate poorly and its not clear how much it helped her when she was on it.  She does acknowledge that she does not do stretching exercises as reliably as she might.    She has had a few digital ulcers but they have been healing more quickly than usual.  She is not certain why but she does note that she is no longer taking  care of her grandchildren so she is able to rest her hands and protect them better.    She has established with the pain clinic at [facility] and that they have taken over the prescribing of her narcotics and are working with her on a more comprehensive pain program.  She is really actually quite happy about that.    She continues to get some esophageal spasms at times but oddly this seems only to happen with the drinking of liquids.  Its not bad enough that she wants to do anything more about it.  We did talk about the possible value of all toyed minutes however.    The other new thing that she has had is some dizziness and tinnitus developing in her left ear much greater than right.  She is seeking care with an otolaryngologist at [facility] where most of her care is delivered however.    She is otherwise been pretty good.  She had a little bit of a flareup last week where she felt weaker and more achy and more sluggish.  She has not had a clear-cut coronavirus exposure to her knowledge.      August 12, 2021 interval history:    Since we have last seen the patient she feels she is really been doing very well.  She has been seeing the [facility] pain clinic and was having some difficulties that seemed unusual to her.  Ultimately after a lot of jitters and changes in her pain situation she was worked up and diagnosed with Graves' disease and is now being treated with methimazole.    She also was seen in the breast clinic where there was some evidence of fluid around her implants raising the question of whether there had been a rupture of them.  There was associated pain.  The fluid was removed and she has been feeling better since then.    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  That was removed and the wound has healed up very well.    Interestingly, since she has had Botox injections she has not had any digital ulcers including over her right contractured PIP joints.  Although she really did not  like the amount of pain she got with the Botox she is pretty happy with not having had any ulcers.    Potentially contributing to that improvement however is that she is not been smoking at all for the last 4 months.  Her breathing feels better to her as well and overall her breathing feels stable to her.  She has not had pulmonary function test for quite some time and is a bit overdue for those, but she is never had evidence of significant lung involvement.    Although she thinks her skin is somewhat sensitive she does not believe it is worsening at all and she believes is completely stable and her joints feel stable to her as well and her not bothering her significantly.    February 9, 2022 interval history:    Since we have last seen the patient she really has not felt well.  She thinks this is primarily since she is received her second coronavirus shot in the fall.  She however also had autoimmune thyroiditis diagnosed in April and has been placed on methimazole and does not think she is felt quite as well since then either.  She of course became euthyroid after being hyperthyroid and has gained weight with it.    Among the thing she has been noticing more recently is virtually constant heartburn despite being on omeprazole 40 mg twice daily.    She however also notices more widespread pain.  This includes the small joints of her hands and feet but she denies significant morning stiffness there.    She has not been on any disease modifying drugs now for quite some time including Plaquenil.  She does believe that her skin has been stable throughout all this and not worsening.    She does have a lot of fatigue, and as part of recent laboratory work-up she had iron levels done that were on the low side of normal but she was anemic and her ferritin was low.  There were no other inflammatory markers so the ferritin could not be put fully in context.  She has done well in the past after being given IV iron.    She is  not exercising regularly but does not think she is fundamentally more short of breath.  Her last PFTs were 2 years ago but these were completely stable with forced vital capacity 102% of predicted.  DLCO on those studies was at 84% of predicted also stable.  She does continue to smoke though she says very little.    August 18, 2022 interval history:    Since last clinic visit,     Patient reports having worsening swallowing issues with pills getting stuck in the back of the throat and having difficulty drinking water occasionally.  Points to the lower portion of the neck and states that it feels like things are getting stuck there.  Denies any choking or sensation denies any issues with consuming food.  Reports that she does use some vitamin once a day in the morning.    Reports consistent fatigue and states that she is being evaluated by endocrinology for radioactive ablation vs surgery for her Graves' disease.  Reports that her thyroid issues has been causing weight changes, fatigue, palpitations and sleep issues.    Reports that her shortness of breath has been moderately worse since last clinic visit.  In the last PFTs, she had significant worsening of her FVC and DLCO.  However, at that time patient reported that the test was not done properly.     She continues to smoke 1 pack/day.    She states that she is not much physically active.    Review of Systems:   Pertinent items are noted in HPI or as below, remainder of complete ROS is negative.      No recent problems with hearing or vision. No swallowing problems.   No breathing difficulty, shortness of breath, coughing, or wheezing  No chest pain or palpitations  No heart burn, indigestion, abdominal pain, nausea, vomiting, diarrhea  No urination problems, no bloody, cloudy urine, no dysuria  No numbing, tingling, weakness  No headaches or confusion  No rashes. No easy bleeding or bruising.   Answers for HPI/ROS submitted by the patient on 11/21/2019   General  Symptoms: Yes  Skin Symptoms: Yes  HENT Symptoms: Yes  EYE SYMPTOMS: Yes  HEART SYMPTOMS: Yes  LUNG SYMPTOMS: Yes  INTESTINAL SYMPTOMS: Yes  URINARY SYMPTOMS: No  GYNECOLOGIC SYMPTOMS: No  BREAST SYMPTOMS: No  SKELETAL SYMPTOMS: Yes  BLOOD SYMPTOMS: No  NERVOUS SYSTEM SYMPTOMS: Yes  MENTAL HEALTH SYMPTOMS: Yes  Fever: No  Loss of appetite: No  Weight loss: No  Weight gain: Yes  Fatigue: Yes  Night sweats: Yes  Chills: No  Excessive thirst: Yes  Feeling hot or cold when others believe the temperature is normal: Yes  Loss of height: No  Post-operative complications: No  Surgical site pain: No  Hallucinations: No  Change in or Loss of Energy: No  Changes in hair: No  Changes in moles/birth marks: Yes  Itching: Yes  Rashes: No  Changes in nails: No  Acne: No  Hair in places you don't want it: No  Ear pain: No  Ear discharge: No  Hearing loss: Yes  Tinnitus: Yes  Nosebleeds: No  Congestion: No  Sinus pain: No  Tooth pain: No  Gum tenderness: No  Bleeding gums: No  Change in taste: No  Change in sense of smell: No  Dry mouth: Yes  Hearing aid used: No  Eye pain: No  Vision loss: Yes  Dry eyes: Yes  Watery eyes: Yes  Eye bulging: No  Double vision: No  Flashing of lights: No  Cough: No  Sputum or phlegm: No  Coughing up blood: No  Difficulty breating or shortness of breath: No  Snoring: No  Wheezing: No  Difficulty breathing on exertion: Yes  Chest pain or pressure: No  Fast or irregular heartbeat: No  Pain in legs with walking: Yes  Trouble breathing while lying down: No  Fingers or toes appear blue: Yes  High blood pressure: Yes  Low blood pressure: No  Fainting: No  Murmurs: No  Pacemaker: No  Varicose veins: No  Edema or swelling: No  Wake up at night with shortness of breath: No  Light-headedness: No  Heart burn or indigestion: Yes  Nausea: No  Vomiting: No  Abdominal pain: No  Bloating: Yes  Constipation: Yes  Diarrhea: No  Back pain: Yes  Muscle aches: Yes  Neck pain: Yes  Swollen joints: No  Joint pain: Yes  Bone  pain: No  Muscle cramps: No  Trouble with coordination: No  Dizziness or trouble with balance: No  Fainting or black-out spells: No  Memory loss: No  Headache: Yes  Seizures: No  Speech problems: No  Nervous or Anxious: No  Depression: Yes  Trouble sleeping: No  Trouble thinking or concentrating: No  Mood changes: No  Panic attacks: No      Active Medications:     Current Outpatient Medications:      aspirin-dipyridamole (AGGRENOX)  MG per 12 hr capsule, Take 1 capsule by mouth daily , Disp: , Rfl:      BIOTIN PO, 1 tablet daily, Disp: , Rfl:      Blood Pressure Monitoring KIT, Check BP every 7 days., Disp: 1 kit, Rfl: 0     carboxymethylcellulose PF (REFRESH PLUS) 0.5 % ophthalmic solution, Place 1 drop into both eyes 4 times daily as needed for dry eyes, Disp: 120 mL, Rfl: 11     cevimeline (EVOXAC) 30 MG capsule, Take 1 capsule (30 mg) by mouth 3 times daily, Disp: 270 capsule, Rfl: 2     cyanocobalamin (VITAMIN B12) 1000 MCG/ML injection, Inject 1 mL into the muscle every 30 days, Disp: , Rfl:      esomeprazole (NEXIUM) 40 MG capsule, Take 1 capsule (40 mg) by mouth 2 times daily Take 30-60 minutes before eating. (Patient not taking: Reported on 8/12/2021), Disp: 180 capsule, Rfl: 3     esomeprazole (NEXIUM) 40 MG DR capsule, Take 1 capsule (40 mg) by mouth daily Take 30-60 minutes before eating., Disp: 60 capsule, Rfl: 3     famotidine (PEPCID) 20 MG tablet, Take 1 tablet (20 mg) by mouth 2 times daily., Disp: 60 tablet, Rfl: 6     folic acid (FOLVITE) 1 MG tablet, Take 1 tablet (1 mg) by mouth daily, Disp: 90 tablet, Rfl: 3     hydroxychloroquine (PLAQUENIL) 200 MG tablet, Take 1 tablet (200 mg) by mouth 2 times daily Annual Plaquenil toxicity eye screening required. (Patient not taking: Reported on 8/12/2021), Disp: 180 tablet, Rfl: 3     leucovorin (WELLCOVORIN) 25 MG tablet, Take 1 tablet (25 mg) by mouth daily Day after taking methotrexate (Patient not taking: Reported on 8/12/2021), Disp: 12 tablet,  "Rfl: 3     LORazepam (ATIVAN) 0.5 MG tablet, Take 1 - 2 tablets by mouth three times daily as needed., Disp: 60 tablet, Rfl: 0     naloxone (EVZIO) 0.4 MG/0.4ML auto-injector, Inject 0.4 mLs (0.4 mg) into the muscle as needed for opioid reversal every 2-3 minutes until assistance arrives (Patient not taking: Reported on 8/12/2021), Disp: 0.8 mL, Rfl: 0     olopatadine (PATADAY) 0.2 % ophthalmic solution, Place 1 drop into both eyes daily (Patient not taking: Reported on 2/9/2022), Disp: 1 Bottle, Rfl: 11     olopatadine (PATANOL) 0.1 % ophthalmic solution, Place 1 drop into both eyes 2 times daily (Patient not taking: Reported on 8/12/2021), Disp: 5 mL, Rfl: 3     omeprazole (PRILOSEC) 40 MG DR capsule, Take 1 capsule (40 mg) by mouth 2 times daily, Disp: 180 capsule, Rfl: 3     ondansetron (ZOFRAN-ODT) 4 MG ODT tab, DISSOLVE 1 TABLET IN MOUTH EVERY EIGHT HOURS AS NEEDED FOR NAUSEA, Disp: 20 tablet, Rfl: 0     oxyCODONE (ROXICODONE) 5 MG tablet, Take 1 tablet (5 mg) by mouth every four hours as needed for moderate to severe pain.  Maximum of 6 tablets per day.  (Patient not taking: Reported on 8/12/2021), Disp: 180 tablet, Rfl: 0     oxyCODONE-acetaminophen (PERCOCET) 5-325 MG tablet, Take 1 tablet by mouth every 4 hours as needed for moderate pain or severe pain. Maximum of 3 tablets per day., Disp: 90 tablet, Rfl: 0     pravastatin (PRAVACHOL) 10 MG tablet, Take 1 tablet (10 mg) by mouth daily, Disp: 30 tablet, Rfl: 5     pregabalin (LYRICA) 75 MG capsule, Take 1 capsule (75 mg) by mouth 2 times daily, Disp: 180 capsule, Rfl: 1     Syringe/Needle, Disp, 27G X 1/2\" 1 ML MISC, Used with Methotrexate once weekly (Patient not taking: Reported on 8/12/2021), Disp: 12 each, Rfl: 3     valACYclovir (VALTREX) 500 MG tablet, Take 1 tablet (500 mg) by mouth 2 times daily (Patient taking differently: Take 500 mg by mouth daily ), Disp: 180 tablet, Rfl: 1     Vitamin D, Cholecalciferol, 1000 UNITS TABS, Take 2,000 Units by " mouth daily , Disp: , Rfl:       Allergies:   Penicillin   Bactroban   Levaquin  Rifampin   Clarithromycin   Ofloxacin  Sertraline                                                  Past Medical History:  Rheumatoid arthritis/scleroderma  Gastric antral vascular ectasia  Gastroesophageal reflux disease  Iron deficiency anemia  Irregular heart beat  Osteomyelitis, right 3rd finger  Raynaud's syndrome with gangrene  Scleroderma  Sjogren's syndrome  Systemic sclerosis  Metatarsalgia  Dysuria  Ulcer of finger  Sicca syndrome  Palpitations  Ischemia of upper extremity  Intercostal pain  Anxiety  Breast implant rupture  Palpitations     Past Surgical History:  Appendectomy   Colonoscopy (08/04/2016)  EGD x 5 (08/22/14 - 08/02/2016)  Mammoplasty Augmentation Bilateral     Family History:    The patient's family history includes Cancer in an other family member; Respiratory in her father.     Social History:  The patient reports that she is a current cigarette smoker. She has been smoking about 1.00 pack per day. She has never used smokeless tobacco. She reports that she does not drink alcohol or use drugs. She states she is attempting to get into a research program for quitting smoking.   PCP: Lindy Carbone  Marital Status: Single     Physical Exam:   /71   Pulse 65   Wt 70.4 kg (155 lb 4.8 oz)   SpO2 100%   BMI 25.84 kg/m     Wt Readings from Last 4 Encounters:   08/18/22 70.4 kg (155 lb 4.8 oz)   02/09/22 69.6 kg (153 lb 6.4 oz)   08/12/21 68 kg (150 lb)   08/20/20 65 kg (143 lb 3.2 oz)     Constitutional: Well-developed, appearing stated age; cooperative  Eyes: Normal EOM, PERRLA, vision, conjunctiva, sclera  ENT: Normal external ears, nose, hearing, lips, teeth, gums, throat. No mucous membrane lesions, normal saliva pool  Neck: No mass or thyroid enlargement  Resp: Lungs clear to auscultation, nl to palpation  CV: RRR, no murmurs, rubs or gallops, no edema  GI: No ABD mass or tenderness, no  HSM  : Not tested  Lymph: No cervical, supraclavicular, inguinal or epitrochlear nodes  MS: The TMJ, neck, shoulder, elbow, wrist, MCP/PIP/DIP, spine, hip, knee, ankle, and foot MTP/IP joints were examined and found normal. No active synovitis or altered joint anatomy. Full joint ROM. Normal  strength. No dactylitis,  tenosynovitis, enthesopathy.  Proximally 15   contracture in PIPs and 5-10   in DIPs of the left hand. Right hand greater than 90   contractures in PIPs 3-5. Obvious calcinosis lesion over the lateral aspect of the right 4th PIP.  Contractures stable as per the patient.    Skin: No nail pitting, alopecia, rash, nodules or lesions  Skin thickening in hands and distal forearms.  She has no digital ulcers now.  Neuro: Normal cranial nerves, strength, sensation, DTRs.   Psych: Normal judgement, orientation, memory, affect.     Imaging:  HRCT without contrast (08/22/2019):  Impression:  1. Mild bilateral posterior subpleural reticular opacities, dependent atelectasis versus early ILD. Consider prone positioning on follow-up  study.  2. Trace mucous plugging in the right middle lobe.  3. Stable incidental calcified splenic artery aneurysm.  4. Collapsed left breast implant and calcified right implant.  Sinai Carter MD    PFT Results (07/22/2019):  The FVC and FEV1 and the FEV1/FVC ratio are within normal limits. The inspiratory flow rates are within normal limits. Lung volumes are within normal limits. The diffusing capacity is normal. However, the diffusing capacity was not corrected for the   patient's hemoglobin  Impression:  Normal spirometry, lung volumes and diffusing capacity.    Laboratory:   Component      Latest Ref Rng & Units 8/15/2019 8/15/2019 8/15/2019           3:18 PM  3:24 PM  3:24 PM   WBC      4.0 - 11.0 10e9/L  5.2    RBC Count      3.8 - 5.2 10e12/L  3.71 (L)    Hemoglobin      11.7 - 15.7 g/dL  11.4 (L)    Hematocrit      35.0 - 47.0 %  37.1    MCV      78 - 100 fl  100     MCH      26.5 - 33.0 pg  30.7    MCHC      31.5 - 36.5 g/dL  30.7 (L)    RDW      10.0 - 15.0 %  13.2    Platelet Count      150 - 450 10e9/L  212    Diff Method        Automated Method    % Neutrophils      %  48.8    % Lymphocytes      %  40.5    % Monocytes      %  9.0    % Eosinophils      %  1.3    % Basophils      %  0.2    % Immature Granulocytes      %  0.2    Nucleated RBCs      0 /100  0    Absolute Neutrophil      1.6 - 8.3 10e9/L  2.6    Absolute Lymphocytes      0.8 - 5.3 10e9/L  2.1    Absolute Monocytes      0.0 - 1.3 10e9/L  0.5    Absolute Eosinophils      0.0 - 0.7 10e9/L  0.1    Absolute Basophils      0.0 - 0.2 10e9/L  0.0    Abs Immature Granulocytes      0 - 0.4 10e9/L  0.0    Absolute Nucleated RBC        0.0    Color Urine       Colorless     Appearance Urine       Clear     Glucose Urine      NEG:Negative mg/dL Negative     Bilirubin Urine      NEG:Negative Negative     Ketones Urine      NEG:Negative mg/dL Negative     Specific Gravity Urine      1.003 - 1.035 1.002 (L)     Blood Urine      NEG:Negative Small (A)     pH Urine      5.0 - 7.0 pH 6.0     Protein Albumin Urine      NEG:Negative mg/dL Negative     Urobilinogen mg/dL      0.0 - 2.0 mg/dL 0.0     Nitrite Urine      NEG:Negative Negative     Leukocyte Esterase Urine      NEG:Negative Negative     Source       Midstream Urine     WBC Urine      0 - 5 /HPF 1     RBC Urine      0 - 2 /HPF 1     Squamous Epithelial /HPF Urine      0 - 1 /HPF <1     Albumin      3.4 - 5.0 g/dL   4.3     Component      Latest Ref Rng & Units 8/15/2019 8/15/2019 8/15/2019 8/15/2019           3:24 PM  3:24 PM  3:24 PM  3:24 PM   Creatinine      0.52 - 1.04 mg/dL  0.78     GFR Estimate      >60 mL/min/1.73:m2  85     GFR Estimate If Black      >60 mL/min/1.73:m2  >90     Sed Rate      0 - 30 mm/h       CRP Inflammation      0.0 - 8.0 mg/L <2.9      AST      0 - 45 U/L   15    ALT      0 - 50 U/L    18   Albumin      3.4 - 5.0 g/dL         Component       Latest Ref Rng & Units 8/15/2019           3:24 PM   Sed Rate      0 - 30 mm/h 16       Again, thank you for allowing me to participate in the care of your patient.        Sincerely,        Brennen Child MD

## 2022-08-19 NOTE — PROGRESS NOTES
[facility]  Rheumatology Clinic  Brennen Child MD  2022     Name: Willard Grayson  MRN: [id number]  Age: 54 year old  : 1965  Referring provider: Referred Self    Problem List:  1. 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   2. Associated marked neuropathic skin pain markedly improved with Lyrica with prior medication therapy with gabapentin.  3. Diffuse musculoskeletal pain requiring methotrexate plus CellCept, and narcotic to control, but also improved. Now off methotrexate due to GI issues.  4. Initial clinical overlap with anti-CCP seropositive rheumatoid arthritis with continued anti-CCP seropositivity as of 2010.  5. Raynaud's phenomena with digital ulcers with easily cracked skin.   6. GI involvement consistent with bacterial small bowel overgrowth with marked improvement after a course of neomycin followed by Dr. Glez; positive hydrogen breath test in 3/2012 - treated with rifaximin through Dr. Jacob  7. No convincing evidence of lung involvement on serial pulmonary function tests or clinically.   8. History of heart palpitations prior to the onset of SSc with stable symptoms since. Holter 2018 with PACs.  9. History of medication failures with Remicade, methotrexate, methotrexate plus CellCept and with Orencia and with improvement since initiation of IVIG but with complicating headaches and overall sense of malaise with IVIG infusions.   10. History of silicone breast implants.   11. Incidental finding of calcified splenic artery aneurysm.  12. EGD in 3/2012 with hiatal hernia and gastral antral vascular ectasia  13. Recurrent right third finger contracture with extensor surface ulceration, healed   14. Trochanteric bursitis, R>L    Assessment and Plan:   #  Systemic sclerosis   # Gastroesophageal reflux disease, esophagitis presence not specified  # Esophageal dysmotility  # Raynaud's disease with gangrene  # High risk medications (not anticoagulants) long-term use  # Rheumatoid arthritis involving multiple sites with positive rheumatoid factor (H)  # Metatarsalgia, unspecified laterality  # Metatarsalgia of both feet  # Osteopenia, unspecified location  # Systemic sclerosis  # MCDANIEL (dyspnea on exertion)  # Chest wall pain  # Skin lesion of lower extremity    Plan/recommendation:    -Patient is currently not on any systemic medications for scleroderma.  -Her last PFT done which showed worsening of FVC and DLCO is concerning in the setting of worsening shortness of breath.  We will repeat another PFT and a 6-minute walk test for further evaluation.  If worsening PFTs, will consider starting on CellCept.  -Discussed quitting smoking.  -Her symptomsgetting stuck in the back of the throat is likely due to oral dryness.  Recommended drinking lemon juice and water prior to taking medications.  Also recommended using cevilimine 1 hour prior to taking food.  -Discussed regarding her ongoing management of Graves' disease.  She is okay to receive radioactive ablation therapy from rheumatology standpoint.    Bereket Peterson,  Rheumatology Fellow,  Pager: [phone].    I saw the patient with the fellow.  My exam and recomendations are as described.      Brennen Child MD        August 20, 2020 interval history:    Patient comes in today with him main concern being some new skin lesions that she has noted over her left leg and left arm over the last few months.  She does have a dermatology appointment within the [facility] system where she gets most of her care.  That is not however until October.    The other new issue this been bothering her a fair amount is neck pain and headaches.  She did get a cervical spine film just recently at [facility] and has not yet reviewed that with  anyone there.    In terms of her scleroderma skin involvement she really feels that has been quite stable.  We have repeatedly tried to get her on methotrexate but she is tolerated that poorly but she has tolerated MMF better and remains on it.  The main problem for her has been the contractures in her hands and those have gradually worsened particularly in her right hand.    She is largely sheltering in place because of coronavirus, but she is still smoking cigarettes.  If anything she has increased doing so because of feeling under stress.      January 14, 2021 interval history:    Since we last seen the patient she feels she is really been doing pretty stably.  She went off of MMF because of development of a squamous cell carcinoma in the understanding that MMF could have been contributing to impaired tumor Amino-Cerv balance.  She really does not think she is gotten worse that since going off of it.  That was back on November 12.  The SCCA was on her forearm and is healed nicely.  She thinks that perhaps the skin in the area was a little more shiny than surrounding skin but there were no other difficulties with the healing.    She does acknowledge that over the last year or 2 her fingers to develop worsening contractures and she has difficulty writing.  Nonetheless she has tolerated methotrexate poorly and its not clear how much it helped her when she was on it.  She does acknowledge that she does not do stretching exercises as reliably as she might.    She has had a few digital ulcers but they have been healing more quickly than usual.  She is not certain why but she does note that she is no longer taking care of her grandchildren so she is able to rest her hands and protect them better.    She has established with the pain clinic at [facility] and that they have taken over the prescribing of her narcotics and are working with her on a more comprehensive pain program.  She is really actually quite happy about  that.    She continues to get some esophageal spasms at times but oddly this seems only to happen with the drinking of liquids.  Its not bad enough that she wants to do anything more about it.  We did talk about the possible value of all toyed minutes however.    The other new thing that she has had is some dizziness and tinnitus developing in her left ear much greater than right.  She is seeking care with an otolaryngologist at [facility] where most of her care is delivered however.    She is otherwise been pretty good.  She had a little bit of a flareup last week where she felt weaker and more achy and more sluggish.  She has not had a clear-cut coronavirus exposure to her knowledge.      August 12, 2021 interval history:    Since we have last seen the patient she feels she is really been doing very well.  She has been seeing the [facility] pain clinic and was having some difficulties that seemed unusual to her.  Ultimately after a lot of jitters and changes in her pain situation she was worked up and diagnosed with Graves' disease and is now being treated with methimazole.    She also was seen in the breast clinic where there was some evidence of fluid around her implants raising the question of whether there had been a rupture of them.  There was associated pain.  The fluid was removed and she has been feeling better since then.    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  That was removed and the wound has healed up very well.    Interestingly, since she has had Botox injections she has not had any digital ulcers including over her right contractured PIP joints.  Although she really did not like the amount of pain she got with the Botox she is pretty happy with not having had any ulcers.    Potentially contributing to that improvement however is that she is not been smoking at all for the last 4 months.  Her breathing feels better to her as well and overall her breathing feels stable to her.   She has not had pulmonary function test for quite some time and is a bit overdue for those, but she is never had evidence of significant lung involvement.    Although she thinks her skin is somewhat sensitive she does not believe it is worsening at all and she believes is completely stable and her joints feel stable to her as well and her not bothering her significantly.    February 9, 2022 interval history:    Since we have last seen the patient she really has not felt well.  She thinks this is primarily since she is received her second coronavirus shot in the fall.  She however also had autoimmune thyroiditis diagnosed in April and has been placed on methimazole and does not think she is felt quite as well since then either.  She of course became euthyroid after being hyperthyroid and has gained weight with it.    Among the thing she has been noticing more recently is virtually constant heartburn despite being on omeprazole 40 mg twice daily.    She however also notices more widespread pain.  This includes the small joints of her hands and feet but she denies significant morning stiffness there.    She has not been on any disease modifying drugs now for quite some time including Plaquenil.  She does believe that her skin has been stable throughout all this and not worsening.    She does have a lot of fatigue, and as part of recent laboratory work-up she had iron levels done that were on the low side of normal but she was anemic and her ferritin was low.  There were no other inflammatory markers so the ferritin could not be put fully in context.  She has done well in the past after being given IV iron.    She is not exercising regularly but does not think she is fundamentally more short of breath.  Her last PFTs were 2 years ago but these were completely stable with forced vital capacity 102% of predicted.  DLCO on those studies was at 84% of predicted also stable.  She does continue to smoke though she says very  jules.    August 18, 2022 interval history:    Since last clinic visit,     Patient reports having worsening swallowing issues with pills getting stuck in the back of the throat and having difficulty drinking water occasionally.  Points to the lower portion of the neck and states that it feels like things are getting stuck there.  Denies any choking or sensation denies any issues with consuming food.  Reports that she does use some vitamin once a day in the morning.    Reports consistent fatigue and states that she is being evaluated by endocrinology for radioactive ablation vs surgery for her Graves' disease.  Reports that her thyroid issues has been causing weight changes, fatigue, palpitations and sleep issues.    Reports that her shortness of breath has been moderately worse since last clinic visit.  In the last PFTs, she had significant worsening of her FVC and DLCO.  However, at that time patient reported that the test was not done properly.     She continues to smoke 1 pack/day.    She states that she is not much physically active.    Review of Systems:   Pertinent items are noted in HPI or as below, remainder of complete ROS is negative.      No recent problems with hearing or vision. No swallowing problems.   No breathing difficulty, shortness of breath, coughing, or wheezing  No chest pain or palpitations  No heart burn, indigestion, abdominal pain, nausea, vomiting, diarrhea  No urination problems, no bloody, cloudy urine, no dysuria  No numbing, tingling, weakness  No headaches or confusion  No rashes. No easy bleeding or bruising.   Answers for HPI/ROS submitted by the patient on 11/21/2019   General Symptoms: Yes  Skin Symptoms: Yes  HENT Symptoms: Yes  EYE SYMPTOMS: Yes  HEART SYMPTOMS: Yes  LUNG SYMPTOMS: Yes  INTESTINAL SYMPTOMS: Yes  URINARY SYMPTOMS: No  GYNECOLOGIC SYMPTOMS: No  BREAST SYMPTOMS: No  SKELETAL SYMPTOMS: Yes  BLOOD SYMPTOMS: No  NERVOUS SYSTEM SYMPTOMS: Yes  MENTAL HEALTH SYMPTOMS:  Yes  Fever: No  Loss of appetite: No  Weight loss: No  Weight gain: Yes  Fatigue: Yes  Night sweats: Yes  Chills: No  Excessive thirst: Yes  Feeling hot or cold when others believe the temperature is normal: Yes  Loss of height: No  Post-operative complications: No  Surgical site pain: No  Hallucinations: No  Change in or Loss of Energy: No  Changes in hair: No  Changes in moles/birth marks: Yes  Itching: Yes  Rashes: No  Changes in nails: No  Acne: No  Hair in places you don't want it: No  Ear pain: No  Ear discharge: No  Hearing loss: Yes  Tinnitus: Yes  Nosebleeds: No  Congestion: No  Sinus pain: No  Tooth pain: No  Gum tenderness: No  Bleeding gums: No  Change in taste: No  Change in sense of smell: No  Dry mouth: Yes  Hearing aid used: No  Eye pain: No  Vision loss: Yes  Dry eyes: Yes  Watery eyes: Yes  Eye bulging: No  Double vision: No  Flashing of lights: No  Cough: No  Sputum or phlegm: No  Coughing up blood: No  Difficulty breating or shortness of breath: No  Snoring: No  Wheezing: No  Difficulty breathing on exertion: Yes  Chest pain or pressure: No  Fast or irregular heartbeat: No  Pain in legs with walking: Yes  Trouble breathing while lying down: No  Fingers or toes appear blue: Yes  High blood pressure: Yes  Low blood pressure: No  Fainting: No  Murmurs: No  Pacemaker: No  Varicose veins: No  Edema or swelling: No  Wake up at night with shortness of breath: No  Light-headedness: No  Heart burn or indigestion: Yes  Nausea: No  Vomiting: No  Abdominal pain: No  Bloating: Yes  Constipation: Yes  Diarrhea: No  Back pain: Yes  Muscle aches: Yes  Neck pain: Yes  Swollen joints: No  Joint pain: Yes  Bone pain: No  Muscle cramps: No  Trouble with coordination: No  Dizziness or trouble with balance: No  Fainting or black-out spells: No  Memory loss: No  Headache: Yes  Seizures: No  Speech problems: No  Nervous or Anxious: No  Depression: Yes  Trouble sleeping: No  Trouble thinking or concentrating: No  Mood  changes: No  Panic attacks: No      Active Medications:     Current Outpatient Medications:      aspirin-dipyridamole (AGGRENOX)  MG per 12 hr capsule, Take 1 capsule by mouth daily , Disp: , Rfl:      BIOTIN PO, 1 tablet daily, Disp: , Rfl:      Blood Pressure Monitoring KIT, Check BP every 7 days., Disp: 1 kit, Rfl: 0     carboxymethylcellulose PF (REFRESH PLUS) 0.5 % ophthalmic solution, Place 1 drop into both eyes 4 times daily as needed for dry eyes, Disp: 120 mL, Rfl: 11     cevimeline (EVOXAC) 30 MG capsule, Take 1 capsule (30 mg) by mouth 3 times daily, Disp: 270 capsule, Rfl: 2     cyanocobalamin (VITAMIN B12) 1000 MCG/ML injection, Inject 1 mL into the muscle every 30 days, Disp: , Rfl:      esomeprazole (NEXIUM) 40 MG capsule, Take 1 capsule (40 mg) by mouth 2 times daily Take 30-60 minutes before eating. (Patient not taking: Reported on 8/12/2021), Disp: 180 capsule, Rfl: 3     esomeprazole (NEXIUM) 40 MG DR capsule, Take 1 capsule (40 mg) by mouth daily Take 30-60 minutes before eating., Disp: 60 capsule, Rfl: 3     famotidine (PEPCID) 20 MG tablet, Take 1 tablet (20 mg) by mouth 2 times daily., Disp: 60 tablet, Rfl: 6     folic acid (FOLVITE) 1 MG tablet, Take 1 tablet (1 mg) by mouth daily, Disp: 90 tablet, Rfl: 3     hydroxychloroquine (PLAQUENIL) 200 MG tablet, Take 1 tablet (200 mg) by mouth 2 times daily Annual Plaquenil toxicity eye screening required. (Patient not taking: Reported on 8/12/2021), Disp: 180 tablet, Rfl: 3     leucovorin (WELLCOVORIN) 25 MG tablet, Take 1 tablet (25 mg) by mouth daily Day after taking methotrexate (Patient not taking: Reported on 8/12/2021), Disp: 12 tablet, Rfl: 3     LORazepam (ATIVAN) 0.5 MG tablet, Take 1 - 2 tablets by mouth three times daily as needed., Disp: 60 tablet, Rfl: 0     naloxone (EVZIO) 0.4 MG/0.4ML auto-injector, Inject 0.4 mLs (0.4 mg) into the muscle as needed for opioid reversal every 2-3 minutes until assistance arrives (Patient not  "taking: Reported on 8/12/2021), Disp: 0.8 mL, Rfl: 0     olopatadine (PATADAY) 0.2 % ophthalmic solution, Place 1 drop into both eyes daily (Patient not taking: Reported on 2/9/2022), Disp: 1 Bottle, Rfl: 11     olopatadine (PATANOL) 0.1 % ophthalmic solution, Place 1 drop into both eyes 2 times daily (Patient not taking: Reported on 8/12/2021), Disp: 5 mL, Rfl: 3     omeprazole (PRILOSEC) 40 MG DR capsule, Take 1 capsule (40 mg) by mouth 2 times daily, Disp: 180 capsule, Rfl: 3     ondansetron (ZOFRAN-ODT) 4 MG ODT tab, DISSOLVE 1 TABLET IN MOUTH EVERY EIGHT HOURS AS NEEDED FOR NAUSEA, Disp: 20 tablet, Rfl: 0     oxyCODONE (ROXICODONE) 5 MG tablet, Take 1 tablet (5 mg) by mouth every four hours as needed for moderate to severe pain.  Maximum of 6 tablets per day.  (Patient not taking: Reported on 8/12/2021), Disp: 180 tablet, Rfl: 0     oxyCODONE-acetaminophen (PERCOCET) 5-325 MG tablet, Take 1 tablet by mouth every 4 hours as needed for moderate pain or severe pain. Maximum of 3 tablets per day., Disp: 90 tablet, Rfl: 0     pravastatin (PRAVACHOL) 10 MG tablet, Take 1 tablet (10 mg) by mouth daily, Disp: 30 tablet, Rfl: 5     pregabalin (LYRICA) 75 MG capsule, Take 1 capsule (75 mg) by mouth 2 times daily, Disp: 180 capsule, Rfl: 1     Syringe/Needle, Disp, 27G X 1/2\" 1 ML MISC, Used with Methotrexate once weekly (Patient not taking: Reported on 8/12/2021), Disp: 12 each, Rfl: 3     valACYclovir (VALTREX) 500 MG tablet, Take 1 tablet (500 mg) by mouth 2 times daily (Patient taking differently: Take 500 mg by mouth daily ), Disp: 180 tablet, Rfl: 1     Vitamin D, Cholecalciferol, 1000 UNITS TABS, Take 2,000 Units by mouth daily , Disp: , Rfl:       Allergies:   Penicillin   Bactroban   Levaquin  Rifampin   Clarithromycin   Ofloxacin  Sertraline                                                  Past Medical History:  Rheumatoid arthritis/scleroderma  Gastric antral vascular ectasia  Gastroesophageal reflux " disease  Iron deficiency anemia  Irregular heart beat  Osteomyelitis, right 3rd finger  Raynaud's syndrome with gangrene  Scleroderma  Sjogren's syndrome  Systemic sclerosis  Metatarsalgia  Dysuria  Ulcer of finger  Sicca syndrome  Palpitations  Ischemia of upper extremity  Intercostal pain  Anxiety  Breast implant rupture  Palpitations     Past Surgical History:  Appendectomy   Colonoscopy (08/04/2016)  EGD x 5 (08/22/14 - 08/02/2016)  Mammoplasty Augmentation Bilateral     Family History:    The patient's family history includes Cancer in an other family member; Respiratory in her father.     Social History:  The patient reports that she is a current cigarette smoker. She has been smoking about 1.00 pack per day. She has never used smokeless tobacco. She reports that she does not drink alcohol or use drugs. She states she is attempting to get into a research program for quitting smoking.   PCP: Lindy Carbone  Marital Status: Single     Physical Exam:   /71   Pulse 65   Wt 70.4 kg (155 lb 4.8 oz)   SpO2 100%   BMI 25.84 kg/m     Wt Readings from Last 4 Encounters:   08/18/22 70.4 kg (155 lb 4.8 oz)   02/09/22 69.6 kg (153 lb 6.4 oz)   08/12/21 68 kg (150 lb)   08/20/20 65 kg (143 lb 3.2 oz)     Constitutional: Well-developed, appearing stated age; cooperative  Eyes: Normal EOM, PERRLA, vision, conjunctiva, sclera  ENT: Normal external ears, nose, hearing, lips, teeth, gums, throat. No mucous membrane lesions, normal saliva pool  Neck: No mass or thyroid enlargement  Resp: Lungs clear to auscultation, nl to palpation  CV: RRR, no murmurs, rubs or gallops, no edema  GI: No ABD mass or tenderness, no HSM  : Not tested  Lymph: No cervical, supraclavicular, inguinal or epitrochlear nodes  MS: The TMJ, neck, shoulder, elbow, wrist, MCP/PIP/DIP, spine, hip, knee, ankle, and foot MTP/IP joints were examined and found normal. No active synovitis or altered joint anatomy. Full joint ROM. Normal   strength. No dactylitis,  tenosynovitis, enthesopathy.  Proximally 15   contracture in PIPs and 5-10   in DIPs of the left hand. Right hand greater than 90   contractures in PIPs 3-5. Obvious calcinosis lesion over the lateral aspect of the right 4th PIP.  Contractures stable as per the patient.    Skin: No nail pitting, alopecia, rash, nodules or lesions  Skin thickening in hands and distal forearms.  She has no digital ulcers now.  Neuro: Normal cranial nerves, strength, sensation, DTRs.   Psych: Normal judgement, orientation, memory, affect.     Imaging:  HRCT without contrast (08/22/2019):  Impression:  1. Mild bilateral posterior subpleural reticular opacities, dependent atelectasis versus early ILD. Consider prone positioning on follow-up  study.  2. Trace mucous plugging in the right middle lobe.  3. Stable incidental calcified splenic artery aneurysm.  4. Collapsed left breast implant and calcified right implant.  Sinai Carter MD    PFT Results (07/22/2019):  The FVC and FEV1 and the FEV1/FVC ratio are within normal limits. The inspiratory flow rates are within normal limits. Lung volumes are within normal limits. The diffusing capacity is normal. However, the diffusing capacity was not corrected for the   patient's hemoglobin  Impression:  Normal spirometry, lung volumes and diffusing capacity.    Laboratory:   Component      Latest Ref Rng & Units 8/15/2019 8/15/2019 8/15/2019           3:18 PM  3:24 PM  3:24 PM   WBC      4.0 - 11.0 10e9/L  5.2    RBC Count      3.8 - 5.2 10e12/L  3.71 (L)    Hemoglobin      11.7 - 15.7 g/dL  11.4 (L)    Hematocrit      35.0 - 47.0 %  37.1    MCV      78 - 100 fl  100    MCH      26.5 - 33.0 pg  30.7    MCHC      31.5 - 36.5 g/dL  30.7 (L)    RDW      10.0 - 15.0 %  13.2    Platelet Count      150 - 450 10e9/L  212    Diff Method        Automated Method    % Neutrophils      %  48.8    % Lymphocytes      %  40.5    % Monocytes      %  9.0    % Eosinophils      %  1.3     % Basophils      %  0.2    % Immature Granulocytes      %  0.2    Nucleated RBCs      0 /100  0    Absolute Neutrophil      1.6 - 8.3 10e9/L  2.6    Absolute Lymphocytes      0.8 - 5.3 10e9/L  2.1    Absolute Monocytes      0.0 - 1.3 10e9/L  0.5    Absolute Eosinophils      0.0 - 0.7 10e9/L  0.1    Absolute Basophils      0.0 - 0.2 10e9/L  0.0    Abs Immature Granulocytes      0 - 0.4 10e9/L  0.0    Absolute Nucleated RBC        0.0    Color Urine       Colorless     Appearance Urine       Clear     Glucose Urine      NEG:Negative mg/dL Negative     Bilirubin Urine      NEG:Negative Negative     Ketones Urine      NEG:Negative mg/dL Negative     Specific Gravity Urine      1.003 - 1.035 1.002 (L)     Blood Urine      NEG:Negative Small (A)     pH Urine      5.0 - 7.0 pH 6.0     Protein Albumin Urine      NEG:Negative mg/dL Negative     Urobilinogen mg/dL      0.0 - 2.0 mg/dL 0.0     Nitrite Urine      NEG:Negative Negative     Leukocyte Esterase Urine      NEG:Negative Negative     Source       Midstream Urine     WBC Urine      0 - 5 /HPF 1     RBC Urine      0 - 2 /HPF 1     Squamous Epithelial /HPF Urine      0 - 1 /HPF <1     Albumin      3.4 - 5.0 g/dL   4.3     Component      Latest Ref Rng & Units 8/15/2019 8/15/2019 8/15/2019 8/15/2019           3:24 PM  3:24 PM  3:24 PM  3:24 PM   Creatinine      0.52 - 1.04 mg/dL  0.78     GFR Estimate      >60 mL/min/1.73:m2  85     GFR Estimate If Black      >60 mL/min/1.73:m2  >90     Sed Rate      0 - 30 mm/h       CRP Inflammation      0.0 - 8.0 mg/L <2.9      AST      0 - 45 U/L   15    ALT      0 - 50 U/L    18   Albumin      3.4 - 5.0 g/dL         Component      Latest Ref Rng & Units 8/15/2019           3:24 PM   Sed Rate      0 - 30 mm/h 16

## 2022-10-22 ENCOUNTER — HEALTH MAINTENANCE LETTER (OUTPATIENT)
Age: 57
End: 2022-10-22

## 2022-10-24 DIAGNOSIS — K21.01 GASTROESOPHAGEAL REFLUX DISEASE WITH ESOPHAGITIS AND HEMORRHAGE: ICD-10-CM

## 2022-10-24 DIAGNOSIS — M34.9 SYSTEMIC SCLEROSIS (H): ICD-10-CM

## 2022-10-25 RX ORDER — FAMOTIDINE 20 MG/1
20 TABLET, FILM COATED ORAL 2 TIMES DAILY
Qty: 60 TABLET | Refills: 0 | OUTPATIENT
Start: 2022-10-25

## 2022-10-25 NOTE — TELEPHONE ENCOUNTER
pregabalin (LYRICA) 75 MG capsule 180 capsule 1 1/16/2018         Last Written Prescription Date:  1-  Last Fill Quantity: 180,   # refills: 1  Last Office Visit : 8-  Future Office visit:  12-    Routing refill request to provider for review/approval because:  CONTROLLED MEDICATION  LAST ORDERED IN 2018      Kathleen M Doege RN

## 2022-10-26 RX ORDER — PREGABALIN 75 MG/1
75 CAPSULE ORAL 2 TIMES DAILY
Qty: 180 CAPSULE | Refills: 0 | Status: SHIPPED | OUTPATIENT
Start: 2022-10-26 | End: 2022-11-04 | Stop reason: ALTCHOICE

## 2022-11-18 DIAGNOSIS — K21.01 GASTROESOPHAGEAL REFLUX DISEASE WITH ESOPHAGITIS AND HEMORRHAGE: ICD-10-CM

## 2022-11-22 RX ORDER — ESOMEPRAZOLE MAGNESIUM 40 MG/1
40 CAPSULE, DELAYED RELEASE ORAL DAILY
Qty: 90 CAPSULE | Refills: 2 | Status: SHIPPED | OUTPATIENT
Start: 2022-11-22 | End: 2023-02-24

## 2022-11-22 NOTE — TELEPHONE ENCOUNTER
Last Clinic Visit: 8/18/2022 [facility]

## 2022-12-29 ENCOUNTER — OFFICE VISIT (OUTPATIENT)
Dept: PULMONOLOGY | Facility: CLINIC | Age: 57
End: 2022-12-29
Attending: INTERNAL MEDICINE
Payer: MEDICARE

## 2022-12-29 VITALS — OXYGEN SATURATION: 98 % | SYSTOLIC BLOOD PRESSURE: 115 MMHG | DIASTOLIC BLOOD PRESSURE: 56 MMHG | HEART RATE: 72 BPM

## 2022-12-29 DIAGNOSIS — R06.09 DOE (DYSPNEA ON EXERTION): ICD-10-CM

## 2022-12-29 DIAGNOSIS — K22.4 ESOPHAGEAL DYSMOTILITY: ICD-10-CM

## 2022-12-29 DIAGNOSIS — I73.01 RAYNAUD'S DISEASE WITH GANGRENE (H): ICD-10-CM

## 2022-12-29 DIAGNOSIS — M05.79 RHEUMATOID ARTHRITIS INVOLVING MULTIPLE SITES WITH POSITIVE RHEUMATOID FACTOR (H): ICD-10-CM

## 2022-12-29 DIAGNOSIS — M35.00 SICCA SYNDROME (H): ICD-10-CM

## 2022-12-29 DIAGNOSIS — M34.9 SYSTEMIC SCLEROSIS (H): Primary | ICD-10-CM

## 2022-12-29 PROCEDURE — 99214 OFFICE O/P EST MOD 30 MIN: CPT | Performed by: INTERNAL MEDICINE

## 2022-12-29 PROCEDURE — G0463 HOSPITAL OUTPT CLINIC VISIT: HCPCS | Performed by: INTERNAL MEDICINE

## 2022-12-29 NOTE — PROGRESS NOTES
[facility]  Rheumatology Clinic  Brennen Child MD  2022     Name: Willard Grayson  MRN: [id number]  Age: 54 year old  : 1965  Referring provider: Referred Self    Problem List:  1. 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   2. Associated marked neuropathic skin pain markedly improved with Lyrica with prior medication therapy with gabapentin.  3. Diffuse musculoskeletal pain requiring methotrexate plus CellCept, and narcotic to control, but also improved. Now off methotrexate due to GI issues.  4. Initial clinical overlap with anti-CCP seropositive rheumatoid arthritis with continued anti-CCP seropositivity as of 2010.  5. Raynaud's phenomena with digital ulcers with easily cracked skin.   6. GI involvement consistent with bacterial small bowel overgrowth with marked improvement after a course of neomycin followed by Dr. Glez; positive hydrogen breath test in 3/2012 - treated with rifaximin through Dr. Jacob  7. No convincing evidence of lung involvement on serial pulmonary function tests or clinically.   8. History of heart palpitations prior to the onset of SSc with stable symptoms since. Holter 2018 with PACs.  9. History of medication failures with Remicade, methotrexate, methotrexate plus CellCept and with Orencia and with improvement since initiation of IVIG but with complicating headaches and overall sense of malaise with IVIG infusions.   10. History of silicone breast implants.   11. Incidental finding of calcified splenic artery aneurysm.  12. EGD in 3/2012 with hiatal hernia and gastral antral vascular ectasia  13. Recurrent right third finger contracture with extensor surface ulceration, healed   14. Trochanteric bursitis, R>L    Assessment and Plan:   #  Systemic sclerosis   # Gastroesophageal reflux disease, esophagitis presence not specified  # Esophageal dysmotility  # Raynaud's disease with gangrene  # High risk medications (not anticoagulants) long-term use  # Rheumatoid arthritis involving multiple sites with positive rheumatoid factor (H)  # Metatarsalgia, unspecified laterality  # Metatarsalgia of both feet  # Osteopenia, unspecified location  # Systemic sclerosis  # MCDANIEL (dyspnea on exertion)  # Chest wall pain  # Skin lesion of lower extremity    Plan/recommendation:    I think she is overall doing very stably.  I have reiterated to her however that it is very important to continue to monitor her cardiopulmonary situation and pulmonary function test at the least invasive and safest way to do this.  She dislikes doing them but will go ahead and get them done.    Think she is otherwise stable enough I can continue to see her at 6-month intervals, sooner as needed.    JANET Child MD, PhD    Rheumatology              August 20, 2020 interval history:    Patient comes in today with him main concern being some new skin lesions that she has noted over her left leg and left arm over the last few months.  She does have a dermatology appointment within the [facility] system where she gets most of her care.  That is not however until October.    The other new issue this been bothering her a fair amount is neck pain and headaches.  She did get a cervical spine film just recently at [facility] and has not yet reviewed that with anyone there.    In terms of her scleroderma skin involvement she really feels that has been quite stable.  We have repeatedly tried to get her on methotrexate but she is tolerated that poorly but she has tolerated MMF better and remains on it.  The main problem for her has been the contractures in her hands and those have gradually worsened particularly in her right hand.    She is largely sheltering in place because of coronavirus,  but she is still smoking cigarettes.  If anything she has increased doing so because of feeling under stress.      January 14, 2021 interval history:    Since we last seen the patient she feels she is really been doing pretty stably.  She went off of MMF because of development of a squamous cell carcinoma in the understanding that MMF could have been contributing to impaired tumor Amino-Cerv balance.  She really does not think she is gotten worse that since going off of it.  That was back on November 12.  The SCCA was on her forearm and is healed nicely.  She thinks that perhaps the skin in the area was a little more shiny than surrounding skin but there were no other difficulties with the healing.    She does acknowledge that over the last year or 2 her fingers to develop worsening contractures and she has difficulty writing.  Nonetheless she has tolerated methotrexate poorly and its not clear how much it helped her when she was on it.  She does acknowledge that she does not do stretching exercises as reliably as she might.    She has had a few digital ulcers but they have been healing more quickly than usual.  She is not certain why but she does note that she is no longer taking care of her grandchildren so she is able to rest her hands and protect them better.    She has established with the pain clinic at [facility] and that they have taken over the prescribing of her narcotics and are working with her on a more comprehensive pain program.  She is really actually quite happy about that.    She continues to get some esophageal spasms at times but oddly this seems only to happen with the drinking of liquids.  Its not bad enough that she wants to do anything more about it.  We did talk about the possible value of all toyed minutes however.    The other new thing that she has had is some dizziness and tinnitus developing in her left ear much greater than right.  She is seeking care with an otolaryngologist at [facility] where  most of her care is delivered however.    She is otherwise been pretty good.  She had a little bit of a flareup last week where she felt weaker and more achy and more sluggish.  She has not had a clear-cut coronavirus exposure to her knowledge.      August 12, 2021 interval history:    Since we have last seen the patient she feels she is really been doing very well.  She has been seeing the [facility] pain clinic and was having some difficulties that seemed unusual to her.  Ultimately after a lot of jitters and changes in her pain situation she was worked up and diagnosed with Graves' disease and is now being treated with methimazole.    She also was seen in the breast clinic where there was some evidence of fluid around her implants raising the question of whether there had been a rupture of them.  There was associated pain.  The fluid was removed and she has been feeling better since then.    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  That was removed and the wound has healed up very well.    Interestingly, since she has had Botox injections she has not had any digital ulcers including over her right contractured PIP joints.  Although she really did not like the amount of pain she got with the Botox she is pretty happy with not having had any ulcers.    Potentially contributing to that improvement however is that she is not been smoking at all for the last 4 months.  Her breathing feels better to her as well and overall her breathing feels stable to her.  She has not had pulmonary function test for quite some time and is a bit overdue for those, but she is never had evidence of significant lung involvement.    Although she thinks her skin is somewhat sensitive she does not believe it is worsening at all and she believes is completely stable and her joints feel stable to her as well and her not bothering her significantly.    February 9, 2022 interval history:    Since we have last seen the  patient she really has not felt well.  She thinks this is primarily since she is received her second coronavirus shot in the fall.  She however also had autoimmune thyroiditis diagnosed in April and has been placed on methimazole and does not think she is felt quite as well since then either.  She of course became euthyroid after being hyperthyroid and has gained weight with it.    Among the thing she has been noticing more recently is virtually constant heartburn despite being on omeprazole 40 mg twice daily.    She however also notices more widespread pain.  This includes the small joints of her hands and feet but she denies significant morning stiffness there.    She has not been on any disease modifying drugs now for quite some time including Plaquenil.  She does believe that her skin has been stable throughout all this and not worsening.    She does have a lot of fatigue, and as part of recent laboratory work-up she had iron levels done that were on the low side of normal but she was anemic and her ferritin was low.  There were no other inflammatory markers so the ferritin could not be put fully in context.  She has done well in the past after being given IV iron.    She is not exercising regularly but does not think she is fundamentally more short of breath.  Her last PFTs were 2 years ago but these were completely stable with forced vital capacity 102% of predicted.  DLCO on those studies was at 84% of predicted also stable.  She does continue to smoke though she says very little.    August 18, 2022 interval history:    Since last clinic visit,     Patient reports having worsening swallowing issues with pills getting stuck in the back of the throat and having difficulty drinking water occasionally.  Points to the lower portion of the neck and states that it feels like things are getting stuck there.  Denies any choking or sensation denies any issues with consuming food.  Reports that she does use some vitamin  once a day in the morning.    Reports consistent fatigue and states that she is being evaluated by endocrinology for radioactive ablation vs surgery for her Graves' disease.  Reports that her thyroid issues has been causing weight changes, fatigue, palpitations and sleep issues.    Reports that her shortness of breath has been moderately worse since last clinic visit.  In the last PFTs, she had significant worsening of her FVC and DLCO.  However, at that time patient reported that the test was not done properly.     She continues to smoke 1 pack/day.    She states that she is not much physically active.    December 29, 2022 interval history:    Since we have last seen the patient she has continued to struggle with getting the thyroid dosage correct but she feels like that has finally happened.  She definitely is feeling better with it at this point that she has and feels like she is essentially back to her normal baseline.    In terms of features of her scleroderma she thinks that despite all of the difficulties with getting the thyroid correct, that she has been pretty stable with that.  Her GERD is variable day-to-day and she can have enough reflux that she wakes due to it but its not frequent.  She denies worsening dysphagia.  She actually drinks soda despite the fact that it bothers her somewhat and she gets some difficulty with moving that through.  She knows that she really does not have a lot of appetite but her weight has been stable or she has been gaining slightly.    She does have Raynauds episodes but is not had any of the more severe ones that she had earlier on in the disease process and has not had any digital ulcers though she does continue to smoke.    She does think her breathing may be gradually a little bit worse.  She thinks that is due to lack of exercise and the difficulties with adjusting her thyroid hormone.  She has not however had PFTs for some time although we encouraged her to do them  last time and there are active orders on her chart.    She otherwise feels stable.  She denies significant musculoskeletal features, or any worsening of her skin.  She has been off of all medication now in terms of controlling skin joint and other inflammatory features of disease for quite some time.    Review of Systems:   Pertinent items are noted in HPI or as below, remainder of complete ROS is negative.      No recent problems with hearing or vision. No swallowing problems.   No breathing difficulty, shortness of breath, coughing, or wheezing  No chest pain or palpitations  No heart burn, indigestion, abdominal pain, nausea, vomiting, diarrhea  No urination problems, no bloody, cloudy urine, no dysuria  No numbing, tingling, weakness  No headaches or confusion  No rashes. No easy bleeding or bruising.   Answers for HPI/ROS submitted by the patient on 11/21/2019   General Symptoms: Yes  Skin Symptoms: Yes  HENT Symptoms: Yes  EYE SYMPTOMS: Yes  HEART SYMPTOMS: Yes  LUNG SYMPTOMS: Yes  INTESTINAL SYMPTOMS: Yes  URINARY SYMPTOMS: No  GYNECOLOGIC SYMPTOMS: No  BREAST SYMPTOMS: No  SKELETAL SYMPTOMS: Yes  BLOOD SYMPTOMS: No  NERVOUS SYSTEM SYMPTOMS: Yes  MENTAL HEALTH SYMPTOMS: Yes  Fever: No  Loss of appetite: No  Weight loss: No  Weight gain: Yes  Fatigue: Yes  Night sweats: Yes  Chills: No  Excessive thirst: Yes  Feeling hot or cold when others believe the temperature is normal: Yes  Loss of height: No  Post-operative complications: No  Surgical site pain: No  Hallucinations: No  Change in or Loss of Energy: No  Changes in hair: No  Changes in moles/birth marks: Yes  Itching: Yes  Rashes: No  Changes in nails: No  Acne: No  Hair in places you don't want it: No  Ear pain: No  Ear discharge: No  Hearing loss: Yes  Tinnitus: Yes  Nosebleeds: No  Congestion: No  Sinus pain: No  Tooth pain: No  Gum tenderness: No  Bleeding gums: No  Change in taste: No  Change in sense of smell: No  Dry mouth: Yes  Hearing aid used:  No  Eye pain: No  Vision loss: Yes  Dry eyes: Yes  Watery eyes: Yes  Eye bulging: No  Double vision: No  Flashing of lights: No  Cough: No  Sputum or phlegm: No  Coughing up blood: No  Difficulty breating or shortness of breath: No  Snoring: No  Wheezing: No  Difficulty breathing on exertion: Yes  Chest pain or pressure: No  Fast or irregular heartbeat: No  Pain in legs with walking: Yes  Trouble breathing while lying down: No  Fingers or toes appear blue: Yes  High blood pressure: Yes  Low blood pressure: No  Fainting: No  Murmurs: No  Pacemaker: No  Varicose veins: No  Edema or swelling: No  Wake up at night with shortness of breath: No  Light-headedness: No  Heart burn or indigestion: Yes  Nausea: No  Vomiting: No  Abdominal pain: No  Bloating: Yes  Constipation: Yes  Diarrhea: No  Back pain: Yes  Muscle aches: Yes  Neck pain: Yes  Swollen joints: No  Joint pain: Yes  Bone pain: No  Muscle cramps: No  Trouble with coordination: No  Dizziness or trouble with balance: No  Fainting or black-out spells: No  Memory loss: No  Headache: Yes  Seizures: No  Speech problems: No  Nervous or Anxious: No  Depression: Yes  Trouble sleeping: No  Trouble thinking or concentrating: No  Mood changes: No  Panic attacks: No      Active Medications:     Current Outpatient Medications:      aspirin-dipyridamole (AGGRENOX)  MG per 12 hr capsule, Take 1 capsule by mouth daily , Disp: , Rfl:      BIOTIN PO, 1 tablet daily, Disp: , Rfl:      Blood Pressure Monitoring KIT, Check BP every 7 days., Disp: 1 kit, Rfl: 0     carboxymethylcellulose PF (REFRESH PLUS) 0.5 % ophthalmic solution, Place 1 drop into both eyes 4 times daily as needed for dry eyes, Disp: 120 mL, Rfl: 11     cevimeline (EVOXAC) 30 MG capsule, Take 1 capsule (30 mg) by mouth 3 times daily, Disp: 270 capsule, Rfl: 2     cyanocobalamin (VITAMIN B12) 1000 MCG/ML injection, Inject 1 mL into the muscle every 30 days, Disp: , Rfl:      esomeprazole (NEXIUM) 40 MG   capsule, Take 1 capsule (40 mg) by mouth daily Take 30-60 minutes before eating., Disp: 90 capsule, Rfl: 2     famotidine (PEPCID) 20 MG tablet, Take 1 tablet (20 mg) by mouth 2 times daily., Disp: 60 tablet, Rfl: 6     folic acid (FOLVITE) 1 MG tablet, Take 1 tablet (1 mg) by mouth daily, Disp: 90 tablet, Rfl: 3     LORazepam (ATIVAN) 0.5 MG tablet, Take 1 - 2 tablets by mouth three times daily as needed., Disp: 60 tablet, Rfl: 0     omeprazole (PRILOSEC) 40 MG DR capsule, Take 1 capsule (40 mg) by mouth 2 times daily, Disp: 180 capsule, Rfl: 3     ondansetron (ZOFRAN-ODT) 4 MG ODT tab, DISSOLVE 1 TABLET IN MOUTH EVERY EIGHT HOURS AS NEEDED FOR NAUSEA, Disp: 20 tablet, Rfl: 0     oxyCODONE-acetaminophen (PERCOCET) 5-325 MG tablet, Take 1 tablet by mouth every 4 hours as needed for moderate pain or severe pain. Maximum of 3 tablets per day., Disp: 90 tablet, Rfl: 0     pravastatin (PRAVACHOL) 10 MG tablet, Take 1 tablet (10 mg) by mouth daily, Disp: 30 tablet, Rfl: 5     valACYclovir (VALTREX) 500 MG tablet, Take 1 tablet (500 mg) by mouth 2 times daily (Patient taking differently: Take 500 mg by mouth daily), Disp: 180 tablet, Rfl: 1     esomeprazole (NEXIUM) 40 MG capsule, Take 1 capsule (40 mg) by mouth 2 times daily Take 30-60 minutes before eating. (Patient not taking: Reported on 8/12/2021), Disp: 180 capsule, Rfl: 3     hydroxychloroquine (PLAQUENIL) 200 MG tablet, Take 1 tablet (200 mg) by mouth 2 times daily Annual Plaquenil toxicity eye screening required. (Patient not taking: Reported on 8/12/2021), Disp: 180 tablet, Rfl: 3     leucovorin (WELLCOVORIN) 25 MG tablet, Take 1 tablet (25 mg) by mouth daily Day after taking methotrexate (Patient not taking: Reported on 8/12/2021), Disp: 12 tablet, Rfl: 3     naloxone (EVZIO) 0.4 MG/0.4ML auto-injector, Inject 0.4 mLs (0.4 mg) into the muscle as needed for opioid reversal every 2-3 minutes until assistance arrives (Patient not taking: Reported on 8/12/2021),  "Disp: 0.8 mL, Rfl: 0     olopatadine (PATADAY) 0.2 % ophthalmic solution, Place 1 drop into both eyes daily (Patient not taking: Reported on 2/9/2022), Disp: 1 Bottle, Rfl: 11     olopatadine (PATANOL) 0.1 % ophthalmic solution, Place 1 drop into both eyes 2 times daily (Patient not taking: Reported on 8/12/2021), Disp: 5 mL, Rfl: 3     oxyCODONE (ROXICODONE) 5 MG tablet, Take 1 tablet (5 mg) by mouth every four hours as needed for moderate to severe pain.  Maximum of 6 tablets per day.  (Patient not taking: Reported on 8/12/2021), Disp: 180 tablet, Rfl: 0     Syringe/Needle, Disp, 27G X 1/2\" 1 ML MISC, Used with Methotrexate once weekly (Patient not taking: Reported on 8/12/2021), Disp: 12 each, Rfl: 3     Vitamin D, Cholecalciferol, 1000 UNITS TABS, Take 2,000 Units by mouth daily  (Patient not taking: Reported on 12/29/2022), Disp: , Rfl:       Allergies:   Penicillin   Bactroban   Levaquin  Rifampin   Clarithromycin   Ofloxacin  Sertraline                                                  Past Medical History:  Rheumatoid arthritis/scleroderma  Gastric antral vascular ectasia  Gastroesophageal reflux disease  Iron deficiency anemia  Irregular heart beat  Osteomyelitis, right 3rd finger  Raynaud's syndrome with gangrene  Scleroderma  Sjogren's syndrome  Systemic sclerosis  Metatarsalgia  Dysuria  Ulcer of finger  Sicca syndrome  Palpitations  Ischemia of upper extremity  Intercostal pain  Anxiety  Breast implant rupture  Palpitations     Past Surgical History:  Appendectomy   Colonoscopy (08/04/2016)  EGD x 5 (08/22/14 - 08/02/2016)  Mammoplasty Augmentation Bilateral     Family History:    The patient's family history includes Cancer in an other family member; Respiratory in her father.     Social History:  The patient reports that she is a current cigarette smoker. She has been smoking about 1.00 pack per day. She has never used smokeless tobacco. She reports that she does not drink alcohol or use drugs. She " states she is attempting to get into a research program for quitting smoking.   PCP: Lindy Carbone  Marital Status: Single     Physical Exam:   /56   Pulse 72   SpO2 98%    Wt Readings from Last 4 Encounters:   08/18/22 70.4 kg (155 lb 4.8 oz)   02/09/22 69.6 kg (153 lb 6.4 oz)   08/12/21 68 kg (150 lb)   08/20/20 65 kg (143 lb 3.2 oz)     Constitutional: Well-developed, appearing stated age; cooperative  Eyes: Normal EOM, PERRLA, vision, conjunctiva, sclera  ENT: Normal external ears, nose, hearing, lips, teeth, gums, throat. No mucous membrane lesions, normal saliva pool  Neck: No mass or thyroid enlargement  Resp: Lungs clear to auscultation, nl to palpation  CV: RRR, no murmurs, rubs or gallops, no edema  GI: No ABD mass or tenderness, no HSM  : Not tested  Lymph: No cervical, supraclavicular, inguinal or epitrochlear nodes  MS: The TMJ, neck, shoulder, elbow, wrist, MCP/PIP/DIP, spine, hip, knee, ankle, and foot MTP/IP joints were examined and found normal. No active synovitis or altered joint anatomy. Full joint ROM. Normal  strength. No dactylitis,  tenosynovitis, enthesopathy.  Proximally 15   contracture in PIPs and 5-10   in DIPs of the left hand. Right hand greater than 90   contractures in PIPs 3-5. Obvious calcinosis lesion over the lateral aspect of the right 4th PIP.  Contractures stable as per the patient.    Skin: No nail pitting, alopecia, rash, nodules or lesions  Skin thickening in hands and distal forearms.  She has no digital ulcers now.  Neuro: Normal cranial nerves, strength, sensation, DTRs.   Psych: Normal judgement, orientation, memory, affect.     Imaging:  HRCT without contrast (08/22/2019):  Impression:  1. Mild bilateral posterior subpleural reticular opacities, dependent atelectasis versus early ILD. Consider prone positioning on follow-up  study.  2. Trace mucous plugging in the right middle lobe.  3. Stable incidental calcified splenic artery  aneurysm.  4. Collapsed left breast implant and calcified right implant.  Sinai Carter MD    PFT Results (07/22/2019):  The FVC and FEV1 and the FEV1/FVC ratio are within normal limits. The inspiratory flow rates are within normal limits. Lung volumes are within normal limits. The diffusing capacity is normal. However, the diffusing capacity was not corrected for the   patient's hemoglobin  Impression:  Normal spirometry, lung volumes and diffusing capacity.    Laboratory:   Component      Latest Ref Rng & Units 8/15/2019 8/15/2019 8/15/2019           3:18 PM  3:24 PM  3:24 PM   WBC      4.0 - 11.0 10e9/L  5.2    RBC Count      3.8 - 5.2 10e12/L  3.71 (L)    Hemoglobin      11.7 - 15.7 g/dL  11.4 (L)    Hematocrit      35.0 - 47.0 %  37.1    MCV      78 - 100 fl  100    MCH      26.5 - 33.0 pg  30.7    MCHC      31.5 - 36.5 g/dL  30.7 (L)    RDW      10.0 - 15.0 %  13.2    Platelet Count      150 - 450 10e9/L  212    Diff Method        Automated Method    % Neutrophils      %  48.8    % Lymphocytes      %  40.5    % Monocytes      %  9.0    % Eosinophils      %  1.3    % Basophils      %  0.2    % Immature Granulocytes      %  0.2    Nucleated RBCs      0 /100  0    Absolute Neutrophil      1.6 - 8.3 10e9/L  2.6    Absolute Lymphocytes      0.8 - 5.3 10e9/L  2.1    Absolute Monocytes      0.0 - 1.3 10e9/L  0.5    Absolute Eosinophils      0.0 - 0.7 10e9/L  0.1    Absolute Basophils      0.0 - 0.2 10e9/L  0.0    Abs Immature Granulocytes      0 - 0.4 10e9/L  0.0    Absolute Nucleated RBC        0.0    Color Urine       Colorless     Appearance Urine       Clear     Glucose Urine      NEG:Negative mg/dL Negative     Bilirubin Urine      NEG:Negative Negative     Ketones Urine      NEG:Negative mg/dL Negative     Specific Gravity Urine      1.003 - 1.035 1.002 (L)     Blood Urine      NEG:Negative Small (A)     pH Urine      5.0 - 7.0 pH 6.0     Protein Albumin Urine      NEG:Negative mg/dL Negative     Urobilinogen  mg/dL      0.0 - 2.0 mg/dL 0.0     Nitrite Urine      NEG:Negative Negative     Leukocyte Esterase Urine      NEG:Negative Negative     Source       Midstream Urine     WBC Urine      0 - 5 /HPF 1     RBC Urine      0 - 2 /HPF 1     Squamous Epithelial /HPF Urine      0 - 1 /HPF <1     Albumin      3.4 - 5.0 g/dL   4.3     Component      Latest Ref Rng & Units 8/15/2019 8/15/2019 8/15/2019 8/15/2019           3:24 PM  3:24 PM  3:24 PM  3:24 PM   Creatinine      0.52 - 1.04 mg/dL  0.78     GFR Estimate      >60 mL/min/1.73:m2  85     GFR Estimate If Black      >60 mL/min/1.73:m2  >90     Sed Rate      0 - 30 mm/h       CRP Inflammation      0.0 - 8.0 mg/L <2.9      AST      0 - 45 U/L   15    ALT      0 - 50 U/L    18   Albumin      3.4 - 5.0 g/dL         Component      Latest Ref Rng & Units 8/15/2019           3:24 PM   Sed Rate      0 - 30 mm/h 16

## 2022-12-29 NOTE — LETTER
2022         RE: Willard Grayson  [address]  [facility] [id number]        Dear Colleague,    Thank you for referring your patient, Willard Grayosn, to the [facility] [place]. Please see a copy of my visit note below.    [facility]  Rheumatology Clinic  Brennen Child MD  2022     Name: Willard Grayson  MRN: [id number]  Age: 54 year old  : 1965  Referring provider: Referred Self    Problem List:  1. 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   2. Associated marked neuropathic skin pain markedly improved with Lyrica with prior medication therapy with gabapentin.  3. Diffuse musculoskeletal pain requiring methotrexate plus CellCept, and narcotic to control, but also improved. Now off methotrexate due to GI issues.  4. Initial clinical overlap with anti-CCP seropositive rheumatoid arthritis with continued anti-CCP seropositivity as of 2010.  5. Raynaud's phenomena with digital ulcers with easily cracked skin.   6. GI involvement consistent with bacterial small bowel overgrowth with marked improvement after a course of neomycin followed by Dr. Glez; positive hydrogen breath test in 3/2012 - treated with rifaximin through Dr. Jacob  7. No convincing evidence of lung involvement on serial pulmonary function tests or clinically.   8. History of heart palpitations prior to the onset of SSc with stable symptoms since. Holter 2018 with PACs.  9. History of medication failures with Remicade, methotrexate, methotrexate plus CellCept and with Orencia and with improvement since initiation of IVIG but with complicating headaches and overall sense of malaise with IVIG infusions.   10. History of  silicone breast implants.   11. Incidental finding of calcified splenic artery aneurysm.  12. EGD in 3/2012 with hiatal hernia and gastral antral vascular ectasia  13. Recurrent right third finger contracture with extensor surface ulceration, healed   14. Trochanteric bursitis, R>L    Assessment and Plan:   # Systemic sclerosis   # Gastroesophageal reflux disease, esophagitis presence not specified  # Esophageal dysmotility  # Raynaud's disease with gangrene  # High risk medications (not anticoagulants) long-term use  # Rheumatoid arthritis involving multiple sites with positive rheumatoid factor (H)  # Metatarsalgia, unspecified laterality  # Metatarsalgia of both feet  # Osteopenia, unspecified location  # Systemic sclerosis  # MCDANIEL (dyspnea on exertion)  # Chest wall pain  # Skin lesion of lower extremity    Plan/recommendation:    I think she is overall doing very stably.  I have reiterated to her however that it is very important to continue to monitor her cardiopulmonary situation and pulmonary function test at the least invasive and safest way to do this.  She dislikes doing them but will go ahead and get them done.    Think she is otherwise stable enough I can continue to see her at 6-month intervals, sooner as needed.    JANET Child MD, PhD    Rheumatology              August 20, 2020 interval history:    Patient comes in today with him main concern being some new skin lesions that she has noted over her left leg and left arm over the last few months.  She does have a dermatology appointment within the [facility] system where she gets most of her care.  That is not however until October.    The other new issue this been bothering her a fair amount is neck pain and headaches.  She did get a cervical spine film just recently at [facility] and has not yet reviewed that with anyone there.    In terms of her scleroderma skin involvement she really feels that has been quite stable.  We have repeatedly  tried to get her on methotrexate but she is tolerated that poorly but she has tolerated MMF better and remains on it.  The main problem for her has been the contractures in her hands and those have gradually worsened particularly in her right hand.    She is largely sheltering in place because of coronavirus, but she is still smoking cigarettes.  If anything she has increased doing so because of feeling under stress.      January 14, 2021 interval history:    Since we last seen the patient she feels she is really been doing pretty stably.  She went off of MMF because of development of a squamous cell carcinoma in the understanding that MMF could have been contributing to impaired tumor Amino-Cerv balance.  She really does not think she is gotten worse that since going off of it.  That was back on November 12.  The SCCA was on her forearm and is healed nicely.  She thinks that perhaps the skin in the area was a little more shiny than surrounding skin but there were no other difficulties with the healing.    She does acknowledge that over the last year or 2 her fingers to develop worsening contractures and she has difficulty writing.  Nonetheless she has tolerated methotrexate poorly and its not clear how much it helped her when she was on it.  She does acknowledge that she does not do stretching exercises as reliably as she might.    She has had a few digital ulcers but they have been healing more quickly than usual.  She is not certain why but she does note that she is no longer taking care of her grandchildren so she is able to rest her hands and protect them better.    She has established with the pain clinic at [facility] and that they have taken over the prescribing of her narcotics and are working with her on a more comprehensive pain program.  She is really actually quite happy about that.    She continues to get some esophageal spasms at times but oddly this seems only to happen with the drinking of liquids.  Its  not bad enough that she wants to do anything more about it.  We did talk about the possible value of all toyed minutes however.    The other new thing that she has had is some dizziness and tinnitus developing in her left ear much greater than right.  She is seeking care with an otolaryngologist at [facility] where most of her care is delivered however.    She is otherwise been pretty good.  She had a little bit of a flareup last week where she felt weaker and more achy and more sluggish.  She has not had a clear-cut coronavirus exposure to her knowledge.      August 12, 2021 interval history:    Since we have last seen the patient she feels she is really been doing very well.  She has been seeing the [facility] pain clinic and was having some difficulties that seemed unusual to her.  Ultimately after a lot of jitters and changes in her pain situation she was worked up and diagnosed with Graves' disease and is now being treated with methimazole.    She also was seen in the breast clinic where there was some evidence of fluid around her implants raising the question of whether there had been a rupture of them.  There was associated pain.  The fluid was removed and she has been feeling better since then.    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  That was removed and the wound has healed up very well.    Interestingly, since she has had Botox injections she has not had any digital ulcers including over her right contractured PIP joints.  Although she really did not like the amount of pain she got with the Botox she is pretty happy with not having had any ulcers.    Potentially contributing to that improvement however is that she is not been smoking at all for the last 4 months.  Her breathing feels better to her as well and overall her breathing feels stable to her.  She has not had pulmonary function test for quite some time and is a bit overdue for those, but she is never had evidence of  significant lung involvement.    Although she thinks her skin is somewhat sensitive she does not believe it is worsening at all and she believes is completely stable and her joints feel stable to her as well and her not bothering her significantly.    February 9, 2022 interval history:    Since we have last seen the patient she really has not felt well.  She thinks this is primarily since she is received her second coronavirus shot in the fall.  She however also had autoimmune thyroiditis diagnosed in April and has been placed on methimazole and does not think she is felt quite as well since then either.  She of course became euthyroid after being hyperthyroid and has gained weight with it.    Among the thing she has been noticing more recently is virtually constant heartburn despite being on omeprazole 40 mg twice daily.    She however also notices more widespread pain.  This includes the small joints of her hands and feet but she denies significant morning stiffness there.    She has not been on any disease modifying drugs now for quite some time including Plaquenil.  She does believe that her skin has been stable throughout all this and not worsening.    She does have a lot of fatigue, and as part of recent laboratory work-up she had iron levels done that were on the low side of normal but she was anemic and her ferritin was low.  There were no other inflammatory markers so the ferritin could not be put fully in context.  She has done well in the past after being given IV iron.    She is not exercising regularly but does not think she is fundamentally more short of breath.  Her last PFTs were 2 years ago but these were completely stable with forced vital capacity 102% of predicted.  DLCO on those studies was at 84% of predicted also stable.  She does continue to smoke though she says very little.    August 18, 2022 interval history:    Since last clinic visit,     Patient reports having worsening swallowing  issues with pills getting stuck in the back of the throat and having difficulty drinking water occasionally.  Points to the lower portion of the neck and states that it feels like things are getting stuck there.  Denies any choking or sensation denies any issues with consuming food.  Reports that she does use some vitamin once a day in the morning.    Reports consistent fatigue and states that she is being evaluated by endocrinology for radioactive ablation vs surgery for her Graves' disease.  Reports that her thyroid issues has been causing weight changes, fatigue, palpitations and sleep issues.    Reports that her shortness of breath has been moderately worse since last clinic visit.  In the last PFTs, she had significant worsening of her FVC and DLCO.  However, at that time patient reported that the test was not done properly.     She continues to smoke 1 pack/day.    She states that she is not much physically active.    December 29, 2022 interval history:    Since we have last seen the patient she has continued to struggle with getting the thyroid dosage correct but she feels like that has finally happened.  She definitely is feeling better with it at this point that she has and feels like she is essentially back to her normal baseline.    In terms of features of her scleroderma she thinks that despite all of the difficulties with getting the thyroid correct, that she has been pretty stable with that.  Her GERD is variable day-to-day and she can have enough reflux that she wakes due to it but its not frequent.  She denies worsening dysphagia.  She actually drinks soda despite the fact that it bothers her somewhat and she gets some difficulty with moving that through.  She knows that she really does not have a lot of appetite but her weight has been stable or she has been gaining slightly.    She does have Raynauds episodes but is not had any of the more severe ones that she had earlier on in the disease process  and has not had any digital ulcers though she does continue to smoke.    She does think her breathing may be gradually a little bit worse.  She thinks that is due to lack of exercise and the difficulties with adjusting her thyroid hormone.  She has not however had PFTs for some time although we encouraged her to do them last time and there are active orders on her chart.    She otherwise feels stable.  She denies significant musculoskeletal features, or any worsening of her skin.  She has been off of all medication now in terms of controlling skin joint and other inflammatory features of disease for quite some time.    Review of Systems:   Pertinent items are noted in HPI or as below, remainder of complete ROS is negative.      No recent problems with hearing or vision. No swallowing problems.   No breathing difficulty, shortness of breath, coughing, or wheezing  No chest pain or palpitations  No heart burn, indigestion, abdominal pain, nausea, vomiting, diarrhea  No urination problems, no bloody, cloudy urine, no dysuria  No numbing, tingling, weakness  No headaches or confusion  No rashes. No easy bleeding or bruising.   Answers for HPI/ROS submitted by the patient on 11/21/2019   General Symptoms: Yes  Skin Symptoms: Yes  HENT Symptoms: Yes  EYE SYMPTOMS: Yes  HEART SYMPTOMS: Yes  LUNG SYMPTOMS: Yes  INTESTINAL SYMPTOMS: Yes  URINARY SYMPTOMS: No  GYNECOLOGIC SYMPTOMS: No  BREAST SYMPTOMS: No  SKELETAL SYMPTOMS: Yes  BLOOD SYMPTOMS: No  NERVOUS SYSTEM SYMPTOMS: Yes  MENTAL HEALTH SYMPTOMS: Yes  Fever: No  Loss of appetite: No  Weight loss: No  Weight gain: Yes  Fatigue: Yes  Night sweats: Yes  Chills: No  Excessive thirst: Yes  Feeling hot or cold when others believe the temperature is normal: Yes  Loss of height: No  Post-operative complications: No  Surgical site pain: No  Hallucinations: No  Change in or Loss of Energy: No  Changes in hair: No  Changes in moles/birth marks: Yes  Itching: Yes  Rashes:  No  Changes in nails: No  Acne: No  Hair in places you don't want it: No  Ear pain: No  Ear discharge: No  Hearing loss: Yes  Tinnitus: Yes  Nosebleeds: No  Congestion: No  Sinus pain: No  Tooth pain: No  Gum tenderness: No  Bleeding gums: No  Change in taste: No  Change in sense of smell: No  Dry mouth: Yes  Hearing aid used: No  Eye pain: No  Vision loss: Yes  Dry eyes: Yes  Watery eyes: Yes  Eye bulging: No  Double vision: No  Flashing of lights: No  Cough: No  Sputum or phlegm: No  Coughing up blood: No  Difficulty breating or shortness of breath: No  Snoring: No  Wheezing: No  Difficulty breathing on exertion: Yes  Chest pain or pressure: No  Fast or irregular heartbeat: No  Pain in legs with walking: Yes  Trouble breathing while lying down: No  Fingers or toes appear blue: Yes  High blood pressure: Yes  Low blood pressure: No  Fainting: No  Murmurs: No  Pacemaker: No  Varicose veins: No  Edema or swelling: No  Wake up at night with shortness of breath: No  Light-headedness: No  Heart burn or indigestion: Yes  Nausea: No  Vomiting: No  Abdominal pain: No  Bloating: Yes  Constipation: Yes  Diarrhea: No  Back pain: Yes  Muscle aches: Yes  Neck pain: Yes  Swollen joints: No  Joint pain: Yes  Bone pain: No  Muscle cramps: No  Trouble with coordination: No  Dizziness or trouble with balance: No  Fainting or black-out spells: No  Memory loss: No  Headache: Yes  Seizures: No  Speech problems: No  Nervous or Anxious: No  Depression: Yes  Trouble sleeping: No  Trouble thinking or concentrating: No  Mood changes: No  Panic attacks: No      Active Medications:     Current Outpatient Medications:      aspirin-dipyridamole (AGGRENOX)  MG per 12 hr capsule, Take 1 capsule by mouth daily , Disp: , Rfl:      BIOTIN PO, 1 tablet daily, Disp: , Rfl:      Blood Pressure Monitoring KIT, Check BP every 7 days., Disp: 1 kit, Rfl: 0     carboxymethylcellulose PF (REFRESH PLUS) 0.5 % ophthalmic solution, Place 1 drop into both  eyes 4 times daily as needed for dry eyes, Disp: 120 mL, Rfl: 11     cevimeline (EVOXAC) 30 MG capsule, Take 1 capsule (30 mg) by mouth 3 times daily, Disp: 270 capsule, Rfl: 2     cyanocobalamin (VITAMIN B12) 1000 MCG/ML injection, Inject 1 mL into the muscle every 30 days, Disp: , Rfl:      esomeprazole (NEXIUM) 40 MG DR capsule, Take 1 capsule (40 mg) by mouth daily Take 30-60 minutes before eating., Disp: 90 capsule, Rfl: 2     famotidine (PEPCID) 20 MG tablet, Take 1 tablet (20 mg) by mouth 2 times daily., Disp: 60 tablet, Rfl: 6     folic acid (FOLVITE) 1 MG tablet, Take 1 tablet (1 mg) by mouth daily, Disp: 90 tablet, Rfl: 3     LORazepam (ATIVAN) 0.5 MG tablet, Take 1 - 2 tablets by mouth three times daily as needed., Disp: 60 tablet, Rfl: 0     omeprazole (PRILOSEC) 40 MG DR capsule, Take 1 capsule (40 mg) by mouth 2 times daily, Disp: 180 capsule, Rfl: 3     ondansetron (ZOFRAN-ODT) 4 MG ODT tab, DISSOLVE 1 TABLET IN MOUTH EVERY EIGHT HOURS AS NEEDED FOR NAUSEA, Disp: 20 tablet, Rfl: 0     oxyCODONE-acetaminophen (PERCOCET) 5-325 MG tablet, Take 1 tablet by mouth every 4 hours as needed for moderate pain or severe pain. Maximum of 3 tablets per day., Disp: 90 tablet, Rfl: 0     pravastatin (PRAVACHOL) 10 MG tablet, Take 1 tablet (10 mg) by mouth daily, Disp: 30 tablet, Rfl: 5     valACYclovir (VALTREX) 500 MG tablet, Take 1 tablet (500 mg) by mouth 2 times daily (Patient taking differently: Take 500 mg by mouth daily), Disp: 180 tablet, Rfl: 1     esomeprazole (NEXIUM) 40 MG capsule, Take 1 capsule (40 mg) by mouth 2 times daily Take 30-60 minutes before eating. (Patient not taking: Reported on 8/12/2021), Disp: 180 capsule, Rfl: 3     hydroxychloroquine (PLAQUENIL) 200 MG tablet, Take 1 tablet (200 mg) by mouth 2 times daily Annual Plaquenil toxicity eye screening required. (Patient not taking: Reported on 8/12/2021), Disp: 180 tablet, Rfl: 3     leucovorin (WELLCOVORIN) 25 MG tablet, Take 1 tablet (25  "mg) by mouth daily Day after taking methotrexate (Patient not taking: Reported on 8/12/2021), Disp: 12 tablet, Rfl: 3     naloxone (EVZIO) 0.4 MG/0.4ML auto-injector, Inject 0.4 mLs (0.4 mg) into the muscle as needed for opioid reversal every 2-3 minutes until assistance arrives (Patient not taking: Reported on 8/12/2021), Disp: 0.8 mL, Rfl: 0     olopatadine (PATADAY) 0.2 % ophthalmic solution, Place 1 drop into both eyes daily (Patient not taking: Reported on 2/9/2022), Disp: 1 Bottle, Rfl: 11     olopatadine (PATANOL) 0.1 % ophthalmic solution, Place 1 drop into both eyes 2 times daily (Patient not taking: Reported on 8/12/2021), Disp: 5 mL, Rfl: 3     oxyCODONE (ROXICODONE) 5 MG tablet, Take 1 tablet (5 mg) by mouth every four hours as needed for moderate to severe pain.  Maximum of 6 tablets per day.  (Patient not taking: Reported on 8/12/2021), Disp: 180 tablet, Rfl: 0     Syringe/Needle, Disp, 27G X 1/2\" 1 ML MISC, Used with Methotrexate once weekly (Patient not taking: Reported on 8/12/2021), Disp: 12 each, Rfl: 3     Vitamin D, Cholecalciferol, 1000 UNITS TABS, Take 2,000 Units by mouth daily  (Patient not taking: Reported on 12/29/2022), Disp: , Rfl:       Allergies:   Penicillin   Bactroban   Levaquin  Rifampin   Clarithromycin   Ofloxacin  Sertraline                                                  Past Medical History:  Rheumatoid arthritis/scleroderma  Gastric antral vascular ectasia  Gastroesophageal reflux disease  Iron deficiency anemia  Irregular heart beat  Osteomyelitis, right 3rd finger  Raynaud's syndrome with gangrene  Scleroderma  Sjogren's syndrome  Systemic sclerosis  Metatarsalgia  Dysuria  Ulcer of finger  Sicca syndrome  Palpitations  Ischemia of upper extremity  Intercostal pain  Anxiety  Breast implant rupture  Palpitations     Past Surgical History:  Appendectomy   Colonoscopy (08/04/2016)  EGD x 5 (08/22/14 - 08/02/2016)  Mammoplasty Augmentation Bilateral     Family History:    The " patient's family history includes Cancer in an other family member; Respiratory in her father.     Social History:  The patient reports that she is a current cigarette smoker. She has been smoking about 1.00 pack per day. She has never used smokeless tobacco. She reports that she does not drink alcohol or use drugs. She states she is attempting to get into a research program for quitting smoking.   PCP: Lindy Carbone  Marital Status: Single     Physical Exam:   /56   Pulse 72   SpO2 98%    Wt Readings from Last 4 Encounters:   08/18/22 70.4 kg (155 lb 4.8 oz)   02/09/22 69.6 kg (153 lb 6.4 oz)   08/12/21 68 kg (150 lb)   08/20/20 65 kg (143 lb 3.2 oz)     Constitutional: Well-developed, appearing stated age; cooperative  Eyes: Normal EOM, PERRLA, vision, conjunctiva, sclera  ENT: Normal external ears, nose, hearing, lips, teeth, gums, throat. No mucous membrane lesions, normal saliva pool  Neck: No mass or thyroid enlargement  Resp: Lungs clear to auscultation, nl to palpation  CV: RRR, no murmurs, rubs or gallops, no edema  GI: No ABD mass or tenderness, no HSM  : Not tested  Lymph: No cervical, supraclavicular, inguinal or epitrochlear nodes  MS: The TMJ, neck, shoulder, elbow, wrist, MCP/PIP/DIP, spine, hip, knee, ankle, and foot MTP/IP joints were examined and found normal. No active synovitis or altered joint anatomy. Full joint ROM. Normal  strength. No dactylitis,  tenosynovitis, enthesopathy.  Proximally 15   contracture in PIPs and 5-10   in DIPs of the left hand. Right hand greater than 90   contractures in PIPs 3-5. Obvious calcinosis lesion over the lateral aspect of the right 4th PIP.  Contractures stable as per the patient.    Skin: No nail pitting, alopecia, rash, nodules or lesions  Skin thickening in hands and distal forearms.  She has no digital ulcers now.  Neuro: Normal cranial nerves, strength, sensation, DTRs.   Psych: Normal judgement, orientation, memory, affect.      Imaging:  HRCT without contrast (08/22/2019):  Impression:  1. Mild bilateral posterior subpleural reticular opacities, dependent atelectasis versus early ILD. Consider prone positioning on follow-up  study.  2. Trace mucous plugging in the right middle lobe.  3. Stable incidental calcified splenic artery aneurysm.  4. Collapsed left breast implant and calcified right implant.  Sinai Carter MD    PFT Results (07/22/2019):  The FVC and FEV1 and the FEV1/FVC ratio are within normal limits. The inspiratory flow rates are within normal limits. Lung volumes are within normal limits. The diffusing capacity is normal. However, the diffusing capacity was not corrected for the   patient's hemoglobin  Impression:  Normal spirometry, lung volumes and diffusing capacity.    Laboratory:   Component      Latest Ref Rng & Units 8/15/2019 8/15/2019 8/15/2019           3:18 PM  3:24 PM  3:24 PM   WBC      4.0 - 11.0 10e9/L  5.2    RBC Count      3.8 - 5.2 10e12/L  3.71 (L)    Hemoglobin      11.7 - 15.7 g/dL  11.4 (L)    Hematocrit      35.0 - 47.0 %  37.1    MCV      78 - 100 fl  100    MCH      26.5 - 33.0 pg  30.7    MCHC      31.5 - 36.5 g/dL  30.7 (L)    RDW      10.0 - 15.0 %  13.2    Platelet Count      150 - 450 10e9/L  212    Diff Method        Automated Method    % Neutrophils      %  48.8    % Lymphocytes      %  40.5    % Monocytes      %  9.0    % Eosinophils      %  1.3    % Basophils      %  0.2    % Immature Granulocytes      %  0.2    Nucleated RBCs      0 /100  0    Absolute Neutrophil      1.6 - 8.3 10e9/L  2.6    Absolute Lymphocytes      0.8 - 5.3 10e9/L  2.1    Absolute Monocytes      0.0 - 1.3 10e9/L  0.5    Absolute Eosinophils      0.0 - 0.7 10e9/L  0.1    Absolute Basophils      0.0 - 0.2 10e9/L  0.0    Abs Immature Granulocytes      0 - 0.4 10e9/L  0.0    Absolute Nucleated RBC        0.0    Color Urine       Colorless     Appearance Urine       Clear     Glucose Urine      NEG:Negative mg/dL  Negative     Bilirubin Urine      NEG:Negative Negative     Ketones Urine      NEG:Negative mg/dL Negative     Specific Gravity Urine      1.003 - 1.035 1.002 (L)     Blood Urine      NEG:Negative Small (A)     pH Urine      5.0 - 7.0 pH 6.0     Protein Albumin Urine      NEG:Negative mg/dL Negative     Urobilinogen mg/dL      0.0 - 2.0 mg/dL 0.0     Nitrite Urine      NEG:Negative Negative     Leukocyte Esterase Urine      NEG:Negative Negative     Source       Midstream Urine     WBC Urine      0 - 5 /HPF 1     RBC Urine      0 - 2 /HPF 1     Squamous Epithelial /HPF Urine      0 - 1 /HPF <1     Albumin      3.4 - 5.0 g/dL   4.3     Component      Latest Ref Rng & Units 8/15/2019 8/15/2019 8/15/2019 8/15/2019           3:24 PM  3:24 PM  3:24 PM  3:24 PM   Creatinine      0.52 - 1.04 mg/dL  0.78     GFR Estimate      >60 mL/min/1.73:m2  85     GFR Estimate If Black      >60 mL/min/1.73:m2  >90     Sed Rate      0 - 30 mm/h       CRP Inflammation      0.0 - 8.0 mg/L <2.9      AST      0 - 45 U/L   15    ALT      0 - 50 U/L    18   Albumin      3.4 - 5.0 g/dL         Component      Latest Ref Rng & Units 8/15/2019           3:24 PM   Sed Rate      0 - 30 mm/h 16           Again, thank you for allowing me to participate in the care of your patient.        Sincerely,        Brennen Child MD

## 2022-12-29 NOTE — NURSING NOTE
Chief Complaint   Patient presents with     Follow Up     Scleroderma follow up      Vitals were taken and medications were reconciled.    Bhumika Tapia RMA  3:59 PM    
denies.

## 2023-02-03 DIAGNOSIS — K21.01 GASTROESOPHAGEAL REFLUX DISEASE WITH ESOPHAGITIS AND HEMORRHAGE: ICD-10-CM

## 2023-02-07 RX ORDER — ESOMEPRAZOLE MAGNESIUM 40 MG/1
40 CAPSULE, DELAYED RELEASE ORAL DAILY
Qty: 90 CAPSULE | Refills: 2 | Status: SHIPPED | OUTPATIENT
Start: 2023-02-07 | End: 2023-10-27

## 2023-02-07 NOTE — TELEPHONE ENCOUNTER
Esomeprazole Magnesium Oral Capsule Delayed Release 40 MG    Resent order from November.   Never received at the pharmacy.       Malaika Ulloa RN  Central Triage Red Flags/Med Refills

## 2023-02-24 ENCOUNTER — MYC MEDICAL ADVICE (OUTPATIENT)
Dept: PULMONOLOGY | Facility: CLINIC | Age: 58
End: 2023-02-24
Payer: MEDICARE

## 2023-02-24 NOTE — TELEPHONE ENCOUNTER
Daniela at  This point she will mostly need to ride this out.     Do not know if she is still having fevers.  Even if she is not she may get some relief from some of these symptoms with acetaminophen, and she can take up to 3, maximum 4 g a day of that.    She should do her best to stay well-hydrated.  Other than for that getting through COVID is mostly a matter of resting a lot.  If she is still having a lot of nausea we could give her some ondansetron for that.

## 2023-02-24 NOTE — TELEPHONE ENCOUNTER
Return call placed to patient with Dr. Child's recommendations. She states that she did receive an order for ondansetron when she was seen in the ED and that her PCP is trying to arrange for monoclonal antibody treatment for her.   Stephanie Urbano RN  [facility]

## 2023-04-01 ENCOUNTER — HEALTH MAINTENANCE LETTER (OUTPATIENT)
Age: 58
End: 2023-04-01

## 2023-04-24 ENCOUNTER — TELEPHONE (OUTPATIENT)
Dept: RHEUMATOLOGY | Facility: CLINIC | Age: 58
End: 2023-04-24
Payer: MEDICARE

## 2023-04-24 NOTE — TELEPHONE ENCOUNTER
Return call placed to patient, patient states she will reschedule PFTs and 6 minute walk before she sees Dr. Child in July but is unable to complete tests at this time.   Stephanie Urbano RN  [facility]

## 2023-04-24 NOTE — TELEPHONE ENCOUNTER
[facility] Call Center    Phone Message    May a detailed message be left on voicemail: yes     Reason for Call: Other: Patient calling to reschedule her PFT and 6 min Walk for tomorrow.  Please advise and call patient. thank you.     Action Taken: Other: Pulm    Travel Screening: Not Applicable

## 2023-07-13 ENCOUNTER — OFFICE VISIT (OUTPATIENT)
Dept: PULMONOLOGY | Facility: CLINIC | Age: 58
End: 2023-07-13
Attending: INTERNAL MEDICINE
Payer: MEDICARE

## 2023-07-13 VITALS — HEART RATE: 53 BPM | OXYGEN SATURATION: 99 % | DIASTOLIC BLOOD PRESSURE: 69 MMHG | SYSTOLIC BLOOD PRESSURE: 132 MMHG

## 2023-07-13 DIAGNOSIS — I73.01 RAYNAUD'S DISEASE WITH GANGRENE (H): ICD-10-CM

## 2023-07-13 DIAGNOSIS — E05.00 GRAVES DISEASE: ICD-10-CM

## 2023-07-13 DIAGNOSIS — M34.9 SYSTEMIC SCLEROSIS (H): ICD-10-CM

## 2023-07-13 DIAGNOSIS — R06.09 DOE (DYSPNEA ON EXERTION): Primary | ICD-10-CM

## 2023-07-13 DIAGNOSIS — R63.5 WEIGHT GAIN: ICD-10-CM

## 2023-07-13 DIAGNOSIS — K22.4 ESOPHAGEAL DYSMOTILITY: ICD-10-CM

## 2023-07-13 DIAGNOSIS — M35.00 SICCA SYNDROME (H): ICD-10-CM

## 2023-07-13 PROCEDURE — 99214 OFFICE O/P EST MOD 30 MIN: CPT | Performed by: INTERNAL MEDICINE

## 2023-07-13 PROCEDURE — G0463 HOSPITAL OUTPT CLINIC VISIT: HCPCS | Performed by: INTERNAL MEDICINE

## 2023-07-13 NOTE — NURSING NOTE
Chief Complaint   Patient presents with     Follow Up     6mo f/u scleroderma     Vitals were taken and medications were reconciled.     RENATA Crisostomo

## 2023-07-13 NOTE — LETTER
2023         RE: Willard Grayson  [address]  [facility] [id number]        Dear Colleague,    Thank you for referring your patient, Willard Grayson, to the [facility] [place]. Please see a copy of my visit note below.    [facility]  Rheumatology Clinic  Brennen Child MD  2023     Name: Willard Grayson  MRN: [id number]  Age: 54 year old  : 1965  Referring provider: Referred Self    Problem List:  1. 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   2. Associated marked neuropathic skin pain markedly improved with Lyrica with prior medication therapy with gabapentin.  3. Diffuse musculoskeletal pain requiring methotrexate plus CellCept, and narcotic to control, but also improved. Now off methotrexate due to GI issues.  4. Initial clinical overlap with anti-CCP seropositive rheumatoid arthritis with continued anti-CCP seropositivity as of 2010.  5. Raynaud's phenomena with digital ulcers with easily cracked skin.   6. GI involvement consistent with bacterial small bowel overgrowth with marked improvement after a course of neomycin followed by Dr. Glez; positive hydrogen breath test in 3/2012 - treated with rifaximin through Dr. Jacob  7. No convincing evidence of lung involvement on serial pulmonary function tests or clinically.   8. History of heart palpitations prior to the onset of SSc with stable symptoms since. Holter 2018 with PACs.  9. History of medication failures with Remicade, methotrexate, methotrexate plus CellCept and with Orencia and with improvement since initiation of IVIG but with complicating headaches and overall sense of malaise with IVIG infusions.   10. History of  silicone breast implants.   11. Incidental finding of calcified splenic artery aneurysm.  12. EGD in 3/2012 with hiatal hernia and gastral antral vascular ectasia  13. Recurrent right third finger contracture with extensor surface ulceration, healed   14. Trochanteric bursitis, R>L    Assessment and Plan:   # Systemic sclerosis   # Gastroesophageal reflux disease, esophagitis presence not specified  # Esophageal dysmotility  # Raynaud's disease with gangrene  # High risk medications (not anticoagulants) long-term use  # Rheumatoid arthritis involving multiple sites with positive rheumatoid factor (H)  # Metatarsalgia, unspecified laterality  # Metatarsalgia of both feet  # Osteopenia, unspecified location  # Systemic sclerosis  # MCDANIEL (dyspnea on exertion)  # Chest wall pain  # Skin lesion of lower extremity    Plan/recommendation:    I do think her systemic sclerosis is stable.  She is a little bit difficult to assess in terms of how she is doing overall with contemporaneous thyroid disease.    I am inclined to agree that she would benefit from thyroidectomy and us achieving a stable dose of thyroid replacement therapy compared to the current therapeutic approach.  I would favor that and we discussed that.    I would like to plan on seeing her back in 8 to 9 months with pulmonary function test at that time, sooner as needed.    Today's visit was 22 minutes in face-to-face time with an additional 8 minutes spent in chart review,  and documentation completed on the date of service.    JANET Child MD, PhD    Rheumatology              August 20, 2020 interval history:    Patient comes in today with him main concern being some new skin lesions that she has noted over her left leg and left arm over the last few months.  She does have a dermatology appointment within the [facility] system where she gets most of her care.  That is not however until October.    The other new issue this been  bothering her a fair amount is neck pain and headaches.  She did get a cervical spine film just recently at [facility] and has not yet reviewed that with anyone there.    In terms of her scleroderma skin involvement she really feels that has been quite stable.  We have repeatedly tried to get her on methotrexate but she is tolerated that poorly but she has tolerated MMF better and remains on it.  The main problem for her has been the contractures in her hands and those have gradually worsened particularly in her right hand.    She is largely sheltering in place because of coronavirus, but she is still smoking cigarettes.  If anything she has increased doing so because of feeling under stress.      January 14, 2021 interval history:    Since we last seen the patient she feels she is really been doing pretty stably.  She went off of MMF because of development of a squamous cell carcinoma in the understanding that MMF could have been contributing to impaired tumor Amino-Cerv balance.  She really does not think she is gotten worse that since going off of it.  That was back on November 12.  The SCCA was on her forearm and is healed nicely.  She thinks that perhaps the skin in the area was a little more shiny than surrounding skin but there were no other difficulties with the healing.    She does acknowledge that over the last year or 2 her fingers to develop worsening contractures and she has difficulty writing.  Nonetheless she has tolerated methotrexate poorly and its not clear how much it helped her when she was on it.  She does acknowledge that she does not do stretching exercises as reliably as she might.    She has had a few digital ulcers but they have been healing more quickly than usual.  She is not certain why but she does note that she is no longer taking care of her grandchildren so she is able to rest her hands and protect them better.    She has established with the pain clinic at [facility] and that they have taken  over the prescribing of her narcotics and are working with her on a more comprehensive pain program.  She is really actually quite happy about that.    She continues to get some esophageal spasms at times but oddly this seems only to happen with the drinking of liquids.  Its not bad enough that she wants to do anything more about it.  We did talk about the possible value of all toyed minutes however.    The other new thing that she has had is some dizziness and tinnitus developing in her left ear much greater than right.  She is seeking care with an otolaryngologist at [facility] where most of her care is delivered however.    She is otherwise been pretty good.  She had a little bit of a flareup last week where she felt weaker and more achy and more sluggish.  She has not had a clear-cut coronavirus exposure to her knowledge.      August 12, 2021 interval history:    Since we have last seen the patient she feels she is really been doing very well.  She has been seeing the [facility] pain clinic and was having some difficulties that seemed unusual to her.  Ultimately after a lot of jitters and changes in her pain situation she was worked up and diagnosed with Graves' disease and is now being treated with methimazole.    She also was seen in the breast clinic where there was some evidence of fluid around her implants raising the question of whether there had been a rupture of them.  There was associated pain.  The fluid was removed and she has been feeling better since then.    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  That was removed and the wound has healed up very well.    Interestingly, since she has had Botox injections she has not had any digital ulcers including over her right contractured PIP joints.  Although she really did not like the amount of pain she got with the Botox she is pretty happy with not having had any ulcers.    Potentially contributing to that improvement however is that  she is not been smoking at all for the last 4 months.  Her breathing feels better to her as well and overall her breathing feels stable to her.  She has not had pulmonary function test for quite some time and is a bit overdue for those, but she is never had evidence of significant lung involvement.    Although she thinks her skin is somewhat sensitive she does not believe it is worsening at all and she believes is completely stable and her joints feel stable to her as well and her not bothering her significantly.    February 9, 2022 interval history:    Since we have last seen the patient she really has not felt well.  She thinks this is primarily since she is received her second coronavirus shot in the fall.  She however also had autoimmune thyroiditis diagnosed in April and has been placed on methimazole and does not think she is felt quite as well since then either.  She of course became euthyroid after being hyperthyroid and has gained weight with it.    Among the thing she has been noticing more recently is virtually constant heartburn despite being on omeprazole 40 mg twice daily.    She however also notices more widespread pain.  This includes the small joints of her hands and feet but she denies significant morning stiffness there.    She has not been on any disease modifying drugs now for quite some time including Plaquenil.  She does believe that her skin has been stable throughout all this and not worsening.    She does have a lot of fatigue, and as part of recent laboratory work-up she had iron levels done that were on the low side of normal but she was anemic and her ferritin was low.  There were no other inflammatory markers so the ferritin could not be put fully in context.  She has done well in the past after being given IV iron.    She is not exercising regularly but does not think she is fundamentally more short of breath.  Her last PFTs were 2 years ago but these were completely stable with forced  vital capacity 102% of predicted.  DLCO on those studies was at 84% of predicted also stable.  She does continue to smoke though she says very little.    August 18, 2022 interval history:    Since last clinic visit,     Patient reports having worsening swallowing issues with pills getting stuck in the back of the throat and having difficulty drinking water occasionally.  Points to the lower portion of the neck and states that it feels like things are getting stuck there.  Denies any choking or sensation denies any issues with consuming food.  Reports that she does use some vitamin once a day in the morning.    Reports consistent fatigue and states that she is being evaluated by endocrinology for radioactive ablation vs surgery for her Graves' disease.  Reports that her thyroid issues has been causing weight changes, fatigue, palpitations and sleep issues.    Reports that her shortness of breath has been moderately worse since last clinic visit.  In the last PFTs, she had significant worsening of her FVC and DLCO.  However, at that time patient reported that the test was not done properly.     She continues to smoke 1 pack/day.    She states that she is not much physically active.    December 29, 2022 interval history:    Since we have last seen the patient she has continued to struggle with getting the thyroid dosage correct but she feels like that has finally happened.  She definitely is feeling better with it at this point that she has and feels like she is essentially back to her normal baseline.    In terms of features of her scleroderma she thinks that despite all of the difficulties with getting the thyroid correct, that she has been pretty stable with that.  Her GERD is variable day-to-day and she can have enough reflux that she wakes due to it but its not frequent.  She denies worsening dysphagia.  She actually drinks soda despite the fact that it bothers her somewhat and she gets some difficulty with moving  that through.  She knows that she really does not have a lot of appetite but her weight has been stable or she has been gaining slightly.    She does have Raynauds episodes but is not had any of the more severe ones that she had earlier on in the disease process and has not had any digital ulcers though she does continue to smoke.    She does think her breathing may be gradually a little bit worse.  She thinks that is due to lack of exercise and the difficulties with adjusting her thyroid hormone.  She has not however had PFTs for some time although we encouraged her to do them last time and there are active orders on her chart.    She otherwise feels stable.  She denies significant musculoskeletal features, or any worsening of her skin.  She has been off of all medication now in terms of controlling skin joint and other inflammatory features of disease for quite some time.    July 13, 2023 interval history:    Since we have last seen the patient she feels she is done quite stably with respect to her systemic sclerosis.  Her biggest problems she believes derived from her active Graves' disease.  She is on methimazole for that but is not sure she is tolerating it all that well.  Her current endocrinologist has encouraged her to consider thyroidectomy and thyroid replacement.    She quit smoking in January.  Perhaps because of that in part and because of the thyroid disease in part she has gained significant weight and is up about 30 pounds.  Notably however her exercise tolerance is actually improved overall.  She attributes that to stopping smoking.  She says she can climb stairs pretty well without a lot of difficulty.    Raynaud's phenomena also was better this winter than it has been, likely also due to stopping smoking.    She however is having more pain in her skin.  This does not feel to her like its her scleroderma and she thinks maybe it is more related to either her thyroid disease or the methimazole  itself, either of which could be the case.    GERD is stable and other manifestations of her disease have been stable without any significant worsening.  She does continue to have Keratoconjunctivitis sicca.  That was a consideration in terms of possibility of radioactive thyroid ablation, which her endocrinologist consult against.    Review of Systems:   Pertinent items are noted in HPI or as below, remainder of complete ROS is negative.      No recent problems with hearing or vision. No swallowing problems.   No breathing difficulty, shortness of breath, coughing, or wheezing  No chest pain or palpitations  No heart burn, indigestion, abdominal pain, nausea, vomiting, diarrhea  No urination problems, no bloody, cloudy urine, no dysuria  No numbing, tingling, weakness  No headaches or confusion  No rashes. No easy bleeding or bruising.   Answers for HPI/ROS submitted by the patient on 11/21/2019   General Symptoms: Yes  Skin Symptoms: Yes  HENT Symptoms: Yes  EYE SYMPTOMS: Yes  HEART SYMPTOMS: Yes  LUNG SYMPTOMS: Yes  INTESTINAL SYMPTOMS: Yes  URINARY SYMPTOMS: No  GYNECOLOGIC SYMPTOMS: No  BREAST SYMPTOMS: No  SKELETAL SYMPTOMS: Yes  BLOOD SYMPTOMS: No  NERVOUS SYSTEM SYMPTOMS: Yes  MENTAL HEALTH SYMPTOMS: Yes  Fever: No  Loss of appetite: No  Weight loss: No  Weight gain: Yes  Fatigue: Yes  Night sweats: Yes  Chills: No  Excessive thirst: Yes  Feeling hot or cold when others believe the temperature is normal: Yes  Loss of height: No  Post-operative complications: No  Surgical site pain: No  Hallucinations: No  Change in or Loss of Energy: No  Changes in hair: No  Changes in moles/birth marks: Yes  Itching: Yes  Rashes: No  Changes in nails: No  Acne: No  Hair in places you don't want it: No  Ear pain: No  Ear discharge: No  Hearing loss: Yes  Tinnitus: Yes  Nosebleeds: No  Congestion: No  Sinus pain: No  Tooth pain: No  Gum tenderness: No  Bleeding gums: No  Change in taste: No  Change in sense of smell: No  Dry  mouth: Yes  Hearing aid used: No  Eye pain: No  Vision loss: Yes  Dry eyes: Yes  Watery eyes: Yes  Eye bulging: No  Double vision: No  Flashing of lights: No  Cough: No  Sputum or phlegm: No  Coughing up blood: No  Difficulty breating or shortness of breath: No  Snoring: No  Wheezing: No  Difficulty breathing on exertion: Yes  Chest pain or pressure: No  Fast or irregular heartbeat: No  Pain in legs with walking: Yes  Trouble breathing while lying down: No  Fingers or toes appear blue: Yes  High blood pressure: Yes  Low blood pressure: No  Fainting: No  Murmurs: No  Pacemaker: No  Varicose veins: No  Edema or swelling: No  Wake up at night with shortness of breath: No  Light-headedness: No  Heart burn or indigestion: Yes  Nausea: No  Vomiting: No  Abdominal pain: No  Bloating: Yes  Constipation: Yes  Diarrhea: No  Back pain: Yes  Muscle aches: Yes  Neck pain: Yes  Swollen joints: No  Joint pain: Yes  Bone pain: No  Muscle cramps: No  Trouble with coordination: No  Dizziness or trouble with balance: No  Fainting or black-out spells: No  Memory loss: No  Headache: Yes  Seizures: No  Speech problems: No  Nervous or Anxious: No  Depression: Yes  Trouble sleeping: No  Trouble thinking or concentrating: No  Mood changes: No  Panic attacks: No      Active Medications:     Current Outpatient Medications:     aspirin-dipyridamole (AGGRENOX)  MG per 12 hr capsule, Take 1 capsule by mouth daily , Disp: , Rfl:     BIOTIN PO, 1 tablet daily, Disp: , Rfl:     Blood Pressure Monitoring KIT, Check BP every 7 days., Disp: 1 kit, Rfl: 0    carboxymethylcellulose PF (REFRESH PLUS) 0.5 % ophthalmic solution, Place 1 drop into both eyes 4 times daily as needed for dry eyes, Disp: 120 mL, Rfl: 11    cevimeline (EVOXAC) 30 MG capsule, Take 1 capsule (30 mg) by mouth 3 times daily, Disp: 270 capsule, Rfl: 2    cyanocobalamin (VITAMIN B12) 1000 MCG/ML injection, Inject 1 mL into the muscle every 30 days, Disp: , Rfl:     esomeprazole  (NEXIUM) 40 MG DR capsule, Take 1 capsule (40 mg) by mouth daily Take 30-60 minutes before eating., Disp: 90 capsule, Rfl: 2    famotidine (PEPCID) 20 MG tablet, Take 1 tablet (20 mg) by mouth 2 times daily., Disp: 60 tablet, Rfl: 6    hydroxychloroquine (PLAQUENIL) 200 MG tablet, Take 1 tablet (200 mg) by mouth 2 times daily Annual Plaquenil toxicity eye screening required. (Patient not taking: Reported on 8/12/2021), Disp: 180 tablet, Rfl: 3    leucovorin (WELLCOVORIN) 25 MG tablet, Take 1 tablet (25 mg) by mouth daily Day after taking methotrexate (Patient not taking: Reported on 8/12/2021), Disp: 12 tablet, Rfl: 3    LORazepam (ATIVAN) 0.5 MG tablet, Take 1 - 2 tablets by mouth three times daily as needed., Disp: 60 tablet, Rfl: 0    naloxone (EVZIO) 0.4 MG/0.4ML auto-injector, Inject 0.4 mLs (0.4 mg) into the muscle as needed for opioid reversal every 2-3 minutes until assistance arrives (Patient not taking: Reported on 8/12/2021), Disp: 0.8 mL, Rfl: 0    olopatadine (PATADAY) 0.2 % ophthalmic solution, Place 1 drop into both eyes daily (Patient not taking: Reported on 2/9/2022), Disp: 1 Bottle, Rfl: 11    olopatadine (PATANOL) 0.1 % ophthalmic solution, Place 1 drop into both eyes 2 times daily (Patient not taking: Reported on 8/12/2021), Disp: 5 mL, Rfl: 3    omeprazole (PRILOSEC) 40 MG DR capsule, Take 1 capsule (40 mg) by mouth 2 times daily, Disp: 180 capsule, Rfl: 3    ondansetron (ZOFRAN-ODT) 4 MG ODT tab, DISSOLVE 1 TABLET IN MOUTH EVERY EIGHT HOURS AS NEEDED FOR NAUSEA, Disp: 20 tablet, Rfl: 0    oxyCODONE (ROXICODONE) 5 MG tablet, Take 1 tablet (5 mg) by mouth every four hours as needed for moderate to severe pain.  Maximum of 6 tablets per day.  (Patient not taking: Reported on 8/12/2021), Disp: 180 tablet, Rfl: 0    oxyCODONE-acetaminophen (PERCOCET) 5-325 MG tablet, Take 1 tablet by mouth every 4 hours as needed for moderate pain or severe pain. Maximum of 3 tablets per day., Disp: 90 tablet, Rfl:  "0    pravastatin (PRAVACHOL) 10 MG tablet, Take 1 tablet (10 mg) by mouth daily, Disp: 30 tablet, Rfl: 5    Syringe/Needle, Disp, 27G X 1/2\" 1 ML MISC, Used with Methotrexate once weekly (Patient not taking: Reported on 8/12/2021), Disp: 12 each, Rfl: 3    valACYclovir (VALTREX) 500 MG tablet, Take 1 tablet (500 mg) by mouth 2 times daily (Patient taking differently: Take 500 mg by mouth daily), Disp: 180 tablet, Rfl: 1    Vitamin D, Cholecalciferol, 1000 UNITS TABS, Take 2,000 Units by mouth daily  (Patient not taking: Reported on 12/29/2022), Disp: , Rfl:       Allergies:   Penicillin   Bactroban   Levaquin  Rifampin   Clarithromycin   Ofloxacin  Sertraline                                                  Past Medical History:  Rheumatoid arthritis/scleroderma  Gastric antral vascular ectasia  Gastroesophageal reflux disease  Iron deficiency anemia  Irregular heart beat  Osteomyelitis, right 3rd finger  Raynaud's syndrome with gangrene  Scleroderma  Sjogren's syndrome  Systemic sclerosis  Metatarsalgia  Dysuria  Ulcer of finger  Sicca syndrome  Palpitations  Ischemia of upper extremity  Intercostal pain  Anxiety  Breast implant rupture  Palpitations     Past Surgical History:  Appendectomy   Colonoscopy (08/04/2016)  EGD x 5 (08/22/14 - 08/02/2016)  Mammoplasty Augmentation Bilateral     Family History:    The patient's family history includes Cancer in an other family member; Respiratory in her father.     Social History:  The patient reports that she is a current cigarette smoker. She has been smoking about 1.00 pack per day. She has never used smokeless tobacco. She reports that she does not drink alcohol or use drugs. She states she is attempting to get into a research program for quitting smoking.   PCP: Lindy Carbone  Marital Status: Single     Physical Exam:   /69   Pulse 53   SpO2 99%    Wt Readings from Last 4 Encounters:   08/18/22 70.4 kg (155 lb 4.8 oz)   02/09/22 69.6 kg (153 lb " 6.4 oz)   08/12/21 68 kg (150 lb)   08/20/20 65 kg (143 lb 3.2 oz)     Constitutional: Well-developed, appearing stated age; cooperative  Eyes: Normal EOM, PERRLA, vision, conjunctiva, sclera  ENT: Normal external ears, nose, hearing, lips, teeth, gums, throat. No mucous membrane lesions, normal saliva pool  Neck: No mass or thyroid enlargement  Resp: Lungs clear to auscultation, nl to palpation  CV: RRR, no murmurs, rubs or gallops, no edema  GI: No ABD mass or tenderness, no HSM  : Not tested  Lymph: No cervical, supraclavicular, inguinal or epitrochlear nodes  MS: The TMJ, neck, shoulder, elbow, wrist, MCP/PIP/DIP, spine, hip, knee, ankle, and foot MTP/IP joints were examined and found normal. No active synovitis or altered joint anatomy. Full joint ROM. Normal  strength. No dactylitis,  tenosynovitis, enthesopathy.  Proximally 15   contracture in PIPs and 5-10   in DIPs of the left hand. Right hand greater than 90   contractures in PIPs 3-5.  Diminished calcinosis lesion over the lateral aspect of the right 4th PIP.  Contractures stable as per the patient.    Skin: No nail pitting, alopecia, rash, nodules or lesions  Skin thickening in hands and distal forearms appears stable to me and is minimal.  For the most part her skin has the atrophic changes of late systemic sclerosis with minimal or no skin thickening..  She has no digital ulcers now.  Neuro: Normal cranial nerves, strength, sensation, DTRs.   Psych: Normal judgement, orientation, memory, affect.     Imaging:  HRCT without contrast (08/22/2019):  Impression:  1. Mild bilateral posterior subpleural reticular opacities, dependent atelectasis versus early ILD. Consider prone positioning on follow-up  study.  2. Trace mucous plugging in the right middle lobe.  3. Stable incidental calcified splenic artery aneurysm.  4. Collapsed left breast implant and calcified right implant.  Sinai Carter MD    PFT Results (07/22/2019):  The FVC and FEV1 and the  FEV1/FVC ratio are within normal limits. The inspiratory flow rates are within normal limits. Lung volumes are within normal limits. The diffusing capacity is normal. However, the diffusing capacity was not corrected for the   patient's hemoglobin  Impression:  Normal spirometry, lung volumes and diffusing capacity.    Laboratory:   Component      Latest Ref Rng & Units 8/15/2019 8/15/2019 8/15/2019           3:18 PM  3:24 PM  3:24 PM   WBC      4.0 - 11.0 10e9/L  5.2    RBC Count      3.8 - 5.2 10e12/L  3.71 (L)    Hemoglobin      11.7 - 15.7 g/dL  11.4 (L)    Hematocrit      35.0 - 47.0 %  37.1    MCV      78 - 100 fl  100    MCH      26.5 - 33.0 pg  30.7    MCHC      31.5 - 36.5 g/dL  30.7 (L)    RDW      10.0 - 15.0 %  13.2    Platelet Count      150 - 450 10e9/L  212    Diff Method        Automated Method    % Neutrophils      %  48.8    % Lymphocytes      %  40.5    % Monocytes      %  9.0    % Eosinophils      %  1.3    % Basophils      %  0.2    % Immature Granulocytes      %  0.2    Nucleated RBCs      0 /100  0    Absolute Neutrophil      1.6 - 8.3 10e9/L  2.6    Absolute Lymphocytes      0.8 - 5.3 10e9/L  2.1    Absolute Monocytes      0.0 - 1.3 10e9/L  0.5    Absolute Eosinophils      0.0 - 0.7 10e9/L  0.1    Absolute Basophils      0.0 - 0.2 10e9/L  0.0    Abs Immature Granulocytes      0 - 0.4 10e9/L  0.0    Absolute Nucleated RBC        0.0    Color Urine       Colorless     Appearance Urine       Clear     Glucose Urine      NEG:Negative mg/dL Negative     Bilirubin Urine      NEG:Negative Negative     Ketones Urine      NEG:Negative mg/dL Negative     Specific Gravity Urine      1.003 - 1.035 1.002 (L)     Blood Urine      NEG:Negative Small (A)     pH Urine      5.0 - 7.0 pH 6.0     Protein Albumin Urine      NEG:Negative mg/dL Negative     Urobilinogen mg/dL      0.0 - 2.0 mg/dL 0.0     Nitrite Urine      NEG:Negative Negative     Leukocyte Esterase Urine      NEG:Negative Negative     Source        Midstream Urine     WBC Urine      0 - 5 /HPF 1     RBC Urine      0 - 2 /HPF 1     Squamous Epithelial /HPF Urine      0 - 1 /HPF <1     Albumin      3.4 - 5.0 g/dL   4.3     Component      Latest Ref Rng & Units 8/15/2019 8/15/2019 8/15/2019 8/15/2019           3:24 PM  3:24 PM  3:24 PM  3:24 PM   Creatinine      0.52 - 1.04 mg/dL  0.78     GFR Estimate      >60 mL/min/1.73:m2  85     GFR Estimate If Black      >60 mL/min/1.73:m2  >90     Sed Rate      0 - 30 mm/h       CRP Inflammation      0.0 - 8.0 mg/L <2.9      AST      0 - 45 U/L   15    ALT      0 - 50 U/L    18   Albumin      3.4 - 5.0 g/dL         Component      Latest Ref Rng & Units 8/15/2019           3:24 PM   Sed Rate      0 - 30 mm/h 16               Again, thank you for allowing me to participate in the care of your patient.        Sincerely,        Brennen Child MD

## 2023-07-13 NOTE — PROGRESS NOTES
[facility]  Rheumatology Clinic  Brennen Child MD  2023     Name: Willard Grayson  MRN: [id number]  Age: 54 year old  : 1965  Referring provider: Referred Self    Problem List:  1. 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   2. Associated marked neuropathic skin pain markedly improved with Lyrica with prior medication therapy with gabapentin.  3. Diffuse musculoskeletal pain requiring methotrexate plus CellCept, and narcotic to control, but also improved. Now off methotrexate due to GI issues.  4. Initial clinical overlap with anti-CCP seropositive rheumatoid arthritis with continued anti-CCP seropositivity as of 2010.  5. Raynaud's phenomena with digital ulcers with easily cracked skin.   6. GI involvement consistent with bacterial small bowel overgrowth with marked improvement after a course of neomycin followed by Dr. Glez; positive hydrogen breath test in 3/2012 - treated with rifaximin through Dr. Jacob  7. No convincing evidence of lung involvement on serial pulmonary function tests or clinically.   8. History of heart palpitations prior to the onset of SSc with stable symptoms since. Holter 2018 with PACs.  9. History of medication failures with Remicade, methotrexate, methotrexate plus CellCept and with Orencia and with improvement since initiation of IVIG but with complicating headaches and overall sense of malaise with IVIG infusions.   10. History of silicone breast implants.   11. Incidental finding of calcified splenic artery aneurysm.  12. EGD in 3/2012 with hiatal hernia and gastral antral vascular ectasia  13. Recurrent right third finger contracture with extensor surface ulceration, healed   14. Trochanteric bursitis, R>L    Assessment and Plan:   #  Systemic sclerosis   # Gastroesophageal reflux disease, esophagitis presence not specified  # Esophageal dysmotility  # Raynaud's disease with gangrene  # High risk medications (not anticoagulants) long-term use  # Rheumatoid arthritis involving multiple sites with positive rheumatoid factor (H)  # Metatarsalgia, unspecified laterality  # Metatarsalgia of both feet  # Osteopenia, unspecified location  # Systemic sclerosis  # MCDANIEL (dyspnea on exertion)  # Chest wall pain  # Skin lesion of lower extremity    Plan/recommendation:    I do think her systemic sclerosis is stable.  She is a little bit difficult to assess in terms of how she is doing overall with contemporaneous thyroid disease.    I am inclined to agree that she would benefit from thyroidectomy and us achieving a stable dose of thyroid replacement therapy compared to the current therapeutic approach.  I would favor that and we discussed that.    I would like to plan on seeing her back in 8 to 9 months with pulmonary function test at that time, sooner as needed.    Today's visit was 22 minutes in face-to-face time with an additional 8 minutes spent in chart review,  and documentation completed on the date of service.    JAENT Child MD, PhD    Rheumatology              August 20, 2020 interval history:    Patient comes in today with him main concern being some new skin lesions that she has noted over her left leg and left arm over the last few months.  She does have a dermatology appointment within the [facility] system where she gets most of her care.  That is not however until October.    The other new issue this been bothering her a fair amount is neck pain and headaches.  She did get a cervical spine film just recently at [facility] and has not yet reviewed that with anyone there.    In terms of her scleroderma skin involvement she really feels that has been quite stable.  We have repeatedly tried to get her on methotrexate but she is  tolerated that poorly but she has tolerated MMF better and remains on it.  The main problem for her has been the contractures in her hands and those have gradually worsened particularly in her right hand.    She is largely sheltering in place because of coronavirus, but she is still smoking cigarettes.  If anything she has increased doing so because of feeling under stress.      January 14, 2021 interval history:    Since we last seen the patient she feels she is really been doing pretty stably.  She went off of MMF because of development of a squamous cell carcinoma in the understanding that MMF could have been contributing to impaired tumor Amino-Cerv balance.  She really does not think she is gotten worse that since going off of it.  That was back on November 12.  The SCCA was on her forearm and is healed nicely.  She thinks that perhaps the skin in the area was a little more shiny than surrounding skin but there were no other difficulties with the healing.    She does acknowledge that over the last year or 2 her fingers to develop worsening contractures and she has difficulty writing.  Nonetheless she has tolerated methotrexate poorly and its not clear how much it helped her when she was on it.  She does acknowledge that she does not do stretching exercises as reliably as she might.    She has had a few digital ulcers but they have been healing more quickly than usual.  She is not certain why but she does note that she is no longer taking care of her grandchildren so she is able to rest her hands and protect them better.    She has established with the pain clinic at [facility] and that they have taken over the prescribing of her narcotics and are working with her on a more comprehensive pain program.  She is really actually quite happy about that.    She continues to get some esophageal spasms at times but oddly this seems only to happen with the drinking of liquids.  Its not bad enough that she wants to do anything  more about it.  We did talk about the possible value of all toyed minutes however.    The other new thing that she has had is some dizziness and tinnitus developing in her left ear much greater than right.  She is seeking care with an otolaryngologist at [facility] where most of her care is delivered however.    She is otherwise been pretty good.  She had a little bit of a flareup last week where she felt weaker and more achy and more sluggish.  She has not had a clear-cut coronavirus exposure to her knowledge.      August 12, 2021 interval history:    Since we have last seen the patient she feels she is really been doing very well.  She has been seeing the [facility] pain clinic and was having some difficulties that seemed unusual to her.  Ultimately after a lot of jitters and changes in her pain situation she was worked up and diagnosed with Graves' disease and is now being treated with methimazole.    She also was seen in the breast clinic where there was some evidence of fluid around her implants raising the question of whether there had been a rupture of them.  There was associated pain.  The fluid was removed and she has been feeling better since then.    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  That was removed and the wound has healed up very well.    Interestingly, since she has had Botox injections she has not had any digital ulcers including over her right contractured PIP joints.  Although she really did not like the amount of pain she got with the Botox she is pretty happy with not having had any ulcers.    Potentially contributing to that improvement however is that she is not been smoking at all for the last 4 months.  Her breathing feels better to her as well and overall her breathing feels stable to her.  She has not had pulmonary function test for quite some time and is a bit overdue for those, but she is never had evidence of significant lung involvement.    Although she  thinks her skin is somewhat sensitive she does not believe it is worsening at all and she believes is completely stable and her joints feel stable to her as well and her not bothering her significantly.    February 9, 2022 interval history:    Since we have last seen the patient she really has not felt well.  She thinks this is primarily since she is received her second coronavirus shot in the fall.  She however also had autoimmune thyroiditis diagnosed in April and has been placed on methimazole and does not think she is felt quite as well since then either.  She of course became euthyroid after being hyperthyroid and has gained weight with it.    Among the thing she has been noticing more recently is virtually constant heartburn despite being on omeprazole 40 mg twice daily.    She however also notices more widespread pain.  This includes the small joints of her hands and feet but she denies significant morning stiffness there.    She has not been on any disease modifying drugs now for quite some time including Plaquenil.  She does believe that her skin has been stable throughout all this and not worsening.    She does have a lot of fatigue, and as part of recent laboratory work-up she had iron levels done that were on the low side of normal but she was anemic and her ferritin was low.  There were no other inflammatory markers so the ferritin could not be put fully in context.  She has done well in the past after being given IV iron.    She is not exercising regularly but does not think she is fundamentally more short of breath.  Her last PFTs were 2 years ago but these were completely stable with forced vital capacity 102% of predicted.  DLCO on those studies was at 84% of predicted also stable.  She does continue to smoke though she says very little.    August 18, 2022 interval history:    Since last clinic visit,     Patient reports having worsening swallowing issues with pills getting stuck in the back of the  throat and having difficulty drinking water occasionally.  Points to the lower portion of the neck and states that it feels like things are getting stuck there.  Denies any choking or sensation denies any issues with consuming food.  Reports that she does use some vitamin once a day in the morning.    Reports consistent fatigue and states that she is being evaluated by endocrinology for radioactive ablation vs surgery for her Graves' disease.  Reports that her thyroid issues has been causing weight changes, fatigue, palpitations and sleep issues.    Reports that her shortness of breath has been moderately worse since last clinic visit.  In the last PFTs, she had significant worsening of her FVC and DLCO.  However, at that time patient reported that the test was not done properly.     She continues to smoke 1 pack/day.    She states that she is not much physically active.    December 29, 2022 interval history:    Since we have last seen the patient she has continued to struggle with getting the thyroid dosage correct but she feels like that has finally happened.  She definitely is feeling better with it at this point that she has and feels like she is essentially back to her normal baseline.    In terms of features of her scleroderma she thinks that despite all of the difficulties with getting the thyroid correct, that she has been pretty stable with that.  Her GERD is variable day-to-day and she can have enough reflux that she wakes due to it but its not frequent.  She denies worsening dysphagia.  She actually drinks soda despite the fact that it bothers her somewhat and she gets some difficulty with moving that through.  She knows that she really does not have a lot of appetite but her weight has been stable or she has been gaining slightly.    She does have Raynauds episodes but is not had any of the more severe ones that she had earlier on in the disease process and has not had any digital ulcers though she does  continue to smoke.    She does think her breathing may be gradually a little bit worse.  She thinks that is due to lack of exercise and the difficulties with adjusting her thyroid hormone.  She has not however had PFTs for some time although we encouraged her to do them last time and there are active orders on her chart.    She otherwise feels stable.  She denies significant musculoskeletal features, or any worsening of her skin.  She has been off of all medication now in terms of controlling skin joint and other inflammatory features of disease for quite some time.    July 13, 2023 interval history:    Since we have last seen the patient she feels she is done quite stably with respect to her systemic sclerosis.  Her biggest problems she believes derived from her active Graves' disease.  She is on methimazole for that but is not sure she is tolerating it all that well.  Her current endocrinologist has encouraged her to consider thyroidectomy and thyroid replacement.    She quit smoking in January.  Perhaps because of that in part and because of the thyroid disease in part she has gained significant weight and is up about 30 pounds.  Notably however her exercise tolerance is actually improved overall.  She attributes that to stopping smoking.  She says she can climb stairs pretty well without a lot of difficulty.    Raynaud's phenomena also was better this winter than it has been, likely also due to stopping smoking.    She however is having more pain in her skin.  This does not feel to her like its her scleroderma and she thinks maybe it is more related to either her thyroid disease or the methimazole itself, either of which could be the case.    GERD is stable and other manifestations of her disease have been stable without any significant worsening.  She does continue to have Keratoconjunctivitis sicca.  That was a consideration in terms of possibility of radioactive thyroid ablation, which her endocrinologist  consult against.    Review of Systems:   Pertinent items are noted in HPI or as below, remainder of complete ROS is negative.      No recent problems with hearing or vision. No swallowing problems.   No breathing difficulty, shortness of breath, coughing, or wheezing  No chest pain or palpitations  No heart burn, indigestion, abdominal pain, nausea, vomiting, diarrhea  No urination problems, no bloody, cloudy urine, no dysuria  No numbing, tingling, weakness  No headaches or confusion  No rashes. No easy bleeding or bruising.   Answers for HPI/ROS submitted by the patient on 11/21/2019   General Symptoms: Yes  Skin Symptoms: Yes  HENT Symptoms: Yes  EYE SYMPTOMS: Yes  HEART SYMPTOMS: Yes  LUNG SYMPTOMS: Yes  INTESTINAL SYMPTOMS: Yes  URINARY SYMPTOMS: No  GYNECOLOGIC SYMPTOMS: No  BREAST SYMPTOMS: No  SKELETAL SYMPTOMS: Yes  BLOOD SYMPTOMS: No  NERVOUS SYSTEM SYMPTOMS: Yes  MENTAL HEALTH SYMPTOMS: Yes  Fever: No  Loss of appetite: No  Weight loss: No  Weight gain: Yes  Fatigue: Yes  Night sweats: Yes  Chills: No  Excessive thirst: Yes  Feeling hot or cold when others believe the temperature is normal: Yes  Loss of height: No  Post-operative complications: No  Surgical site pain: No  Hallucinations: No  Change in or Loss of Energy: No  Changes in hair: No  Changes in moles/birth marks: Yes  Itching: Yes  Rashes: No  Changes in nails: No  Acne: No  Hair in places you don't want it: No  Ear pain: No  Ear discharge: No  Hearing loss: Yes  Tinnitus: Yes  Nosebleeds: No  Congestion: No  Sinus pain: No  Tooth pain: No  Gum tenderness: No  Bleeding gums: No  Change in taste: No  Change in sense of smell: No  Dry mouth: Yes  Hearing aid used: No  Eye pain: No  Vision loss: Yes  Dry eyes: Yes  Watery eyes: Yes  Eye bulging: No  Double vision: No  Flashing of lights: No  Cough: No  Sputum or phlegm: No  Coughing up blood: No  Difficulty breating or shortness of breath: No  Snoring: No  Wheezing: No  Difficulty breathing on  exertion: Yes  Chest pain or pressure: No  Fast or irregular heartbeat: No  Pain in legs with walking: Yes  Trouble breathing while lying down: No  Fingers or toes appear blue: Yes  High blood pressure: Yes  Low blood pressure: No  Fainting: No  Murmurs: No  Pacemaker: No  Varicose veins: No  Edema or swelling: No  Wake up at night with shortness of breath: No  Light-headedness: No  Heart burn or indigestion: Yes  Nausea: No  Vomiting: No  Abdominal pain: No  Bloating: Yes  Constipation: Yes  Diarrhea: No  Back pain: Yes  Muscle aches: Yes  Neck pain: Yes  Swollen joints: No  Joint pain: Yes  Bone pain: No  Muscle cramps: No  Trouble with coordination: No  Dizziness or trouble with balance: No  Fainting or black-out spells: No  Memory loss: No  Headache: Yes  Seizures: No  Speech problems: No  Nervous or Anxious: No  Depression: Yes  Trouble sleeping: No  Trouble thinking or concentrating: No  Mood changes: No  Panic attacks: No      Active Medications:     Current Outpatient Medications:      aspirin-dipyridamole (AGGRENOX)  MG per 12 hr capsule, Take 1 capsule by mouth daily , Disp: , Rfl:      BIOTIN PO, 1 tablet daily, Disp: , Rfl:      Blood Pressure Monitoring KIT, Check BP every 7 days., Disp: 1 kit, Rfl: 0     carboxymethylcellulose PF (REFRESH PLUS) 0.5 % ophthalmic solution, Place 1 drop into both eyes 4 times daily as needed for dry eyes, Disp: 120 mL, Rfl: 11     cevimeline (EVOXAC) 30 MG capsule, Take 1 capsule (30 mg) by mouth 3 times daily, Disp: 270 capsule, Rfl: 2     cyanocobalamin (VITAMIN B12) 1000 MCG/ML injection, Inject 1 mL into the muscle every 30 days, Disp: , Rfl:      esomeprazole (NEXIUM) 40 MG DR capsule, Take 1 capsule (40 mg) by mouth daily Take 30-60 minutes before eating., Disp: 90 capsule, Rfl: 2     famotidine (PEPCID) 20 MG tablet, Take 1 tablet (20 mg) by mouth 2 times daily., Disp: 60 tablet, Rfl: 6     hydroxychloroquine (PLAQUENIL) 200 MG tablet, Take 1 tablet (200 mg)  "by mouth 2 times daily Annual Plaquenil toxicity eye screening required. (Patient not taking: Reported on 8/12/2021), Disp: 180 tablet, Rfl: 3     leucovorin (WELLCOVORIN) 25 MG tablet, Take 1 tablet (25 mg) by mouth daily Day after taking methotrexate (Patient not taking: Reported on 8/12/2021), Disp: 12 tablet, Rfl: 3     LORazepam (ATIVAN) 0.5 MG tablet, Take 1 - 2 tablets by mouth three times daily as needed., Disp: 60 tablet, Rfl: 0     naloxone (EVZIO) 0.4 MG/0.4ML auto-injector, Inject 0.4 mLs (0.4 mg) into the muscle as needed for opioid reversal every 2-3 minutes until assistance arrives (Patient not taking: Reported on 8/12/2021), Disp: 0.8 mL, Rfl: 0     olopatadine (PATADAY) 0.2 % ophthalmic solution, Place 1 drop into both eyes daily (Patient not taking: Reported on 2/9/2022), Disp: 1 Bottle, Rfl: 11     olopatadine (PATANOL) 0.1 % ophthalmic solution, Place 1 drop into both eyes 2 times daily (Patient not taking: Reported on 8/12/2021), Disp: 5 mL, Rfl: 3     omeprazole (PRILOSEC) 40 MG DR capsule, Take 1 capsule (40 mg) by mouth 2 times daily, Disp: 180 capsule, Rfl: 3     ondansetron (ZOFRAN-ODT) 4 MG ODT tab, DISSOLVE 1 TABLET IN MOUTH EVERY EIGHT HOURS AS NEEDED FOR NAUSEA, Disp: 20 tablet, Rfl: 0     oxyCODONE (ROXICODONE) 5 MG tablet, Take 1 tablet (5 mg) by mouth every four hours as needed for moderate to severe pain.  Maximum of 6 tablets per day.  (Patient not taking: Reported on 8/12/2021), Disp: 180 tablet, Rfl: 0     oxyCODONE-acetaminophen (PERCOCET) 5-325 MG tablet, Take 1 tablet by mouth every 4 hours as needed for moderate pain or severe pain. Maximum of 3 tablets per day., Disp: 90 tablet, Rfl: 0     pravastatin (PRAVACHOL) 10 MG tablet, Take 1 tablet (10 mg) by mouth daily, Disp: 30 tablet, Rfl: 5     Syringe/Needle, Disp, 27G X 1/2\" 1 ML MISC, Used with Methotrexate once weekly (Patient not taking: Reported on 8/12/2021), Disp: 12 each, Rfl: 3     valACYclovir (VALTREX) 500 MG " tablet, Take 1 tablet (500 mg) by mouth 2 times daily (Patient taking differently: Take 500 mg by mouth daily), Disp: 180 tablet, Rfl: 1     Vitamin D, Cholecalciferol, 1000 UNITS TABS, Take 2,000 Units by mouth daily  (Patient not taking: Reported on 12/29/2022), Disp: , Rfl:       Allergies:   Penicillin   Bactroban   Levaquin  Rifampin   Clarithromycin   Ofloxacin  Sertraline                                                  Past Medical History:  Rheumatoid arthritis/scleroderma  Gastric antral vascular ectasia  Gastroesophageal reflux disease  Iron deficiency anemia  Irregular heart beat  Osteomyelitis, right 3rd finger  Raynaud's syndrome with gangrene  Scleroderma  Sjogren's syndrome  Systemic sclerosis  Metatarsalgia  Dysuria  Ulcer of finger  Sicca syndrome  Palpitations  Ischemia of upper extremity  Intercostal pain  Anxiety  Breast implant rupture  Palpitations     Past Surgical History:  Appendectomy   Colonoscopy (08/04/2016)  EGD x 5 (08/22/14 - 08/02/2016)  Mammoplasty Augmentation Bilateral     Family History:    The patient's family history includes Cancer in an other family member; Respiratory in her father.     Social History:  The patient reports that she is a current cigarette smoker. She has been smoking about 1.00 pack per day. She has never used smokeless tobacco. She reports that she does not drink alcohol or use drugs. She states she is attempting to get into a research program for quitting smoking.   PCP: Lindy Carbone  Marital Status: Single     Physical Exam:   /69   Pulse 53   SpO2 99%    Wt Readings from Last 4 Encounters:   08/18/22 70.4 kg (155 lb 4.8 oz)   02/09/22 69.6 kg (153 lb 6.4 oz)   08/12/21 68 kg (150 lb)   08/20/20 65 kg (143 lb 3.2 oz)     Constitutional: Well-developed, appearing stated age; cooperative  Eyes: Normal EOM, PERRLA, vision, conjunctiva, sclera  ENT: Normal external ears, nose, hearing, lips, teeth, gums, throat. No mucous membrane  lesions, normal saliva pool  Neck: No mass or thyroid enlargement  Resp: Lungs clear to auscultation, nl to palpation  CV: RRR, no murmurs, rubs or gallops, no edema  GI: No ABD mass or tenderness, no HSM  : Not tested  Lymph: No cervical, supraclavicular, inguinal or epitrochlear nodes  MS: The TMJ, neck, shoulder, elbow, wrist, MCP/PIP/DIP, spine, hip, knee, ankle, and foot MTP/IP joints were examined and found normal. No active synovitis or altered joint anatomy. Full joint ROM. Normal  strength. No dactylitis,  tenosynovitis, enthesopathy.  Proximally 15   contracture in PIPs and 5-10   in DIPs of the left hand. Right hand greater than 90   contractures in PIPs 3-5.  Diminished calcinosis lesion over the lateral aspect of the right 4th PIP.  Contractures stable as per the patient.    Skin: No nail pitting, alopecia, rash, nodules or lesions  Skin thickening in hands and distal forearms appears stable to me and is minimal.  For the most part her skin has the atrophic changes of late systemic sclerosis with minimal or no skin thickening..  She has no digital ulcers now.  Neuro: Normal cranial nerves, strength, sensation, DTRs.   Psych: Normal judgement, orientation, memory, affect.     Imaging:  HRCT without contrast (08/22/2019):  Impression:  1. Mild bilateral posterior subpleural reticular opacities, dependent atelectasis versus early ILD. Consider prone positioning on follow-up  study.  2. Trace mucous plugging in the right middle lobe.  3. Stable incidental calcified splenic artery aneurysm.  4. Collapsed left breast implant and calcified right implant.  Sinai Carter MD    PFT Results (07/22/2019):  The FVC and FEV1 and the FEV1/FVC ratio are within normal limits. The inspiratory flow rates are within normal limits. Lung volumes are within normal limits. The diffusing capacity is normal. However, the diffusing capacity was not corrected for the   patient's hemoglobin  Impression:  Normal spirometry,  lung volumes and diffusing capacity.    Laboratory:   Component      Latest Ref Rng & Units 8/15/2019 8/15/2019 8/15/2019           3:18 PM  3:24 PM  3:24 PM   WBC      4.0 - 11.0 10e9/L  5.2    RBC Count      3.8 - 5.2 10e12/L  3.71 (L)    Hemoglobin      11.7 - 15.7 g/dL  11.4 (L)    Hematocrit      35.0 - 47.0 %  37.1    MCV      78 - 100 fl  100    MCH      26.5 - 33.0 pg  30.7    MCHC      31.5 - 36.5 g/dL  30.7 (L)    RDW      10.0 - 15.0 %  13.2    Platelet Count      150 - 450 10e9/L  212    Diff Method        Automated Method    % Neutrophils      %  48.8    % Lymphocytes      %  40.5    % Monocytes      %  9.0    % Eosinophils      %  1.3    % Basophils      %  0.2    % Immature Granulocytes      %  0.2    Nucleated RBCs      0 /100  0    Absolute Neutrophil      1.6 - 8.3 10e9/L  2.6    Absolute Lymphocytes      0.8 - 5.3 10e9/L  2.1    Absolute Monocytes      0.0 - 1.3 10e9/L  0.5    Absolute Eosinophils      0.0 - 0.7 10e9/L  0.1    Absolute Basophils      0.0 - 0.2 10e9/L  0.0    Abs Immature Granulocytes      0 - 0.4 10e9/L  0.0    Absolute Nucleated RBC        0.0    Color Urine       Colorless     Appearance Urine       Clear     Glucose Urine      NEG:Negative mg/dL Negative     Bilirubin Urine      NEG:Negative Negative     Ketones Urine      NEG:Negative mg/dL Negative     Specific Gravity Urine      1.003 - 1.035 1.002 (L)     Blood Urine      NEG:Negative Small (A)     pH Urine      5.0 - 7.0 pH 6.0     Protein Albumin Urine      NEG:Negative mg/dL Negative     Urobilinogen mg/dL      0.0 - 2.0 mg/dL 0.0     Nitrite Urine      NEG:Negative Negative     Leukocyte Esterase Urine      NEG:Negative Negative     Source       Midstream Urine     WBC Urine      0 - 5 /HPF 1     RBC Urine      0 - 2 /HPF 1     Squamous Epithelial /HPF Urine      0 - 1 /HPF <1     Albumin      3.4 - 5.0 g/dL   4.3     Component      Latest Ref Rng & Units 8/15/2019 8/15/2019 8/15/2019 8/15/2019           3:24 PM  3:24  PM  3:24 PM  3:24 PM   Creatinine      0.52 - 1.04 mg/dL  0.78     GFR Estimate      >60 mL/min/1.73:m2  85     GFR Estimate If Black      >60 mL/min/1.73:m2  >90     Sed Rate      0 - 30 mm/h       CRP Inflammation      0.0 - 8.0 mg/L <2.9      AST      0 - 45 U/L   15    ALT      0 - 50 U/L    18   Albumin      3.4 - 5.0 g/dL         Component      Latest Ref Rng & Units 8/15/2019           3:24 PM   Sed Rate      0 - 30 mm/h 16

## 2023-08-09 ENCOUNTER — TELEPHONE (OUTPATIENT)
Dept: RHEUMATOLOGY | Facility: CLINIC | Age: 58
End: 2023-08-09
Payer: MEDICARE

## 2023-08-09 NOTE — TELEPHONE ENCOUNTER
Patient Contacted for the patient to call back and schedule the following:    Appointment type: RTN  Provider: UMESH  Return date: 04/04/24  Specialty phone number: [phone]  Additional appointment(s) needed: N/A   Additonal Notes: N/A

## 2023-08-17 ENCOUNTER — TRANSFERRED RECORDS (OUTPATIENT)
Dept: HEALTH INFORMATION MANAGEMENT | Facility: CLINIC | Age: 58
End: 2023-08-17
Payer: MEDICARE

## 2023-08-30 DIAGNOSIS — M77.40 METATARSALGIA, UNSPECIFIED LATERALITY: ICD-10-CM

## 2023-08-30 DIAGNOSIS — M34.9 SYSTEMIC SCLEROSIS (H): ICD-10-CM

## 2023-08-30 DIAGNOSIS — M35.00 SICCA SYNDROME (H): ICD-10-CM

## 2023-09-05 DIAGNOSIS — M34.9 SYSTEMIC SCLEROSIS (H): ICD-10-CM

## 2023-09-05 DIAGNOSIS — M35.00 SICCA SYNDROME (H): ICD-10-CM

## 2023-09-05 DIAGNOSIS — M77.40 METATARSALGIA, UNSPECIFIED LATERALITY: ICD-10-CM

## 2023-09-05 RX ORDER — CEVIMELINE HYDROCHLORIDE 30 MG/1
30 CAPSULE ORAL 3 TIMES DAILY
Qty: 270 CAPSULE | Refills: 0 | OUTPATIENT
Start: 2023-09-05

## 2023-09-05 RX ORDER — CEVIMELINE HYDROCHLORIDE 30 MG/1
30 CAPSULE ORAL 3 TIMES DAILY
Qty: 270 CAPSULE | Refills: 2 | Status: SHIPPED | OUTPATIENT
Start: 2023-09-05

## 2023-09-05 NOTE — TELEPHONE ENCOUNTER
cevimeline (EVOXAC) 30 MG capsule     270 capsule 2 6/27/2022 7/13/2023  [facility]    Brennen Child MD  Rheumatology

## 2023-09-08 NOTE — NURSING NOTE
Chief Complaint   Patient presents with     Follow Up     systemic sclerosis     Vitals were taken and medications were reconciled.     RENATA Crisostomo  4:16 PM     Patient: Mima Vidal    Procedure Summary       Date: 09/08/23 Room / Location: [facility] ENDOSCOPY 5 / [facility]    Anesthesia Start: 1018 Anesthesia Stop: 1033    Procedure: COLONOSCOPY WITH ANESTHESIA Diagnosis:       Generalized abdominal pain      (Generalized abdominal pain [R10.84])    Surgeons: Jamie Moon DO Provider: Ryan Van CRNA    Anesthesia Type: MAC ASA Status: 3            Anesthesia Type: MAC    Vitals  Vitals Value Taken Time   BP     Temp     Pulse 81 09/08/23 1033   Resp     SpO2 94 % 09/08/23 1033   Vitals shown include unvalidated device data.        Post Anesthesia Care and Evaluation    Patient location during evaluation: PHASE II  Patient participation: complete - patient participated  Level of consciousness: awake  Pain score: 0  Pain management: adequate    Airway patency: patent  Anesthetic complications: No anesthetic complications  PONV Status: none  Cardiovascular status: acceptable  Respiratory status: acceptable  Hydration status: acceptable

## 2023-10-26 DIAGNOSIS — K21.01 GASTROESOPHAGEAL REFLUX DISEASE WITH ESOPHAGITIS AND HEMORRHAGE: ICD-10-CM

## 2023-10-27 RX ORDER — ESOMEPRAZOLE MAGNESIUM 40 MG/1
40 CAPSULE, DELAYED RELEASE ORAL DAILY
Qty: 90 CAPSULE | Refills: 1 | Status: SHIPPED | OUTPATIENT
Start: 2023-10-27 | End: 2024-05-09

## 2023-10-27 NOTE — TELEPHONE ENCOUNTER
Medication/Dose: esomeprazole (NEXIUM) 40 MG DR capsule    Last Written : 2/7/23  Last Quantity: 90, # refills: 2  Last Office Visit :  7/13/23  Pending appointment: 4/4/24     Prescription approved per Refill Protocol    RAÚL Frank, RN  [facility] Refill Team

## 2023-11-05 ENCOUNTER — HEALTH MAINTENANCE LETTER (OUTPATIENT)
Age: 58
End: 2023-11-05

## 2023-12-21 ENCOUNTER — TELEPHONE (OUTPATIENT)
Dept: RHEUMATOLOGY | Facility: CLINIC | Age: 58
End: 2023-12-21
Payer: MEDICARE

## 2023-12-21 NOTE — TELEPHONE ENCOUNTER
Patient Contacted for the patient to call back and schedule the following:    Appointment type: rsd  Provider: karon  Return date: 05/23/24   Specialty phone number: [phone]  Additional appointment(s) needed: n/a  Additonal Notes: n/a

## 2024-03-03 ENCOUNTER — TELEPHONE (OUTPATIENT)
Dept: PULMONOLOGY | Facility: CLINIC | Age: 59
End: 2024-03-03
Payer: MEDICARE

## 2024-03-14 NOTE — TELEPHONE ENCOUNTER
PA Initiation    Medication: ESOMEPRAZOLE MAGNESIUM 40 MG PO CPDR  Insurance Company: [employer] - Phone [phone] Fax [phone]  Pharmacy Filling the Rx: [id number] - [place], [place] - [address]  Filling Pharmacy Phone:    Filling Pharmacy Fax:    Start Date: 3/14/2024

## 2024-03-15 NOTE — TELEPHONE ENCOUNTER
Prior Authorization Approval    Medication: ESOMEPRAZOLE MAGNESIUM 40 MG PO CPDR  Authorization Effective Date: 1/1/2024  Authorization Expiration Date: 3/14/2025  Approved Dose/Quantity: UD  Reference #:     Insurance Company: [employer] - Phone [phone] Fax [phone]  Expected CoPay: $4.50    CoPay Card Available: No    Financial Assistance Needed: n/a  Which Pharmacy is filling the prescription: [id number] - [place], [place] - [facility]  Pharmacy Notified: yes  Patient Notified: no

## 2024-04-09 ENCOUNTER — OFFICE VISIT (OUTPATIENT)
Dept: PULMONOLOGY | Facility: CLINIC | Age: 59
End: 2024-04-09
Attending: INTERNAL MEDICINE
Payer: MEDICARE

## 2024-04-09 DIAGNOSIS — I73.01 RAYNAUD'S DISEASE WITH GANGRENE (H): ICD-10-CM

## 2024-04-09 DIAGNOSIS — M34.9 SYSTEMIC SCLEROSIS (H): ICD-10-CM

## 2024-04-09 DIAGNOSIS — R06.09 DOE (DYSPNEA ON EXERTION): ICD-10-CM

## 2024-04-09 DIAGNOSIS — M35.00 SICCA SYNDROME (H): ICD-10-CM

## 2024-04-09 DIAGNOSIS — K22.4 ESOPHAGEAL DYSMOTILITY: ICD-10-CM

## 2024-04-09 LAB
6 MIN WALK (FT): 1260 FT
6 MIN WALK (M): 384 M

## 2024-04-09 PROCEDURE — 94726 PLETHYSMOGRAPHY LUNG VOLUMES: CPT | Performed by: INTERNAL MEDICINE

## 2024-04-09 PROCEDURE — 94618 PULMONARY STRESS TESTING: CPT | Performed by: INTERNAL MEDICINE

## 2024-04-09 PROCEDURE — 94375 RESPIRATORY FLOW VOLUME LOOP: CPT | Performed by: INTERNAL MEDICINE

## 2024-04-09 PROCEDURE — 94729 DIFFUSING CAPACITY: CPT | Performed by: INTERNAL MEDICINE

## 2024-04-10 LAB
DLCOUNC-%PRED-PRE: 82 %
DLCOUNC-PRE: 16.54 ML/MIN/MMHG
DLCOUNC-PRED: 19.93 ML/MIN/MMHG
ERV-%PRED-PRE: 68 %
ERV-PRE: 0.75 L
ERV-PRED: 1.1 L
EXPTIME-PRE: 7.23 SEC
FEF2575-%PRED-PRE: 85 %
FEF2575-PRE: 1.83 L/SEC
FEF2575-PRED: 2.14 L/SEC
FEFMAX-%PRED-PRE: 103 %
FEFMAX-PRE: 6.53 L/SEC
FEFMAX-PRED: 6.32 L/SEC
FEV1-%PRED-PRE: 90 %
FEV1-PRE: 2.13 L
FEV1FEV6-PRE: 78 %
FEV1FEV6-PRED: 81 %
FEV1FVC-PRE: 78 %
FEV1FVC-PRED: 80 %
FEV1SVC-PRE: 76 %
FEV1SVC-PRED: 71 %
FIFMAX-PRE: 6.41 L/SEC
FRCPLETH-%PRED-PRE: 85 %
FRCPLETH-PRE: 2.34 L
FRCPLETH-PRED: 2.73 L
FVC-%PRED-PRE: 92 %
FVC-PRE: 2.73 L
FVC-PRED: 2.94 L
IC-%PRED-PRE: 93 %
IC-PRE: 2.05 L
IC-PRED: 2.21 L
RVPLETH-%PRED-PRE: 81 %
RVPLETH-PRE: 1.58 L
RVPLETH-PRED: 1.94 L
TLCPLETH-%PRED-PRE: 87 %
TLCPLETH-PRE: 4.39 L
TLCPLETH-PRED: 5.02 L
VA-%PRED-PRE: 89 %
VA-PRE: 4.25 L
VC-%PRED-PRE: 85 %
VC-PRE: 2.8 L
VC-PRED: 3.28 L

## 2024-05-05 DIAGNOSIS — K21.01 GASTROESOPHAGEAL REFLUX DISEASE WITH ESOPHAGITIS AND HEMORRHAGE: ICD-10-CM

## 2024-05-09 ENCOUNTER — OFFICE VISIT (OUTPATIENT)
Dept: PULMONOLOGY | Facility: CLINIC | Age: 59
End: 2024-05-09
Attending: INTERNAL MEDICINE
Payer: MEDICARE

## 2024-05-09 VITALS — OXYGEN SATURATION: 95 % | DIASTOLIC BLOOD PRESSURE: 69 MMHG | SYSTOLIC BLOOD PRESSURE: 142 MMHG | HEART RATE: 63 BPM

## 2024-05-09 DIAGNOSIS — K22.4 ESOPHAGEAL DYSMOTILITY: ICD-10-CM

## 2024-05-09 DIAGNOSIS — M34.9 SYSTEMIC SCLEROSIS (H): Primary | ICD-10-CM

## 2024-05-09 DIAGNOSIS — I73.01 RAYNAUD'S DISEASE WITH GANGRENE (H): ICD-10-CM

## 2024-05-09 DIAGNOSIS — E05.00 GRAVES DISEASE: ICD-10-CM

## 2024-05-09 DIAGNOSIS — M35.00 SICCA SYNDROME (H): ICD-10-CM

## 2024-05-09 DIAGNOSIS — M77.40 METATARSALGIA, UNSPECIFIED LATERALITY: ICD-10-CM

## 2024-05-09 PROCEDURE — G2211 COMPLEX E/M VISIT ADD ON: HCPCS | Performed by: INTERNAL MEDICINE

## 2024-05-09 PROCEDURE — G0463 HOSPITAL OUTPT CLINIC VISIT: HCPCS | Performed by: INTERNAL MEDICINE

## 2024-05-09 PROCEDURE — 99214 OFFICE O/P EST MOD 30 MIN: CPT | Performed by: INTERNAL MEDICINE

## 2024-05-09 RX ORDER — ESOMEPRAZOLE MAGNESIUM 40 MG/1
40 CAPSULE, DELAYED RELEASE ORAL DAILY
Qty: 90 CAPSULE | Refills: 0 | Status: SHIPPED | OUTPATIENT
Start: 2024-05-09 | End: 2024-08-13

## 2024-05-09 ASSESSMENT — PAIN SCALES - GENERAL: PAINLEVEL: NO PAIN (0)

## 2024-05-09 NOTE — PROGRESS NOTES
[facility]  Rheumatology Clinic  Brennen Child MD  2024     Name: Willard Grayson  MRN: [id number]  Age: 54 year old  : 1965  Referring provider: Referred Self    Problem List:  1. 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   2. Associated marked neuropathic skin pain markedly improved with Lyrica with prior medication therapy with gabapentin.  3. Diffuse musculoskeletal pain requiring methotrexate plus CellCept, and narcotic to control, but also improved. Now off methotrexate due to GI issues.  4. Initial clinical overlap with anti-CCP seropositive rheumatoid arthritis with continued anti-CCP seropositivity as of 2010.  5. Raynaud's phenomena with digital ulcers with easily cracked skin.   6. GI involvement consistent with bacterial small bowel overgrowth with marked improvement after a course of neomycin followed by Dr. Glez; positive hydrogen breath test in 3/2012 - treated with rifaximin through Dr. Jacob  7. No convincing evidence of lung involvement on serial pulmonary function tests or clinically.   8. History of heart palpitations prior to the onset of SSc with stable symptoms since. Holter 2018 with PACs.  9. History of medication failures with Remicade, methotrexate, methotrexate plus CellCept and with Orencia and with improvement since initiation of IVIG but with complicating headaches and overall sense of malaise with IVIG infusions.   10. History of silicone breast implants.   11. Incidental finding of calcified splenic artery aneurysm.  12. EGD in 3/2012 with hiatal hernia and gastral antral vascular ectasia  13. Recurrent right third finger contracture with extensor surface ulceration, healed   14. Trochanteric bursitis, R>L    Assessment and Plan:   #  Systemic sclerosis   # Gastroesophageal reflux disease, esophagitis presence not specified  # Esophageal dysmotility  # Raynaud's disease with gangrene  # High risk medications (not anticoagulants) long-term use  # Rheumatoid arthritis involving multiple sites with positive rheumatoid factor (H)  # Metatarsalgia, unspecified laterality  # Metatarsalgia of both feet  # Osteopenia, unspecified location  # Systemic sclerosis  # MCDANIEL (dyspnea on exertion)  # Chest wall pain  # Skin lesion of lower extremity    Plan/recommendation:    I do think her systemic sclerosis is stable.  She is a little bit difficult to assess in terms of how she is doing overall with contemporaneous thyroid disease.    I do think she would benefit from increased stretching, particularly in her hip girdle and knees and we discussed that in some detail.  If she wants a more formal program we can refer her onto physical therapy.    We also discussed possibilities for treatment of the corn/area of possible calcinosis over the left fifth ray.  She would prefer not to see the podiatrist at this point already had an appointment and canceled that.    Therefore I have suggested to her padding with corn pads during the day and then doing some Epsom salt soaks perhaps with some gentle debridement at night to see if there is any calcinosis there whether it can be dislodged.    I think she is otherwise doing stably.  It is not clear to me that she would benefit from immunosuppression at this point.    I will plan to see her back in 6 months, sooner as needed for reassessment.    Today's visit was 25 minutes in face-to-face time with an additional 8 minutes spent in chart review,  and documentation completed on the date of service.    The longitudinal plan of care for the diagnosis(es)/condition(s) as documented were addressed during this visit. Due to the added complexity in care, I will continue to support Sofia in the subsequent management and  with ongoing continuity of care.       JANET Child MD, PhD  Professor of Medicine   Rheumatology              August 20, 2020 interval history:    Patient comes in today with him main concern being some new skin lesions that she has noted over her left leg and left arm over the last few months.  She does have a dermatology appointment within the [facility] system where she gets most of her care.  That is not however until October.    The other new issue this been bothering her a fair amount is neck pain and headaches.  She did get a cervical spine film just recently at [facility] and has not yet reviewed that with anyone there.    In terms of her scleroderma skin involvement she really feels that has been quite stable.  We have repeatedly tried to get her on methotrexate but she is tolerated that poorly but she has tolerated MMF better and remains on it.  The main problem for her has been the contractures in her hands and those have gradually worsened particularly in her right hand.    She is largely sheltering in place because of coronavirus, but she is still smoking cigarettes.  If anything she has increased doing so because of feeling under stress.      January 14, 2021 interval history:    Since we last seen the patient she feels she is really been doing pretty stably.  She went off of MMF because of development of a squamous cell carcinoma in the understanding that MMF could have been contributing to impaired tumor Amino-Cerv balance.  She really does not think she is gotten worse that since going off of it.  That was back on November 12.  The SCCA was on her forearm and is healed nicely.  She thinks that perhaps the skin in the area was a little more shiny than surrounding skin but there were no other difficulties with the healing.    She does acknowledge that over the last year or 2 her fingers to develop worsening contractures and she has difficulty writing.  Nonetheless she has tolerated methotrexate poorly and its  not clear how much it helped her when she was on it.  She does acknowledge that she does not do stretching exercises as reliably as she might.    She has had a few digital ulcers but they have been healing more quickly than usual.  She is not certain why but she does note that she is no longer taking care of her grandchildren so she is able to rest her hands and protect them better.    She has established with the pain clinic at [facility] and that they have taken over the prescribing of her narcotics and are working with her on a more comprehensive pain program.  She is really actually quite happy about that.    She continues to get some esophageal spasms at times but oddly this seems only to happen with the drinking of liquids.  Its not bad enough that she wants to do anything more about it.  We did talk about the possible value of all toyed minutes however.    The other new thing that she has had is some dizziness and tinnitus developing in her left ear much greater than right.  She is seeking care with an otolaryngologist at [facility] where most of her care is delivered however.    She is otherwise been pretty good.  She had a little bit of a flareup last week where she felt weaker and more achy and more sluggish.  She has not had a clear-cut coronavirus exposure to her knowledge.      August 12, 2021 interval history:    Since we have last seen the patient she feels she is really been doing very well.  She has been seeing the [facility] pain clinic and was having some difficulties that seemed unusual to her.  Ultimately after a lot of jitters and changes in her pain situation she was worked up and diagnosed with Graves' disease and is now being treated with methimazole.    She also was seen in the breast clinic where there was some evidence of fluid around her implants raising the question of whether there had been a rupture of them.  There was associated pain.  The fluid was removed and she has been feeling better since  then.    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  That was removed and the wound has healed up very well.    Interestingly, since she has had Botox injections she has not had any digital ulcers including over her right contractured PIP joints.  Although she really did not like the amount of pain she got with the Botox she is pretty happy with not having had any ulcers.    Potentially contributing to that improvement however is that she is not been smoking at all for the last 4 months.  Her breathing feels better to her as well and overall her breathing feels stable to her.  She has not had pulmonary function test for quite some time and is a bit overdue for those, but she is never had evidence of significant lung involvement.    Although she thinks her skin is somewhat sensitive she does not believe it is worsening at all and she believes is completely stable and her joints feel stable to her as well and her not bothering her significantly.    February 9, 2022 interval history:    Since we have last seen the patient she really has not felt well.  She thinks this is primarily since she is received her second coronavirus shot in the fall.  She however also had autoimmune thyroiditis diagnosed in April and has been placed on methimazole and does not think she is felt quite as well since then either.  She of course became euthyroid after being hyperthyroid and has gained weight with it.    Among the thing she has been noticing more recently is virtually constant heartburn despite being on omeprazole 40 mg twice daily.    She however also notices more widespread pain.  This includes the small joints of her hands and feet but she denies significant morning stiffness there.    She has not been on any disease modifying drugs now for quite some time including Plaquenil.  She does believe that her skin has been stable throughout all this and not worsening.    She does have a lot of fatigue, and  as part of recent laboratory work-up she had iron levels done that were on the low side of normal but she was anemic and her ferritin was low.  There were no other inflammatory markers so the ferritin could not be put fully in context.  She has done well in the past after being given IV iron.    She is not exercising regularly but does not think she is fundamentally more short of breath.  Her last PFTs were 2 years ago but these were completely stable with forced vital capacity 102% of predicted.  DLCO on those studies was at 84% of predicted also stable.  She does continue to smoke though she says very little.    August 18, 2022 interval history:    Since last clinic visit,     Patient reports having worsening swallowing issues with pills getting stuck in the back of the throat and having difficulty drinking water occasionally.  Points to the lower portion of the neck and states that it feels like things are getting stuck there.  Denies any choking or sensation denies any issues with consuming food.  Reports that she does use some vitamin once a day in the morning.    Reports consistent fatigue and states that she is being evaluated by endocrinology for radioactive ablation vs surgery for her Graves' disease.  Reports that her thyroid issues has been causing weight changes, fatigue, palpitations and sleep issues.    Reports that her shortness of breath has been moderately worse since last clinic visit.  In the last PFTs, she had significant worsening of her FVC and DLCO.  However, at that time patient reported that the test was not done properly.     She continues to smoke 1 pack/day.    She states that she is not much physically active.    December 29, 2022 interval history:    Since we have last seen the patient she has continued to struggle with getting the thyroid dosage correct but she feels like that has finally happened.  She definitely is feeling better with it at this point that she has and feels like she  is essentially back to her normal baseline.    In terms of features of her scleroderma she thinks that despite all of the difficulties with getting the thyroid correct, that she has been pretty stable with that.  Her GERD is variable day-to-day and she can have enough reflux that she wakes due to it but its not frequent.  She denies worsening dysphagia.  She actually drinks soda despite the fact that it bothers her somewhat and she gets some difficulty with moving that through.  She knows that she really does not have a lot of appetite but her weight has been stable or she has been gaining slightly.    She does have Raynauds episodes but is not had any of the more severe ones that she had earlier on in the disease process and has not had any digital ulcers though she does continue to smoke.    She does think her breathing may be gradually a little bit worse.  She thinks that is due to lack of exercise and the difficulties with adjusting her thyroid hormone.  She has not however had PFTs for some time although we encouraged her to do them last time and there are active orders on her chart.    She otherwise feels stable.  She denies significant musculoskeletal features, or any worsening of her skin.  She has been off of all medication now in terms of controlling skin joint and other inflammatory features of disease for quite some time.    July 13, 2023 interval history:    Since we have last seen the patient she feels she is done quite stably with respect to her systemic sclerosis.  Her biggest problems she believes derived from her active Graves' disease.  She is on methimazole for that but is not sure she is tolerating it all that well.  Her current endocrinologist has encouraged her to consider thyroidectomy and thyroid replacement.    She quit smoking in January.  Perhaps because of that in part and because of the thyroid disease in part she has gained significant weight and is up about 30 pounds.  Notably however  her exercise tolerance is actually improved overall.  She attributes that to stopping smoking.  She says she can climb stairs pretty well without a lot of difficulty.    Raynaud's phenomena also was better this winter than it has been, likely also due to stopping smoking.    She however is having more pain in her skin.  This does not feel to her like its her scleroderma and she thinks maybe it is more related to either her thyroid disease or the methimazole itself, either of which could be the case.    GERD is stable and other manifestations of her disease have been stable without any significant worsening.  She does continue to have Keratoconjunctivitis sicca.  That was a consideration in terms of possibility of radioactive thyroid ablation, which her endocrinologist consult against.    May 9, 2024 interval history:    Since we last seen the patient she has been having ongoing adjustment of her thyroid hormone status post her thyroidectomy.  She has gained quite a bit of weight and is not happy about that and she has had more troubles with anxiety but she does think things are starting to smooth out a little bit.  She is also getting cardiovascular workup right now but she denies having significant palpitations or any other clear-cut arrhythmias.    She did her pulmonary function test several weeks ago and those are completely stable.  She does feel that her exercise tolerance is stable as well, although she acknowledges not doing a lot from an aerobic exercise standpoint.    She is also not doing a lot of stretching exercises and is having a lot of discomfort with tightness in her hips and her knees and we discussed that in greater detail.    She is having difficulty ambulating as well because of a painful corn that may also include underlying calcinosis in the left fifth MTP had.  That has been going on for some time.  She had a immobilizing boot that she used when she developed tendinitis on the dorsal aspect of  that same foot a year or so ago that she has been using simply to be able to ambulate.    She continues to have Keratoconjunctivitis sicca especially affecting her eyes for which she sees Dr. Collazo and she is doing well with that therapy.    She continues to have Raynaud's phenomenon as well.  She has had gradually worsening contractures in the right fingers in particular and she cannot fully open the hand.  Contractures in her PIPs and DIPs are both significant with greater than 90 degree contact contractures in the PIPs and also significant loss of range of motion in the MCPs.  There are much less severe contractures on the left side.  She does acknowledge having worsened Raynauds in the right side than on the left probably as a consequence of that impaired blood flow, but she has not developed any significant distal digital ulcers.    She is otherwise been stable.  She denies significant inflammatory morning stiffness in any of the joints.  Recall that when she initially presented she was given a diagnosis of rheumatoid arthritis, based in part upon a very high titer CCP.    Review of Systems:   Pertinent items are noted in HPI or as below, remainder of complete ROS is negative.      No recent problems with hearing or vision. No swallowing problems.   No breathing difficulty, shortness of breath, coughing, or wheezing  No chest pain or palpitations  No heart burn, indigestion, abdominal pain, nausea, vomiting, diarrhea  No urination problems, no bloody, cloudy urine, no dysuria  No numbing, tingling, weakness  No headaches or confusion  No rashes. No easy bleeding or bruising.   Answers for HPI/ROS submitted by the patient on 11/21/2019   General Symptoms: Yes  Skin Symptoms: Yes  HENT Symptoms: Yes  EYE SYMPTOMS: Yes  HEART SYMPTOMS: Yes  LUNG SYMPTOMS: Yes  INTESTINAL SYMPTOMS: Yes  URINARY SYMPTOMS: No  GYNECOLOGIC SYMPTOMS: No  BREAST SYMPTOMS: No  SKELETAL SYMPTOMS: Yes  BLOOD SYMPTOMS: No  NERVOUS SYSTEM  SYMPTOMS: Yes  MENTAL HEALTH SYMPTOMS: Yes  Fever: No  Loss of appetite: No  Weight loss: No  Weight gain: Yes  Fatigue: Yes  Night sweats: Yes  Chills: No  Excessive thirst: Yes  Feeling hot or cold when others believe the temperature is normal: Yes  Loss of height: No  Post-operative complications: No  Surgical site pain: No  Hallucinations: No  Change in or Loss of Energy: No  Changes in hair: No  Changes in moles/birth marks: Yes  Itching: Yes  Rashes: No  Changes in nails: No  Acne: No  Hair in places you don't want it: No  Ear pain: No  Ear discharge: No  Hearing loss: Yes  Tinnitus: Yes  Nosebleeds: No  Congestion: No  Sinus pain: No  Tooth pain: No  Gum tenderness: No  Bleeding gums: No  Change in taste: No  Change in sense of smell: No  Dry mouth: Yes  Hearing aid used: No  Eye pain: No  Vision loss: Yes  Dry eyes: Yes  Watery eyes: Yes  Eye bulging: No  Double vision: No  Flashing of lights: No  Cough: No  Sputum or phlegm: No  Coughing up blood: No  Difficulty breating or shortness of breath: No  Snoring: No  Wheezing: No  Difficulty breathing on exertion: Yes  Chest pain or pressure: No  Fast or irregular heartbeat: No  Pain in legs with walking: Yes  Trouble breathing while lying down: No  Fingers or toes appear blue: Yes  High blood pressure: Yes  Low blood pressure: No  Fainting: No  Murmurs: No  Pacemaker: No  Varicose veins: No  Edema or swelling: No  Wake up at night with shortness of breath: No  Light-headedness: No  Heart burn or indigestion: Yes  Nausea: No  Vomiting: No  Abdominal pain: No  Bloating: Yes  Constipation: Yes  Diarrhea: No  Back pain: Yes  Muscle aches: Yes  Neck pain: Yes  Swollen joints: No  Joint pain: Yes  Bone pain: No  Muscle cramps: No  Trouble with coordination: No  Dizziness or trouble with balance: No  Fainting or black-out spells: No  Memory loss: No  Headache: Yes  Seizures: No  Speech problems: No  Nervous or Anxious: No  Depression: Yes  Trouble sleeping: No  Trouble  thinking or concentrating: No  Mood changes: No  Panic attacks: No      Active Medications:     Current Outpatient Medications:     aspirin-dipyridamole (AGGRENOX)  MG per 12 hr capsule, Take 1 capsule by mouth daily , Disp: , Rfl:     BIOTIN PO, 1 tablet daily, Disp: , Rfl:     Blood Pressure Monitoring KIT, Check BP every 7 days., Disp: 1 kit, Rfl: 0    carboxymethylcellulose PF (REFRESH PLUS) 0.5 % ophthalmic solution, Place 1 drop into both eyes 4 times daily as needed for dry eyes, Disp: 120 mL, Rfl: 11    cevimeline (EVOXAC) 30 MG capsule, Take 1 capsule (30 mg) by mouth 3 times daily, Disp: 270 capsule, Rfl: 2    cyanocobalamin (VITAMIN B12) 1000 MCG/ML injection, Inject 1 mL into the muscle every 30 days, Disp: , Rfl:     esomeprazole (NEXIUM) 40 MG DR capsule, Take 1 capsule (40 mg) by mouth daily Take 30-60 minutes before eating., Disp: 90 capsule, Rfl: 1    famotidine (PEPCID) 20 MG tablet, Take 1 tablet (20 mg) by mouth 2 times daily., Disp: 60 tablet, Rfl: 6    LORazepam (ATIVAN) 0.5 MG tablet, Take 1 - 2 tablets by mouth three times daily as needed., Disp: 60 tablet, Rfl: 0    omeprazole (PRILOSEC) 40 MG DR capsule, Take 1 capsule (40 mg) by mouth 2 times daily, Disp: 180 capsule, Rfl: 3    ondansetron (ZOFRAN-ODT) 4 MG ODT tab, DISSOLVE 1 TABLET IN MOUTH EVERY EIGHT HOURS AS NEEDED FOR NAUSEA, Disp: 20 tablet, Rfl: 0    oxyCODONE-acetaminophen (PERCOCET) 5-325 MG tablet, Take 1 tablet by mouth every 4 hours as needed for moderate pain or severe pain. Maximum of 3 tablets per day., Disp: 90 tablet, Rfl: 0    pravastatin (PRAVACHOL) 10 MG tablet, Take 1 tablet (10 mg) by mouth daily, Disp: 30 tablet, Rfl: 5    hydroxychloroquine (PLAQUENIL) 200 MG tablet, Take 1 tablet (200 mg) by mouth 2 times daily Annual Plaquenil toxicity eye screening required. (Patient not taking: Reported on 8/12/2021), Disp: 180 tablet, Rfl: 3    leucovorin (WELLCOVORIN) 25 MG tablet, Take 1 tablet (25 mg) by mouth daily  "Day after taking methotrexate (Patient not taking: Reported on 8/12/2021), Disp: 12 tablet, Rfl: 3    naloxone (EVZIO) 0.4 MG/0.4ML auto-injector, Inject 0.4 mLs (0.4 mg) into the muscle as needed for opioid reversal every 2-3 minutes until assistance arrives (Patient not taking: Reported on 8/12/2021), Disp: 0.8 mL, Rfl: 0    olopatadine (PATADAY) 0.2 % ophthalmic solution, Place 1 drop into both eyes daily (Patient not taking: Reported on 2/9/2022), Disp: 1 Bottle, Rfl: 11    olopatadine (PATANOL) 0.1 % ophthalmic solution, Place 1 drop into both eyes 2 times daily (Patient not taking: Reported on 8/12/2021), Disp: 5 mL, Rfl: 3    oxyCODONE (ROXICODONE) 5 MG tablet, Take 1 tablet (5 mg) by mouth every four hours as needed for moderate to severe pain.  Maximum of 6 tablets per day.  (Patient not taking: Reported on 8/12/2021), Disp: 180 tablet, Rfl: 0    Syringe/Needle, Disp, 27G X 1/2\" 1 ML MISC, Used with Methotrexate once weekly (Patient not taking: Reported on 8/12/2021), Disp: 12 each, Rfl: 3    valACYclovir (VALTREX) 500 MG tablet, Take 1 tablet (500 mg) by mouth 2 times daily (Patient not taking: Reported on 5/9/2024), Disp: 180 tablet, Rfl: 1    Vitamin D, Cholecalciferol, 1000 UNITS TABS, Take 2,000 Units by mouth daily  (Patient not taking: Reported on 12/29/2022), Disp: , Rfl:       Allergies:   Penicillin   Bactroban   Levaquin  Rifampin   Clarithromycin   Ofloxacin  Sertraline                                                  Past Medical History:  Rheumatoid arthritis/scleroderma  Gastric antral vascular ectasia  Gastroesophageal reflux disease  Iron deficiency anemia  Irregular heart beat  Osteomyelitis, right 3rd finger  Raynaud's syndrome with gangrene  Scleroderma  Sjogren's syndrome  Systemic sclerosis  Metatarsalgia  Dysuria  Ulcer of finger  Sicca syndrome  Palpitations  Ischemia of upper extremity  Intercostal pain  Anxiety  Breast implant rupture  Palpitations     Past Surgical " History:  Appendectomy   Colonoscopy (08/04/2016)  EGD x 5 (08/22/14 - 08/02/2016)  Mammoplasty Augmentation Bilateral     Family History:    The patient's family history includes Cancer in an other family member; Respiratory in her father.     Social History:  The patient reports that she is a current cigarette smoker. She has been smoking about 1.00 pack per day. She has never used smokeless tobacco. She reports that she does not drink alcohol or use drugs. She states she is attempting to get into a research program for quitting smoking.   PCP: Lindy Carbone  Marital Status: Single     Physical Exam:   BP (!) 142/69   Pulse 63   SpO2 95%    Wt Readings from Last 4 Encounters:   08/18/22 70.4 kg (155 lb 4.8 oz)   02/09/22 69.6 kg (153 lb 6.4 oz)   08/12/21 68 kg (150 lb)   08/20/20 65 kg (143 lb 3.2 oz)     Constitutional: Well-developed, appearing stated age; cooperative  Eyes: Normal EOM, PERRLA, vision, conjunctiva, sclera  ENT: Normal external ears, nose, hearing, lips, teeth, gums, throat. No mucous membrane lesions, normal saliva pool  Neck: No mass or thyroid enlargement  Resp: Lungs clear to auscultation, nl to palpation  CV: RRR, no murmurs, rubs or gallops, no edema  GI: No ABD mass or tenderness, no HSM  : Not tested  Lymph: No cervical, supraclavicular, inguinal or epitrochlear nodes  MS: The TMJ, neck, shoulder, elbow, wrist, MCP/PIP/DIP, spine, hip, knee, ankle, and foot MTP/IP joints were examined and found normal. No active synovitis or altered joint anatomy. Full joint ROM. Normal  strength. No dactylitis,  tenosynovitis, enthesopathy.  Proximally 15-20   contracture in PIPs and 5-10   in DIPs of the left hand. Right hand greater than 90   contractures in PIPs 2-5.  Diminished calcinosis lesion over the lateral aspect of the right 4th PIP.  Contractures stable as per the patient.    Skin: No nail pitting, alopecia, rash, nodules or lesions  Skin thickening in hands and distal  forearms appears stable to me and is minimal.  For the most part her skin has the atrophic changes of late systemic sclerosis with minimal or no skin thickening..  She has no digital ulcers now.  Neuro: Normal cranial nerves, strength, sensation, DTRs.   Psych: Normal judgement, orientation, memory, affect.     Imaging:  HRCT without contrast (08/22/2019):  Impression:  1. Mild bilateral posterior subpleural reticular opacities, dependent atelectasis versus early ILD. Consider prone positioning on follow-up  study.  2. Trace mucous plugging in the right middle lobe.  3. Stable incidental calcified splenic artery aneurysm.  4. Collapsed left breast implant and calcified right implant.  Sinai Carter MD    PFT Results (07/22/2019):  The FVC and FEV1 and the FEV1/FVC ratio are within normal limits. The inspiratory flow rates are within normal limits. Lung volumes are within normal limits. The diffusing capacity is normal. However, the diffusing capacity was not corrected for the   patient's hemoglobin  Impression:  Normal spirometry, lung volumes and diffusing capacity.    Laboratory:   Component      Latest Ref Rng & Units 8/15/2019 8/15/2019 8/15/2019           3:18 PM  3:24 PM  3:24 PM   WBC      4.0 - 11.0 10e9/L  5.2    RBC Count      3.8 - 5.2 10e12/L  3.71 (L)    Hemoglobin      11.7 - 15.7 g/dL  11.4 (L)    Hematocrit      35.0 - 47.0 %  37.1    MCV      78 - 100 fl  100    MCH      26.5 - 33.0 pg  30.7    MCHC      31.5 - 36.5 g/dL  30.7 (L)    RDW      10.0 - 15.0 %  13.2    Platelet Count      150 - 450 10e9/L  212    Diff Method        Automated Method    % Neutrophils      %  48.8    % Lymphocytes      %  40.5    % Monocytes      %  9.0    % Eosinophils      %  1.3    % Basophils      %  0.2    % Immature Granulocytes      %  0.2    Nucleated RBCs      0 /100  0    Absolute Neutrophil      1.6 - 8.3 10e9/L  2.6    Absolute Lymphocytes      0.8 - 5.3 10e9/L  2.1    Absolute Monocytes      0.0 - 1.3 10e9/L   0.5    Absolute Eosinophils      0.0 - 0.7 10e9/L  0.1    Absolute Basophils      0.0 - 0.2 10e9/L  0.0    Abs Immature Granulocytes      0 - 0.4 10e9/L  0.0    Absolute Nucleated RBC        0.0    Color Urine       Colorless     Appearance Urine       Clear     Glucose Urine      NEG:Negative mg/dL Negative     Bilirubin Urine      NEG:Negative Negative     Ketones Urine      NEG:Negative mg/dL Negative     Specific Gravity Urine      1.003 - 1.035 1.002 (L)     Blood Urine      NEG:Negative Small (A)     pH Urine      5.0 - 7.0 pH 6.0     Protein Albumin Urine      NEG:Negative mg/dL Negative     Urobilinogen mg/dL      0.0 - 2.0 mg/dL 0.0     Nitrite Urine      NEG:Negative Negative     Leukocyte Esterase Urine      NEG:Negative Negative     Source       Midstream Urine     WBC Urine      0 - 5 /HPF 1     RBC Urine      0 - 2 /HPF 1     Squamous Epithelial /HPF Urine      0 - 1 /HPF <1     Albumin      3.4 - 5.0 g/dL   4.3     Component      Latest Ref Rng & Units 8/15/2019 8/15/2019 8/15/2019 8/15/2019           3:24 PM  3:24 PM  3:24 PM  3:24 PM   Creatinine      0.52 - 1.04 mg/dL  0.78     GFR Estimate      >60 mL/min/1.73:m2  85     GFR Estimate If Black      >60 mL/min/1.73:m2  >90     Sed Rate      0 - 30 mm/h       CRP Inflammation      0.0 - 8.0 mg/L <2.9      AST      0 - 45 U/L   15    ALT      0 - 50 U/L    18   Albumin      3.4 - 5.0 g/dL         Component      Latest Ref Rng & Units 8/15/2019           3:24 PM   Sed Rate      0 - 30 mm/h 16

## 2024-05-09 NOTE — TELEPHONE ENCOUNTER
Medication/Dose: Esomeprazole  Last Written Prescription Date: 10/27/23  Last Fill Quantity: 90, # refills: 3  Last Office Visit:  8/10/2022  Next Enc:  5/9/24  Refilled per protocol    Stephanie Urbano RN  [facility]

## 2024-05-09 NOTE — NURSING NOTE
Chief Complaint   Patient presents with    Follow Up     Scleroderma      Vitals were taken and medications were reconciled.     Bhumika Tapia RMA  2:59 PM     no

## 2024-05-09 NOTE — LETTER
2024         RE: Willard Grayson  [address]  [facility] [address]        Dear Colleague,    Thank you for referring your patient, Willard Grayson, to the [facility] [place]. Please see a copy of my visit note below.    [facility]  Rheumatology Clinic  Brennen Child MD  2024     Name: Willard Grayson  MRN: [id number]  Age: 54 year old  : 1965  Referring provider: Referred Self    Problem List:  1. 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   2. Associated marked neuropathic skin pain markedly improved with Lyrica with prior medication therapy with gabapentin.  3. Diffuse musculoskeletal pain requiring methotrexate plus CellCept, and narcotic to control, but also improved. Now off methotrexate due to GI issues.  4. Initial clinical overlap with anti-CCP seropositive rheumatoid arthritis with continued anti-CCP seropositivity as of 2010.  5. Raynaud's phenomena with digital ulcers with easily cracked skin.   6. GI involvement consistent with bacterial small bowel overgrowth with marked improvement after a course of neomycin followed by Dr. Glez; positive hydrogen breath test in 3/2012 - treated with rifaximin through Dr. Jacob  7. No convincing evidence of lung involvement on serial pulmonary function tests or clinically.   8. History of heart palpitations prior to the onset of SSc with stable symptoms since. Holter 2018 with PACs.  9. History of medication failures with Remicade, methotrexate, methotrexate plus CellCept and with Orencia and with improvement since initiation of IVIG but with complicating headaches and overall sense of malaise with IVIG infusions.   10. History of silicone  breast implants.   11. Incidental finding of calcified splenic artery aneurysm.  12. EGD in 3/2012 with hiatal hernia and gastral antral vascular ectasia  13. Recurrent right third finger contracture with extensor surface ulceration, healed   14. Trochanteric bursitis, R>L    Assessment and Plan:   # Systemic sclerosis   # Gastroesophageal reflux disease, esophagitis presence not specified  # Esophageal dysmotility  # Raynaud's disease with gangrene  # High risk medications (not anticoagulants) long-term use  # Rheumatoid arthritis involving multiple sites with positive rheumatoid factor (H)  # Metatarsalgia, unspecified laterality  # Metatarsalgia of both feet  # Osteopenia, unspecified location  # Systemic sclerosis  # MCDANIEL (dyspnea on exertion)  # Chest wall pain  # Skin lesion of lower extremity    Plan/recommendation:    I do think her systemic sclerosis is stable.  She is a little bit difficult to assess in terms of how she is doing overall with contemporaneous thyroid disease.    I do think she would benefit from increased stretching, particularly in her hip girdle and knees and we discussed that in some detail.  If she wants a more formal program we can refer her onto physical therapy.    We also discussed possibilities for treatment of the corn/area of possible calcinosis over the left fifth ray.  She would prefer not to see the podiatrist at this point already had an appointment and canceled that.    Therefore I have suggested to her padding with corn pads during the day and then doing some Epsom salt soaks perhaps with some gentle debridement at night to see if there is any calcinosis there whether it can be dislodged.    I think she is otherwise doing stably.  It is not clear to me that she would benefit from immunosuppression at this point.    I will plan to see her back in 6 months, sooner as needed for reassessment.    Today's visit was 25 minutes in face-to-face time with an additional 8 minutes spent  in chart review,  and documentation completed on the date of service.    The longitudinal plan of care for the diagnosis(es)/condition(s) as documented were addressed during this visit. Due to the added complexity in care, I will continue to support Sofia in the subsequent management and with ongoing continuity of care.       JANET Child MD, PhD  Professor of Medicine   Rheumatology              August 20, 2020 interval history:    Patient comes in today with him main concern being some new skin lesions that she has noted over her left leg and left arm over the last few months.  She does have a dermatology appointment within the [facility] system where she gets most of her care.  That is not however until October.    The other new issue this been bothering her a fair amount is neck pain and headaches.  She did get a cervical spine film just recently at [facility] and has not yet reviewed that with anyone there.    In terms of her scleroderma skin involvement she really feels that has been quite stable.  We have repeatedly tried to get her on methotrexate but she is tolerated that poorly but she has tolerated MMF better and remains on it.  The main problem for her has been the contractures in her hands and those have gradually worsened particularly in her right hand.    She is largely sheltering in place because of coronavirus, but she is still smoking cigarettes.  If anything she has increased doing so because of feeling under stress.      January 14, 2021 interval history:    Since we last seen the patient she feels she is really been doing pretty stably.  She went off of MMF because of development of a squamous cell carcinoma in the understanding that MMF could have been contributing to impaired tumor Amino-Cerv balance.  She really does not think she is gotten worse that since going off of it.  That was back on November 12.  The SCCA was on her forearm and is healed nicely.  She thinks that perhaps the skin  in the area was a little more shiny than surrounding skin but there were no other difficulties with the healing.    She does acknowledge that over the last year or 2 her fingers to develop worsening contractures and she has difficulty writing.  Nonetheless she has tolerated methotrexate poorly and its not clear how much it helped her when she was on it.  She does acknowledge that she does not do stretching exercises as reliably as she might.    She has had a few digital ulcers but they have been healing more quickly than usual.  She is not certain why but she does note that she is no longer taking care of her grandchildren so she is able to rest her hands and protect them better.    She has established with the pain clinic at [facility] and that they have taken over the prescribing of her narcotics and are working with her on a more comprehensive pain program.  She is really actually quite happy about that.    She continues to get some esophageal spasms at times but oddly this seems only to happen with the drinking of liquids.  Its not bad enough that she wants to do anything more about it.  We did talk about the possible value of all toyed minutes however.    The other new thing that she has had is some dizziness and tinnitus developing in her left ear much greater than right.  She is seeking care with an otolaryngologist at [facility] where most of her care is delivered however.    She is otherwise been pretty good.  She had a little bit of a flareup last week where she felt weaker and more achy and more sluggish.  She has not had a clear-cut coronavirus exposure to her knowledge.      August 12, 2021 interval history:    Since we have last seen the patient she feels she is really been doing very well.  She has been seeing the [facility] pain clinic and was having some difficulties that seemed unusual to her.  Ultimately after a lot of jitters and changes in her pain situation she was worked up and diagnosed with Graves'  disease and is now being treated with methimazole.    She also was seen in the breast clinic where there was some evidence of fluid around her implants raising the question of whether there had been a rupture of them.  There was associated pain.  The fluid was removed and she has been feeling better since then.    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  That was removed and the wound has healed up very well.    Interestingly, since she has had Botox injections she has not had any digital ulcers including over her right contractured PIP joints.  Although she really did not like the amount of pain she got with the Botox she is pretty happy with not having had any ulcers.    Potentially contributing to that improvement however is that she is not been smoking at all for the last 4 months.  Her breathing feels better to her as well and overall her breathing feels stable to her.  She has not had pulmonary function test for quite some time and is a bit overdue for those, but she is never had evidence of significant lung involvement.    Although she thinks her skin is somewhat sensitive she does not believe it is worsening at all and she believes is completely stable and her joints feel stable to her as well and her not bothering her significantly.    February 9, 2022 interval history:    Since we have last seen the patient she really has not felt well.  She thinks this is primarily since she is received her second coronavirus shot in the fall.  She however also had autoimmune thyroiditis diagnosed in April and has been placed on methimazole and does not think she is felt quite as well since then either.  She of course became euthyroid after being hyperthyroid and has gained weight with it.    Among the thing she has been noticing more recently is virtually constant heartburn despite being on omeprazole 40 mg twice daily.    She however also notices more widespread pain.  This includes the  small joints of her hands and feet but she denies significant morning stiffness there.    She has not been on any disease modifying drugs now for quite some time including Plaquenil.  She does believe that her skin has been stable throughout all this and not worsening.    She does have a lot of fatigue, and as part of recent laboratory work-up she had iron levels done that were on the low side of normal but she was anemic and her ferritin was low.  There were no other inflammatory markers so the ferritin could not be put fully in context.  She has done well in the past after being given IV iron.    She is not exercising regularly but does not think she is fundamentally more short of breath.  Her last PFTs were 2 years ago but these were completely stable with forced vital capacity 102% of predicted.  DLCO on those studies was at 84% of predicted also stable.  She does continue to smoke though she says very little.    August 18, 2022 interval history:    Since last clinic visit,     Patient reports having worsening swallowing issues with pills getting stuck in the back of the throat and having difficulty drinking water occasionally.  Points to the lower portion of the neck and states that it feels like things are getting stuck there.  Denies any choking or sensation denies any issues with consuming food.  Reports that she does use some vitamin once a day in the morning.    Reports consistent fatigue and states that she is being evaluated by endocrinology for radioactive ablation vs surgery for her Graves' disease.  Reports that her thyroid issues has been causing weight changes, fatigue, palpitations and sleep issues.    Reports that her shortness of breath has been moderately worse since last clinic visit.  In the last PFTs, she had significant worsening of her FVC and DLCO.  However, at that time patient reported that the test was not done properly.     She continues to smoke 1 pack/day.    She states that she is  not much physically active.    December 29, 2022 interval history:    Since we have last seen the patient she has continued to struggle with getting the thyroid dosage correct but she feels like that has finally happened.  She definitely is feeling better with it at this point that she has and feels like she is essentially back to her normal baseline.    In terms of features of her scleroderma she thinks that despite all of the difficulties with getting the thyroid correct, that she has been pretty stable with that.  Her GERD is variable day-to-day and she can have enough reflux that she wakes due to it but its not frequent.  She denies worsening dysphagia.  She actually drinks soda despite the fact that it bothers her somewhat and she gets some difficulty with moving that through.  She knows that she really does not have a lot of appetite but her weight has been stable or she has been gaining slightly.    She does have Raynauds episodes but is not had any of the more severe ones that she had earlier on in the disease process and has not had any digital ulcers though she does continue to smoke.    She does think her breathing may be gradually a little bit worse.  She thinks that is due to lack of exercise and the difficulties with adjusting her thyroid hormone.  She has not however had PFTs for some time although we encouraged her to do them last time and there are active orders on her chart.    She otherwise feels stable.  She denies significant musculoskeletal features, or any worsening of her skin.  She has been off of all medication now in terms of controlling skin joint and other inflammatory features of disease for quite some time.    July 13, 2023 interval history:    Since we have last seen the patient she feels she is done quite stably with respect to her systemic sclerosis.  Her biggest problems she believes derived from her active Graves' disease.  She is on methimazole for that but is not sure she is  tolerating it all that well.  Her current endocrinologist has encouraged her to consider thyroidectomy and thyroid replacement.    She quit smoking in January.  Perhaps because of that in part and because of the thyroid disease in part she has gained significant weight and is up about 30 pounds.  Notably however her exercise tolerance is actually improved overall.  She attributes that to stopping smoking.  She says she can climb stairs pretty well without a lot of difficulty.    Raynaud's phenomena also was better this winter than it has been, likely also due to stopping smoking.    She however is having more pain in her skin.  This does not feel to her like its her scleroderma and she thinks maybe it is more related to either her thyroid disease or the methimazole itself, either of which could be the case.    GERD is stable and other manifestations of her disease have been stable without any significant worsening.  She does continue to have Keratoconjunctivitis sicca.  That was a consideration in terms of possibility of radioactive thyroid ablation, which her endocrinologist consult against.    May 9, 2024 interval history:    Since we last seen the patient she has been having ongoing adjustment of her thyroid hormone status post her thyroidectomy.  She has gained quite a bit of weight and is not happy about that and she has had more troubles with anxiety but she does think things are starting to smooth out a little bit.  She is also getting cardiovascular workup right now but she denies having significant palpitations or any other clear-cut arrhythmias.    She did her pulmonary function test several weeks ago and those are completely stable.  She does feel that her exercise tolerance is stable as well, although she acknowledges not doing a lot from an aerobic exercise standpoint.    She is also not doing a lot of stretching exercises and is having a lot of discomfort with tightness in her hips and her knees and we  discussed that in greater detail.    She is having difficulty ambulating as well because of a painful corn that may also include underlying calcinosis in the left fifth MTP had.  That has been going on for some time.  She had a immobilizing boot that she used when she developed tendinitis on the dorsal aspect of that same foot a year or so ago that she has been using simply to be able to ambulate.    She continues to have Keratoconjunctivitis sicca especially affecting her eyes for which she sees Dr. Collazo and she is doing well with that therapy.    She continues to have Raynaud's phenomenon as well.  She has had gradually worsening contractures in the right fingers in particular and she cannot fully open the hand.  Contractures in her PIPs and DIPs are both significant with greater than 90 degree contact contractures in the PIPs and also significant loss of range of motion in the MCPs.  There are much less severe contractures on the left side.  She does acknowledge having worsened Raynauds in the right side than on the left probably as a consequence of that impaired blood flow, but she has not developed any significant distal digital ulcers.    She is otherwise been stable.  She denies significant inflammatory morning stiffness in any of the joints.  Recall that when she initially presented she was given a diagnosis of rheumatoid arthritis, based in part upon a very high titer CCP.    Review of Systems:   Pertinent items are noted in HPI or as below, remainder of complete ROS is negative.      No recent problems with hearing or vision. No swallowing problems.   No breathing difficulty, shortness of breath, coughing, or wheezing  No chest pain or palpitations  No heart burn, indigestion, abdominal pain, nausea, vomiting, diarrhea  No urination problems, no bloody, cloudy urine, no dysuria  No numbing, tingling, weakness  No headaches or confusion  No rashes. No easy bleeding or bruising.   Answers for HPI/ROS  submitted by the patient on 11/21/2019   General Symptoms: Yes  Skin Symptoms: Yes  HENT Symptoms: Yes  EYE SYMPTOMS: Yes  HEART SYMPTOMS: Yes  LUNG SYMPTOMS: Yes  INTESTINAL SYMPTOMS: Yes  URINARY SYMPTOMS: No  GYNECOLOGIC SYMPTOMS: No  BREAST SYMPTOMS: No  SKELETAL SYMPTOMS: Yes  BLOOD SYMPTOMS: No  NERVOUS SYSTEM SYMPTOMS: Yes  MENTAL HEALTH SYMPTOMS: Yes  Fever: No  Loss of appetite: No  Weight loss: No  Weight gain: Yes  Fatigue: Yes  Night sweats: Yes  Chills: No  Excessive thirst: Yes  Feeling hot or cold when others believe the temperature is normal: Yes  Loss of height: No  Post-operative complications: No  Surgical site pain: No  Hallucinations: No  Change in or Loss of Energy: No  Changes in hair: No  Changes in moles/birth marks: Yes  Itching: Yes  Rashes: No  Changes in nails: No  Acne: No  Hair in places you don't want it: No  Ear pain: No  Ear discharge: No  Hearing loss: Yes  Tinnitus: Yes  Nosebleeds: No  Congestion: No  Sinus pain: No  Tooth pain: No  Gum tenderness: No  Bleeding gums: No  Change in taste: No  Change in sense of smell: No  Dry mouth: Yes  Hearing aid used: No  Eye pain: No  Vision loss: Yes  Dry eyes: Yes  Watery eyes: Yes  Eye bulging: No  Double vision: No  Flashing of lights: No  Cough: No  Sputum or phlegm: No  Coughing up blood: No  Difficulty breating or shortness of breath: No  Snoring: No  Wheezing: No  Difficulty breathing on exertion: Yes  Chest pain or pressure: No  Fast or irregular heartbeat: No  Pain in legs with walking: Yes  Trouble breathing while lying down: No  Fingers or toes appear blue: Yes  High blood pressure: Yes  Low blood pressure: No  Fainting: No  Murmurs: No  Pacemaker: No  Varicose veins: No  Edema or swelling: No  Wake up at night with shortness of breath: No  Light-headedness: No  Heart burn or indigestion: Yes  Nausea: No  Vomiting: No  Abdominal pain: No  Bloating: Yes  Constipation: Yes  Diarrhea: No  Back pain: Yes  Muscle aches: Yes  Neck pain:  Yes  Swollen joints: No  Joint pain: Yes  Bone pain: No  Muscle cramps: No  Trouble with coordination: No  Dizziness or trouble with balance: No  Fainting or black-out spells: No  Memory loss: No  Headache: Yes  Seizures: No  Speech problems: No  Nervous or Anxious: No  Depression: Yes  Trouble sleeping: No  Trouble thinking or concentrating: No  Mood changes: No  Panic attacks: No      Active Medications:     Current Outpatient Medications:      aspirin-dipyridamole (AGGRENOX)  MG per 12 hr capsule, Take 1 capsule by mouth daily , Disp: , Rfl:      BIOTIN PO, 1 tablet daily, Disp: , Rfl:      Blood Pressure Monitoring KIT, Check BP every 7 days., Disp: 1 kit, Rfl: 0     carboxymethylcellulose PF (REFRESH PLUS) 0.5 % ophthalmic solution, Place 1 drop into both eyes 4 times daily as needed for dry eyes, Disp: 120 mL, Rfl: 11     cevimeline (EVOXAC) 30 MG capsule, Take 1 capsule (30 mg) by mouth 3 times daily, Disp: 270 capsule, Rfl: 2     cyanocobalamin (VITAMIN B12) 1000 MCG/ML injection, Inject 1 mL into the muscle every 30 days, Disp: , Rfl:      esomeprazole (NEXIUM) 40 MG DR capsule, Take 1 capsule (40 mg) by mouth daily Take 30-60 minutes before eating., Disp: 90 capsule, Rfl: 1     famotidine (PEPCID) 20 MG tablet, Take 1 tablet (20 mg) by mouth 2 times daily., Disp: 60 tablet, Rfl: 6     LORazepam (ATIVAN) 0.5 MG tablet, Take 1 - 2 tablets by mouth three times daily as needed., Disp: 60 tablet, Rfl: 0     omeprazole (PRILOSEC) 40 MG DR capsule, Take 1 capsule (40 mg) by mouth 2 times daily, Disp: 180 capsule, Rfl: 3     ondansetron (ZOFRAN-ODT) 4 MG ODT tab, DISSOLVE 1 TABLET IN MOUTH EVERY EIGHT HOURS AS NEEDED FOR NAUSEA, Disp: 20 tablet, Rfl: 0     oxyCODONE-acetaminophen (PERCOCET) 5-325 MG tablet, Take 1 tablet by mouth every 4 hours as needed for moderate pain or severe pain. Maximum of 3 tablets per day., Disp: 90 tablet, Rfl: 0     pravastatin (PRAVACHOL) 10 MG tablet, Take 1 tablet (10 mg) by  "mouth daily, Disp: 30 tablet, Rfl: 5     hydroxychloroquine (PLAQUENIL) 200 MG tablet, Take 1 tablet (200 mg) by mouth 2 times daily Annual Plaquenil toxicity eye screening required. (Patient not taking: Reported on 8/12/2021), Disp: 180 tablet, Rfl: 3     leucovorin (WELLCOVORIN) 25 MG tablet, Take 1 tablet (25 mg) by mouth daily Day after taking methotrexate (Patient not taking: Reported on 8/12/2021), Disp: 12 tablet, Rfl: 3     naloxone (EVZIO) 0.4 MG/0.4ML auto-injector, Inject 0.4 mLs (0.4 mg) into the muscle as needed for opioid reversal every 2-3 minutes until assistance arrives (Patient not taking: Reported on 8/12/2021), Disp: 0.8 mL, Rfl: 0     olopatadine (PATADAY) 0.2 % ophthalmic solution, Place 1 drop into both eyes daily (Patient not taking: Reported on 2/9/2022), Disp: 1 Bottle, Rfl: 11     olopatadine (PATANOL) 0.1 % ophthalmic solution, Place 1 drop into both eyes 2 times daily (Patient not taking: Reported on 8/12/2021), Disp: 5 mL, Rfl: 3     oxyCODONE (ROXICODONE) 5 MG tablet, Take 1 tablet (5 mg) by mouth every four hours as needed for moderate to severe pain.  Maximum of 6 tablets per day.  (Patient not taking: Reported on 8/12/2021), Disp: 180 tablet, Rfl: 0     Syringe/Needle, Disp, 27G X 1/2\" 1 ML MISC, Used with Methotrexate once weekly (Patient not taking: Reported on 8/12/2021), Disp: 12 each, Rfl: 3     valACYclovir (VALTREX) 500 MG tablet, Take 1 tablet (500 mg) by mouth 2 times daily (Patient not taking: Reported on 5/9/2024), Disp: 180 tablet, Rfl: 1     Vitamin D, Cholecalciferol, 1000 UNITS TABS, Take 2,000 Units by mouth daily  (Patient not taking: Reported on 12/29/2022), Disp: , Rfl:       Allergies:   Penicillin   Bactroban   Levaquin  Rifampin   Clarithromycin   Ofloxacin  Sertraline                                                  Past Medical History:  Rheumatoid arthritis/scleroderma  Gastric antral vascular ectasia  Gastroesophageal reflux disease  Iron deficiency " anemia  Irregular heart beat  Osteomyelitis, right 3rd finger  Raynaud's syndrome with gangrene  Scleroderma  Sjogren's syndrome  Systemic sclerosis  Metatarsalgia  Dysuria  Ulcer of finger  Sicca syndrome  Palpitations  Ischemia of upper extremity  Intercostal pain  Anxiety  Breast implant rupture  Palpitations     Past Surgical History:  Appendectomy   Colonoscopy (08/04/2016)  EGD x 5 (08/22/14 - 08/02/2016)  Mammoplasty Augmentation Bilateral     Family History:    The patient's family history includes Cancer in an other family member; Respiratory in her father.     Social History:  The patient reports that she is a current cigarette smoker. She has been smoking about 1.00 pack per day. She has never used smokeless tobacco. She reports that she does not drink alcohol or use drugs. She states she is attempting to get into a research program for quitting smoking.   PCP: Lindy Carbone  Marital Status: Single     Physical Exam:   BP (!) 142/69   Pulse 63   SpO2 95%    Wt Readings from Last 4 Encounters:   08/18/22 70.4 kg (155 lb 4.8 oz)   02/09/22 69.6 kg (153 lb 6.4 oz)   08/12/21 68 kg (150 lb)   08/20/20 65 kg (143 lb 3.2 oz)     Constitutional: Well-developed, appearing stated age; cooperative  Eyes: Normal EOM, PERRLA, vision, conjunctiva, sclera  ENT: Normal external ears, nose, hearing, lips, teeth, gums, throat. No mucous membrane lesions, normal saliva pool  Neck: No mass or thyroid enlargement  Resp: Lungs clear to auscultation, nl to palpation  CV: RRR, no murmurs, rubs or gallops, no edema  GI: No ABD mass or tenderness, no HSM  : Not tested  Lymph: No cervical, supraclavicular, inguinal or epitrochlear nodes  MS: The TMJ, neck, shoulder, elbow, wrist, MCP/PIP/DIP, spine, hip, knee, ankle, and foot MTP/IP joints were examined and found normal. No active synovitis or altered joint anatomy. Full joint ROM. Normal  strength. No dactylitis,  tenosynovitis, enthesopathy.  Proximally  15-20   contracture in PIPs and 5-10   in DIPs of the left hand. Right hand greater than 90   contractures in PIPs 2-5.  Diminished calcinosis lesion over the lateral aspect of the right 4th PIP.  Contractures stable as per the patient.    Skin: No nail pitting, alopecia, rash, nodules or lesions  Skin thickening in hands and distal forearms appears stable to me and is minimal.  For the most part her skin has the atrophic changes of late systemic sclerosis with minimal or no skin thickening..  She has no digital ulcers now.  Neuro: Normal cranial nerves, strength, sensation, DTRs.   Psych: Normal judgement, orientation, memory, affect.     Imaging:  HRCT without contrast (08/22/2019):  Impression:  1. Mild bilateral posterior subpleural reticular opacities, dependent atelectasis versus early ILD. Consider prone positioning on follow-up  study.  2. Trace mucous plugging in the right middle lobe.  3. Stable incidental calcified splenic artery aneurysm.  4. Collapsed left breast implant and calcified right implant.  Sinai Carter MD    PFT Results (07/22/2019):  The FVC and FEV1 and the FEV1/FVC ratio are within normal limits. The inspiratory flow rates are within normal limits. Lung volumes are within normal limits. The diffusing capacity is normal. However, the diffusing capacity was not corrected for the   patient's hemoglobin  Impression:  Normal spirometry, lung volumes and diffusing capacity.    Laboratory:   Component      Latest Ref Rng & Units 8/15/2019 8/15/2019 8/15/2019           3:18 PM  3:24 PM  3:24 PM   WBC      4.0 - 11.0 10e9/L  5.2    RBC Count      3.8 - 5.2 10e12/L  3.71 (L)    Hemoglobin      11.7 - 15.7 g/dL  11.4 (L)    Hematocrit      35.0 - 47.0 %  37.1    MCV      78 - 100 fl  100    MCH      26.5 - 33.0 pg  30.7    MCHC      31.5 - 36.5 g/dL  30.7 (L)    RDW      10.0 - 15.0 %  13.2    Platelet Count      150 - 450 10e9/L  212    Diff Method        Automated Method    % Neutrophils      %   48.8    % Lymphocytes      %  40.5    % Monocytes      %  9.0    % Eosinophils      %  1.3    % Basophils      %  0.2    % Immature Granulocytes      %  0.2    Nucleated RBCs      0 /100  0    Absolute Neutrophil      1.6 - 8.3 10e9/L  2.6    Absolute Lymphocytes      0.8 - 5.3 10e9/L  2.1    Absolute Monocytes      0.0 - 1.3 10e9/L  0.5    Absolute Eosinophils      0.0 - 0.7 10e9/L  0.1    Absolute Basophils      0.0 - 0.2 10e9/L  0.0    Abs Immature Granulocytes      0 - 0.4 10e9/L  0.0    Absolute Nucleated RBC        0.0    Color Urine       Colorless     Appearance Urine       Clear     Glucose Urine      NEG:Negative mg/dL Negative     Bilirubin Urine      NEG:Negative Negative     Ketones Urine      NEG:Negative mg/dL Negative     Specific Gravity Urine      1.003 - 1.035 1.002 (L)     Blood Urine      NEG:Negative Small (A)     pH Urine      5.0 - 7.0 pH 6.0     Protein Albumin Urine      NEG:Negative mg/dL Negative     Urobilinogen mg/dL      0.0 - 2.0 mg/dL 0.0     Nitrite Urine      NEG:Negative Negative     Leukocyte Esterase Urine      NEG:Negative Negative     Source       Midstream Urine     WBC Urine      0 - 5 /HPF 1     RBC Urine      0 - 2 /HPF 1     Squamous Epithelial /HPF Urine      0 - 1 /HPF <1     Albumin      3.4 - 5.0 g/dL   4.3     Component      Latest Ref Rng & Units 8/15/2019 8/15/2019 8/15/2019 8/15/2019           3:24 PM  3:24 PM  3:24 PM  3:24 PM   Creatinine      0.52 - 1.04 mg/dL  0.78     GFR Estimate      >60 mL/min/1.73:m2  85     GFR Estimate If Black      >60 mL/min/1.73:m2  >90     Sed Rate      0 - 30 mm/h       CRP Inflammation      0.0 - 8.0 mg/L <2.9      AST      0 - 45 U/L   15    ALT      0 - 50 U/L    18   Albumin      3.4 - 5.0 g/dL         Component      Latest Ref Rng & Units 8/15/2019           3:24 PM   Sed Rate      0 - 30 mm/h 16           Again, thank you for allowing me to participate in the care of your patient.        Sincerely,        Brennen Child,  MD

## 2024-05-24 NOTE — TELEPHONE ENCOUNTER
Care Management Follow Up    Length of Stay (days): 4    Expected Discharge Date: 05/26/2024     Concerns to be Addressed: discharge planning     Patient plan of care discussed at interdisciplinary rounds: Yes    Anticipated Discharge Disposition: Home, OP INR clinic     Anticipated Discharge Services: None  Anticipated Discharge DME: None    Patient/family educated on Medicare website which has current facility and service quality ratings:  NA  Education Provided on the Discharge Plan:  yes  Patient/Family in Agreement with the Plan: Yes     Referrals Placed by CM/SW: Internal Clinic Care Coordination, [facility] (INR Clinic)  Private pay costs discussed: Not applicable    Additional Information:  64yr old male with a history of NICM (s/p HM2 LVAD 6/2017) s/p OHT 5/6/21, VT, AFib, CKD, DMII, and COPD who presents for RESENDIZ and was found to have a PE.     RNCC met with patient at bedside. Patient has new PE and is being started on warfarin.   Patient let writer know that he legally adopted his great granddaughter when she was 3 months old. Dtr, Graham is now 10 and patient states she is the reason for him to remain healthy. 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 Patient stated his wife is his primary support person and  he has a sister and a brother in the area too    Per cardiology, patient will need clinic INR ~Weds with Dr Montgomery.    -RNCC placed Anticoagulation Referral for [facility] (per team request).  -RNCC asked CHW to call [facility] to obtain appts.        NEXT: Anticipate patient will remain IP through the weekend. ALEXSANDER Tuesday with Anticoagulation Clinic follow-up.     Charleen Landeros RNCC float  Nurse Coordinator    Covering for 6C ([id number] & [id number])  Phone [phone]  Social Work and Care Management Department   [facility] - search CARE COORDINATOR     [place] [address] [place] ([phone]) Saturday & Sunday; ([phone]) [facility] Recognized Holidays     Units:  Last seen: 04/10/18  Pending appt: 07/19/18  Medication: Roxicodone  Last filled: 05/11/18  Qty: 204 tab  ---------------------------------------  Last seen: 04/10/18  Pending appt: 07/19/18  Medication: Percocet  Last filled: 05/18/18  Qty: 90 tab  Lab:         5A Onc 5201 thru 5219 RNCC, 5A Onc 5220 thru 5240 RNCC, 5C OFFSERVICE [id number] RNCC & 5C OFF SERVICE [phone] RNCC Pager: [phone]    Units: 6B Vocera, 6C Card 6401 thru 6420 RNCC, 6C Card 6502 thru 6514 RNCC, & 6C Card 6515 thru 6519 RNCC  Pager: [phone]    Units: 7A SOT RNCC Vocera, 7B Med Surg Vocera, 7C Med Surg 7401 thru 7418 RNCC & 7C Med Surg 7502 thru 3800 RNCC Pager: [phone]    Units: 6A Vocera & 4A CVICU Vocera, 4C MICU Vocera, and 4E SICU Vocera   Pager: [phone]    Units: 5 Ortho Vocera & 5 Med Surg Vocera  Pager: [phone]    Units: 6 Med Surg Vocera & 8 Med Surg Vocera  Pager [phone]

## 2024-08-01 NOTE — TELEPHONE ENCOUNTER
mycophenolate/CELLCEPT      Last Written Prescription Date:  3/28/17  Last Fill Quantity: 360,   # refills: 0  Last Office Visit: 5/18/17  Future Office visit:  NONE    CBC RESULTS:   Recent Labs   Lab Test  06/13/17   1507   WBC  4.8   RBC  3.69*   HGB  11.5*   HCT  37.1   MCV  101*   MCH  31.2   MCHC  31.0*   RDW  12.7   PLT  218       Creatinine   Date Value Ref Range Status   06/13/2017 0.71 0.52 - 1.04 mg/dL Final   ]    Liver Function Studies -   Recent Labs   Lab Test  06/13/17   1507   11/24/15   1848   PROTTOTAL   --    --   7.8   ALBUMIN  4.2   < >  4.1   BILITOTAL   --    --   0.4   ALKPHOS   --    --   49   AST  14   < >  12   ALT  15   < >  15    < > = values in this interval not displayed.                
Prophylactic measure
Prophylactic measure

## 2024-08-08 DIAGNOSIS — K21.01 GASTROESOPHAGEAL REFLUX DISEASE WITH ESOPHAGITIS AND HEMORRHAGE: ICD-10-CM

## 2024-08-13 RX ORDER — ESOMEPRAZOLE MAGNESIUM 40 MG/1
40 CAPSULE, DELAYED RELEASE ORAL DAILY
Qty: 90 CAPSULE | Refills: 3 | Status: SHIPPED | OUTPATIENT
Start: 2024-08-13

## 2024-08-13 NOTE — TELEPHONE ENCOUNTER
LVD:  5/9/2024  [facility]     Brennen Child MD  Rheumatology     Refilled per protocol.

## 2024-12-05 ENCOUNTER — OFFICE VISIT (OUTPATIENT)
Dept: PULMONOLOGY | Facility: CLINIC | Age: 59
End: 2024-12-05
Attending: INTERNAL MEDICINE
Payer: MEDICARE

## 2024-12-05 ENCOUNTER — ANCILLARY PROCEDURE (OUTPATIENT)
Dept: GENERAL RADIOLOGY | Facility: CLINIC | Age: 59
End: 2024-12-05
Attending: INTERNAL MEDICINE
Payer: MEDICARE

## 2024-12-05 VITALS
WEIGHT: 150.8 LBS | BODY MASS INDEX: 25.09 KG/M2 | DIASTOLIC BLOOD PRESSURE: 81 MMHG | OXYGEN SATURATION: 100 % | HEART RATE: 58 BPM | SYSTOLIC BLOOD PRESSURE: 131 MMHG

## 2024-12-05 DIAGNOSIS — L98.499 ULCER OF FINGER, UNSPECIFIED ULCER STAGE (H): ICD-10-CM

## 2024-12-05 DIAGNOSIS — M34.9 SYSTEMIC SCLEROSIS (H): ICD-10-CM

## 2024-12-05 DIAGNOSIS — I73.01 RAYNAUD'S DISEASE WITH GANGRENE (H): ICD-10-CM

## 2024-12-05 DIAGNOSIS — I99.8 ISCHEMIA OF UPPER EXTREMITY: ICD-10-CM

## 2024-12-05 DIAGNOSIS — M35.00 SICCA SYNDROME (H): ICD-10-CM

## 2024-12-05 DIAGNOSIS — Z79.899 ENCOUNTER FOR LONG-TERM CURRENT USE OF MEDICATION: ICD-10-CM

## 2024-12-05 DIAGNOSIS — M34.9 SYSTEMIC SCLEROSIS (H): Primary | ICD-10-CM

## 2024-12-05 PROCEDURE — G0463 HOSPITAL OUTPT CLINIC VISIT: HCPCS | Performed by: INTERNAL MEDICINE

## 2024-12-05 PROCEDURE — 99214 OFFICE O/P EST MOD 30 MIN: CPT | Performed by: INTERNAL MEDICINE

## 2024-12-05 PROCEDURE — G2211 COMPLEX E/M VISIT ADD ON: HCPCS | Performed by: INTERNAL MEDICINE

## 2024-12-05 PROCEDURE — 73130 X-RAY EXAM OF HAND: CPT | Mod: LT | Performed by: RADIOLOGY

## 2024-12-05 RX ORDER — LEVOTHYROXINE SODIUM 75 UG/1
75 CAPSULE ORAL DAILY
COMMUNITY

## 2024-12-05 ASSESSMENT — PAIN SCALES - GENERAL: PAINLEVEL_OUTOF10: NO PAIN (0)

## 2024-12-05 NOTE — NURSING NOTE
Chief Complaint   Patient presents with    Follow Up     Return Scleroderma - 6 mo       Vitals were taken, medications reconciled.    Jeremy Chapa, Clinic Assistant   3:44 PM

## 2024-12-05 NOTE — Clinical Note
2024      Willard Grayson  [address]  [facility] [id number]      Dear Colleague,    Thank you for referring your patient, Willard Grayson, to the [facility] [place]. Please see a copy of my visit note below.    [facility]  Rheumatology Clinic  Brennen Child MD      Name: Willard Grayson  MRN: [id number]  Age: 54 year old  : 1965  Referring provider: Referred Self    Problem List:  1. 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   2. Associated marked neuropathic skin pain markedly improved with Lyrica with prior medication therapy with gabapentin.  3. Diffuse musculoskeletal pain requiring methotrexate plus CellCept, and narcotic to control, but also improved. Now off methotrexate due to GI issues.  4. Initial clinical overlap with anti-CCP seropositive rheumatoid arthritis with continued anti-CCP seropositivity as of 2010.  5. Raynaud's phenomena with digital ulcers with easily cracked skin.   6. GI involvement consistent with bacterial small bowel overgrowth with marked improvement after a course of neomycin followed by Dr. Glez; positive hydrogen breath test in 3/2012 - treated with rifaximin through Dr. Jacob  7. No convincing evidence of lung involvement on serial pulmonary function tests or clinically.   8. History of heart palpitations prior to the onset of SSc with stable symptoms since. Holter 2018 with PACs.  9. History of medication failures with Remicade, methotrexate, methotrexate plus CellCept and with Orencia and with improvement since initiation of IVIG but with complicating headaches and overall sense of malaise with IVIG infusions.   10. History of silicone breast implants.   11.  Incidental finding of calcified splenic artery aneurysm.  12. EGD in 3/2012 with hiatal hernia and gastral antral vascular ectasia  13. Recurrent right third finger contracture with extensor surface ulceration, healed   14. Trochanteric bursitis, R>L    Assessment and Plan:   # Systemic sclerosis   # Gastroesophageal reflux disease, esophagitis presence not specified  # Esophageal dysmotility  # Raynaud's disease with gangrene  # High risk medications (not anticoagulants) long-term use  # Rheumatoid arthritis involving multiple sites with positive rheumatoid factor (H)  # Metatarsalgia, unspecified laterality  # Metatarsalgia of both feet  # Osteopenia, unspecified location  # Systemic sclerosis  # MCDANIEL (dyspnea on exertion)  # Chest wall pain  # Skin lesion of lower extremity    Plan/recommendation:    I do think her systemic sclerosis is stable.  She is a little bit difficult to assess in terms of how she is doing overall with contemporaneous thyroid disease.    I do think she would benefit from increased stretching, particularly in her hip girdle and knees and we discussed that in some detail.  If she wants a more formal program we can refer her onto physical therapy.    We also discussed possibilities for treatment of the corn/area of possible calcinosis over the left fifth ray.  She would prefer not to see the podiatrist at this point already had an appointment and canceled that.    Therefore I have suggested to her padding with corn pads during the day and then doing some Epsom salt soaks perhaps with some gentle debridement at night to see if there is any calcinosis there whether it can be dislodged.    I think she is otherwise doing stably.  It is not clear to me that she would benefit from immunosuppression at this point.    I will plan to see her back in 6 months, sooner as needed for reassessment.    Today's visit was 25 minutes in face-to-face time with an additional 8 minutes spent in chart review, order  entry and documentation completed on the date of service.    The longitudinal plan of care for the diagnosis(es)/condition(s) as documented were addressed during this visit. Due to the added complexity in care, I will continue to support Sofia in the subsequent management and with ongoing continuity of care.       JANET Child MD, PhD  Professor of Medicine   Rheumatology              August 20, 2020 interval history:    Patient comes in today with him main concern being some new skin lesions that she has noted over her left leg and left arm over the last few months.  She does have a dermatology appointment within the [facility] system where she gets most of her care.  That is not however until October.    The other new issue this been bothering her a fair amount is neck pain and headaches.  She did get a cervical spine film just recently at [facility] and has not yet reviewed that with anyone there.    In terms of her scleroderma skin involvement she really feels that has been quite stable.  We have repeatedly tried to get her on methotrexate but she is tolerated that poorly but she has tolerated MMF better and remains on it.  The main problem for her has been the contractures in her hands and those have gradually worsened particularly in her right hand.    She is largely sheltering in place because of coronavirus, but she is still smoking cigarettes.  If anything she has increased doing so because of feeling under stress.      January 14, 2021 interval history:    Since we last seen the patient she feels she is really been doing pretty stably.  She went off of MMF because of development of a squamous cell carcinoma in the understanding that MMF could have been contributing to impaired tumor Amino-Cerv balance.  She really does not think she is gotten worse that since going off of it.  That was back on November 12.  The SCCA was on her forearm and is healed nicely.  She thinks that perhaps the skin in the area was a little  more shiny than surrounding skin but there were no other difficulties with the healing.    She does acknowledge that over the last year or 2 her fingers to develop worsening contractures and she has difficulty writing.  Nonetheless she has tolerated methotrexate poorly and its not clear how much it helped her when she was on it.  She does acknowledge that she does not do stretching exercises as reliably as she might.    She has had a few digital ulcers but they have been healing more quickly than usual.  She is not certain why but she does note that she is no longer taking care of her grandchildren so she is able to rest her hands and protect them better.    She has established with the pain clinic at [facility] and that they have taken over the prescribing of her narcotics and are working with her on a more comprehensive pain program.  She is really actually quite happy about that.    She continues to get some esophageal spasms at times but oddly this seems only to happen with the drinking of liquids.  Its not bad enough that she wants to do anything more about it.  We did talk about the possible value of all toyed minutes however.    The other new thing that she has had is some dizziness and tinnitus developing in her left ear much greater than right.  She is seeking care with an otolaryngologist at [facility] where most of her care is delivered however.    She is otherwise been pretty good.  She had a little bit of a flareup last week where she felt weaker and more achy and more sluggish.  She has not had a clear-cut coronavirus exposure to her knowledge.      August 12, 2021 interval history:    Since we have last seen the patient she feels she is really been doing very well.  She has been seeing the [facility] pain clinic and was having some difficulties that seemed unusual to her.  Ultimately after a lot of jitters and changes in her pain situation she was worked up and diagnosed with Graves' disease and is now being  treated with methimazole.    She also was seen in the breast clinic where there was some evidence of fluid around her implants raising the question of whether there had been a rupture of them.  There was associated pain.  The fluid was removed and she has been feeling better since then.    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  That was removed and the wound has healed up very well.    Interestingly, since she has had Botox injections she has not had any digital ulcers including over her right contractured PIP joints.  Although she really did not like the amount of pain she got with the Botox she is pretty happy with not having had any ulcers.    Potentially contributing to that improvement however is that she is not been smoking at all for the last 4 months.  Her breathing feels better to her as well and overall her breathing feels stable to her.  She has not had pulmonary function test for quite some time and is a bit overdue for those, but she is never had evidence of significant lung involvement.    Although she thinks her skin is somewhat sensitive she does not believe it is worsening at all and she believes is completely stable and her joints feel stable to her as well and her not bothering her significantly.    February 9, 2022 interval history:    Since we have last seen the patient she really has not felt well.  She thinks this is primarily since she is received her second coronavirus shot in the fall.  She however also had autoimmune thyroiditis diagnosed in April and has been placed on methimazole and does not think she is felt quite as well since then either.  She of course became euthyroid after being hyperthyroid and has gained weight with it.    Among the thing she has been noticing more recently is virtually constant heartburn despite being on omeprazole 40 mg twice daily.    She however also notices more widespread pain.  This includes the small joints of her hands  and feet but she denies significant morning stiffness there.    She has not been on any disease modifying drugs now for quite some time including Plaquenil.  She does believe that her skin has been stable throughout all this and not worsening.    She does have a lot of fatigue, and as part of recent laboratory work-up she had iron levels done that were on the low side of normal but she was anemic and her ferritin was low.  There were no other inflammatory markers so the ferritin could not be put fully in context.  She has done well in the past after being given IV iron.    She is not exercising regularly but does not think she is fundamentally more short of breath.  Her last PFTs were 2 years ago but these were completely stable with forced vital capacity 102% of predicted.  DLCO on those studies was at 84% of predicted also stable.  She does continue to smoke though she says very little.    August 18, 2022 interval history:    Since last clinic visit,     Patient reports having worsening swallowing issues with pills getting stuck in the back of the throat and having difficulty drinking water occasionally.  Points to the lower portion of the neck and states that it feels like things are getting stuck there.  Denies any choking or sensation denies any issues with consuming food.  Reports that she does use some vitamin once a day in the morning.    Reports consistent fatigue and states that she is being evaluated by endocrinology for radioactive ablation vs surgery for her Graves' disease.  Reports that her thyroid issues has been causing weight changes, fatigue, palpitations and sleep issues.    Reports that her shortness of breath has been moderately worse since last clinic visit.  In the last PFTs, she had significant worsening of her FVC and DLCO.  However, at that time patient reported that the test was not done properly.     She continues to smoke 1 pack/day.    She states that she is not much physically  active.    December 29, 2022 interval history:    Since we have last seen the patient she has continued to struggle with getting the thyroid dosage correct but she feels like that has finally happened.  She definitely is feeling better with it at this point that she has and feels like she is essentially back to her normal baseline.    In terms of features of her scleroderma she thinks that despite all of the difficulties with getting the thyroid correct, that she has been pretty stable with that.  Her GERD is variable day-to-day and she can have enough reflux that she wakes due to it but its not frequent.  She denies worsening dysphagia.  She actually drinks soda despite the fact that it bothers her somewhat and she gets some difficulty with moving that through.  She knows that she really does not have a lot of appetite but her weight has been stable or she has been gaining slightly.    She does have Raynauds episodes but is not had any of the more severe ones that she had earlier on in the disease process and has not had any digital ulcers though she does continue to smoke.    She does think her breathing may be gradually a little bit worse.  She thinks that is due to lack of exercise and the difficulties with adjusting her thyroid hormone.  She has not however had PFTs for some time although we encouraged her to do them last time and there are active orders on her chart.    She otherwise feels stable.  She denies significant musculoskeletal features, or any worsening of her skin.  She has been off of all medication now in terms of controlling skin joint and other inflammatory features of disease for quite some time.    July 13, 2023 interval history:    Since we have last seen the patient she feels she is done quite stably with respect to her systemic sclerosis.  Her biggest problems she believes derived from her active Graves' disease.  She is on methimazole for that but is not sure she is tolerating it all that  well.  Her current endocrinologist has encouraged her to consider thyroidectomy and thyroid replacement.    She quit smoking in January.  Perhaps because of that in part and because of the thyroid disease in part she has gained significant weight and is up about 30 pounds.  Notably however her exercise tolerance is actually improved overall.  She attributes that to stopping smoking.  She says she can climb stairs pretty well without a lot of difficulty.    Raynaud's phenomena also was better this winter than it has been, likely also due to stopping smoking.    She however is having more pain in her skin.  This does not feel to her like its her scleroderma and she thinks maybe it is more related to either her thyroid disease or the methimazole itself, either of which could be the case.    GERD is stable and other manifestations of her disease have been stable without any significant worsening.  She does continue to have Keratoconjunctivitis sicca.  That was a consideration in terms of possibility of radioactive thyroid ablation, which her endocrinologist consult against.    May 9, 2024 interval history:    Since we last seen the patient she has been having ongoing adjustment of her thyroid hormone status post her thyroidectomy.  She has gained quite a bit of weight and is not happy about that and she has had more troubles with anxiety but she does think things are starting to smooth out a little bit.  She is also getting cardiovascular workup right now but she denies having significant palpitations or any other clear-cut arrhythmias.    She did her pulmonary function test several weeks ago and those are completely stable.  She does feel that her exercise tolerance is stable as well, although she acknowledges not doing a lot from an aerobic exercise standpoint.    She is also not doing a lot of stretching exercises and is having a lot of discomfort with tightness in her hips and her knees and we discussed that in  greater detail.    She is having difficulty ambulating as well because of a painful corn that may also include underlying calcinosis in the left fifth MTP had.  That has been going on for some time.  She had a immobilizing boot that she used when she developed tendinitis on the dorsal aspect of that same foot a year or so ago that she has been using simply to be able to ambulate.    She continues to have Keratoconjunctivitis sicca especially affecting her eyes for which she sees Dr. Collazo and she is doing well with that therapy.    She continues to have Raynaud's phenomenon as well.  She has had gradually worsening contractures in the right fingers in particular and she cannot fully open the hand.  Contractures in her PIPs and DIPs are both significant with greater than 90 degree contact contractures in the PIPs and also significant loss of range of motion in the MCPs.  There are much less severe contractures on the left side.  She does acknowledge having worsened Raynauds in the right side than on the left probably as a consequence of that impaired blood flow, but she has not developed any significant distal digital ulcers.    She is otherwise been stable.  She denies significant inflammatory morning stiffness in any of the joints.  Recall that when she initially presented she was given a diagnosis of rheumatoid arthritis, based in part upon a very high titer CCP.    Review of Systems:   Pertinent items are noted in HPI or as below, remainder of complete ROS is negative.      No recent problems with hearing or vision. No swallowing problems.   No breathing difficulty, shortness of breath, coughing, or wheezing  No chest pain or palpitations  No heart burn, indigestion, abdominal pain, nausea, vomiting, diarrhea  No urination problems, no bloody, cloudy urine, no dysuria  No numbing, tingling, weakness  No headaches or confusion  No rashes. No easy bleeding or bruising.   Answers for HPI/ROS submitted by the patient  on 11/21/2019   General Symptoms: Yes  Skin Symptoms: Yes  HENT Symptoms: Yes  EYE SYMPTOMS: Yes  HEART SYMPTOMS: Yes  LUNG SYMPTOMS: Yes  INTESTINAL SYMPTOMS: Yes  URINARY SYMPTOMS: No  GYNECOLOGIC SYMPTOMS: No  BREAST SYMPTOMS: No  SKELETAL SYMPTOMS: Yes  BLOOD SYMPTOMS: No  NERVOUS SYSTEM SYMPTOMS: Yes  MENTAL HEALTH SYMPTOMS: Yes  Fever: No  Loss of appetite: No  Weight loss: No  Weight gain: Yes  Fatigue: Yes  Night sweats: Yes  Chills: No  Excessive thirst: Yes  Feeling hot or cold when others believe the temperature is normal: Yes  Loss of height: No  Post-operative complications: No  Surgical site pain: No  Hallucinations: No  Change in or Loss of Energy: No  Changes in hair: No  Changes in moles/birth marks: Yes  Itching: Yes  Rashes: No  Changes in nails: No  Acne: No  Hair in places you don't want it: No  Ear pain: No  Ear discharge: No  Hearing loss: Yes  Tinnitus: Yes  Nosebleeds: No  Congestion: No  Sinus pain: No  Tooth pain: No  Gum tenderness: No  Bleeding gums: No  Change in taste: No  Change in sense of smell: No  Dry mouth: Yes  Hearing aid used: No  Eye pain: No  Vision loss: Yes  Dry eyes: Yes  Watery eyes: Yes  Eye bulging: No  Double vision: No  Flashing of lights: No  Cough: No  Sputum or phlegm: No  Coughing up blood: No  Difficulty breating or shortness of breath: No  Snoring: No  Wheezing: No  Difficulty breathing on exertion: Yes  Chest pain or pressure: No  Fast or irregular heartbeat: No  Pain in legs with walking: Yes  Trouble breathing while lying down: No  Fingers or toes appear blue: Yes  High blood pressure: Yes  Low blood pressure: No  Fainting: No  Murmurs: No  Pacemaker: No  Varicose veins: No  Edema or swelling: No  Wake up at night with shortness of breath: No  Light-headedness: No  Heart burn or indigestion: Yes  Nausea: No  Vomiting: No  Abdominal pain: No  Bloating: Yes  Constipation: Yes  Diarrhea: No  Back pain: Yes  Muscle aches: Yes  Neck pain: Yes  Swollen joints:  No  Joint pain: Yes  Bone pain: No  Muscle cramps: No  Trouble with coordination: No  Dizziness or trouble with balance: No  Fainting or black-out spells: No  Memory loss: No  Headache: Yes  Seizures: No  Speech problems: No  Nervous or Anxious: No  Depression: Yes  Trouble sleeping: No  Trouble thinking or concentrating: No  Mood changes: No  Panic attacks: No      Active Medications:     Current Outpatient Medications:     aspirin-dipyridamole (AGGRENOX)  MG per 12 hr capsule, Take 1 capsule by mouth daily , Disp: , Rfl:     BIOTIN PO, 1 tablet daily, Disp: , Rfl:     Blood Pressure Monitoring KIT, Check BP every 7 days., Disp: 1 kit, Rfl: 0    cyanocobalamin (VITAMIN B12) 1000 MCG/ML injection, Inject 1 mL into the muscle every 30 days, Disp: , Rfl:     esomeprazole (NEXIUM) 40 MG DR capsule, Take 1 capsule (40 mg) by mouth daily Take 30-60 minutes before eating., Disp: 90 capsule, Rfl: 3    lifitegrast (XIIDRA) 5 % opthalmic solution, Apply 1 drop to eye every 12 hours., Disp: , Rfl:     LORazepam (ATIVAN) 0.5 MG tablet, Take 1 - 2 tablets by mouth three times daily as needed., Disp: 60 tablet, Rfl: 0    omeprazole (PRILOSEC) 40 MG DR capsule, Take 1 capsule (40 mg) by mouth 2 times daily, Disp: 180 capsule, Rfl: 3    ondansetron (ZOFRAN-ODT) 4 MG ODT tab, DISSOLVE 1 TABLET IN MOUTH EVERY EIGHT HOURS AS NEEDED FOR NAUSEA, Disp: 20 tablet, Rfl: 0    oxyCODONE-acetaminophen (PERCOCET) 5-325 MG tablet, Take 1 tablet by mouth every 4 hours as needed for moderate pain or severe pain. Maximum of 3 tablets per day., Disp: 90 tablet, Rfl: 0    pravastatin (PRAVACHOL) 10 MG tablet, Take 1 tablet (10 mg) by mouth daily, Disp: 30 tablet, Rfl: 5    TIROSINT 75 MCG capsule, Take 75 mcg by mouth daily., Disp: , Rfl:     carboxymethylcellulose PF (REFRESH PLUS) 0.5 % ophthalmic solution, Place 1 drop into both eyes 4 times daily as needed for dry eyes (Patient not taking: Reported on 12/5/2024), Disp: 120 mL, Rfl: 11     "cevimeline (EVOXAC) 30 MG capsule, TAKE ONE CAPSULE BY MOUTH THREE TIMES DAILY, Disp: 270 capsule, Rfl: 3    famotidine (PEPCID) 20 MG tablet, Take 1 tablet (20 mg) by mouth 2 times daily. (Patient not taking: Reported on 12/5/2024), Disp: 60 tablet, Rfl: 6    hydroxychloroquine (PLAQUENIL) 200 MG tablet, Take 1 tablet (200 mg) by mouth 2 times daily Annual Plaquenil toxicity eye screening required. (Patient not taking: Reported on 12/5/2024), Disp: 180 tablet, Rfl: 3    leucovorin (WELLCOVORIN) 25 MG tablet, Take 1 tablet (25 mg) by mouth daily Day after taking methotrexate (Patient not taking: Reported on 8/12/2021), Disp: 12 tablet, Rfl: 3    naloxone (EVZIO) 0.4 MG/0.4ML auto-injector, Inject 0.4 mLs (0.4 mg) into the muscle as needed for opioid reversal every 2-3 minutes until assistance arrives (Patient not taking: Reported on 12/5/2024), Disp: 0.8 mL, Rfl: 0    olopatadine (PATADAY) 0.2 % ophthalmic solution, Place 1 drop into both eyes daily (Patient not taking: Reported on 12/5/2024), Disp: 1 Bottle, Rfl: 11    olopatadine (PATANOL) 0.1 % ophthalmic solution, Place 1 drop into both eyes 2 times daily (Patient not taking: Reported on 8/12/2021), Disp: 5 mL, Rfl: 3    oxyCODONE (ROXICODONE) 5 MG tablet, Take 1 tablet (5 mg) by mouth every four hours as needed for moderate to severe pain.  Maximum of 6 tablets per day.  (Patient not taking: Reported on 8/12/2021), Disp: 180 tablet, Rfl: 0    Syringe/Needle, Disp, 27G X 1/2\" 1 ML MISC, Used with Methotrexate once weekly (Patient not taking: Reported on 8/12/2021), Disp: 12 each, Rfl: 3    valACYclovir (VALTREX) 500 MG tablet, Take 1 tablet (500 mg) by mouth 2 times daily (Patient not taking: Reported on 12/5/2024), Disp: 180 tablet, Rfl: 1    Vitamin D, Cholecalciferol, 1000 UNITS TABS, Take 2,000 Units by mouth daily  (Patient not taking: Reported on 12/5/2024), Disp: , Rfl:       Allergies:   Penicillin   Bactroban   Levaquin  Rifampin   Clarithromycin "   Ofloxacin  Sertraline                                                  Past Medical History:  Rheumatoid arthritis/scleroderma  Gastric antral vascular ectasia  Gastroesophageal reflux disease  Iron deficiency anemia  Irregular heart beat  Osteomyelitis, right 3rd finger  Raynaud's syndrome with gangrene  Scleroderma  Sjogren's syndrome  Systemic sclerosis  Metatarsalgia  Dysuria  Ulcer of finger  Sicca syndrome  Palpitations  Ischemia of upper extremity  Intercostal pain  Anxiety  Breast implant rupture  Palpitations     Past Surgical History:  Appendectomy   Colonoscopy (08/04/2016)  EGD x 5 (08/22/14 - 08/02/2016)  Mammoplasty Augmentation Bilateral     Family History:    The patient's family history includes Cancer in an other family member; Respiratory in her father.     Social History:  The patient reports that she is a current cigarette smoker. She has been smoking about 1.00 pack per day. She has never used smokeless tobacco. She reports that she does not drink alcohol or use drugs. She states she is attempting to get into a research program for quitting smoking.   PCP: Lindy Carbone  Marital Status: Single     Physical Exam:   /81 (BP Location: Right arm, Patient Position: Chair, Cuff Size: Adult Regular)   Pulse 58   Wt 68.4 kg (150 lb 12.8 oz)   SpO2 100%   BMI 25.09 kg/m     Wt Readings from Last 4 Encounters:   12/05/24 68.4 kg (150 lb 12.8 oz)   08/18/22 70.4 kg (155 lb 4.8 oz)   02/09/22 69.6 kg (153 lb 6.4 oz)   08/12/21 68 kg (150 lb)     Constitutional: Well-developed, appearing stated age; cooperative  Eyes: Normal EOM, PERRLA, vision, conjunctiva, sclera  ENT: Normal external ears, nose, hearing, lips, teeth, gums, throat. No mucous membrane lesions, normal saliva pool  Neck: No mass or thyroid enlargement  Resp: Lungs clear to auscultation, nl to palpation  CV: RRR, no murmurs, rubs or gallops, no edema  GI: No ABD mass or tenderness, no HSM  : Not tested  Lymph: No  cervical, supraclavicular, inguinal or epitrochlear nodes  MS: The TMJ, neck, shoulder, elbow, wrist, MCP/PIP/DIP, spine, hip, knee, ankle, and foot MTP/IP joints were examined and found normal. No active synovitis or altered joint anatomy. Full joint ROM. Normal  strength. No dactylitis,  tenosynovitis, enthesopathy.  Proximally 15-20   contracture in PIPs and 5-10   in DIPs of the left hand. Right hand greater than 90   contractures in PIPs 2-5.  Diminished calcinosis lesion over the lateral aspect of the right 4th PIP.  Contractures stable as per the patient.    Skin: No nail pitting, alopecia, rash, nodules or lesions  Skin thickening in hands and distal forearms appears stable to me and is minimal.  For the most part her skin has the atrophic changes of late systemic sclerosis with minimal or no skin thickening..  She has no digital ulcers now.  Neuro: Normal cranial nerves, strength, sensation, DTRs.   Psych: Normal judgement, orientation, memory, affect.     Imaging:  HRCT without contrast (08/22/2019):  Impression:  1. Mild bilateral posterior subpleural reticular opacities, dependent atelectasis versus early ILD. Consider prone positioning on follow-up  study.  2. Trace mucous plugging in the right middle lobe.  3. Stable incidental calcified splenic artery aneurysm.  4. Collapsed left breast implant and calcified right implant.  Sinai Carter MD    PFT Results (07/22/2019):  The FVC and FEV1 and the FEV1/FVC ratio are within normal limits. The inspiratory flow rates are within normal limits. Lung volumes are within normal limits. The diffusing capacity is normal. However, the diffusing capacity was not corrected for the   patient's hemoglobin  Impression:  Normal spirometry, lung volumes and diffusing capacity.    Laboratory:   Component      Latest Ref Rng & Units 8/15/2019 8/15/2019 8/15/2019           3:18 PM  3:24 PM  3:24 PM   WBC      4.0 - 11.0 10e9/L  5.2    RBC Count      3.8 - 5.2 10e12/L   3.71 (L)    Hemoglobin      11.7 - 15.7 g/dL  11.4 (L)    Hematocrit      35.0 - 47.0 %  37.1    MCV      78 - 100 fl  100    MCH      26.5 - 33.0 pg  30.7    MCHC      31.5 - 36.5 g/dL  30.7 (L)    RDW      10.0 - 15.0 %  13.2    Platelet Count      150 - 450 10e9/L  212    Diff Method        Automated Method    % Neutrophils      %  48.8    % Lymphocytes      %  40.5    % Monocytes      %  9.0    % Eosinophils      %  1.3    % Basophils      %  0.2    % Immature Granulocytes      %  0.2    Nucleated RBCs      0 /100  0    Absolute Neutrophil      1.6 - 8.3 10e9/L  2.6    Absolute Lymphocytes      0.8 - 5.3 10e9/L  2.1    Absolute Monocytes      0.0 - 1.3 10e9/L  0.5    Absolute Eosinophils      0.0 - 0.7 10e9/L  0.1    Absolute Basophils      0.0 - 0.2 10e9/L  0.0    Abs Immature Granulocytes      0 - 0.4 10e9/L  0.0    Absolute Nucleated RBC        0.0    Color Urine       Colorless     Appearance Urine       Clear     Glucose Urine      NEG:Negative mg/dL Negative     Bilirubin Urine      NEG:Negative Negative     Ketones Urine      NEG:Negative mg/dL Negative     Specific Gravity Urine      1.003 - 1.035 1.002 (L)     Blood Urine      NEG:Negative Small (A)     pH Urine      5.0 - 7.0 pH 6.0     Protein Albumin Urine      NEG:Negative mg/dL Negative     Urobilinogen mg/dL      0.0 - 2.0 mg/dL 0.0     Nitrite Urine      NEG:Negative Negative     Leukocyte Esterase Urine      NEG:Negative Negative     Source       Midstream Urine     WBC Urine      0 - 5 /HPF 1     RBC Urine      0 - 2 /HPF 1     Squamous Epithelial /HPF Urine      0 - 1 /HPF <1     Albumin      3.4 - 5.0 g/dL   4.3     Component      Latest Ref Rng & Units 8/15/2019 8/15/2019 8/15/2019 8/15/2019           3:24 PM  3:24 PM  3:24 PM  3:24 PM   Creatinine      0.52 - 1.04 mg/dL  0.78     GFR Estimate      >60 mL/min/1.73:m2  85     GFR Estimate If Black      >60 mL/min/1.73:m2  >90     Sed Rate      0 - 30 mm/h       CRP Inflammation      0.0 - 8.0  mg/L <2.9      AST      0 - 45 U/L   15    ALT      0 - 50 U/L    18   Albumin      3.4 - 5.0 g/dL         Component      Latest Ref Rng & Units 8/15/2019           3:24 PM   Sed Rate      0 - 30 mm/h 16               Again, thank you for allowing me to participate in the care of your patient.        Sincerely,        Brennen Child MD

## 2024-12-07 NOTE — PROGRESS NOTES
[facility]  Rheumatology Clinic  Brennen Child MD      Name: Willard Grayson  MRN: [id number]  Age: 54 year old  : 1965  Referring provider: Referred Self    Problem List:  1. 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   2. Associated marked neuropathic skin pain markedly improved with Lyrica with prior medication therapy with gabapentin.  3. Diffuse musculoskeletal pain requiring methotrexate plus CellCept, and narcotic to control, but also improved. Now off methotrexate due to GI issues.  4. Initial clinical overlap with anti-CCP seropositive rheumatoid arthritis with continued anti-CCP seropositivity as of 2010.  5. Raynaud's phenomena with digital ulcers with easily cracked skin.   6. GI involvement consistent with bacterial small bowel overgrowth with marked improvement after a course of neomycin followed by Dr. Glez; positive hydrogen breath test in 3/2012 - treated with rifaximin through Dr. Jacob  7. No convincing evidence of lung involvement on serial pulmonary function tests or clinically.   8. History of heart palpitations prior to the onset of SSc with stable symptoms since. Holter 2018 with PACs.  9. History of medication failures with Remicade, methotrexate, methotrexate plus CellCept and with Orencia and with improvement since initiation of IVIG but with complicating headaches and overall sense of malaise with IVIG infusions.   10. History of silicone breast implants.   11. Incidental finding of calcified splenic artery aneurysm.  12. EGD in 3/2012 with hiatal hernia and gastral antral vascular ectasia  13. Recurrent right third finger contracture with extensor surface ulceration, healed   14. Trochanteric bursitis, R>L    Assessment and Plan:   # Systemic  sclerosis   # Gastroesophageal reflux disease, esophagitis presence not specified  # Esophageal dysmotility  # Raynaud's disease with gangrene  # High risk medications (not anticoagulants) long-term use  # Rheumatoid arthritis involving multiple sites with positive rheumatoid factor (H)  # Metatarsalgia, unspecified laterality  # Metatarsalgia of both feet  # Osteopenia, unspecified location  # Systemic sclerosis  # MCDANIEL (dyspnea on exertion)  # Chest wall pain  # Skin lesion of lower extremity    Plan/recommendation:    I do think her systemic sclerosis is stable.  She is a little bit difficult to assess in terms of how she is doing overall with contemporaneous thyroid disease.    I do think she would benefit from increased stretching, particularly in her hip girdle and knees and we discussed that in some detail.  If she wants a more formal program we can refer her onto physical therapy.    We also discussed possibilities for treatment of the corn/area of possible calcinosis over the left fifth ray.  She would prefer not to see the podiatrist at this point already had an appointment and canceled that.    Therefore I have suggested to her padding with corn pads during the day and then doing some Epsom salt soaks perhaps with some gentle debridement at night to see if there is any calcinosis there whether it can be dislodged.    I think she is otherwise doing stably.  It is not clear to me that she would benefit from immunosuppression at this point.    I will plan to see her back in 6 months, sooner as needed for reassessment.    Today's visit was 25 minutes in face-to-face time with an additional 8 minutes spent in chart review,  and documentation completed on the date of service.    The longitudinal plan of care for the diagnosis(es)/condition(s) as documented were addressed during this visit. Due to the added complexity in care, I will continue to support Sofia in the subsequent management and with  ongoing continuity of care.       JANET Child MD, PhD  Professor of Medicine   Rheumatology              August 20, 2020 interval history:    Patient comes in today with him main concern being some new skin lesions that she has noted over her left leg and left arm over the last few months.  She does have a dermatology appointment within the [facility] system where she gets most of her care.  That is not however until October.    The other new issue this been bothering her a fair amount is neck pain and headaches.  She did get a cervical spine film just recently at [facility] and has not yet reviewed that with anyone there.    In terms of her scleroderma skin involvement she really feels that has been quite stable.  We have repeatedly tried to get her on methotrexate but she is tolerated that poorly but she has tolerated MMF better and remains on it.  The main problem for her has been the contractures in her hands and those have gradually worsened particularly in her right hand.    She is largely sheltering in place because of coronavirus, but she is still smoking cigarettes.  If anything she has increased doing so because of feeling under stress.      January 14, 2021 interval history:    Since we last seen the patient she feels she is really been doing pretty stably.  She went off of MMF because of development of a squamous cell carcinoma in the understanding that MMF could have been contributing to impaired tumor Amino-Cerv balance.  She really does not think she is gotten worse that since going off of it.  That was back on November 12.  The SCCA was on her forearm and is healed nicely.  She thinks that perhaps the skin in the area was a little more shiny than surrounding skin but there were no other difficulties with the healing.    She does acknowledge that over the last year or 2 her fingers to develop worsening contractures and she has difficulty writing.  Nonetheless she has tolerated methotrexate poorly and its not  clear how much it helped her when she was on it.  She does acknowledge that she does not do stretching exercises as reliably as she might.    She has had a few digital ulcers but they have been healing more quickly than usual.  She is not certain why but she does note that she is no longer taking care of her grandchildren so she is able to rest her hands and protect them better.    She has established with the pain clinic at [facility] and that they have taken over the prescribing of her narcotics and are working with her on a more comprehensive pain program.  She is really actually quite happy about that.    She continues to get some esophageal spasms at times but oddly this seems only to happen with the drinking of liquids.  Its not bad enough that she wants to do anything more about it.  We did talk about the possible value of all toyed minutes however.    The other new thing that she has had is some dizziness and tinnitus developing in her left ear much greater than right.  She is seeking care with an otolaryngologist at [facility] where most of her care is delivered however.    She is otherwise been pretty good.  She had a little bit of a flareup last week where she felt weaker and more achy and more sluggish.  She has not had a clear-cut coronavirus exposure to her knowledge.      August 12, 2021 interval history:    Since we have last seen the patient she feels she is really been doing very well.  She has been seeing the [facility] pain clinic and was having some difficulties that seemed unusual to her.  Ultimately after a lot of jitters and changes in her pain situation she was worked up and diagnosed with Graves' disease and is now being treated with methimazole.    She also was seen in the breast clinic where there was some evidence of fluid around her implants raising the question of whether there had been a rupture of them.  There was associated pain.  The fluid was removed and she has been feeling better since  then.    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  That was removed and the wound has healed up very well.    Interestingly, since she has had Botox injections she has not had any digital ulcers including over her right contractured PIP joints.  Although she really did not like the amount of pain she got with the Botox she is pretty happy with not having had any ulcers.    Potentially contributing to that improvement however is that she is not been smoking at all for the last 4 months.  Her breathing feels better to her as well and overall her breathing feels stable to her.  She has not had pulmonary function test for quite some time and is a bit overdue for those, but she is never had evidence of significant lung involvement.    Although she thinks her skin is somewhat sensitive she does not believe it is worsening at all and she believes is completely stable and her joints feel stable to her as well and her not bothering her significantly.    February 9, 2022 interval history:    Since we have last seen the patient she really has not felt well.  She thinks this is primarily since she is received her second coronavirus shot in the fall.  She however also had autoimmune thyroiditis diagnosed in April and has been placed on methimazole and does not think she is felt quite as well since then either.  She of course became euthyroid after being hyperthyroid and has gained weight with it.    Among the thing she has been noticing more recently is virtually constant heartburn despite being on omeprazole 40 mg twice daily.    She however also notices more widespread pain.  This includes the small joints of her hands and feet but she denies significant morning stiffness there.    She has not been on any disease modifying drugs now for quite some time including Plaquenil.  She does believe that her skin has been stable throughout all this and not worsening.    She does have a lot of fatigue, and  as part of recent laboratory work-up she had iron levels done that were on the low side of normal but she was anemic and her ferritin was low.  There were no other inflammatory markers so the ferritin could not be put fully in context.  She has done well in the past after being given IV iron.    She is not exercising regularly but does not think she is fundamentally more short of breath.  Her last PFTs were 2 years ago but these were completely stable with forced vital capacity 102% of predicted.  DLCO on those studies was at 84% of predicted also stable.  She does continue to smoke though she says very little.    August 18, 2022 interval history:    Since last clinic visit,     Patient reports having worsening swallowing issues with pills getting stuck in the back of the throat and having difficulty drinking water occasionally.  Points to the lower portion of the neck and states that it feels like things are getting stuck there.  Denies any choking or sensation denies any issues with consuming food.  Reports that she does use some vitamin once a day in the morning.    Reports consistent fatigue and states that she is being evaluated by endocrinology for radioactive ablation vs surgery for her Graves' disease.  Reports that her thyroid issues has been causing weight changes, fatigue, palpitations and sleep issues.    Reports that her shortness of breath has been moderately worse since last clinic visit.  In the last PFTs, she had significant worsening of her FVC and DLCO.  However, at that time patient reported that the test was not done properly.     She continues to smoke 1 pack/day.    She states that she is not much physically active.    December 29, 2022 interval history:    Since we have last seen the patient she has continued to struggle with getting the thyroid dosage correct but she feels like that has finally happened.  She definitely is feeling better with it at this point that she has and feels like she  is essentially back to her normal baseline.    In terms of features of her scleroderma she thinks that despite all of the difficulties with getting the thyroid correct, that she has been pretty stable with that.  Her GERD is variable day-to-day and she can have enough reflux that she wakes due to it but its not frequent.  She denies worsening dysphagia.  She actually drinks soda despite the fact that it bothers her somewhat and she gets some difficulty with moving that through.  She knows that she really does not have a lot of appetite but her weight has been stable or she has been gaining slightly.    She does have Raynauds episodes but is not had any of the more severe ones that she had earlier on in the disease process and has not had any digital ulcers though she does continue to smoke.    She does think her breathing may be gradually a little bit worse.  She thinks that is due to lack of exercise and the difficulties with adjusting her thyroid hormone.  She has not however had PFTs for some time although we encouraged her to do them last time and there are active orders on her chart.    She otherwise feels stable.  She denies significant musculoskeletal features, or any worsening of her skin.  She has been off of all medication now in terms of controlling skin joint and other inflammatory features of disease for quite some time.    July 13, 2023 interval history:    Since we have last seen the patient she feels she is done quite stably with respect to her systemic sclerosis.  Her biggest problems she believes derived from her active Graves' disease.  She is on methimazole for that but is not sure she is tolerating it all that well.  Her current endocrinologist has encouraged her to consider thyroidectomy and thyroid replacement.    She quit smoking in January.  Perhaps because of that in part and because of the thyroid disease in part she has gained significant weight and is up about 30 pounds.  Notably however  her exercise tolerance is actually improved overall.  She attributes that to stopping smoking.  She says she can climb stairs pretty well without a lot of difficulty.    Raynaud's phenomena also was better this winter than it has been, likely also due to stopping smoking.    She however is having more pain in her skin.  This does not feel to her like its her scleroderma and she thinks maybe it is more related to either her thyroid disease or the methimazole itself, either of which could be the case.    GERD is stable and other manifestations of her disease have been stable without any significant worsening.  She does continue to have Keratoconjunctivitis sicca.  That was a consideration in terms of possibility of radioactive thyroid ablation, which her endocrinologist consult against.    May 9, 2024 interval history:    Since we last seen the patient she has been having ongoing adjustment of her thyroid hormone status post her thyroidectomy.  She has gained quite a bit of weight and is not happy about that and she has had more troubles with anxiety but she does think things are starting to smooth out a little bit.  She is also getting cardiovascular workup right now but she denies having significant palpitations or any other clear-cut arrhythmias.    She did her pulmonary function test several weeks ago and those are completely stable.  She does feel that her exercise tolerance is stable as well, although she acknowledges not doing a lot from an aerobic exercise standpoint.    She is also not doing a lot of stretching exercises and is having a lot of discomfort with tightness in her hips and her knees and we discussed that in greater detail.    She is having difficulty ambulating as well because of a painful corn that may also include underlying calcinosis in the left fifth MTP had.  That has been going on for some time.  She had a immobilizing boot that she used when she developed tendinitis on the dorsal aspect of  that same foot a year or so ago that she has been using simply to be able to ambulate.    She continues to have Keratoconjunctivitis sicca especially affecting her eyes for which she sees Dr. Collazo and she is doing well with that therapy.    She continues to have Raynaud's phenomenon as well.  She has had gradually worsening contractures in the right fingers in particular and she cannot fully open the hand.  Contractures in her PIPs and DIPs are both significant with greater than 90 degree contact contractures in the PIPs and also significant loss of range of motion in the MCPs.  There are much less severe contractures on the left side.  She does acknowledge having worsened Raynauds in the right side than on the left probably as a consequence of that impaired blood flow, but she has not developed any significant distal digital ulcers.    She is otherwise been stable.  She denies significant inflammatory morning stiffness in any of the joints.  Recall that when she initially presented she was given a diagnosis of rheumatoid arthritis, based in part upon a very high titer CCP.    Review of Systems:   Pertinent items are noted in HPI or as below, remainder of complete ROS is negative.      No recent problems with hearing or vision. No swallowing problems.   No breathing difficulty, shortness of breath, coughing, or wheezing  No chest pain or palpitations  No heart burn, indigestion, abdominal pain, nausea, vomiting, diarrhea  No urination problems, no bloody, cloudy urine, no dysuria  No numbing, tingling, weakness  No headaches or confusion  No rashes. No easy bleeding or bruising.   Answers for HPI/ROS submitted by the patient on 11/21/2019   General Symptoms: Yes  Skin Symptoms: Yes  HENT Symptoms: Yes  EYE SYMPTOMS: Yes  HEART SYMPTOMS: Yes  LUNG SYMPTOMS: Yes  INTESTINAL SYMPTOMS: Yes  URINARY SYMPTOMS: No  GYNECOLOGIC SYMPTOMS: No  BREAST SYMPTOMS: No  SKELETAL SYMPTOMS: Yes  BLOOD SYMPTOMS: No  NERVOUS SYSTEM  SYMPTOMS: Yes  MENTAL HEALTH SYMPTOMS: Yes  Fever: No  Loss of appetite: No  Weight loss: No  Weight gain: Yes  Fatigue: Yes  Night sweats: Yes  Chills: No  Excessive thirst: Yes  Feeling hot or cold when others believe the temperature is normal: Yes  Loss of height: No  Post-operative complications: No  Surgical site pain: No  Hallucinations: No  Change in or Loss of Energy: No  Changes in hair: No  Changes in moles/birth marks: Yes  Itching: Yes  Rashes: No  Changes in nails: No  Acne: No  Hair in places you don't want it: No  Ear pain: No  Ear discharge: No  Hearing loss: Yes  Tinnitus: Yes  Nosebleeds: No  Congestion: No  Sinus pain: No  Tooth pain: No  Gum tenderness: No  Bleeding gums: No  Change in taste: No  Change in sense of smell: No  Dry mouth: Yes  Hearing aid used: No  Eye pain: No  Vision loss: Yes  Dry eyes: Yes  Watery eyes: Yes  Eye bulging: No  Double vision: No  Flashing of lights: No  Cough: No  Sputum or phlegm: No  Coughing up blood: No  Difficulty breating or shortness of breath: No  Snoring: No  Wheezing: No  Difficulty breathing on exertion: Yes  Chest pain or pressure: No  Fast or irregular heartbeat: No  Pain in legs with walking: Yes  Trouble breathing while lying down: No  Fingers or toes appear blue: Yes  High blood pressure: Yes  Low blood pressure: No  Fainting: No  Murmurs: No  Pacemaker: No  Varicose veins: No  Edema or swelling: No  Wake up at night with shortness of breath: No  Light-headedness: No  Heart burn or indigestion: Yes  Nausea: No  Vomiting: No  Abdominal pain: No  Bloating: Yes  Constipation: Yes  Diarrhea: No  Back pain: Yes  Muscle aches: Yes  Neck pain: Yes  Swollen joints: No  Joint pain: Yes  Bone pain: No  Muscle cramps: No  Trouble with coordination: No  Dizziness or trouble with balance: No  Fainting or black-out spells: No  Memory loss: No  Headache: Yes  Seizures: No  Speech problems: No  Nervous or Anxious: No  Depression: Yes  Trouble sleeping: No  Trouble  thinking or concentrating: No  Mood changes: No  Panic attacks: No      Active Medications:     Current Outpatient Medications:     aspirin-dipyridamole (AGGRENOX)  MG per 12 hr capsule, Take 1 capsule by mouth daily , Disp: , Rfl:     BIOTIN PO, 1 tablet daily, Disp: , Rfl:     Blood Pressure Monitoring KIT, Check BP every 7 days., Disp: 1 kit, Rfl: 0    cyanocobalamin (VITAMIN B12) 1000 MCG/ML injection, Inject 1 mL into the muscle every 30 days, Disp: , Rfl:     esomeprazole (NEXIUM) 40 MG DR capsule, Take 1 capsule (40 mg) by mouth daily Take 30-60 minutes before eating., Disp: 90 capsule, Rfl: 3    lifitegrast (XIIDRA) 5 % opthalmic solution, Apply 1 drop to eye every 12 hours., Disp: , Rfl:     LORazepam (ATIVAN) 0.5 MG tablet, Take 1 - 2 tablets by mouth three times daily as needed., Disp: 60 tablet, Rfl: 0    omeprazole (PRILOSEC) 40 MG DR capsule, Take 1 capsule (40 mg) by mouth 2 times daily, Disp: 180 capsule, Rfl: 3    ondansetron (ZOFRAN-ODT) 4 MG ODT tab, DISSOLVE 1 TABLET IN MOUTH EVERY EIGHT HOURS AS NEEDED FOR NAUSEA, Disp: 20 tablet, Rfl: 0    oxyCODONE-acetaminophen (PERCOCET) 5-325 MG tablet, Take 1 tablet by mouth every 4 hours as needed for moderate pain or severe pain. Maximum of 3 tablets per day., Disp: 90 tablet, Rfl: 0    pravastatin (PRAVACHOL) 10 MG tablet, Take 1 tablet (10 mg) by mouth daily, Disp: 30 tablet, Rfl: 5    TIROSINT 75 MCG capsule, Take 75 mcg by mouth daily., Disp: , Rfl:     carboxymethylcellulose PF (REFRESH PLUS) 0.5 % ophthalmic solution, Place 1 drop into both eyes 4 times daily as needed for dry eyes (Patient not taking: Reported on 12/5/2024), Disp: 120 mL, Rfl: 11    cevimeline (EVOXAC) 30 MG capsule, TAKE ONE CAPSULE BY MOUTH THREE TIMES DAILY, Disp: 270 capsule, Rfl: 3    famotidine (PEPCID) 20 MG tablet, Take 1 tablet (20 mg) by mouth 2 times daily. (Patient not taking: Reported on 12/5/2024), Disp: 60 tablet, Rfl: 6    hydroxychloroquine (PLAQUENIL) 200  "MG tablet, Take 1 tablet (200 mg) by mouth 2 times daily Annual Plaquenil toxicity eye screening required. (Patient not taking: Reported on 12/5/2024), Disp: 180 tablet, Rfl: 3    leucovorin (WELLCOVORIN) 25 MG tablet, Take 1 tablet (25 mg) by mouth daily Day after taking methotrexate (Patient not taking: Reported on 8/12/2021), Disp: 12 tablet, Rfl: 3    naloxone (EVZIO) 0.4 MG/0.4ML auto-injector, Inject 0.4 mLs (0.4 mg) into the muscle as needed for opioid reversal every 2-3 minutes until assistance arrives (Patient not taking: Reported on 12/5/2024), Disp: 0.8 mL, Rfl: 0    olopatadine (PATADAY) 0.2 % ophthalmic solution, Place 1 drop into both eyes daily (Patient not taking: Reported on 12/5/2024), Disp: 1 Bottle, Rfl: 11    olopatadine (PATANOL) 0.1 % ophthalmic solution, Place 1 drop into both eyes 2 times daily (Patient not taking: Reported on 8/12/2021), Disp: 5 mL, Rfl: 3    oxyCODONE (ROXICODONE) 5 MG tablet, Take 1 tablet (5 mg) by mouth every four hours as needed for moderate to severe pain.  Maximum of 6 tablets per day.  (Patient not taking: Reported on 8/12/2021), Disp: 180 tablet, Rfl: 0    Syringe/Needle, Disp, 27G X 1/2\" 1 ML MISC, Used with Methotrexate once weekly (Patient not taking: Reported on 8/12/2021), Disp: 12 each, Rfl: 3    valACYclovir (VALTREX) 500 MG tablet, Take 1 tablet (500 mg) by mouth 2 times daily (Patient not taking: Reported on 12/5/2024), Disp: 180 tablet, Rfl: 1    Vitamin D, Cholecalciferol, 1000 UNITS TABS, Take 2,000 Units by mouth daily  (Patient not taking: Reported on 12/5/2024), Disp: , Rfl:       Allergies:   Penicillin   Bactroban   Levaquin  Rifampin   Clarithromycin   Ofloxacin  Sertraline                                                  Past Medical History:  Rheumatoid arthritis/scleroderma  Gastric antral vascular ectasia  Gastroesophageal reflux disease  Iron deficiency anemia  Irregular heart beat  Osteomyelitis, right 3rd finger  Raynaud's syndrome with " gangrene  Scleroderma  Sjogren's syndrome  Systemic sclerosis  Metatarsalgia  Dysuria  Ulcer of finger  Sicca syndrome  Palpitations  Ischemia of upper extremity  Intercostal pain  Anxiety  Breast implant rupture  Palpitations     Past Surgical History:  Appendectomy   Colonoscopy (08/04/2016)  EGD x 5 (08/22/14 - 08/02/2016)  Mammoplasty Augmentation Bilateral     Family History:    The patient's family history includes Cancer in an other family member; Respiratory in her father.     Social History:  The patient reports that she is a current cigarette smoker. She has been smoking about 1.00 pack per day. She has never used smokeless tobacco. She reports that she does not drink alcohol or use drugs. She states she is attempting to get into a research program for quitting smoking.   PCP: Lindy Carbone  Marital Status: Single     Physical Exam:   /81 (BP Location: Right arm, Patient Position: Chair, Cuff Size: Adult Regular)   Pulse 58   Wt 68.4 kg (150 lb 12.8 oz)   SpO2 100%   BMI 25.09 kg/m     Wt Readings from Last 4 Encounters:   12/05/24 68.4 kg (150 lb 12.8 oz)   08/18/22 70.4 kg (155 lb 4.8 oz)   02/09/22 69.6 kg (153 lb 6.4 oz)   08/12/21 68 kg (150 lb)     Constitutional: Well-developed, appearing stated age; cooperative  Eyes: Normal EOM, PERRLA, vision, conjunctiva, sclera  ENT: Normal external ears, nose, hearing, lips, teeth, gums, throat. No mucous membrane lesions, normal saliva pool  Neck: No mass or thyroid enlargement  Resp: Lungs clear to auscultation, nl to palpation  CV: RRR, no murmurs, rubs or gallops, no edema  GI: No ABD mass or tenderness, no HSM  : Not tested  Lymph: No cervical, supraclavicular, inguinal or epitrochlear nodes  MS: The TMJ, neck, shoulder, elbow, wrist, MCP/PIP/DIP, spine, hip, knee, ankle, and foot MTP/IP joints were examined and found normal. No active synovitis or altered joint anatomy. Full joint ROM. Normal  strength. No dactylitis,   tenosynovitis, enthesopathy.  Proximally 15-20   contracture in PIPs and 5-10   in DIPs of the left hand. Right hand greater than 90   contractures in PIPs 2-5.  Diminished calcinosis lesion over the lateral aspect of the right 4th PIP.  Contractures stable as per the patient.    Skin: No nail pitting, alopecia, rash, nodules or lesions  Skin thickening in hands and distal forearms appears stable to me and is minimal.  For the most part her skin has the atrophic changes of late systemic sclerosis with minimal or no skin thickening..  She has no digital ulcers now.  Neuro: Normal cranial nerves, strength, sensation, DTRs.   Psych: Normal judgement, orientation, memory, affect.     Imaging:  HRCT without contrast (08/22/2019):  Impression:  1. Mild bilateral posterior subpleural reticular opacities, dependent atelectasis versus early ILD. Consider prone positioning on follow-up  study.  2. Trace mucous plugging in the right middle lobe.  3. Stable incidental calcified splenic artery aneurysm.  4. Collapsed left breast implant and calcified right implant.  Sinai Carter MD    PFT Results (07/22/2019):  The FVC and FEV1 and the FEV1/FVC ratio are within normal limits. The inspiratory flow rates are within normal limits. Lung volumes are within normal limits. The diffusing capacity is normal. However, the diffusing capacity was not corrected for the   patient's hemoglobin  Impression:  Normal spirometry, lung volumes and diffusing capacity.    Laboratory:   Component      Latest Ref Rng & Units 8/15/2019 8/15/2019 8/15/2019           3:18 PM  3:24 PM  3:24 PM   WBC      4.0 - 11.0 10e9/L  5.2    RBC Count      3.8 - 5.2 10e12/L  3.71 (L)    Hemoglobin      11.7 - 15.7 g/dL  11.4 (L)    Hematocrit      35.0 - 47.0 %  37.1    MCV      78 - 100 fl  100    MCH      26.5 - 33.0 pg  30.7    MCHC      31.5 - 36.5 g/dL  30.7 (L)    RDW      10.0 - 15.0 %  13.2    Platelet Count      150 - 450 10e9/L  212    Diff Method         Automated Method    % Neutrophils      %  48.8    % Lymphocytes      %  40.5    % Monocytes      %  9.0    % Eosinophils      %  1.3    % Basophils      %  0.2    % Immature Granulocytes      %  0.2    Nucleated RBCs      0 /100  0    Absolute Neutrophil      1.6 - 8.3 10e9/L  2.6    Absolute Lymphocytes      0.8 - 5.3 10e9/L  2.1    Absolute Monocytes      0.0 - 1.3 10e9/L  0.5    Absolute Eosinophils      0.0 - 0.7 10e9/L  0.1    Absolute Basophils      0.0 - 0.2 10e9/L  0.0    Abs Immature Granulocytes      0 - 0.4 10e9/L  0.0    Absolute Nucleated RBC        0.0    Color Urine       Colorless     Appearance Urine       Clear     Glucose Urine      NEG:Negative mg/dL Negative     Bilirubin Urine      NEG:Negative Negative     Ketones Urine      NEG:Negative mg/dL Negative     Specific Gravity Urine      1.003 - 1.035 1.002 (L)     Blood Urine      NEG:Negative Small (A)     pH Urine      5.0 - 7.0 pH 6.0     Protein Albumin Urine      NEG:Negative mg/dL Negative     Urobilinogen mg/dL      0.0 - 2.0 mg/dL 0.0     Nitrite Urine      NEG:Negative Negative     Leukocyte Esterase Urine      NEG:Negative Negative     Source       Midstream Urine     WBC Urine      0 - 5 /HPF 1     RBC Urine      0 - 2 /HPF 1     Squamous Epithelial /HPF Urine      0 - 1 /HPF <1     Albumin      3.4 - 5.0 g/dL   4.3     Component      Latest Ref Rng & Units 8/15/2019 8/15/2019 8/15/2019 8/15/2019           3:24 PM  3:24 PM  3:24 PM  3:24 PM   Creatinine      0.52 - 1.04 mg/dL  0.78     GFR Estimate      >60 mL/min/1.73:m2  85     GFR Estimate If Black      >60 mL/min/1.73:m2  >90     Sed Rate      0 - 30 mm/h       CRP Inflammation      0.0 - 8.0 mg/L <2.9      AST      0 - 45 U/L   15    ALT      0 - 50 U/L    18   Albumin      3.4 - 5.0 g/dL         Component      Latest Ref Rng & Units 8/15/2019           3:24 PM   Sed Rate      0 - 30 mm/h 16            Urine      0 - 1 /HPF <1     Albumin      3.4 - 5.0 g/dL   4.3     Component      Latest Ref Rng & Units 8/15/2019 8/15/2019 8/15/2019 8/15/2019           3:24 PM  3:24 PM  3:24 PM  3:24 PM   Creatinine      0.52 - 1.04 mg/dL  0.78     GFR Estimate      >60 mL/min/1.73:m2  85     GFR Estimate If Black      >60 mL/min/1.73:m2  >90     Sed Rate      0 - 30 mm/h       CRP Inflammation      0.0 - 8.0 mg/L <2.9      AST      0 - 45 U/L   15    ALT      0 - 50 U/L    18   Albumin      3.4 - 5.0 g/dL         Component      Latest Ref Rng & Units 8/15/2019           3:24 PM   Sed Rate      0 - 30 mm/h 16

## 2024-12-11 ENCOUNTER — MYC MEDICAL ADVICE (OUTPATIENT)
Dept: PULMONOLOGY | Facility: CLINIC | Age: 59
End: 2024-12-11
Payer: MEDICARE

## 2024-12-11 DIAGNOSIS — I73.01 RAYNAUD'S DISEASE WITH GANGRENE (H): ICD-10-CM

## 2024-12-11 DIAGNOSIS — L98.491 SKIN ULCER OF FINGER, LIMITED TO BREAKDOWN OF SKIN (H): ICD-10-CM

## 2024-12-11 DIAGNOSIS — M34.9 SYSTEMIC SCLEROSIS (H): Primary | ICD-10-CM

## 2024-12-17 NOTE — TELEPHONE ENCOUNTER
She has calcinosis in several locations on the films. No evidence for infection. CAn't be sure if her  ulcer and new ulcer are ischemic or due to calcinosis.     Suggest;     Stop smoking.   We can try vasodilation with either sildenafil or tadalafil.   And/or try persantine (or aggrenox) or trental   Avoid microtrauma ( use a small corn pad under a bandage to avoid pressure on the ulcer; this is particularly important if calcinosis is what is going on .

## 2024-12-18 NOTE — TELEPHONE ENCOUNTER
Placed call to patient to discuss Dr. Child's treatment recommendations after reviewing hand films. Patient agrees to try to stop smoking and would like to discuss side effects of medications Dr. Child recommends. Patient accepted offer of a telephone visit with clinic pharmacist, JAYME referral placed.  Stephanie Urbano RN  [facility]

## 2024-12-23 ENCOUNTER — VIRTUAL VISIT (OUTPATIENT)
Dept: PHARMACY | Facility: CLINIC | Age: 59
End: 2024-12-23
Attending: INTERNAL MEDICINE
Payer: MEDICARE

## 2024-12-23 DIAGNOSIS — M34.9 SYSTEMIC SCLEROSIS (H): Primary | ICD-10-CM

## 2024-12-23 DIAGNOSIS — I73.01 RAYNAUD'S DISEASE WITH GANGRENE (H): ICD-10-CM

## 2024-12-23 NOTE — Clinical Note
Patient agrees to starting sildenafil following discussion of side effects, what dose would you like ordered? She is already taking Aggrenox (outside provider?) although only once daily, wondering given 12-hour capsule if she should bump up to twice daily?

## 2024-12-23 NOTE — PROGRESS NOTES
Medication Therapy Management (MTM) Encounter    ASSESSMENT:                            Medication Adherence/Access: No issues identified. Note patient sees an outside health system, med rec completed.    Systemic sclerosis/Raynaud's/RA: Patient with finger ulcerations due to Raynaud's phenomenon, would benefit from starting additional therapy with vasodilator as discussed with provider. Note she is already taking dipyridamole. Sildenafil education provided today including administration, monitoring, common and serious side effects (including flushing, indigestion, headache, nosebleed, congestion, sudden hearing loss), and time to effectiveness.     PLAN:                            Writer will confirm sildenafil dosing with provider, then send order to [facility] per patient request. Report any side effects, especially any sudden hearing changes. If you do experience hearing changes, stop taking the medication. Update - per provider, start sildenafil 20 mg three times daily.  Continue aspirin-dipyridamole  mg daily. Writer will discuss increasing to twice daily with provider, per 12-hour ER formulation and original written instructions. Update - per provider, continue once daily dosing until reassessment.  Increase cevimeline 30 mg to twice daily if tolerated to help with dry mouth symptoms. You can take this up to three times daily.  Continue Xiidra 5% drops every 12 hours.    Follow-up: 4 weeks for sildenafil tolerability. Sees rheumatologist 6/5/25.    SUBJECTIVE/OBJECTIVE:                          Sofia Grayson is a 59 year old female called for an initial visit. She was referred to me from Brennen Child MD.    Reason for visit: Discuss options for Raynaud's.    Allergies/ADRs: Reviewed in chart  Past Medical History: Reviewed in chart  Tobacco: She reports that she has been smoking cigarettes. She has never used smokeless tobacco.Nicotine/Tobacco Cessation Plan  Not discussed today  Alcohol: not  "assessed today    Medication Adherence/Access: No issues identified. Note patient sees an outside health system, med rec completed.    Systemic sclerosis/Raynaud's/RA:   Lyrica 75 mg daily  Aspirin-dipyridamole  mg once daily  Cevimeline 30 mg three times daily  Xiidra 5% ophthalmic solution, 1 drop every 12 hours    Previously tried: Remicade, methotrexate, mycophenolate (developed BCC), Orencia, IVIG (did not tolerate, headaches)    Raynaud's symptoms include severely painful ulcers on fingertips, purple discoloration as well as numbness and tingling. Hands are also \"very crippled\" from scleroderma. Difficult to use her hands. She is already taking Aggrenox, it is written for twice daily but had she only been taking it once daily. Also only taking cevimeline once daily instead of three times daily as higher doses made her drool. She might try increasing to twice daily because she has had worsening dry mouth. Lyrica is helpful for neuropathic skin pain.    Today's Vitals: There were no vitals taken for this visit.  ----------------    I spent 35 minutes with this patient today. All changes were made via collaborative practice agreement with Brennen Child. A copy of the visit note was provided to the patient's provider(s).    A summary of these recommendations was sent via [employer].    Marry Thakur, PharmD  Medication Therapy Management Pharmacist  [facility]     Telemedicine Visit Details  Type of service:  Telephone visit  Start Time:  1045  End Time:  1120     Medication Therapy Recommendations  No medication therapy recommendations to display   "

## 2024-12-24 RX ORDER — CHLORAL HYDRATE 500 MG
2 CAPSULE ORAL DAILY
COMMUNITY

## 2024-12-24 RX ORDER — PREGABALIN 75 MG/1
75 CAPSULE ORAL DAILY
COMMUNITY

## 2024-12-24 NOTE — PATIENT INSTRUCTIONS
"Recommendations from today's MTM visit:                                                      Writer will confirm sildenafil dosing with provider, then send order to [facility] per patient request. Report any side effects, especially any sudden hearing changes. If you do experience hearing changes, stop taking the medication.  Continue aspirin-dipyridamole  mg daily. Writer will discuss increasing to twice daily with provider, per 12-hour ER formulation and original written instructions.  Increase cevimeline 30 mg to twice daily if tolerated to help with dry mouth symptoms. You can take this up to three times daily.  Continue Xiidra 5% drops every 12 hours.    Follow-up: 4 weeks for sildenafil tolerability. Sees rheumatologist 6/5/25.    It was great speaking with you today.  I value your experience and would be very thankful for your time in providing feedback in our clinic survey. In the next few days, you may receive an email or text message from [facility] [employer] with a link to a survey related to your  clinical pharmacist.\"     To schedule another MTM appointment, please call the clinic directly or you may call the MTM scheduling line at [phone].    My Clinical Pharmacist's contact information:                                                      Please feel free to contact me with any questions or concerns you have.      Marry Thakur, PharmD  Medication Therapy Management Pharmacist  [facility]    "

## 2024-12-26 RX ORDER — SILDENAFIL CITRATE 20 MG/1
20 TABLET ORAL 3 TIMES DAILY
Qty: 90 TABLET | Refills: 2 | Status: SHIPPED | OUTPATIENT
Start: 2024-12-26

## 2025-01-29 ENCOUNTER — TELEPHONE (OUTPATIENT)
Dept: RHEUMATOLOGY | Facility: CLINIC | Age: 60
End: 2025-01-29
Payer: MEDICARE

## 2025-01-29 NOTE — TELEPHONE ENCOUNTER
MTM appointment canceled, we made one more attempt to reschedule.     Routing back to referring provider and MTM Pharmacist Team        Dionne Potts  MTM

## 2025-05-24 ENCOUNTER — HEALTH MAINTENANCE LETTER (OUTPATIENT)
Age: 60
End: 2025-05-24

## 2025-08-11 DIAGNOSIS — K21.01 GASTROESOPHAGEAL REFLUX DISEASE WITH ESOPHAGITIS AND HEMORRHAGE: ICD-10-CM

## 2025-08-13 RX ORDER — ESOMEPRAZOLE MAGNESIUM 40 MG/1
40 CAPSULE, DELAYED RELEASE ORAL DAILY
Qty: 90 CAPSULE | Refills: 0 | Status: SHIPPED | OUTPATIENT
Start: 2025-08-13

## 2025-08-25 ENCOUNTER — TELEPHONE (OUTPATIENT)
Dept: PULMONOLOGY | Facility: CLINIC | Age: 60
End: 2025-08-25
Payer: MEDICARE